# Patient Record
Sex: MALE | Race: WHITE | NOT HISPANIC OR LATINO | URBAN - METROPOLITAN AREA
[De-identification: names, ages, dates, MRNs, and addresses within clinical notes are randomized per-mention and may not be internally consistent; named-entity substitution may affect disease eponyms.]

---

## 2017-07-28 ENCOUNTER — INPATIENT (INPATIENT)
Facility: HOSPITAL | Age: 62
LOS: 1 days | Discharge: HOME | DRG: 884 | End: 2017-07-30
Attending: INTERNAL MEDICINE | Admitting: HOSPITALIST
Payer: COMMERCIAL

## 2017-07-28 VITALS
OXYGEN SATURATION: 95 % | TEMPERATURE: 98 F | HEIGHT: 65 IN | HEART RATE: 121 BPM | SYSTOLIC BLOOD PRESSURE: 154 MMHG | RESPIRATION RATE: 28 BRPM | DIASTOLIC BLOOD PRESSURE: 87 MMHG | WEIGHT: 139.99 LBS

## 2017-07-28 DIAGNOSIS — Z95.810 PRESENCE OF AUTOMATIC (IMPLANTABLE) CARDIAC DEFIBRILLATOR: Chronic | ICD-10-CM

## 2017-07-28 DIAGNOSIS — Z95.828 PRESENCE OF OTHER VASCULAR IMPLANTS AND GRAFTS: Chronic | ICD-10-CM

## 2017-07-28 LAB
BASE EXCESS BLDA CALC-SCNC: 1.5 MMOL/L — SIGNIFICANT CHANGE UP (ref -3–3)
BASOPHILS # BLD AUTO: 0 K/UL — SIGNIFICANT CHANGE UP (ref 0–0.2)
BASOPHILS NFR BLD AUTO: 0.8 % — SIGNIFICANT CHANGE UP (ref 0–2)
BLOOD GAS COMMENTS ARTERIAL: SIGNIFICANT CHANGE UP
EOSINOPHIL # BLD AUTO: 0.1 K/UL — SIGNIFICANT CHANGE UP (ref 0–0.5)
EOSINOPHIL NFR BLD AUTO: 1.2 % — SIGNIFICANT CHANGE UP (ref 0–5)
GAS PNL BLDA: SIGNIFICANT CHANGE UP
HCO3 BLDA-SCNC: 26 MMOL/L — SIGNIFICANT CHANGE UP (ref 20–26)
HCT VFR BLD CALC: 43.9 % — SIGNIFICANT CHANGE UP (ref 42–52)
HGB BLD-MCNC: 14 G/DL — SIGNIFICANT CHANGE UP (ref 14–18)
HOROWITZ INDEX BLDA+IHG-RTO: 21 — SIGNIFICANT CHANGE UP
LYMPHOCYTES # BLD AUTO: 1.5 K/UL — SIGNIFICANT CHANGE UP (ref 1–4.8)
LYMPHOCYTES # BLD AUTO: 22.7 % — SIGNIFICANT CHANGE UP (ref 20–55)
MAGNESIUM SERPL-MCNC: 2.1 MG/DL — SIGNIFICANT CHANGE UP (ref 1.6–2.6)
MCHC RBC-ENTMCNC: 24.3 PG — LOW (ref 27–31)
MCHC RBC-ENTMCNC: 31.9 G/DL — LOW (ref 32–36)
MCV RBC AUTO: 76.3 FL — LOW (ref 80–94)
MONOCYTES # BLD AUTO: 0.9 K/UL — HIGH (ref 0–0.8)
MONOCYTES NFR BLD AUTO: 13.1 % — HIGH (ref 3–10)
NEUTROPHILS # BLD AUTO: 4.1 K/UL — SIGNIFICANT CHANGE UP (ref 1.8–8)
NEUTROPHILS NFR BLD AUTO: 62 % — SIGNIFICANT CHANGE UP (ref 37–73)
NT-PROBNP SERPL-SCNC: HIGH PG/ML (ref 0–300)
PCO2 BLDA: 31 MMHG — LOW (ref 35–45)
PH BLDA: 7.5 — HIGH (ref 7.35–7.45)
PHOSPHATE SERPL-MCNC: 3.6 MG/DL — SIGNIFICANT CHANGE UP (ref 2.4–4.7)
PLATELET # BLD AUTO: 251 K/UL — SIGNIFICANT CHANGE UP (ref 150–400)
PO2 BLDA: 75 MMHG — LOW (ref 83–108)
RBC # BLD: 5.75 M/UL — SIGNIFICANT CHANGE UP (ref 4.6–6.2)
RBC # FLD: 21.6 % — HIGH (ref 11–15.6)
SAO2 % BLDA: 96 % — SIGNIFICANT CHANGE UP (ref 95–99)
WBC # BLD: 6.6 K/UL — SIGNIFICANT CHANGE UP (ref 4.8–10.8)
WBC # FLD AUTO: 6.6 K/UL — SIGNIFICANT CHANGE UP (ref 4.8–10.8)

## 2017-07-28 PROCEDURE — 71010: CPT | Mod: 26

## 2017-07-28 RX ORDER — SODIUM CHLORIDE 9 MG/ML
3 INJECTION INTRAMUSCULAR; INTRAVENOUS; SUBCUTANEOUS ONCE
Qty: 0 | Refills: 0 | Status: COMPLETED | OUTPATIENT
Start: 2017-07-28 | End: 2017-07-28

## 2017-07-28 RX ADMIN — SODIUM CHLORIDE 3 MILLILITER(S): 9 INJECTION INTRAMUSCULAR; INTRAVENOUS; SUBCUTANEOUS at 22:31

## 2017-07-28 NOTE — ED PROVIDER NOTE - PMH
AICD (automatic cardioverter/defibrillator) present    Atrial fibrillation    Cardiomyopathy    CVA (cerebral vascular accident)    Hyperlipidemia    Hypertension    RBBB    Thrombus of left atrial appendage

## 2017-07-28 NOTE — ED PROVIDER NOTE - CARE PLAN
Principal Discharge DX:	Atrial fibrillation with RVR  Secondary Diagnosis:	Dyspnea  Secondary Diagnosis:	Congestive heart failure (CHF)

## 2017-07-28 NOTE — ED PROVIDER NOTE - MEDICAL DECISION MAKING DETAILS
multiple bedside cardiac reevlaution do not suspect acute cva need for immediate rate control to prevent detioration loryitple bedside reassessments cardiopulmonary status

## 2017-07-28 NOTE — ED ADULT TRIAGE NOTE - CHIEF COMPLAINT QUOTE
patient complaining of respitory distress - upon arrival by ems at the home, patient oxygenation was 74 and placed on 100% nrb. - patient was also complaining of right arm and wrist discomfort

## 2017-07-28 NOTE — ED PROVIDER NOTE - OBJECTIVE STATEMENT
63 y/o M pt with hx of AICD, A-Fib, Cardiomyopathy, CVA (Residual right upper extremity weakness), HLD, HTN, RBBB,  presents to ED BIBA for difficulty breathing starting today. Per EMS, Wife states increased weakness to right upper extremity which isn't normal for pt. Unknown time of onset, Unknown DNR/DNI. Pt is not oxygen dependent at home. Pt walks independently. denies fever. denies HA or neck pain. no chest pain.  no abd pain. no n/v/d. no urinary f/u/d. no back pain. no motor or sensory deficits. denies drug use. no recent travel. no rash. no other acute issues symptoms or concerns.  PMD: Dennise   Nephrologist: Jayne  Cardiologist: Ke

## 2017-07-28 NOTE — ED PROVIDER NOTE - CARDIAC, MLM
Normal rate, regular rhythm.  Heart sounds S1, S2.  No murmurs,  +2 pitting edema b/l lower extremities

## 2017-07-28 NOTE — ED PROVIDER NOTE - CRITICAL CARE PROVIDED
direct patient care (not related to procedure)/interpretation of diagnostic studies/consultation with other physicians/documentation/additional history taking

## 2017-07-29 DIAGNOSIS — I50.23 ACUTE ON CHRONIC SYSTOLIC (CONGESTIVE) HEART FAILURE: ICD-10-CM

## 2017-07-29 DIAGNOSIS — R60.0 LOCALIZED EDEMA: ICD-10-CM

## 2017-07-29 DIAGNOSIS — Z71.89 OTHER SPECIFIED COUNSELING: ICD-10-CM

## 2017-07-29 DIAGNOSIS — I63.9 CEREBRAL INFARCTION, UNSPECIFIED: ICD-10-CM

## 2017-07-29 DIAGNOSIS — Z95.810 PRESENCE OF AUTOMATIC (IMPLANTABLE) CARDIAC DEFIBRILLATOR: ICD-10-CM

## 2017-07-29 DIAGNOSIS — E78.5 HYPERLIPIDEMIA, UNSPECIFIED: ICD-10-CM

## 2017-07-29 DIAGNOSIS — I48.91 UNSPECIFIED ATRIAL FIBRILLATION: ICD-10-CM

## 2017-07-29 DIAGNOSIS — N18.3 CHRONIC KIDNEY DISEASE, STAGE 3 (MODERATE): ICD-10-CM

## 2017-07-29 DIAGNOSIS — Z29.9 ENCOUNTER FOR PROPHYLACTIC MEASURES, UNSPECIFIED: ICD-10-CM

## 2017-07-29 DIAGNOSIS — F01.51 VASCULAR DEMENTIA, UNSPECIFIED SEVERITY, WITH BEHAVIORAL DISTURBANCE: ICD-10-CM

## 2017-07-29 DIAGNOSIS — R13.12 DYSPHAGIA, OROPHARYNGEAL PHASE: ICD-10-CM

## 2017-07-29 LAB
APTT BLD: 40.8 SEC — HIGH (ref 27.5–37.4)
INR BLD: 3.42 RATIO — HIGH (ref 0.88–1.16)
PROTHROM AB SERPL-ACNC: 38.6 SEC — HIGH (ref 9.8–12.7)

## 2017-07-29 PROCEDURE — 73090 X-RAY EXAM OF FOREARM: CPT | Mod: 26,LT

## 2017-07-29 PROCEDURE — 70450 CT HEAD/BRAIN W/O DYE: CPT | Mod: 26

## 2017-07-29 PROCEDURE — 73070 X-RAY EXAM OF ELBOW: CPT | Mod: 26,RT

## 2017-07-29 PROCEDURE — 73130 X-RAY EXAM OF HAND: CPT | Mod: 26,RT

## 2017-07-29 PROCEDURE — 99223 1ST HOSP IP/OBS HIGH 75: CPT | Mod: AI

## 2017-07-29 PROCEDURE — 99291 CRITICAL CARE FIRST HOUR: CPT

## 2017-07-29 PROCEDURE — 12345: CPT | Mod: NC

## 2017-07-29 RX ORDER — FUROSEMIDE 40 MG
40 TABLET ORAL
Qty: 0 | Refills: 0 | Status: DISCONTINUED | OUTPATIENT
Start: 2017-07-29 | End: 2017-07-29

## 2017-07-29 RX ORDER — NITROGLYCERIN 6.5 MG
0.5 CAPSULE, EXTENDED RELEASE ORAL EVERY 6 HOURS
Qty: 0 | Refills: 0 | Status: DISCONTINUED | OUTPATIENT
Start: 2017-07-29 | End: 2017-07-30

## 2017-07-29 RX ORDER — ALBUTEROL 90 UG/1
2 AEROSOL, METERED ORAL EVERY 6 HOURS
Qty: 0 | Refills: 0 | Status: DISCONTINUED | OUTPATIENT
Start: 2017-07-29 | End: 2017-07-29

## 2017-07-29 RX ORDER — DIGOXIN 250 MCG
0.5 TABLET ORAL ONCE
Qty: 0 | Refills: 0 | Status: COMPLETED | OUTPATIENT
Start: 2017-07-29 | End: 2017-07-29

## 2017-07-29 RX ORDER — CARVEDILOL PHOSPHATE 80 MG/1
3.12 CAPSULE, EXTENDED RELEASE ORAL EVERY 12 HOURS
Qty: 0 | Refills: 0 | Status: DISCONTINUED | OUTPATIENT
Start: 2017-07-29 | End: 2017-07-29

## 2017-07-29 RX ORDER — FUROSEMIDE 40 MG
40 TABLET ORAL ONCE
Qty: 0 | Refills: 0 | Status: COMPLETED | OUTPATIENT
Start: 2017-07-29 | End: 2017-07-29

## 2017-07-29 RX ORDER — HYDRALAZINE HCL 50 MG
10 TABLET ORAL
Qty: 0 | Refills: 0 | Status: DISCONTINUED | OUTPATIENT
Start: 2017-07-29 | End: 2017-07-29

## 2017-07-29 RX ORDER — CARVEDILOL PHOSPHATE 80 MG/1
3.12 CAPSULE, EXTENDED RELEASE ORAL ONCE
Qty: 0 | Refills: 0 | Status: COMPLETED | OUTPATIENT
Start: 2017-07-29 | End: 2017-07-29

## 2017-07-29 RX ORDER — DIGOXIN 250 MCG
0.12 TABLET ORAL DAILY
Qty: 0 | Refills: 0 | Status: DISCONTINUED | OUTPATIENT
Start: 2017-07-30 | End: 2017-07-30

## 2017-07-29 RX ORDER — PANTOPRAZOLE SODIUM 20 MG/1
40 TABLET, DELAYED RELEASE ORAL
Qty: 0 | Refills: 0 | Status: DISCONTINUED | OUTPATIENT
Start: 2017-07-29 | End: 2017-07-30

## 2017-07-29 RX ORDER — ALBUTEROL 90 UG/1
2.5 AEROSOL, METERED ORAL EVERY 6 HOURS
Qty: 0 | Refills: 0 | Status: DISCONTINUED | OUTPATIENT
Start: 2017-07-29 | End: 2017-07-30

## 2017-07-29 RX ORDER — ATORVASTATIN CALCIUM 80 MG/1
40 TABLET, FILM COATED ORAL AT BEDTIME
Qty: 0 | Refills: 0 | Status: DISCONTINUED | OUTPATIENT
Start: 2017-07-29 | End: 2017-07-29

## 2017-07-29 RX ORDER — ASPIRIN/CALCIUM CARB/MAGNESIUM 324 MG
325 TABLET ORAL ONCE
Qty: 0 | Refills: 0 | Status: COMPLETED | OUTPATIENT
Start: 2017-07-29 | End: 2017-07-29

## 2017-07-29 RX ORDER — FUROSEMIDE 40 MG
40 TABLET ORAL DAILY
Qty: 0 | Refills: 0 | Status: DISCONTINUED | OUTPATIENT
Start: 2017-07-29 | End: 2017-07-29

## 2017-07-29 RX ORDER — FUROSEMIDE 40 MG
40 TABLET ORAL DAILY
Qty: 0 | Refills: 0 | Status: DISCONTINUED | OUTPATIENT
Start: 2017-07-30 | End: 2017-07-30

## 2017-07-29 RX ORDER — CARVEDILOL PHOSPHATE 80 MG/1
6.25 CAPSULE, EXTENDED RELEASE ORAL EVERY 12 HOURS
Qty: 0 | Refills: 0 | Status: DISCONTINUED | OUTPATIENT
Start: 2017-07-29 | End: 2017-07-30

## 2017-07-29 RX ADMIN — CARVEDILOL PHOSPHATE 6.25 MILLIGRAM(S): 80 CAPSULE, EXTENDED RELEASE ORAL at 17:57

## 2017-07-29 RX ADMIN — Medication 40 MILLIGRAM(S): at 18:35

## 2017-07-29 RX ADMIN — ALBUTEROL 2.5 MILLIGRAM(S): 90 AEROSOL, METERED ORAL at 17:58

## 2017-07-29 RX ADMIN — Medication 40 MILLIGRAM(S): at 08:56

## 2017-07-29 RX ADMIN — Medication 40 MILLIGRAM(S): at 17:57

## 2017-07-29 RX ADMIN — Medication 0.5 INCH(S): at 18:35

## 2017-07-29 RX ADMIN — PANTOPRAZOLE SODIUM 40 MILLIGRAM(S): 20 TABLET, DELAYED RELEASE ORAL at 08:13

## 2017-07-29 RX ADMIN — Medication 0.5 MILLIGRAM(S): at 18:35

## 2017-07-29 RX ADMIN — CARVEDILOL PHOSPHATE 3.12 MILLIGRAM(S): 80 CAPSULE, EXTENDED RELEASE ORAL at 08:13

## 2017-07-29 NOTE — H&P ADULT - PROBLEM SELECTOR PROBLEM 5
AICD (automatic cardioverter/defibrillator) present Acute on chronic systolic heart failure CKD (chronic kidney disease) stage 3, GFR 30-59 ml/min

## 2017-07-29 NOTE — H&P ADULT - HISTORY OF PRESENT ILLNESS
61yo M w/ PMHx of chronic afib on warfarin, ischemic CVA (residual R sided weakness), hemorrhagic CVA, CAD, BiV AICD, systolic heart failure w/ EF ~30%, CKD stage 3, chronic dysphagia, jeancarlos filter placement presents for evaluation of R sided arm weakness and mild confusion. These symptoms began this morning but pt has h/o similar symptoms. He was recently admitted @ [facility] 6/2017 for same complaint. The symptoms at that time were self limited and the workup was negative as per wife. Today, the symptoms have persisted about 20 hours with no resolution. The wife states that the pt's speech has been delayed, not slurred. She reports purposeful movement with the R arm but his coordination is dramatically declined from baseline. No other acute complaints. Denies fever/chills. Denies cough. Denies palpitations. Denies CP.    In the ED, an IV was started and the pt's spouse refused cardizem and aspirin. No other interventions. Pt has had variable heart rate between 90 and 140 and the rhythm is V paced with spurts of atrial fibrillation.     PMHx: chronic afib on warfarin, ischemic CVA (residual R sided weakness), hemorrhagic CVA, CAD, BiV AICD, systolic heart failure w/ EF ~30%, CKD stage 3, chronic dysphagia, jeancarlos filter placement    PSHx: AICD placement, jeancarlos filter placed    Family Hx: denies family hx of DM2, premature CAD

## 2017-07-29 NOTE — ED ADULT NURSE REASSESSMENT NOTE - NS ED NURSE REASSESS COMMENT FT1
Dr. Travis at the bedside. Plan of care thoroughly explained to wife by MD Travis. Pt to received 0.5mg IV Digoxin, 40mg IV lasix and 1/2 inch nitro paste. Wife agreeable to plan of care with MD at bedside.

## 2017-07-29 NOTE — CONSULT NOTE ADULT - ASSESSMENT
CKD , Known CM  Needs to be kept in state of augmented pre-renal azotemia to avoid pulm edema   Renal fcn at baseline   Continue the current Lasix  Watch Labs   wife not at bedside , but will discuss when I see her

## 2017-07-29 NOTE — ED ADULT NURSE REASSESSMENT NOTE - NS ED NURSE REASSESS COMMENT FT1
Bedside report received from off-going RN at 0730. Pt received resting quietly on stretcher with wife at bedside. Pt is A&Ox1, oriented to self only. Pt denies any complaints at this time. Pt's wife states that she did not want pt to receive ASA or Cardizem without speaking to Dr. Dempsey (cardio). Pt's wife refusing multiple forms of treatment throughout hospital stay. Pt offered dysphagia screening and albuterol inhaler which pt's wife refused. She states she is agreeable to pt taking lasix, protonix and coreg as ordered. Dr. Honeycutt paged. Awaiting call back at this time. Pt denies any pain or discomfort at this time. NIH as charted on flowsheet, however pt has right side residual weakness from previous stroke per wife. Pt in no apparent distress. Respirations slightly labored with even chest rise, oxygen in place via nasal cannula at 2LPM. Wife reports pt with chronic dyspnea/tachypnea. Lj heard on auscultation bilaterally throughout. MD aware. Wife states that she would like to take patient home since the CT was negative. Charge RN made aware of wife's concerns.

## 2017-07-29 NOTE — ED ADULT NURSE REASSESSMENT NOTE - NS ED NURSE REASSESS COMMENT FT1
Spoke with Dr. Honeycutt, states he will be in the ED to speak with pt and his wife. Pt turned and repositioned for comfort, cocnepcion care provided. Pt given with meal tray and honey thick liquid, tolerating well. Will continue to monitor and reassess.

## 2017-07-29 NOTE — H&P ADULT - NSHPREVIEWOFSYSTEMS_GEN_ALL_CORE
CONSTITUTIONAL:  Denies weight loss, fever, chills, weakness or fatigue.  HEENT:  Denies vision loss, blurred vision. Denies congestion, runny nose or sore throat.  SKIN:  Denies rash or itching. Denies new skin lesions.  CARDIOVASCULAR:  Denies chest pain, chest pressure or chest discomfort. Denies palpitations.  RESPIRATORY:  Admits chronic dyspnea, denies cough.   GASTROINTESTINAL:  Denies anorexia, nausea, vomiting or diarrhea. Denies abdominal pain or blood.  GENITOURINARY:  Denies dysuria. Denies discharge.  NEUROLOGICAL:  Denies headache, seizure activity, syncope. Admits R sided arm weakness.   MUSCULOSKELETAL:  Denies muscle aches, back pain, joint pain or stiffness.  HEMATOLOGIC:  Denies anemia, bleeding or bruising.  LYMPHATICS:  Denies enlarged nodes. Admits lower extremity edema, chronic.   ENDOCRINOLOGIC:  Denies sweating, cold or heat intolerance. Denies polyuria or polydipsia.

## 2017-07-29 NOTE — CONSULT NOTE ADULT - ASSESSMENT
62 year old man with multiple medical problems including stroke in 2012 resulting in right sided weakness who presents with worsening of chronic symptoms.  Unclear if this is a new event or worsening of chronic symptoms in the setting of hypoxia.  Would also check urinalysis to r/o causes (UTI) of toxic encephalopathy  Continue warfarin. Patient's wife not agreeable to addition of aspirin.  Unable to have MRI due to pacemaker/defibrillator. Can repeat CT brain tomorrow.  X-rays of right hand/forearm to rule out fractures with acute pain. Can also consider ultrasound.  Echo and carotid ultrasound ordered.

## 2017-07-29 NOTE — H&P ADULT - PROBLEM SELECTOR PROBLEM 6
Dyslipidemia AICD (automatic cardioverter/defibrillator) present Acute on chronic systolic heart failure

## 2017-07-29 NOTE — H&P ADULT - PROBLEM SELECTOR PLAN 1
admit to stroke unit  neurology consult ([facility] neurology)  MRI cannot be completed as the pt's spouse states that his AICD is not compatible  telemetry monitoring  urgent CT brain without contrast has been ordered  pt's spouse refuses aspirin  continue statin  pt is on warfarin w/ supratherapeutic INR  carotid sono  stroke protocol

## 2017-07-29 NOTE — CONSULT NOTE ADULT - SUBJECTIVE AND OBJECTIVE BOX
CHIEF COMPLAINT:confusion and right sided weakness    HPI: 61 yo man with multiple medical problems including a history of stroke in 2012 resulting in right sided weakness who came to the hospital with confusion and worsening right sided weakness.  His wife reports having intermittent confusion since he was started on a diuretic. He was admitted to [facility] last month with similar symptoms. She reports that he is "delusional". Yesterday it seemed that his chronic right sided weakness was slightly worse. He was not using his hand and complaining of pain in his hand.  CT brain did not show any acute infarct.      REVIEW OF SYSTEMS: Pertinent symptoms negative other than listed in HPI and PMH.    PAST MEDICAL & SURGICAL HISTORY:  AICD (automatic cardioverter/defibrillator) present  Thrombus of left atrial appendage  Atrial fibrillation  RBBB  Cardiomyopathy  Hyperlipidemia  Hypertension  CVA (cerebral vascular accident)  AICD (automatic cardioverter/defibrillator) present  [place] filter in place: no h/o dvt and as per wife  it was placed as a precaution after cva.    MEDICATIONS  (STANDING):  pantoprazole    Tablet 40 milliGRAM(s) Oral before breakfast  carvedilol 6.25 milliGRAM(s) Oral every 12 hours  furosemide    Tablet 40 milliGRAM(s) Oral two times a day    MEDICATIONS  (PRN):  ALBUTerol    0.083% 2.5 milliGRAM(s) Nebulizer every 6 hours PRN Shortness of Breath and/or Wheezing    Allergies    No Known Allergies    Intolerances        FAMILY HISTORY:  Family history of cerebrovascular accident (CVA)          SOCIAL HISTORY:    Tobacco:  denies  Alcohol:  denies  Drugs:  denies    VITAL SIGNS:  Vital Signs Last 24 Hrs  T(C): 36.9 (29 Jul 2017 11:39), Max: 36.9 (29 Jul 2017 11:39)  T(F): 98.4 (29 Jul 2017 11:39), Max: 98.4 (29 Jul 2017 11:39)  HR: 96 (29 Jul 2017 11:39) (96 - 121)  BP: 104/68 (29 Jul 2017 11:39) (104/68 - 154/87)  BP(mean): --  RR: 28 (29 Jul 2017 11:39) (26 - 28)  SpO2: 96% (29 Jul 2017 11:39) (93% - 96%)    PHYSICAL EXAMINATION:  General: Well-developed, well nourished, in no acute distress.  Eyes: Conjunctiva and sclera clear.  Neurologic:  - Mental Status:  Alert, awake, oriented to person. States he is at [facility]   Speech is fluent with intact naming, repetition, and comprehension  Cranial Nerves II-XII:    II:  Visual fields are full to confronation;  Pupils are equal, round, and reactive to light.  III, IV, VI:  Extraocular movements are intact without nystagmus.  V:  Facial sensation is intact in the V1-V3 distribution bilaterally.  VII:  Right facial droop  VIII:  Hearing is intact to finger rub.  IX, X:  Uvula is midline and soft palate rises symmetrically  XI:  Head turning and shoulder shrug are intact.  XII:  Tongue protrudes in the midline.  - Motor:  right hemiparesis, arm worse than leg. Not allowing exam of right arm due to pain in hand and forearm.  - Reflexes:  1+ and symmetric at the biceps, triceps, brachioradialis (right deferred), knees, and ankles.  Plantar responses flexor.  - Sensory:  Intact to light touch, pin prick, vibration, and joint-position sense throughout.  - Gait:  deferred    LABS:                          14.0   6.6   )-----------( 251      ( 28 Jul 2017 22:39 )             43.9     28 Jul 2017 22:38    136    |  96     |  35.0   ----------------------------<  158    4.7     |  25.0   |  1.92     Ca    9.6        28 Jul 2017 22:38  Phos  3.6       28 Jul 2017 22:38  Mg     2.1       28 Jul 2017 22:38    TPro  8.0    /  Alb  3.7    /  TBili  4.5    /  DBili  x      /  AST  22     /  ALT  13     /  AlkPhos  98     28 Jul 2017 22:38    LIVER FUNCTIONS - ( 28 Jul 2017 22:38 )  Alb: 3.7 g/dL / Pro: 8.0 g/dL / ALK PHOS: 98 U/L / ALT: 13 U/L / AST: 22 U/L / GGT: x           PT/INR - ( 29 Jul 2017 01:16 )   PT: 38.6 sec;   INR: 3.42 ratio         PTT - ( 29 Jul 2017 01:16 )  PTT:40.8 sec      RADIOLOGY & ADDITIONAL STUDIES:  CT brain: no intracranial hemorrhage or acute infarct
Patient is a 62y old  Male who presents with a chief complaint of R arm weakness and confusion (29 Jul 2017 04:04)      HPI:  63yo M w/ PMHx of chronic afib on warfarin, ischemic CVA (residual R sided weakness), hemorrhagic CVA, CAD, BiV AICD, systolic heart failure w/ EF ~30%, CKD stage 3, chronic dysphagia, jeancarlos filter placement presents for evaluation of R sided arm weakness and mild confusion. These symptoms began this morning but pt has h/o similar symptoms. He was recently admitted @ [facility] 6/2017 for same complaint. The symptoms at that time were self limited and the workup was negative as per wife. Today, the symptoms have persisted about 20 hours with no resolution. The wife states that the pt's speech has been delayed, not slurred. She reports purposeful movement with the R arm but his coordination is dramatically declined from baseline. No other acute complaints. Denies fever/chills. Denies cough. Denies palpitations. Denies CP.    In the ED, an IV was started and the pt's spouse refused cardizem and aspirin. No other interventions. Pt has had variable heart rate between 90 and 140 and the rhythm is V paced with spurts of atrial fibrillation.     Above noted     PMHx: chronic afib on warfarin, ischemic CVA (residual R sided weakness), hemorrhagic CVA, CAD, BiV AICD, systolic heart failure w/ EF ~30%, CKD stage 3, chronic dysphagia, jeancarlos filter placement    PSHx: AICD placement, jeancarlos filter placed    Family Hx: denies family hx of DM2, premature CAD (29 Jul 2017 04:04)      PAST MEDICAL & SURGICAL HISTORY:  AICD (automatic cardioverter/defibrillator) present  Thrombus of left atrial appendage  Atrial fibrillation  RBBB  Cardiomyopathy  Hyperlipidemia  Hypertension  CVA (cerebral vascular accident)  AICD (automatic cardioverter/defibrillator) present  [place] filter in place: no h/o dvt and as per wife  it was placed as a precaution after cva.      FAMILY HISTORY:  Family history of cerebrovascular accident (CVA)      Social History:    MEDICATIONS  (STANDING):  pantoprazole    Tablet 40 milliGRAM(s) Oral before breakfast  carvedilol 6.25 milliGRAM(s) Oral every 12 hours  furosemide    Tablet 40 milliGRAM(s) Oral two times a day    MEDICATIONS  (PRN):  ALBUTerol    0.083% 2.5 milliGRAM(s) Nebulizer every 6 hours PRN Shortness of Breath and/or Wheezing      Allergies    No Known Allergies    Intolerances        REVIEW OF SYSTEMS:    CONSTITUTIONAL: No fever, weight loss, or fatigue  EYES: No eye pain, visual disturbances, or discharge  ENMT:  No difficulty hearing, tinnitus, vertigo; No sinus or throat pain  NECK: No pain or stiffness  BREASTS: No pain, masses, or nipple discharge  RESPIRATORY: No cough, wheezing, chills or hemoptysis; No shortness of breath  CARDIOVASCULAR: No chest pain, palpitations, dizziness, or leg swelling  GASTROINTESTINAL: No abdominal or epigastric pain. No nausea, vomiting, or hematemesis; No diarrhea or constipation. No melena or hematochezia.  GENITOURINARY: No dysuria, frequency, hematuria, or incontinence  NEUROLOGICAL: No headaches, memory loss, loss of strength, numbness, or tremors  SKIN: No itching, burning, rashes, or lesions   LYMPH NODES: No enlarged glands  ENDOCRINE: No heat or cold intolerance; No hair loss  MUSCULOSKELETAL: No joint pain or swelling; No muscle, back, or extremity pain  PSYCHIATRIC: No depression, anxiety, mood swings, or difficulty sleeping  HEME/LYMPH: No easy bruising, or bleeding gums  ALLERY AND IMMUNOLOGIC: No hives or eczema      Vital Signs Last 24 Hrs  T(C): 36.9 (29 Jul 2017 11:39), Max: 36.9 (29 Jul 2017 11:39)  T(F): 98.4 (29 Jul 2017 11:39), Max: 98.4 (29 Jul 2017 11:39)  HR: 96 (29 Jul 2017 11:39) (96 - 121)  BP: 104/68 (29 Jul 2017 11:39) (104/68 - 154/87)  BP(mean): --  RR: 28 (29 Jul 2017 11:39) (26 - 28)  SpO2: 96% (29 Jul 2017 11:39) (93% - 96%)  Daily Height in cm: 165.1 (28 Jul 2017 22:08)    Daily   I&O's Detail    I&O's Summary      PHYSICAL EXAM:    GENERAL: NAD  CHEST/LUNG: crackles b/l bases  HEART: irreg irreg rate and rhythm; S1 S2; no murmurs noted  ABDOMEN: Soft, Nontender, Nondistended; Bowel sounds present  EXTREMITIES: +1 edema.   NERVOUS SYSTEM:  Alert, awake. right arm hemiplegia. otherwise nonfocal     LABS:                        14.0   6.6   )-----------( 251      ( 28 Jul 2017 22:39 )             43.9     07-28    136  |  96<L>  |  35.0<H>  ----------------------------<  158<H>  4.7   |  25.0  |  1.92<H>    Ca    9.6      28 Jul 2017 22:38  Phos  3.6     07-28  Mg     2.1     07-28    TPro  8.0  /  Alb  3.7  /  TBili  4.5<H>  /  DBili  x   /  AST  22  /  ALT  13  /  AlkPhos  98  07-28    PT/INR - ( 29 Jul 2017 01:16 )   PT: 38.6 sec;   INR: 3.42 ratio         PTT - ( 29 Jul 2017 01:16 )  PTT:40.8 sec    Magnesium, Serum: 2.1 mg/dL (07-28 @ 22:38)  Phosphorus Level, Serum: 3.6 mg/dL (07-28 @ 22:38)    ABG - ( 28 Jul 2017 22:30 )  pH: 7.50  /  pCO2: 31    /  pO2: 75    / HCO3: 26    / Base Excess: 1.5   /  SaO2: 96                    RADIOLOGY & ADDITIONAL TESTS:
RUSH CARDENAS  [id number]          HPI:  63yo M w/ PMHx of chronic AF on warfarin, prior large hemorrhagic CVA with residual R sided weakness with additional ischemic CVAs, severe NICM with systolic heart failure w/ EF ~30% s/p bi-V AICD, CKD stage 3 presents for evaluation of R sided arm weakness and confusion. Patient was in his USOH per his wife this week until yesterday.  Yesterday he became much more confused and had difficulty with speech.  Additionally he was unable to move his right arm.  Has hemiparesis at baseline but per his wife was much worse.  Patient had brief episode of AMS for which he was seen at [facility] in 6/2017 with no clear diagnosis other than possible overdiuresis.  This episode appears longer lasting.  Patient still appears more confused and aphasic than baseline with worse RUE weakness and lethargy.  In the ER noted to in AF with some RVR but denies CP/SOB/palps.        ALLERGIES:  No Known Allergies      PAST MEDICAL & SURGICAL HISTORY:  Hyperlipidemia  Hypertension  Saul filter   Recurrent aspiration PNA  GERD  NBA  Otherwise, as noted above      MEDICATIONS (HOME):  coreg 3.125mg BID  bumex 1mg QD  coumadin 3mg QHS  omeprazole  albuterol      SOCIAL HISTORY:  Patient denies alcohol, tobacco, drug use    FAMILY HISTORY:  Family history of cerebrovascular accident (CVA)      ROS:  Patient denies cough, and other than noted above full ROS is unremarkable      PHYSICAL EXAM:  Vital Signs Last 24 Hrs  T(C): 36.4 (29 Jul 2017 07:39), Max: 36.6 (28 Jul 2017 22:08)  T(F): 97.5 (29 Jul 2017 07:39), Max: 97.8 (28 Jul 2017 22:08)  HR: 119 (29 Jul 2017 07:39) (119 - 121)  BP: 128/89 (29 Jul 2017 07:39) (128/89 - 154/87)  BP(mean): --  RR: 26 (29 Jul 2017 07:39) (26 - 28)  SpO2: 93% (29 Jul 2017 07:39) (93% - 95%)  General: Patient appears lethargic but in no distress  HEENT: NCAT, mmm, EOMI  Neck: no JVD, no carotid bruits  CVS: nl s1, split s2, no s3, +2/6 sys murmur, irreg, tachy  Chest: CTA b/l  Abdomen: soft, nt/nd  Extremities: tr-1+ b/l LE edema  Neuro: Confused, noted aphasia right arm strength 1-2/5. left 5/5  Psych: Normal affect      ECG:  AF with Bi-V pacing      LABS:                        14.0   6.6   )-----------( 251      ( 28 Jul 2017 22:39 )             43.9     07-28    136  |  96<L>  |  35.0<H>  ----------------------------<  158<H>  4.7   |  25.0  |  1.92<H>    Ca    9.6      28 Jul 2017 22:38  Phos  3.6     07-28  Mg     2.1     07-28    TPro  8.0  /  Alb  3.7  /  TBili  4.5<H>  /  DBili  x   /  AST  22  /  ALT  13  /  AlkPhos  98  07-28    CARDIAC MARKERS ( 28 Jul 2017 22:38 )  x     / 0.02 ng/mL / x     / x     / x          PT/INR - ( 29 Jul 2017 01:16 )   PT: 38.6 sec;   INR: 3.42 ratio         PTT - ( 29 Jul 2017 01:16 )  PTT:40.8 sec      RADIOLOGY:  CXR:  Pulmonary vascular congestion.  Cardiomegaly.    CTH:  No intracranial hemorrhage or mass effect. No significant   interval change in multiple old infarcts.          Assessment:  63yo M w/ PMHx of chronic AF on warfarin, prior large hemorrhagic CVA with residual R sided weakness with additional ischemic CVAs, severe NICM with systolic heart failure w/ EF ~30% s/p bi-V AICD, CKD stage 3 presents for evaluation of R sided arm weakness and confusion.  Noted to be in AF with mild RVR, ?2/2 underlying condition and anxiety/confusion.  No symptoms of RVR.  Appears to be in very mild acute on chronic sys CHF on exam and CXR.  Cause of presentation most likely neurologic, ?recurrent CVA not seen on CTH, cannot get MRI.  ?developing vascular dementia given noted recent decline and recurrent hospitalizations.    Plan:  1. Tele  2. Repeat TTE  3. Neuro eval  4. Coumadin to INR 2-3  5. No ASA given h/o cerebral hemorrhage and on Coumadin  6. Increase Coreg to 6.25mg BID,  Will avoid DHP CCB given LV dysfunction and CHF.  Consider Dig if needed.  7. No statin given h/o hepatic dysfunction with them  8. Renal f/u.  No evidence of significant metabolic derangement or ARF.  9. Will hold on addition of hydralazine/nitrates, has had issues with hypotension in the past and cannot be on ACEi/ARB 2/2 renal issues  10. Change Bumex back to Lasix.  Seemed to tolerate Lasix with less issues.    Will follow.  Thanks!

## 2017-07-29 NOTE — ED ADULT NURSE REASSESSMENT NOTE - NS ED NURSE REASSESS COMMENT FT1
Called and spoke w/ pt and states that's she has tried the benadryl and will try it again but does not want to make an appt w/ addiction specialist.     Pt resting comfortably on stretcher, wife remains at bedside. Pt given meal tray, tolerating well. Pt seen by neurology and cardiology. Awaiting admission bed assignment at this time. Pt respirations are even and unlabored. Pt turned and repositioned for comfort. Will continue to monitor and reassess.

## 2017-07-29 NOTE — H&P ADULT - PROBLEM SELECTOR PLAN 5
management as above pt's spouse is refusing changes in medication regimen so I will continue pt's home meds  would recommend optimizing systolic heart failure regimen if she agrees and BP tolerates nephro consulted by ED (Ke)  likely at baseline

## 2017-07-29 NOTE — H&P ADULT - ASSESSMENT
63yo M w/ PMHx of chronic afib on warfarin, ischemic CVA (residual R sided weakness), hemorrhagic CVA, CAD, BiV AICD, systolic heart failure w/ EF ~30%, CKD stage 3, chronic dysphagia, jeancarlos filter placement presents for evaluation of R sided arm weakness and mild confusion. CT brain pending.

## 2017-07-29 NOTE — H&P ADULT - NSHPPHYSICALEXAM_GEN_ALL_CORE
VITALS: 97.8 - [id number] - R26 - 154/87 - 95%2LNC  GENERAL: Patient lying supine. Appropriately answers yes/no questions. Pleasantly confused.  HEENT: Normocephalic, atraumatic.  Extraocular movements intact. Oropharynx clear. Mucous membranes are moist.  NECK: Supple, soft. No palpable nodules. No thyromegaly appreciated.   LUNGS: Reduced air entry bilateral chest, clear to auscultation bilateral lung fields. No wheezing or rhonchi appreciated  HEART: Soft S1, S2. 2/6 systolic murmur appreciated in R 2nd ICS. Irregular rhythm. Tachycardic.  ABDOMEN: Soft, nontender.  No organomegaly. No palpable masses.  EXTREMITIES: 2+ pitting edema b/l LE. Calves minimally tender to palpation. No obvious deformities noted in 4 extremities.  NEUROLOGIC: Awake and alert. Oriented to person only. Knows he's in a hospital but not which one. Muscle strength in b/l LE is 4/5 throughout. RUE muscle strength is approximately 2/5 in  strength and pt life his arm only a few inches off the bed without assistance. LUE muscle strength is 4/5 throughout. EOMI. PERRL. No obvious sensory deficits.  PSYCHOSOCIAL: Mood is "tired", affect is congruent. Only answering yes/no questions but he is appopriate.  INTEGUMENT: Dry mucous membranes. Skin is cool and clammy in b/l LE. No warmth appreciated.

## 2017-07-29 NOTE — ED ADULT NURSE REASSESSMENT NOTE - NS ED NURSE REASSESS COMMENT FT1
Patient's wife at bedside refusing to allow administration of Cardizem. Stated pt's cardiologist told her to not allow anyone to administer that medication. Patient educated on necessity of medication due to patient in uncontrolled a-fib. Continued to refuse. Dr. Suarez made aware. Dr. Suarez came to bedside to educate patient's wife on medication order. Patient's wife continued to refuse administration of medication unless cleared by cardiologist.

## 2017-07-29 NOTE — H&P ADULT - PROBLEM SELECTOR PLAN 6
continue statin management as above pt's spouse is refusing changes in medication regimen so I will continue pt's home meds  would recommend optimizing systolic heart failure regimen if she agrees and BP tolerates  cardio consulted by ED (Jayne)

## 2017-07-29 NOTE — H&P ADULT - PSH
AICD (automatic cardioverter/defibrillator) present    [place] filter in place  no h/o dvt and as per wife  it was placed as a precaution after cva.

## 2017-07-29 NOTE — H&P ADULT - NSHPSOCIALHISTORY_GEN_ALL_CORE
denies etoh  denies tobacco exposure  denies recreational drug use  ambulates with assistance at home  lives w/ spouse

## 2017-07-29 NOTE — ED ADULT NURSE REASSESSMENT NOTE - NS ED NURSE REASSESS COMMENT FT1
Patient received from EMS triage with IV site C/D/I, patent, negative s/s phlebitis or infiltration. Respirations even & unlabored, denies any numbness or tingling. O2 in place. Lung sounds clear bilat. Denies any chest pain, shortness of breath, nausea or dizziness. Wife at bedside, stated patient experienced episode of confusion & was unable to elevate right arm. Patient has PPM/AICD. Cardiac monitor in place, tach, paced. 3+ pitting edema to BLLE.   Wife stated pt is on thickened liquid diet.

## 2017-07-29 NOTE — H&P ADULT - PROBLEM SELECTOR PLAN 2
on warfarin 2.5mg q daily with supratherapeutic INR  check INR in the AM  pt's spouse is refusing cardizem as she states that her cardiologist informed her that this medicine is not appropriate due to the pt's medical comorbidities  pt's rate is variable but BP is well controlled, will admin extra dose of coreg 3.125mg po at this time as the pt's spouse is agreeable w/ this plan and it has been 10hrs since last dose  target HR <120 as long as pt is maintaining BP

## 2017-07-29 NOTE — PROGRESS NOTE ADULT - ATTENDING COMMENTS
d/w patient and wife at bedside.  explained medical problems and suspected vascular dementia.  advised patient is in acute heart failure and needs diuretics.  she requested renal evaluation (pending) and neuro evaluation (pending).     so far, agreeable to lasix, increased coreg and dysphagia diet

## 2017-07-29 NOTE — H&P ADULT - ATTENDING COMMENTS
The nursing supervisor was at the bedside when I approached to evaluate the patient. She was unhappy with tx regimen which was recommended in the ED. I spoke with her for 45 minutes regarding her 's care before she allowed me to complete a physical examination. She does not want the pt's medications to be changed without consulting her first. I agreed with the ED recommendation of administering a single dose of cardizem but she refused. She requests that cardiology evaluate the patient and I informed her that the consult had been placed and our team can follow up with their recommendations in the morning as the patient is hemodynamically stable at this time and an urgent consultation at 4am was not necessary. She agreed with my recommendations after counseling was provided. I directed her concern toward the pt's new neurologic findings and recommended that at least a CT scan of his brain be completed at this time as the pt is supratherapeutic on warfarin and is high risk. Her questions were answered and her concerns were addressed to the best of my ability. Total encounter including reviewing available records, discussing w/ nursing staff, counseling pt and his spouse, documenting H/P was 90 mins. The nursing supervisor was at the bedside when I approached to evaluate the patient. Pt's spouse was unhappy with tx regimen which was recommended in the ED. I spoke with her for 45 minutes regarding her 's care before she allowed me to complete a physical examination. She does not want the pt's medications to be changed without consulting her first. I agreed with the ED recommendation of administering a single dose of cardizem but she refused. She requests that cardiology evaluate the patient and I informed her that the consult had been placed and our team can follow up with their recommendations in the morning as the patient is hemodynamically stable at this time and an urgent consultation at 4am was not necessary. She agreed with my recommendations after counseling was provided. I directed her concern toward the pt's new neurologic findings and recommended that at least a CT scan of his brain be completed at this time as the pt is supratherapeutic on warfarin and is high risk. Her questions were answered and her concerns were addressed to the best of my ability. Total encounter including reviewing available records, discussing w/ nursing staff, counseling pt and his spouse, documenting H/P was 90 mins.

## 2017-07-29 NOTE — ED ADULT NURSE REASSESSMENT NOTE - NS ED NURSE REASSESS COMMENT FT1
Dr Terry called back, updated on status and VSS, at this time no new orders we continue of established plan of care, pt comfortable resting, wife went home.

## 2017-07-29 NOTE — H&P ADULT - PROBLEM SELECTOR PLAN 3
likely due to CHF but edema appears mildly asymmetric w/ R>L and pt has mild TTP in R calf  considered eval for DVT w/ venous doppler but pt is therapeutic w/ anticoagulation and he has h/o IVC filter so I cancelled imaging

## 2017-07-29 NOTE — PROGRESS NOTE ADULT - SUBJECTIVE AND OBJECTIVE BOX
Called to bedside by RN and Dr. Honeycutt due to patients wife unwilling to allow additional therapy.  Patient became more dyspneic today a/w more elevated rates in AF.  On exam has b/l crackles through the bases appears dyspneic and diaphoretic, remains lethargic    Plan:  1. Lasix IV  2. NTP  3. Dig IV x1 and po starting tomorrow  4. Coreg given at 6.25mg and to continue BID  5. If does not respond may need additional diuresis and/or IV Tridil  6. Increase O2 as needed, venti and eventual CPAP if not improved

## 2017-07-29 NOTE — PROGRESS NOTE ADULT - ASSESSMENT
61yo M w/ PMHx of chronic afib on warfarin, ischemic CVA (residual R sided weakness), hemorrhagic CVA, CAD, BiV AICD, systolic heart failure w/ EF ~30%, CKD stage 3, chronic dysphagia, jeancarlos filter placement presents for evaluation of R sided arm weakness and mild confusion. CT brain showing old strokes, microvascular changes.

## 2017-07-29 NOTE — H&P ADULT - PROBLEM SELECTOR PLAN 4
pt's spouse is refusing changes in medication regimen so I will continue pt's home meds  would recommend optimizing systolic heart failure regimen if she agrees and BP tolerates pt has long standing chronic dysphagia  I informed pt's spouse that she is describing an acute neurologic event which can change the pt's baseline status and acutely worsen his ability to swallow foods  I recommended bedside swallow evaluation prior to advancing diet but she refused this  she states that she wants her 's diet to be advanced prior to speech eval and dysphagia screen  I informed her of risk of aspiration pna, sepsis and even death as the pt has multiple medical comorbidities and she demonstrated adequate understanding of this information and still wants his diet advanced

## 2017-07-29 NOTE — PROGRESS NOTE ADULT - SUBJECTIVE AND OBJECTIVE BOX
seen for AMS, arm discomfort    Patient pulls back right arm during exam.  episodes of mild agitation  ROS negative     MEDICATIONS  (STANDING):  pantoprazole    Tablet 40 milliGRAM(s) Oral before breakfast  carvedilol 6.25 milliGRAM(s) Oral every 12 hours  furosemide    Tablet 40 milliGRAM(s) Oral two times a day    MEDICATIONS  (PRN):  ALBUTerol    0.083% 2.5 milliGRAM(s) Nebulizer every 6 hours PRN Shortness of Breath and/or Wheezing      Allergies    No Known Allergies      Vital Signs Last 24 Hrs  T(C): 36.4 (29 Jul 2017 07:39), Max: 36.6 (28 Jul 2017 22:08)  T(F): 97.5 (29 Jul 2017 07:39), Max: 97.8 (28 Jul 2017 22:08)  HR: 119 (29 Jul 2017 07:39) (119 - 121)  BP: 128/89 (29 Jul 2017 07:39) (128/89 - 154/87)  BP(mean): --  RR: 26 (29 Jul 2017 07:39) (26 - 28)  SpO2: 93% (29 Jul 2017 07:39) (93% - 95%)    PHYSICAL EXAM:    GENERAL: NAD  CHEST/LUNG: crackles b/l bases  HEART: irreg irreg rate and rhythm; S1 S2; no murmurs noted  ABDOMEN: Soft, Nontender, Nondistended; Bowel sounds present  EXTREMITIES: +1 edema.   NERVOUS SYSTEM:  Alert, awake. right arm hemiplegia. otherwise nonfocal     LABS:                        14.0   6.6   )-----------( 251      ( 28 Jul 2017 22:39 )             43.9     07-28    136  |  96<L>  |  35.0<H>  ----------------------------<  158<H>  4.7   |  25.0  |  1.92<H>    Ca    9.6      28 Jul 2017 22:38  Phos  3.6     07-28  Mg     2.1     07-28    TPro  8.0  /  Alb  3.7  /  TBili  4.5<H>  /  DBili  x   /  AST  22  /  ALT  13  /  AlkPhos  98  07-28    PT/INR - ( 29 Jul 2017 01:16 )   PT: 38.6 sec;   INR: 3.42 ratio         PTT - ( 29 Jul 2017 01:16 )  PTT:40.8 sec      CAPILLARY BLOOD GLUCOSE  150 (28 Jul 2017 22:14)            RADIOLOGY & ADDITIONAL TESTS:

## 2017-07-29 NOTE — ED ADULT NURSE REASSESSMENT NOTE - NS ED NURSE REASSESS COMMENT FT1
NIH score: 9. Patient has history of CVA with right sided residual & slightly slurred speech. Respirations even & unlabored, denies any numbness or tingling. O2 in place, Cardiac monitor in place, uncontrolled A-Fib. IV site C/D/I, patent, negative s/s phlebitis or infiltration. Patient refused all AM medications, stated wanted to wait for wife to return to facility. Will continue to monitor.

## 2017-07-29 NOTE — H&P ADULT - PROBLEM SELECTOR PROBLEM 7
Prophylactic measure Advanced care planning/counseling discussion Dyslipidemia AICD (automatic cardioverter/defibrillator) present

## 2017-07-30 ENCOUNTER — TRANSCRIPTION ENCOUNTER (OUTPATIENT)
Age: 62
End: 2017-07-30

## 2017-07-30 VITALS — WEIGHT: 139.99 LBS

## 2017-07-30 PROCEDURE — 83735 ASSAY OF MAGNESIUM: CPT

## 2017-07-30 PROCEDURE — 99285 EMERGENCY DEPT VISIT HI MDM: CPT | Mod: 25

## 2017-07-30 PROCEDURE — 70450 CT HEAD/BRAIN W/O DYE: CPT

## 2017-07-30 PROCEDURE — 94640 AIRWAY INHALATION TREATMENT: CPT

## 2017-07-30 PROCEDURE — 82803 BLOOD GASES ANY COMBINATION: CPT

## 2017-07-30 PROCEDURE — 73090 X-RAY EXAM OF FOREARM: CPT

## 2017-07-30 PROCEDURE — 73130 X-RAY EXAM OF HAND: CPT

## 2017-07-30 PROCEDURE — 71045 X-RAY EXAM CHEST 1 VIEW: CPT

## 2017-07-30 PROCEDURE — 85027 COMPLETE CBC AUTOMATED: CPT

## 2017-07-30 PROCEDURE — 99239 HOSP IP/OBS DSCHRG MGMT >30: CPT

## 2017-07-30 PROCEDURE — 80053 COMPREHEN METABOLIC PANEL: CPT

## 2017-07-30 PROCEDURE — 85730 THROMBOPLASTIN TIME PARTIAL: CPT

## 2017-07-30 PROCEDURE — 84100 ASSAY OF PHOSPHORUS: CPT

## 2017-07-30 PROCEDURE — 73070 X-RAY EXAM OF ELBOW: CPT

## 2017-07-30 PROCEDURE — 36415 COLL VENOUS BLD VENIPUNCTURE: CPT

## 2017-07-30 PROCEDURE — 83880 ASSAY OF NATRIURETIC PEPTIDE: CPT

## 2017-07-30 PROCEDURE — 97163 PT EVAL HIGH COMPLEX 45 MIN: CPT

## 2017-07-30 PROCEDURE — 93005 ELECTROCARDIOGRAM TRACING: CPT

## 2017-07-30 PROCEDURE — 84484 ASSAY OF TROPONIN QUANT: CPT

## 2017-07-30 PROCEDURE — 36600 WITHDRAWAL OF ARTERIAL BLOOD: CPT

## 2017-07-30 PROCEDURE — 85610 PROTHROMBIN TIME: CPT

## 2017-07-30 RX ORDER — ALBUTEROL 90 UG/1
1 AEROSOL, METERED ORAL
Qty: 0 | Refills: 0 | DISCHARGE
Start: 2017-07-30

## 2017-07-30 RX ORDER — FUROSEMIDE 40 MG
1 TABLET ORAL
Qty: 0 | Refills: 0 | DISCHARGE
Start: 2017-07-30 | End: 2017-08-29

## 2017-07-30 RX ORDER — CARVEDILOL PHOSPHATE 80 MG/1
1 CAPSULE, EXTENDED RELEASE ORAL
Qty: 60 | Refills: 0
Start: 2017-07-30 | End: 2017-08-29

## 2017-07-30 RX ORDER — DIGOXIN 250 MCG
1 TABLET ORAL
Qty: 0 | Refills: 0 | DISCHARGE
Start: 2017-07-30 | End: 2017-08-29

## 2017-07-30 RX ORDER — DIGOXIN 250 MCG
1 TABLET ORAL
Qty: 30 | Refills: 0
Start: 2017-07-30 | End: 2017-08-29

## 2017-07-30 RX ORDER — CARVEDILOL PHOSPHATE 80 MG/1
1 CAPSULE, EXTENDED RELEASE ORAL
Qty: 0 | Refills: 0 | DISCHARGE
Start: 2017-07-30 | End: 2017-08-29

## 2017-07-30 RX ORDER — FUROSEMIDE 40 MG
1 TABLET ORAL
Qty: 30 | Refills: 0
Start: 2017-07-30 | End: 2017-08-29

## 2017-07-30 RX ADMIN — Medication 0.12 MILLIGRAM(S): at 07:05

## 2017-07-30 RX ADMIN — CARVEDILOL PHOSPHATE 6.25 MILLIGRAM(S): 80 CAPSULE, EXTENDED RELEASE ORAL at 07:05

## 2017-07-30 NOTE — PHYSICAL THERAPY INITIAL EVALUATION ADULT - ADDITIONAL COMMENTS
pt requires assist for all functional mobility at home, wife able to assist prn, ramp to enter home, owns a W/C, uses a RW, was participating in outpatient PT

## 2017-07-30 NOTE — DISCHARGE NOTE ADULT - PATIENT PORTAL LINK FT
“You can access the [facility] Patient Portal, offered by [facility], by registering with the following website: [email or web address]”

## 2017-07-30 NOTE — DISCHARGE NOTE ADULT - MEDICATION SUMMARY - MEDICATIONS TO STOP TAKING
I will STOP taking the medications listed below when I get home from the hospital:    hydrALAZINE 10 mg oral tablet  -- 1 tab(s) by mouth 2 times a day    Levaquin 250 mg oral tablet  -- 1 tab(s) by mouth every 24 hours  -- Avoid prolonged or excessive exposure to direct and/or artificial sunlight while taking this medication.  Do not take dairy products, antacids, or iron preparations within one hour of this medication.  Finish all this medication unless otherwise directed by prescriber.  May cause drowsiness or dizziness.  Medication should be taken with plenty of water.    pantoprazole 40 mg oral delayed release tablet  -- 1 tab(s) by mouth once a day

## 2017-07-30 NOTE — PROGRESS NOTE ADULT - ASSESSMENT
· Assessment		  CKD , Known CM  Needs to be kept in state of augmented pre-renal azotemia to avoid pulm edema   Renal fcn at baseline   Continue the current Lasix

## 2017-07-30 NOTE — DISCHARGE NOTE ADULT - MEDICATION SUMMARY - MEDICATIONS TO CHANGE
I will SWITCH the dose or number of times a day I take the medications listed below when I get home from the hospital:    carvedilol 12.5 mg oral tablet  -- 1 tab(s) by mouth 2 times a day  -- It is very important that you take or use this exactly as directed.  Do not skip doses or discontinue unless directed by your doctor.  May cause drowsiness.  Alcohol may intensify this effect.  Use care when operating dangerous machinery.  Some non-prescription drugs may aggravate your condition.  Read all labels carefully.  If a warning appears, check with your doctor before taking.  Take with food or milk.

## 2017-07-30 NOTE — ED ADULT NURSE REASSESSMENT NOTE - NS ED NURSE REASSESS COMMENT FT1
family upset patient still in ED, complaining attempted to de escalate. Spouse wants to speak to Supervisor. Isabelle Maza made aware of family concerns will come down to speak with them.

## 2017-07-30 NOTE — PHYSICAL THERAPY INITIAL EVALUATION ADULT - IMPAIRMENTS FOUND, PT EVAL
gait, locomotion, and balance/muscle strength/aerobic capacity/endurance/arousal, attention, and cognition

## 2017-07-30 NOTE — DISCHARGE NOTE ADULT - HOSPITAL COURSE
62 year old man with multiple medical problems including stroke in 2012 resulting in right sided weakness who presents with worsening of chronic symptoms. Patient grasping at right arm and acting confused at times per wife.  patient found to have hypoxia due to acute chf exacerbation, afib with RVR.   CT head negative for acute processes, unable to perform MRI given PPM/aicd.  After multiple conversations between wife and cardiologist, patient given IV lasix and IV dig with improvement and stable for d/c.  seen by neurology, recommended repeat CT head but wife refused.  xray of right arm performed negative for pathology.  d/c home   follow up as outpatient advised  d/c planning 35 min.     VSS patient at baseline-alert/awake/responsive. irreg irreg s1s2 b/l crackles at bases, soft abdomen, trace LE edema.

## 2017-07-30 NOTE — DISCHARGE NOTE ADULT - CARE PLAN
Principal Discharge DX:	Transient alteration of awareness  Goal:	stable.  Instructions for follow-up, activity and diet:	suspected vascular dementia  please follow up with neurology  Secondary Diagnosis:	Vascular dementia with behavior disturbance  Instructions for follow-up, activity and diet:	see above  Secondary Diagnosis:	Atrial fibrillation with RVR  Instructions for follow-up, activity and diet:	stable on new regimen per cardiology--- coreg 6.25mg twice daily, digoxin 0.125mg daily and lasix 40mg daily  follow up with cardiology  Secondary Diagnosis:	Acute on chronic systolic heart failure  Instructions for follow-up, activity and diet:	improved with lasix.  Secondary Diagnosis:	Cerebrovascular accident (CVA), unspecified mechanism  Instructions for follow-up, activity and diet:	at baseline  Secondary Diagnosis:	CKD (chronic kidney disease) stage 3, GFR 30-59 ml/min  Instructions for follow-up, activity and diet:	stable

## 2017-07-30 NOTE — DISCHARGE NOTE ADULT - MEDICATION SUMMARY - MEDICATIONS TO TAKE
I will START or STAY ON the medications listed below when I get home from the hospital:    digoxin 125 mcg (0.125 mg) oral tablet  -- 1 tab(s) by mouth once a day  -- Indication: For Atrial fibrillation    Coumadin 3 mg oral tablet  -- 1 tab(s) by mouth once a day  -- Do not take this drug if you are pregnant.  It is very important that you take or use this exactly as directed.  Do not skip doses or discontinue unless directed by your doctor.  Obtain medical advice before taking any non-prescription drugs as some may affect the action of this medication.    -- Indication: For Atrial fibrillation    Lipitor 40 mg oral tablet  -- 1 tab(s) by mouth once a day  -- Indication: For Cerebrovascular accident (CVA), unspecified mechanism    Coreg 6.25 mg oral tablet  -- 1 tab(s) by mouth every 12 hours  -- Indication: For Atrial fibrillation    albuterol 2.5 mg/3 mL (0.083%) inhalation solution  -- 1 dose(s) inhaled 4 times a day, As Needed  -- Indication: For shortness of breath    furosemide 40 mg oral tablet  -- 1 tab(s) by mouth once a day  -- Avoid prolonged or excessive exposure to direct and/or artificial sunlight while taking this medication.  It is very important that you take or use this exactly as directed.  Do not skip doses or discontinue unless directed by your doctor.  It may be advisable to drink a full glass orange juice or eat a banana daily while taking this medication.    -- Indication: For Acute on chronic systolic heart failure

## 2017-07-30 NOTE — DISCHARGE NOTE ADULT - PLAN OF CARE
stable. suspected vascular dementia  please follow up with neurology see above stable on new regimen per cardiology--- coreg 6.25mg twice daily, digoxin 0.125mg daily and lasix 40mg daily  follow up with cardiology improved with lasix. at baseline stable

## 2017-07-30 NOTE — PHYSICAL THERAPY INITIAL EVALUATION ADULT - RANGE OF MOTION EXAMINATION, REHAB EVAL
right UE grossly observed due to pt refusing to perform ROM assessment due to pain: 50% AROM shoulder, elbow and wrist

## 2017-07-30 NOTE — PHYSICAL THERAPY INITIAL EVALUATION ADULT - CRITERIA FOR SKILLED THERAPEUTIC INTERVENTIONS
impairments found/predicted duration of therapy intervention/anticipated discharge recommendation/rehab potential/therapy frequency/functional limitations in following categories

## 2017-07-30 NOTE — PROGRESS NOTE ADULT - SUBJECTIVE AND OBJECTIVE BOX
NEPHROLOGY INTERVAL HPI/OVERNIGHT EVENTS:    Feels better   Wife at bedside     MEDICATIONS  (STANDING):  pantoprazole    Tablet 40 milliGRAM(s) Oral before breakfast  carvedilol 6.25 milliGRAM(s) Oral every 12 hours  nitroglycerin    2% Ointment 0.5 Inch(s) Transdermal every 6 hours  digoxin     Tablet 0.125 milliGRAM(s) Oral daily  furosemide   Injectable 40 milliGRAM(s) IV Push daily    MEDICATIONS  (PRN):  ALBUTerol    0.083% 2.5 milliGRAM(s) Nebulizer every 6 hours PRN Shortness of Breath and/or Wheezing      Allergies    No Known Allergies    Intolerances      Vital Signs Last 24 Hrs  T(C): 36.9 (2017 07:48), Max: 37.2 (2017 20:03)  T(F): 98.4 (2017 07:48), Max: 98.9 (2017 20:03)  HR: 68 (2017 07:48) (68 - 128)  BP: 132/89 (2017 07:48) (112/64 - 132/89)  BP(mean): --  RR: 20 (2017 07:48) (20 - 28)  SpO2: 100% (2017 07:48) (94% - 100%)  Daily     Daily Weight in k.5 (2017 09:52)  I&O's Detail    I&O's Summary      PHYSICAL EXAM:  GENERAL: NAD  CHEST/LUNG: crackles b/l bases  HEART: irreg irreg rate and rhythm; S1 S2; no murmurs noted  ABDOMEN: Soft, Nontender, Nondistended; Bowel sounds present  EXTREMITIES: +1 edema.   NERVOUS SYSTEM:  Alert, awake. right arm hemiplegia. otherwise nonfocal       LABS:                        14.0   6.6   )-----------( 251      ( 2017 22:39 )             43.9     -    136  |  96<L>  |  35.0<H>  ----------------------------<  158<H>  4.7   |  25.0  |  1.92<H>    Ca    9.6      2017 22:38  Phos  3.6       Mg     2.1         TPro  8.0  /  Alb  3.7  /  TBili  4.5<H>  /  DBili  x   /  AST  22  /  ALT  13  /  AlkPhos  98  07-    PT/INR - ( 2017 01:16 )   PT: 38.6 sec;   INR: 3.42 ratio         PTT - ( 2017 01:16 )  PTT:40.8 sec      ABG - ( 2017 22:30 )  pH: 7.50  /  pCO2: 31    /  pO2: 75    / HCO3: 26    / Base Excess: 1.5   /  SaO2: 96                    RADIOLOGY & ADDITIONAL TESTS:

## 2017-07-30 NOTE — PROGRESS NOTE ADULT - SUBJECTIVE AND OBJECTIVE BOX
RUSH CARDENAS  [id number]      Chief Complaint:  CVA/CHF    Interval History:  Patient feeling much better this am, back to baseline.  Able to use right hand with PT.  Denies further SOB.  No CP/palps.    Tele:  AF, rates better      pantoprazole    Tablet 40 milliGRAM(s) Oral before breakfast  carvedilol 6.25 milliGRAM(s) Oral every 12 hours  ALBUTerol    0.083% 2.5 milliGRAM(s) Nebulizer every 6 hours PRN  nitroglycerin    2% Ointment 0.5 Inch(s) Transdermal every 6 hours  digoxin     Tablet 0.125 milliGRAM(s) Oral daily  furosemide   Injectable 40 milliGRAM(s) IV Push daily          Physical Exam:  T(C): 36.9 (07-30-17 @ 07:48), Max: 37.2 (07-29-17 @ 20:03)  HR: 68 (07-30-17 @ 07:48) (68 - 128)  BP: 132/89 (07-30-17 @ 07:48) (104/68 - 132/89)  RR: 20 (07-30-17 @ 07:48) (20 - 28)  SpO2: 100% (07-30-17 @ 07:48) (94% - 100%)  Wt(kg): --  General: Comfortable in NAD  Neck: No JVD  CVS: nl s1s2, no s3, irreg  Pulm: Crackles at lower bases b/l  Abd: soft, non-tender  Ext: tr b/l pretibial edema  Neuro A&O x3  Psych: Normal affect      Labs:   28 Jul 2017 22:38    136    |  96     |  35.0   ----------------------------<  158    4.7     |  25.0   |  1.92     Ca    9.6        28 Jul 2017 22:38  Phos  3.6       28 Jul 2017 22:38  Mg     2.1       28 Jul 2017 22:38    TPro  8.0    /  Alb  3.7    /  TBili  4.5    /  DBili  x      /  AST  22     /  ALT  13     /  AlkPhos  98     28 Jul 2017 22:38                          14.0   6.6   )-----------( 251      ( 28 Jul 2017 22:39 )             43.9     PT/INR - ( 29 Jul 2017 01:16 )   PT: 38.6 sec;   INR: 3.42 ratio         PTT - ( 29 Jul 2017 01:16 )  PTT:40.8 sec  CARDIAC MARKERS ( 28 Jul 2017 22:38 )  x     / 0.02 ng/mL / x     / x     / x            Assessment:  61yo M w/ PMHx of chronic AF on warfarin, prior large hemorrhagic CVA with residual R sided weakness with additional ischemic CVAs, severe NICM with systolic heart failure w/ EF ~30% s/p bi-V AICD, CKD stage 3 presents for evaluation of R sided arm weakness and confusion.  Noted to be in AF with mild RVR, ?2/2 underlying condition and anxiety/confusion.  No symptoms of RVR.  Appears to be in very mild acute on chronic sys CHF on exam and CXR.  Cause of presentation most likely neurologic, ?recurrent CVA not seen on CTH, cannot get MRI.  ?developing vascular dementia given noted recent decline and recurrent hospitalizations.  -Developed acute on chronic systolic CHF 2/2 elevated HR.  Improved this am with Lasix/NTG/Dig.  -Patients wife feels he is at baseline and wishes to take him home    Plan:  1. CV stable for d/c  2. Repeat TTE in office  3. D/c back on Lasix 40mg in lieu of Bumex as seemed to work better for him  4. D/c on Dig 0.125mg QD and will check level OP  5. D/c on higher dose Coreg, 6.25mg BID  6. Coumadin to INR 2-3  7. No statin given h/o hepatic dysfunction with them  8. Renal f/u OP  9. Will hold on addition of hydralazine/nitrates, has had issues with hypotension in the past and cannot be on ACEi/ARB 2/2 renal issues  10. F/u with EP regarding BiV and in office in 1 week    D/w patient, his wife and Dr. Gonzalez

## 2017-07-30 NOTE — DISCHARGE NOTE ADULT - SECONDARY DIAGNOSIS.
Vascular dementia with behavior disturbance Atrial fibrillation with RVR Acute on chronic systolic heart failure Cerebrovascular accident (CVA), unspecified mechanism CKD (chronic kidney disease) stage 3, GFR 30-59 ml/min

## 2017-12-26 ENCOUNTER — RECORD ABSTRACTING (OUTPATIENT)
Age: 62
End: 2017-12-26

## 2017-12-26 DIAGNOSIS — N28.9 DISORDER OF KIDNEY AND URETER, UNSPECIFIED: ICD-10-CM

## 2018-01-22 ENCOUNTER — RECORD ABSTRACTING (OUTPATIENT)
Age: 63
End: 2018-01-22

## 2018-01-22 DIAGNOSIS — I49.3 VENTRICULAR PREMATURE DEPOLARIZATION: ICD-10-CM

## 2018-01-22 RX ORDER — FUROSEMIDE 40 MG/1
40 TABLET ORAL
Refills: 0 | Status: ACTIVE | COMMUNITY

## 2018-01-22 NOTE — ED ADULT TRIAGE NOTE - MEANS OF ARRIVAL
ABLE TO STAND AND WALK. SLEEPY PATIENT STATED NEEDS MORE TIME TO WAKE UP.  ,MONITORED     OXYGEN LEVEL
stretcher

## 2018-01-23 ENCOUNTER — APPOINTMENT (OUTPATIENT)
Dept: CARDIOLOGY | Facility: CLINIC | Age: 63
End: 2018-01-23

## 2018-01-26 ENCOUNTER — APPOINTMENT (OUTPATIENT)
Dept: CARDIOLOGY | Facility: CLINIC | Age: 63
End: 2018-01-26
Payer: COMMERCIAL

## 2018-01-26 VITALS
DIASTOLIC BLOOD PRESSURE: 79 MMHG | SYSTOLIC BLOOD PRESSURE: 108 MMHG | RESPIRATION RATE: 14 BRPM | HEART RATE: 80 BPM | WEIGHT: 133 LBS | HEIGHT: 64 IN | BODY MASS INDEX: 22.71 KG/M2

## 2018-01-26 PROCEDURE — 99214 OFFICE O/P EST MOD 30 MIN: CPT

## 2018-01-26 PROCEDURE — 93000 ELECTROCARDIOGRAM COMPLETE: CPT | Mod: 59

## 2018-01-26 PROCEDURE — 93284 PRGRMG EVAL IMPLANTABLE DFB: CPT

## 2018-03-28 ENCOUNTER — RX RENEWAL (OUTPATIENT)
Age: 63
End: 2018-03-28

## 2018-04-10 ENCOUNTER — APPOINTMENT (OUTPATIENT)
Dept: CARDIOLOGY | Facility: CLINIC | Age: 63
End: 2018-04-10

## 2018-04-12 ENCOUNTER — APPOINTMENT (OUTPATIENT)
Dept: CARDIOLOGY | Facility: CLINIC | Age: 63
End: 2018-04-12
Payer: COMMERCIAL

## 2018-04-12 VITALS
HEIGHT: 64 IN | WEIGHT: 137 LBS | DIASTOLIC BLOOD PRESSURE: 86 MMHG | SYSTOLIC BLOOD PRESSURE: 127 MMHG | HEART RATE: 81 BPM | RESPIRATION RATE: 14 BRPM | BODY MASS INDEX: 23.39 KG/M2

## 2018-04-12 PROCEDURE — 93284 PRGRMG EVAL IMPLANTABLE DFB: CPT

## 2018-04-12 PROCEDURE — 99215 OFFICE O/P EST HI 40 MIN: CPT

## 2018-04-12 RX ORDER — WARFARIN 2.5 MG/1
2.5 TABLET ORAL
Refills: 0 | Status: DISCONTINUED | COMMUNITY
End: 2018-04-12

## 2018-04-12 RX ORDER — FUROSEMIDE 20 MG/1
20 TABLET ORAL
Qty: 90 | Refills: 3 | Status: DISCONTINUED | COMMUNITY
Start: 2018-03-28 | End: 2018-04-12

## 2018-05-01 ENCOUNTER — RX RENEWAL (OUTPATIENT)
Age: 63
End: 2018-05-01

## 2018-07-05 ENCOUNTER — APPOINTMENT (OUTPATIENT)
Dept: CARDIOLOGY | Facility: CLINIC | Age: 63
End: 2018-07-05
Payer: COMMERCIAL

## 2018-07-05 VITALS
BODY MASS INDEX: 23.56 KG/M2 | WEIGHT: 138 LBS | DIASTOLIC BLOOD PRESSURE: 74 MMHG | HEIGHT: 64 IN | SYSTOLIC BLOOD PRESSURE: 112 MMHG | HEART RATE: 80 BPM

## 2018-07-05 PROCEDURE — 99214 OFFICE O/P EST MOD 30 MIN: CPT

## 2018-07-05 PROCEDURE — 93284 PRGRMG EVAL IMPLANTABLE DFB: CPT

## 2018-07-05 PROCEDURE — 93000 ELECTROCARDIOGRAM COMPLETE: CPT | Mod: 59

## 2018-07-05 RX ORDER — DIGOXIN 125 MCG
0.12 TABLET ORAL
Refills: 0 | Status: ACTIVE | COMMUNITY

## 2018-07-05 RX ORDER — DIGOXIN 125 MCG
0.12 TABLET ORAL
Refills: 0 | Status: COMPLETED | COMMUNITY
End: 2018-07-05

## 2018-07-30 ENCOUNTER — APPOINTMENT (OUTPATIENT)
Dept: ELECTROPHYSIOLOGY | Facility: CLINIC | Age: 63
End: 2018-07-30
Payer: COMMERCIAL

## 2018-07-30 VITALS
HEART RATE: 82 BPM | BODY MASS INDEX: 23.56 KG/M2 | HEIGHT: 64 IN | SYSTOLIC BLOOD PRESSURE: 123 MMHG | DIASTOLIC BLOOD PRESSURE: 85 MMHG | WEIGHT: 138 LBS | RESPIRATION RATE: 15 BRPM

## 2018-07-30 PROCEDURE — 99203 OFFICE O/P NEW LOW 30 MIN: CPT

## 2018-08-23 ENCOUNTER — RX RENEWAL (OUTPATIENT)
Age: 63
End: 2018-08-23

## 2018-10-16 ENCOUNTER — APPOINTMENT (OUTPATIENT)
Dept: CARDIOLOGY | Facility: CLINIC | Age: 63
End: 2018-10-16
Payer: COMMERCIAL

## 2018-10-16 VITALS
SYSTOLIC BLOOD PRESSURE: 135 MMHG | RESPIRATION RATE: 15 BRPM | BODY MASS INDEX: 23.22 KG/M2 | OXYGEN SATURATION: 94 % | WEIGHT: 136 LBS | HEIGHT: 64 IN | DIASTOLIC BLOOD PRESSURE: 94 MMHG | HEART RATE: 87 BPM

## 2018-10-16 VITALS — SYSTOLIC BLOOD PRESSURE: 110 MMHG | DIASTOLIC BLOOD PRESSURE: 78 MMHG

## 2018-10-16 PROCEDURE — 99214 OFFICE O/P EST MOD 30 MIN: CPT

## 2018-10-16 PROCEDURE — 93000 ELECTROCARDIOGRAM COMPLETE: CPT | Mod: 59

## 2018-10-16 PROCEDURE — 93284 PRGRMG EVAL IMPLANTABLE DFB: CPT

## 2018-11-07 ENCOUNTER — MOBILE ON CALL (OUTPATIENT)
Age: 63
End: 2018-11-07

## 2018-11-27 ENCOUNTER — APPOINTMENT (OUTPATIENT)
Dept: CARDIOLOGY | Facility: CLINIC | Age: 63
End: 2018-11-27

## 2019-01-15 ENCOUNTER — APPOINTMENT (OUTPATIENT)
Dept: CARDIOLOGY | Facility: CLINIC | Age: 64
End: 2019-01-15
Payer: COMMERCIAL

## 2019-01-15 PROCEDURE — 93306 TTE W/DOPPLER COMPLETE: CPT

## 2019-01-22 ENCOUNTER — APPOINTMENT (OUTPATIENT)
Dept: CARDIOLOGY | Facility: CLINIC | Age: 64
End: 2019-01-22
Payer: COMMERCIAL

## 2019-01-22 VITALS
OXYGEN SATURATION: 96 % | HEIGHT: 64 IN | BODY MASS INDEX: 23.9 KG/M2 | WEIGHT: 140 LBS | HEART RATE: 90 BPM | SYSTOLIC BLOOD PRESSURE: 115 MMHG | DIASTOLIC BLOOD PRESSURE: 81 MMHG | RESPIRATION RATE: 14 BRPM

## 2019-01-22 DIAGNOSIS — I10 ESSENTIAL (PRIMARY) HYPERTENSION: ICD-10-CM

## 2019-01-22 PROCEDURE — 99214 OFFICE O/P EST MOD 30 MIN: CPT

## 2019-01-22 PROCEDURE — 93284 PRGRMG EVAL IMPLANTABLE DFB: CPT

## 2019-01-22 RX ORDER — WARFARIN 3 MG/1
3 TABLET ORAL
Qty: 45 | Refills: 1 | Status: DISCONTINUED | COMMUNITY
Start: 2018-08-23 | End: 2019-01-22

## 2019-01-22 NOTE — DISCUSSION/SUMMARY
[FreeTextEntry1] : 1. Continue current cardiac meds in doses as noted above for treatment of his CAD, cardiomyopathy and CHF.\par 2. He and his wife continue to be hesitant to add any medication such as ACE inhibitor and Aldactone or statin. We'll continue to hold off for now as the patient is doing very well and had issues with such medications in the past.\par 3. Continue Coumadin at current dose with INRs checked by you. The goal is 2-3.\par 4. He will have an evaluation by Dr. Varner from  about the pending need for a battery change.\par 5. Recommend repeat bloodwork to check renal function.\par 6.  Will plan followup in 3 months or as needed.

## 2019-01-22 NOTE — ASSESSMENT
[FreeTextEntry1] : ICD interrogation demonstrates no events and normal functioning device at 13 months of battery remaining. \par \par Echocardiogram January 15, 2019 demonstrated left ventricle normal in size with moderately reduced function and an ejection fraction of 35-40%. Right ventricle is mildly enlarged with mildly reduced function. Left atrium moderately dilated, mildly dilated right atrium. Mild mitral, mild to moderate aortic and mild tricuspid regurgitation noted. No significant pulmonary hypertension. Overall this represented some improvement in EF.\par \par 63-year-old man with a past medical history CAD, ischemic cardiomyopathy, chronic systolic heart failure, chronic atrial fibrillation who presents for followup. Patient continues to be without evidence of acute heart failure and feeling well. He is still over a year from needing a battery change on his defibrillator. There have been no events. He is tolerating his medications without issue. There may be a component of depression at this point as he has been less active. I have again encouraged him to try and do more. Echo shows a modest improvement in his LV function and is otherwise unchanged.

## 2019-01-22 NOTE — HISTORY OF PRESENT ILLNESS
[FreeTextEntry1] : Patient is back to the office physically feeling generally well. He has had no issues with shortness of breath or edema. His weight has been stable. His wife notes that he has been less active at times and there may be a component of depression. He has been doing little bit exercising with some cycling on the floor. At this point with regards to his teeth they're waiting for his Medicare to kick in in the middle of this year. Patient denies chest pain, palpitations, orthopnea, presyncope, syncope.

## 2019-01-22 NOTE — REVIEW OF SYSTEMS
[FreeTextEntry1] : Patient denies chest pain. Other than noted above full review of systems is unremarkable.

## 2019-01-22 NOTE — PHYSICAL EXAM
[General Appearance - In No Acute Distress] : no acute distress [Normal Conjunctiva] : the conjunctiva exhibited no abnormalities [Normal Oral Mucosa] : normal oral mucosa [Auscultation Breath Sounds / Voice Sounds] : lungs were clear to auscultation bilaterally [Normal Rate] : normal [Rhythm Regular] : regular [Normal S1] : normal S1 [Normal S2] : normal S2 [II] : a grade 2 [Abdomen Soft] : soft [Abdomen Tenderness] : non-tender [Nail Clubbing] : no clubbing of the fingernails [Cyanosis, Localized] : no localized cyanosis [Skin Color & Pigmentation] : normal skin color and pigmentation [Oriented To Time, Place, And Person] : oriented to person, place, and time [Affect] : the affect was normal [FreeTextEntry1] : No JVD, no carotid bruit. [S3] : no S3

## 2019-04-24 ENCOUNTER — APPOINTMENT (OUTPATIENT)
Dept: CARDIOLOGY | Facility: CLINIC | Age: 64
End: 2019-04-24

## 2019-04-25 ENCOUNTER — APPOINTMENT (OUTPATIENT)
Dept: ELECTROPHYSIOLOGY | Facility: CLINIC | Age: 64
End: 2019-04-25
Payer: COMMERCIAL

## 2019-04-25 VITALS
HEIGHT: 64 IN | BODY MASS INDEX: 23.9 KG/M2 | DIASTOLIC BLOOD PRESSURE: 76 MMHG | SYSTOLIC BLOOD PRESSURE: 115 MMHG | RESPIRATION RATE: 16 BRPM | HEART RATE: 82 BPM | WEIGHT: 140 LBS

## 2019-04-25 PROCEDURE — 99205 OFFICE O/P NEW HI 60 MIN: CPT

## 2019-04-25 PROCEDURE — 93284 PRGRMG EVAL IMPLANTABLE DFB: CPT

## 2019-04-25 NOTE — HISTORY OF PRESENT ILLNESS
[FreeTextEntry1] : Mr. Smith is a very pleasant 64 yo male with a past medical history of HTN, permanent AF with slow ventricular response and prior h/o LA thrombus on SHANELLE, RBBB, ischemic cardiomyopathy (EF 35-40%), CVA in 2012 on coumadin (CHADS-VASC = 6) who presented with monomorphic VT on 5/2014 @ 200 bpm. The pt underwent implantation of a dual chamber ICD on 5/16/2014 for secondary prevention and then subsequently upgraded to CRT-D on 1/7/16 for persistently reduced LVEF (20-25%) at the time a/w NYHA Class III symptoms and a wide RBBB (164 ms). \par \par He presents today to establish care with a new electrophysiology provider (undersigned) and for evaluation of his CRT-D system. He's doing well, no complaints. Wife mainly speaks on his behalf. He denies any CP, DUFFY, orthopnea/PND/LE edema, LH/dizziness/syncope, palpitations, or n/v/diaphoresis.

## 2019-04-25 NOTE — PHYSICAL EXAM
[General Appearance - In No Acute Distress] : no acute distress [Normal Conjunctiva] : the conjunctiva exhibited no abnormalities [Normal Oral Mucosa] : normal oral mucosa [No Oral Cyanosis] : no oral cyanosis [Heart Rate And Rhythm] : heart rate and rhythm were normal [Murmurs] : no murmurs present [Heart Sounds] : normal S1 and S2 [Edema] : no peripheral edema present [Respiration, Rhythm And Depth] : normal respiratory rhythm and effort [Exaggerated Use Of Accessory Muscles For Inspiration] : no accessory muscle use [Auscultation Breath Sounds / Voice Sounds] : lungs were clear to auscultation bilaterally [Abdomen Tenderness] : non-tender [Abdomen Soft] : soft [Cyanosis, Localized] : no localized cyanosis [Nail Clubbing] : no clubbing of the fingernails [Skin Color & Pigmentation] : normal skin color and pigmentation [Skin Turgor] : normal skin turgor [] : no rash [Impaired Insight] : insight and judgment were intact [Oriented To Time, Place, And Person] : oriented to person, place, and time [Affect] : the affect was normal [Mood] : the mood was normal [No Anxiety] : not feeling anxious [FreeTextEntry1] : walks with a walker

## 2019-04-25 NOTE — ASSESSMENT
[FreeTextEntry1] : Device interrogation 04/25/2019 \par MDT Viva Quad XT CRT-D with A 5076 / RV 6947 / LV 4298 leads\par A imp 513 ohms / RV im 456 ohms LV imp 513 ohms\par A is in Fib. Fib waves 0.9mV \par RV 1.25V @ 0.4 / LV 1.25V @ 0.4 \par R waves 6.9\par Shock impedance RV/SVC 57/75 ohms \par Has underlying rhythm 50s-90s\par Programmed VVIR  \par No VT / Chronic AF\par  87.4%\par Battery expected longevity is 11 months\par \par Impression: normal device function, however, <96% BiV pacing 2/2 AF w/ventricular fusion

## 2019-04-25 NOTE — DISCUSSION/SUMMARY
[FreeTextEntry1] : 64 yo male with a past medical history of HTN, permanent AF with slow ventricular response and prior h/o LA thrombus on SHANELLE, RBBB, ischemic cardiomyopathy (EF 35-40%), CVA in 2012 on coumadin (CHADS-VASC = 6), MMVT s/p dual chamber ICD and subsequent upgrade to CRT-D who presents with normal device function and no VT recurrence. \par \par Unable to pace more effectively 2/2 underlying AF. Only way to correct this would be to either be more aggressive with rate control (but he is exquisitely sensitive to medication regimen changes) or perform AVJ ablation. However, he is currently clinically stable and doing well, so will defer any treatment changes for now. \par \par Recommendations:\par 1. F/u in device clinic in 4 months (alongside apt w/Dr. Travis)\par 2. Cont coumadin for stroke ppx\par 3. RTC 6 months for clinical f/u and to likely schedule generator replacement

## 2019-04-25 NOTE — REASON FOR VISIT
[Consultation] : a consultation regarding [AICD Check] : implantable cardioverter-defibrillator [Spouse] : spouse [FreeTextEntry1] : Referring: Dr. Travis

## 2019-07-30 ENCOUNTER — APPOINTMENT (OUTPATIENT)
Dept: CARDIOLOGY | Facility: CLINIC | Age: 64
End: 2019-07-30

## 2019-08-28 ENCOUNTER — APPOINTMENT (OUTPATIENT)
Dept: CARDIOLOGY | Facility: CLINIC | Age: 64
End: 2019-08-28
Payer: MEDICARE

## 2019-08-28 PROCEDURE — 93295 DEV INTERROG REMOTE 1/2/MLT: CPT

## 2019-08-28 PROCEDURE — 93296 REM INTERROG EVL PM/IDS: CPT

## 2019-09-19 ENCOUNTER — NON-APPOINTMENT (OUTPATIENT)
Age: 64
End: 2019-09-19

## 2019-09-19 ENCOUNTER — APPOINTMENT (OUTPATIENT)
Dept: CARDIOLOGY | Facility: CLINIC | Age: 64
End: 2019-09-19
Payer: MEDICARE

## 2019-09-19 VITALS
RESPIRATION RATE: 15 BRPM | HEART RATE: 94 BPM | HEIGHT: 64 IN | BODY MASS INDEX: 23.39 KG/M2 | WEIGHT: 137 LBS | DIASTOLIC BLOOD PRESSURE: 76 MMHG | SYSTOLIC BLOOD PRESSURE: 106 MMHG | OXYGEN SATURATION: 98 %

## 2019-09-19 DIAGNOSIS — J00 ACUTE NASOPHARYNGITIS [COMMON COLD]: ICD-10-CM

## 2019-09-19 PROCEDURE — 93289 INTERROG DEVICE EVAL HEART: CPT

## 2019-09-19 PROCEDURE — 93000 ELECTROCARDIOGRAM COMPLETE: CPT | Mod: 59

## 2019-09-19 PROCEDURE — 99214 OFFICE O/P EST MOD 30 MIN: CPT

## 2019-09-19 NOTE — ASSESSMENT
[FreeTextEntry1] : ICD interrogation demonstrates no events and normal functioning device at 6 months of battery remaining. \par \par Echocardiogram January 15, 2019 demonstrated left ventricle normal in size with moderately reduced function and an ejection fraction of 35-40%. Right ventricle is mildly enlarged with mildly reduced function. Left atrium moderately dilated, mildly dilated right atrium. Mild mitral, mild to moderate aortic and mild tricuspid regurgitation noted. No significant pulmonary hypertension. Overall this represented some improvement in EF.\par \par 64-year-old man with a past medical history CAD, ischemic cardiomyopathy, chronic systolic heart failure, chronic atrial fibrillation who presents for followup. Patient does not appear to be in acute CHF at this time and apparently had a recent chest x-ray which was clear. Interrogation of his device does show increased impedances in the chest wall but there is no other evidence for CHF. I would not recommend additional diuresis given that he appears to be dry and his general sensitivities. With regards to his beta blocker I would like him to be on carvedilol twice a day if possible and apparently his heart rates have been running fast in the evenings. If he cannot tolerate higher dose carvedilol or carvedilol b.i.d. might alternatively consider the addition of Ivabridine. He will continue on digoxin.

## 2019-09-19 NOTE — PHYSICAL EXAM
[General Appearance - In No Acute Distress] : no acute distress [Normal Oral Mucosa] : normal oral mucosa [Normal Conjunctiva] : the conjunctiva exhibited no abnormalities [Auscultation Breath Sounds / Voice Sounds] : lungs were clear to auscultation bilaterally [Normal Rate] : normal [Rhythm Regular] : regular [Normal S1] : normal S1 [Normal S2] : normal S2 [II] : a grade 2 [Abdomen Soft] : soft [Nail Clubbing] : no clubbing of the fingernails [Abdomen Tenderness] : non-tender [Cyanosis, Localized] : no localized cyanosis [Skin Color & Pigmentation] : normal skin color and pigmentation [Oriented To Time, Place, And Person] : oriented to person, place, and time [Affect] : the affect was normal [S3] : no S3 [FreeTextEntry1] : No JVD, no carotid bruit.

## 2019-09-19 NOTE — DISCUSSION/SUMMARY
[FreeTextEntry1] : 1. Start Coreg 3.125mg in the evening along with 6.25mg in the am.\par 2. Continue other current cardiac meds in doses as noted above for treatment of his CAD, cardiomyopathy and CHF.\par 3. If HR remains elevated or BP drops will consider Ivabridine.\par 4. He and his wife continue to be hesitant to add any medication such as ACE inhibitor and Aldactone or statin. We'll continue to hold off for now as the patient is doing very well and had issues with such medications in the past.\par 5. Continue Coumadin at current dose with INRs checked by you. The goal is 2-3.\par 6. F/u with EP regarding AICD nearing WILD.\par 7.  Will plan followup in 2 months or as needed.

## 2019-09-19 NOTE — HISTORY OF PRESENT ILLNESS
[FreeTextEntry1] : Patient to the office, having been seen here since January because he was having a lot of issues with weakness and was unable to get out of the house. He is feeling a little stronger these days. He was followed by his primary doctor at home and his carvedilol was decreased to once a day. After doing that he started having more energy and feeling better. He reports no real shortness of breath or orthopnea. He has had no edema. Patient denies chest pain, palpitations, orthopnea, presyncope, syncope.

## 2019-11-26 ENCOUNTER — APPOINTMENT (OUTPATIENT)
Dept: CARDIOLOGY | Facility: CLINIC | Age: 64
End: 2019-11-26

## 2019-12-09 ENCOUNTER — APPOINTMENT (OUTPATIENT)
Dept: CARDIOLOGY | Facility: CLINIC | Age: 64
End: 2019-12-09
Payer: MEDICARE

## 2019-12-09 PROCEDURE — 93295 DEV INTERROG REMOTE 1/2/MLT: CPT

## 2019-12-09 PROCEDURE — 93296 REM INTERROG EVL PM/IDS: CPT

## 2020-02-28 ENCOUNTER — APPOINTMENT (OUTPATIENT)
Dept: CARDIOLOGY | Facility: CLINIC | Age: 65
End: 2020-02-28
Payer: MEDICARE

## 2020-02-28 ENCOUNTER — NON-APPOINTMENT (OUTPATIENT)
Age: 65
End: 2020-02-28

## 2020-02-28 VITALS
HEIGHT: 64 IN | DIASTOLIC BLOOD PRESSURE: 75 MMHG | WEIGHT: 130 LBS | SYSTOLIC BLOOD PRESSURE: 114 MMHG | OXYGEN SATURATION: 95 % | HEART RATE: 85 BPM | BODY MASS INDEX: 22.2 KG/M2 | RESPIRATION RATE: 16 BRPM

## 2020-02-28 DIAGNOSIS — I50.22 CHRONIC SYSTOLIC (CONGESTIVE) HEART FAILURE: ICD-10-CM

## 2020-02-28 PROCEDURE — 99214 OFFICE O/P EST MOD 30 MIN: CPT

## 2020-02-28 PROCEDURE — 93289 INTERROG DEVICE EVAL HEART: CPT

## 2020-02-28 NOTE — HISTORY OF PRESENT ILLNESS
[FreeTextEntry1] : Patient returns to the office today seemingly doing fairly well and stable. He has continued to take carvedilol only in the morning. Unfortunately he still has a lot of issues with walking and is mostly bedbound at this point. He is in need of battery replacement for his AICD. He continues to be monitored at home by his primary care physician with regular blood work which apparently has been very good. He does not appear to be short of breath at all and has had no specific physical symptoms. Patient denies chest pain, palpitations, orthopnea, presyncope, syncope.

## 2020-02-28 NOTE — DISCUSSION/SUMMARY
[FreeTextEntry1] : 1. Will plan to set up for AICD battery generator placement.\par 2. Continue current cardiac meds in doses as noted above for treatment of his CAD, cardiomyopathy and CHF.\par 3. He and his wife continue to be hesitant to add any medication such as ACE inhibitor and Aldactone or statin. We'll continue to hold off for now as the patient is doing very well and had issues with such medications in the past.\par 4. Continue Coumadin at current dose with INRs checked by PCP.\par 5. Will plan followup in 3 months.

## 2020-02-28 NOTE — PHYSICAL EXAM
[General Appearance - In No Acute Distress] : no acute distress [Normal Oral Mucosa] : normal oral mucosa [Normal Conjunctiva] : the conjunctiva exhibited no abnormalities [Auscultation Breath Sounds / Voice Sounds] : lungs were clear to auscultation bilaterally [Abdomen Soft] : soft [Abdomen Tenderness] : non-tender [Nail Clubbing] : no clubbing of the fingernails [Cyanosis, Localized] : no localized cyanosis [Skin Color & Pigmentation] : normal skin color and pigmentation [Oriented To Time, Place, And Person] : oriented to person, place, and time [Affect] : the affect was normal [Rhythm Regular] : regular [Normal Rate] : normal [Normal S2] : normal S2 [Normal S1] : normal S1 [II] : a grade 2 [S3] : no S3 [FreeTextEntry1] : No JVD, no carotid bruit.

## 2020-02-28 NOTE — ASSESSMENT
[FreeTextEntry1] : ICD interrogation demonstrates no events and normal functioning device at 2 months of battery remaining. \par \par Echocardiogram January 15, 2019 demonstrated left ventricle normal in size with moderately reduced function and an ejection fraction of 35-40%. Right ventricle is mildly enlarged with mildly reduced function. Left atrium moderately dilated, mildly dilated right atrium. Mild mitral, mild to moderate aortic and mild tricuspid regurgitation noted. No significant pulmonary hypertension. Overall this represented some improvement in EF.\par \par 64-year-old man with a past medical history CAD, ischemic cardiomyopathy, chronic systolic heart failure, chronic atrial fibrillation who presents for followup. Patient does not appear to be in acute CHF at this time.  He seems to be actually fairly stable from a cardiac standpoint. He does need replacement of his AICD battery. Given the difficulties with getting him out of the house we will forego him following up with EP and to set him up at the hospital for the procedure. He will have blood work at home a week prior to the procedure. He remains on carvedilol only once a day, and although I still recommend he take an evening dose I am not pushing that at this time as he seems to be fairly stable. He is also tolerating his current diuretic regimen reasonably well and seems to be dry if anything. No overt evidence of ischemia at this time.

## 2020-03-12 ENCOUNTER — LABORATORY RESULT (OUTPATIENT)
Age: 65
End: 2020-03-12

## 2020-03-20 ENCOUNTER — APPOINTMENT (OUTPATIENT)
Dept: CARDIOLOGY | Facility: CLINIC | Age: 65
End: 2020-03-20
Payer: MEDICARE

## 2020-03-20 PROCEDURE — 93296 REM INTERROG EVL PM/IDS: CPT

## 2020-03-20 PROCEDURE — 93295 DEV INTERROG REMOTE 1/2/MLT: CPT

## 2020-04-02 PROBLEM — J00 CORYZA: Status: ACTIVE | Noted: 2019-09-19

## 2020-04-10 ENCOUNTER — APPOINTMENT (OUTPATIENT)
Dept: CARDIOLOGY | Facility: CLINIC | Age: 65
End: 2020-04-10
Payer: MEDICARE

## 2020-04-10 PROCEDURE — 93295 DEV INTERROG REMOTE 1/2/MLT: CPT

## 2020-04-10 PROCEDURE — 93296 REM INTERROG EVL PM/IDS: CPT

## 2020-04-14 ENCOUNTER — APPOINTMENT (OUTPATIENT)
Dept: ELECTROPHYSIOLOGY | Facility: CLINIC | Age: 65
End: 2020-04-14
Payer: MEDICARE

## 2020-04-14 VITALS
BODY MASS INDEX: 21.34 KG/M2 | HEART RATE: 70 BPM | SYSTOLIC BLOOD PRESSURE: 112 MMHG | HEIGHT: 64 IN | WEIGHT: 125 LBS | DIASTOLIC BLOOD PRESSURE: 73 MMHG

## 2020-04-14 PROCEDURE — 99214 OFFICE O/P EST MOD 30 MIN: CPT | Mod: 95

## 2020-04-14 RX ORDER — MUPIROCIN 20 MG/G
2 OINTMENT TOPICAL
Qty: 22 | Refills: 0 | Status: DISCONTINUED | COMMUNITY
Start: 2018-04-26 | End: 2020-04-14

## 2020-04-14 NOTE — REASON FOR VISIT
[Follow-Up - Clinic] : a clinic follow-up of [FreeTextEntry1] : Referring: Dr. Travis [Spouse] : spouse

## 2020-04-14 NOTE — DISCUSSION/SUMMARY
[FreeTextEntry1] : RUSH CARDENAS is a 64 year old male with hypertension, permanent AF with slow ventricular response (and prior h/o LA thrombus on SHANELLE) on warfarin, RBBB, CVA (2012), monomorphic VT (5/2014) s/p MDT dual chamber ICD with upgrade to CRT-D (1/2016) and ischemic cardiomyopathy with NYHA II-IIIC HFrEF (LVEF: 35-40%) who presents for follow up as his CRT-D recently reached WILD.\par \par We will schedule patient for generator replacement. His BiV pacing percentage remains low and this appears to be secondary to rapid AF. I discussed his wife the possibility of pursuing AVN ablation for better rate control since he does not tolerate additional AVN blockers (appears to be due to hypotension). She was reluctant to pursue this since he is doing well. However, she will discuss with Dr. Travis.\par \par Recommendations:\par - Generator Replacement\par - Consider AVN ablation at same time if family agreeable\par \par This visit was completed via [facility] due to the restrictions of the COVID-19 pandemic. All issues as below were discussed and addressed but no physical exam was performed unless allowed by visual confirmation on [facility]. If it was felt that the patient should be evaluated in clinic then they were directed there. Patient verbally consented to visit.\par \par 25 minutes were spent with the patient face to face and more than 50% of the time was spent in counseling and coordination of care.

## 2020-04-14 NOTE — HISTORY OF PRESENT ILLNESS
[Home] : at home, [unfilled] , at the time of the visit. [Medical Office: ([facility])___] : at ~his/her~ medical office located in [place] [Spouse] : spouse [Patient] : the patient [Self] : self [FreeTextEntry1] : Time Initiated: 10:30 am\par \par RUSH CARDENAS is a 64 year old male with hypertension, permanent AF with slow ventricular response (and prior h/o LA thrombus on SHANELLE) on warfarin, RBBB, CVA (2012), monomorphic VT (5/2014) s/p MDT dual chamber ICD with upgrade to CRT-D (1/2016) and ischemic cardiomyopathy with NYHA II-IIIC HFrEF (LVEF: 35-40%) who presents for follow up as his CRT-D recently reached WILD.\par \par Today, the patient states he feels well. Currently he states he is able to walk about 2 rooms. He denies any chest pain, palpitations, dyspnea on exertion, orthopnea, paroxysmal nocturnal dyspnea, peripheral edema, lightheadedness, dizziness, syncope, nausea, vomiting, diaphoresis, melena, hematochezia, hematemesis, fever, chills or sweats. [FreeTextEntry2] : Je Smith [FreeTextEntry4] : AMAURY Torres

## 2020-05-19 ENCOUNTER — APPOINTMENT (OUTPATIENT)
Dept: CARDIOLOGY | Facility: CLINIC | Age: 65
End: 2020-05-19

## 2020-05-21 ENCOUNTER — TRANSCRIPTION ENCOUNTER (OUTPATIENT)
Age: 65
End: 2020-05-21

## 2020-05-21 ENCOUNTER — OUTPATIENT (OUTPATIENT)
Dept: OUTPATIENT SERVICES | Facility: HOSPITAL | Age: 65
LOS: 1 days | Discharge: HOME | End: 2020-05-21
Payer: MEDICARE

## 2020-05-21 VITALS
RESPIRATION RATE: 16 BRPM | OXYGEN SATURATION: 97 % | DIASTOLIC BLOOD PRESSURE: 87 MMHG | HEART RATE: 46 BPM | TEMPERATURE: 98 F | SYSTOLIC BLOOD PRESSURE: 142 MMHG

## 2020-05-21 VITALS
OXYGEN SATURATION: 97 % | HEART RATE: 69 BPM | SYSTOLIC BLOOD PRESSURE: 111 MMHG | DIASTOLIC BLOOD PRESSURE: 78 MMHG | RESPIRATION RATE: 18 BRPM

## 2020-05-21 DIAGNOSIS — Z95.828 PRESENCE OF OTHER VASCULAR IMPLANTS AND GRAFTS: Chronic | ICD-10-CM

## 2020-05-21 DIAGNOSIS — Z95.810 PRESENCE OF AUTOMATIC (IMPLANTABLE) CARDIAC DEFIBRILLATOR: Chronic | ICD-10-CM

## 2020-05-21 DIAGNOSIS — I25.10 ATHEROSCLEROTIC HEART DISEASE OF NATIVE CORONARY ARTERY WITHOUT ANGINA PECTORIS: ICD-10-CM

## 2020-05-21 LAB
ANION GAP SERPL CALC-SCNC: 12 MMOL/L — SIGNIFICANT CHANGE UP (ref 5–17)
APTT BLD: 39.6 SEC — HIGH (ref 27.5–36.3)
BUN SERPL-MCNC: 18 MG/DL — SIGNIFICANT CHANGE UP (ref 8–20)
CALCIUM SERPL-MCNC: 9.1 MG/DL — SIGNIFICANT CHANGE UP (ref 8.6–10.2)
CHLORIDE SERPL-SCNC: 99 MMOL/L — SIGNIFICANT CHANGE UP (ref 98–107)
CO2 SERPL-SCNC: 29 MMOL/L — SIGNIFICANT CHANGE UP (ref 22–29)
CREAT SERPL-MCNC: 1.29 MG/DL — SIGNIFICANT CHANGE UP (ref 0.5–1.3)
GLUCOSE SERPL-MCNC: 124 MG/DL — HIGH (ref 70–99)
HCT VFR BLD CALC: 48.4 % — SIGNIFICANT CHANGE UP (ref 39–50)
HGB BLD-MCNC: 15 G/DL — SIGNIFICANT CHANGE UP (ref 13–17)
INR BLD: 1.73 RATIO — HIGH (ref 0.88–1.16)
MAGNESIUM SERPL-MCNC: 2 MG/DL — SIGNIFICANT CHANGE UP (ref 1.6–2.6)
MCHC RBC-ENTMCNC: 24 PG — LOW (ref 27–34)
MCHC RBC-ENTMCNC: 31 GM/DL — LOW (ref 32–36)
MCV RBC AUTO: 77.4 FL — LOW (ref 80–100)
PLATELET # BLD AUTO: 282 K/UL — SIGNIFICANT CHANGE UP (ref 150–400)
POTASSIUM SERPL-MCNC: 3.7 MMOL/L — SIGNIFICANT CHANGE UP (ref 3.5–5.3)
POTASSIUM SERPL-SCNC: 3.7 MMOL/L — SIGNIFICANT CHANGE UP (ref 3.5–5.3)
PROTHROM AB SERPL-ACNC: 19.9 SEC — HIGH (ref 10–12.9)
RBC # BLD: 6.25 M/UL — HIGH (ref 4.2–5.8)
RBC # FLD: 15.7 % — HIGH (ref 10.3–14.5)
SARS-COV-2 RNA SPEC QL NAA+PROBE: SIGNIFICANT CHANGE UP
SODIUM SERPL-SCNC: 140 MMOL/L — SIGNIFICANT CHANGE UP (ref 135–145)
WBC # BLD: 6.89 K/UL — SIGNIFICANT CHANGE UP (ref 3.8–10.5)
WBC # FLD AUTO: 6.89 K/UL — SIGNIFICANT CHANGE UP (ref 3.8–10.5)

## 2020-05-21 PROCEDURE — 85610 PROTHROMBIN TIME: CPT

## 2020-05-21 PROCEDURE — C1889: CPT

## 2020-05-21 PROCEDURE — 87635 SARS-COV-2 COVID-19 AMP PRB: CPT

## 2020-05-21 PROCEDURE — 36415 COLL VENOUS BLD VENIPUNCTURE: CPT

## 2020-05-21 PROCEDURE — 93010 ELECTROCARDIOGRAM REPORT: CPT

## 2020-05-21 PROCEDURE — C1882: CPT

## 2020-05-21 PROCEDURE — 80048 BASIC METABOLIC PNL TOTAL CA: CPT

## 2020-05-21 PROCEDURE — 33264 RMVL & RPLCMT DFB GEN MLT LD: CPT

## 2020-05-21 PROCEDURE — 85027 COMPLETE CBC AUTOMATED: CPT

## 2020-05-21 PROCEDURE — 85730 THROMBOPLASTIN TIME PARTIAL: CPT

## 2020-05-21 PROCEDURE — 93005 ELECTROCARDIOGRAM TRACING: CPT

## 2020-05-21 PROCEDURE — 83735 ASSAY OF MAGNESIUM: CPT

## 2020-05-21 RX ORDER — CEPHALEXIN 500 MG
1 CAPSULE ORAL
Qty: 4 | Refills: 0
Start: 2020-05-21 | End: 2020-05-22

## 2020-05-21 RX ORDER — LISINOPRIL 2.5 MG/1
1 TABLET ORAL
Qty: 30 | Refills: 0
Start: 2020-05-21 | End: 2020-06-19

## 2020-05-21 NOTE — DISCHARGE NOTE PROVIDER - NSDCCPTREATMENT_GEN_ALL_CORE_FT
PRINCIPAL PROCEDURE  Procedure: Replacement, biventricular ICD pulse generator  Findings and Treatment: Cardiac Device Implant Post Operative Instructions  - Do not touch the incision until it is completely healed.   - There are Steristrips (white strips of tape) on your incision, which will start to peel off on their own over the next 2-3 weeks. Do not pick at or peel off the Steristrips.   - Bruising around the implant site or over the chest, side or arm near the incision is normal, and will take a few weeks to resolve.    - Do not apply soaps, creams, lotions, ointments or powders to the incision until it is completely healed.  - You may take a shower in 24 hours, and allow the water to run over the incision. However, do not submerge the incision in water: do not swim or soak in bath tubs, hot tubs, swimming pools, etc.   You should call the doctor if:   - You notice redness, drainage, swelling, increased tenderness, hot sensation around the incision, bleeding or incision edges pulling apart.  - Your temperature is greater than 100 degrees F for more than 24 hours.  - You notice swelling or bulging at the incision or around the device that was not there when you left the hospital or is increasing in size.  - You experience increased difficulty breathing.  - You notice new/worsening swelling in your legs and ankles.  - You faint or have dizzy spells.  - You have any questions or concerns regarding your device or the procedure. PRINCIPAL PROCEDURE  Procedure: Replacement, biventricular ICD pulse generator  Findings and Treatment: Cardiac Device Implant Post Operative Instructions  - Do not touch the incision until it is completely healed.   - There is DERMABOND on your incision, which will start to peel off on their own over the next 2-3 weeks. Do not pick at or peel off the glue.  - Bruising around the implant site or over the chest, side or arm near the incision is normal, and will take a few weeks to resolve.    - Do not apply soaps, creams, lotions, ointments or powders to the incision until it is completely healed.  - You may take a shower in 24 hours, and allow the water to run over the incision. However, do not submerge the incision in water: do not swim or soak in bath tubs, hot tubs, swimming pools, etc.   You should call the doctor if:   - You notice redness, drainage, swelling, increased tenderness, hot sensation around the incision, bleeding or incision edges pulling apart.  - Your temperature is greater than 100 degrees F for more than 24 hours.  - You notice swelling or bulging at the incision or around the device that was not there when you left the hospital or is increasing in size.  - You experience increased difficulty breathing.  - You notice new/worsening swelling in your legs and ankles.  - You faint or have dizzy spells.  - You have any questions or concerns regarding your device or the procedure.

## 2020-05-21 NOTE — DISCHARGE NOTE PROVIDER - HOSPITAL COURSE
64 year old male with hypertension, permanent AF with slow ventricular response (and prior h/o LA thrombus on SHANELLE) on warfarin, RBBB, CVA (2012), monomorphic VT (5/2014) s/p MDT dual chamber ICD with upgrade to CRT-D (1/2016) and ischemic cardiomyopathy with NYHA II-IIIC HFrEF (LVEF: 35-40%) who is now s/p successful generator replacement.

## 2020-05-21 NOTE — H&P PST ADULT - NSICDXPASTSURGICALHX_GEN_ALL_CORE_FT
PAST SURGICAL HISTORY:  AICD (automatic cardioverter/defibrillator) present     Saul filter in place no h/o dvt and as per wife  it was placed as a precaution after cva.

## 2020-05-21 NOTE — DISCHARGE NOTE PROVIDER - NSDCCPCAREPLAN_GEN_ALL_CORE_FT
PRINCIPAL DISCHARGE DIAGNOSIS  Diagnosis: ICD (implantable cardioverter-defibrillator) battery depletion  Assessment and Plan of Treatment:

## 2020-05-21 NOTE — H&P PST ADULT - HISTORY OF PRESENT ILLNESS
64 year old male with hypertension, permanent AF with slow ventricular response (and prior h/o LA thrombus on SHANELLE) on warfarin, RBBB, CVA (2012), monomorphic VT (5/2014) s/p MDT dual chamber ICD with upgrade to CRT-D (1/2016) and ischemic cardiomyopathy with NYHA II-IIIC HFrEF (LVEF: 35-40%) who presents for follow up as his CRT-D recently reached [facility]. Today, the patient states he feels well. Currently he states he is able to walk about 2 rooms. He denies any chest pain, palpitations, dyspnea on exertion, orthopnea, paroxysmal nocturnal dyspnea, peripheral edema, lightheadedness, dizziness, syncope, nausea, vomiting, diaphoresis, melena, hematochezia, hematemesis, fever, chills or sweats.      Cardiology Summary  Echo:  1/15/2019 TTE: LAD: 3.9cm. LVEF 35-40%. Basal-mid inferolateral wall severely hypokinetic. Apical septal segment and apex moderately hypokinetic. Moderate LA dilation. Mild RA dilation. Mild MR, TR. Mild AoV sclerosis. Mild-mod AI.  ICD/Defibrillator:    MDT CRT-D (S/N: [id number]; DOI: 1/7/2016)  RA Lead: 5076 (S/N: [id number]; DOI: 5/16/2014)  RV Lead: 6947M (S/N: [id number]; DOI: 5/16/2014)  LV Lead: 4298 (S/N: [id number]; DOI: 1/7/2016)

## 2020-05-21 NOTE — PROGRESS NOTE ADULT - SUBJECTIVE AND OBJECTIVE BOX
ELECTROPHYSIOLOGY BRIEF POST-OP NOTE    I have personally seen and examined the patient today in holding area spot 10 s/p generator replacement    PRE-OP DIAGNOSIS: CRT-D generator at WILD    POST-OP DIAGNOSIS: New CRT-D generator    PROCEDURE: Generator replacement    COMPLICATIONS: None    CARDIOMYOPATHY: YES    IMPLANT EF%: 35%    NYHA CLASS: III    BETA BLOCKER: Coreg    ACE/ARB: No - Renal function improved - starting low dose ACEI    Physician: Dave Marte MD  Assistant: None    ESTIMATED BLOOD LOSS: <10 mL    ANESTHESIA TYPE:  [   ]General Anesthesia  [ x ]Sedation  [   ]Local/Regional    CONDITION:  [  ]Critical  [  ]Serious  [ x ]Stable  [  ]Good    SPECIMENS REMOVED (if applicable): old Medtronic generator    IMPLANT (if applicable): New Medtronic CRT-D generator    EKG: VVI paced at 70ppm    Vital Signs Last 24 Hrs  T(C): 36.7 (21 May 2020 08:15), Max: 36.7 (21 May 2020 08:15)  T(F): 98 (21 May 2020 08:15), Max: 98 (21 May 2020 08:15)  HR: 69 (21 May 2020 12:25) (46 - 69)  BP: 133/83 (21 May 2020 12:25) (115/77 - 142/87)  RR: 18 (21 May 2020 12:25) (16 - 18)  SpO2: 97% (21 May 2020 12:25) (97% - 98%)    Physical Exam:  Constitutional: NAD,  Cardiovascular: +S1S2 RRR  Pulmonary: CTA b/l, unlabored  Pressure dressing on LACW CDI. No hematoma.   GI: soft NTND +BS  Extremities: no pedal edema,   Neuro: non focal, HARRIS x4    A/P  64 year old male with hypertension, permanent AF with slow ventricular response (and prior h/o LA thrombus on SHANELLE) on warfarin, RBBB, CVA (2012), monomorphic VT (5/2014) s/p MDT dual chamber ICD with upgrade to CRT-D (1/2016) and ischemic cardiomyopathy with NYHA II-IIIC HFrEF (LVEF: 35-40%) who presents for generator replacement.     -Keflex 500mg PO BID x 2 days  -Continue Coumadin. Check INR in 2-3 days  -Resume ACEI and follow up with Dr. Travis  -Remove pressure dressing in AM  -Discharge home today.

## 2020-05-21 NOTE — H&P PST ADULT - ASSESSMENT
64 year old male with hypertension, permanent AF with slow ventricular response (and prior h/o LA thrombus on SHANELLE) on warfarin, RBBB, CVA (2012), monomorphic VT (5/2014) s/p MDT dual chamber ICD with upgrade to CRT-D (1/2016) and ischemic cardiomyopathy with NYHA II-IIIC HFrEF (LVEF: 35-40%) who presents for generator replacement.     - Arrived in NPO status  - COVID-19 swab not obtained as outpatient. Will swab stat now.   - No AV node ablation this admission. Patient preference  - Patient not on ACEI/ARB due to history of PITA.

## 2020-05-21 NOTE — DISCHARGE NOTE PROVIDER - NSDCMRMEDTOKEN_GEN_ALL_CORE_FT
Coreg 6.25 mg oral tablet: 1 tab(s) orally once a day  Coumadin 3 mg oral tablet: 1 tab(s) orally once a day  digoxin 125 mcg (0.125 mg) oral tablet: 1 tab(s) orally every other day (at bedtime)  furosemide 40 mg oral tablet: 1 tab(s) orally every other day (at bedtime)  Lasix 20 mg oral tablet: 1 tab(s) orally every other day (at bedtime)  pantoprazole 40 mg oral delayed release tablet: 1 tab(s) orally once a day Coreg 6.25 mg oral tablet: 1 tab(s) orally once a day  Coumadin 3 mg oral tablet: 1 tab(s) orally once a day  digoxin 125 mcg (0.125 mg) oral tablet: 1 tab(s) orally every other day (at bedtime)  furosemide 40 mg oral tablet: 1 tab(s) orally every other day (at bedtime)  Keflex 500 mg oral capsule: 1 cap(s) orally 2 times a day x 2 days   Lasix 20 mg oral tablet: 1 tab(s) orally every other day (at bedtime)  lisinopril 2.5 mg oral tablet: 1 tab(s) orally once a day   pantoprazole 40 mg oral delayed release tablet: 1 tab(s) orally once a day

## 2020-05-21 NOTE — DISCHARGE NOTE NURSING/CASE MANAGEMENT/SOCIAL WORK - PATIENT PORTAL LINK FT
You can access the FollowMyHealth Patient Portal offered by [facility] by registering at the following website: [email or web address]. By joining [email or web address]’s FollowMyHealth portal, you will also be able to view your health information using other applications (apps) compatible with our system.

## 2020-05-21 NOTE — DISCHARGE NOTE PROVIDER - PROVIDER TOKENS
PROVIDER:[TOKEN:[[id number]],FOLLOWUP:[2 weeks],ESTABLISHEDPATIENT:[T]] PROVIDER:[TOKEN:[[id number]],FOLLOWUP:[2 weeks],ESTABLISHEDPATIENT:[T]],PROVIDER:[TOKEN:[[id number]],ESTABLISHEDPATIENT:[T]]

## 2020-05-21 NOTE — DISCHARGE NOTE PROVIDER - CARE PROVIDER_API CALL
Dave Marte)  Cardiology; Internal Medicine  [address] [place] [address], [place] [address]  Phone: [phone]  Fax: [phone]  Established Patient  Follow Up Time: 2 weeks Dave Marte)  Cardiology; Internal Medicine  [address] [facility], [address]  [place] [address]  Phone: [phone]  Fax: [phone]  Established Patient  Follow Up Time: 2 weeks    [facility] [place] [address]  Phone: [phone]  Fax: [phone]  Established Patient  Follow Up Time:

## 2020-05-21 NOTE — DISCHARGE NOTE PROVIDER - NSDCFUSCHEDAPPT_GEN_ALL_CORE_FT
RUSH CARDENAS ; 06/09/2020 ; NPP Cardiology [address] [facility] RUSH CARDENAS ; 06/09/2020 ; NPP Cardiology [address] [facility]

## 2020-05-21 NOTE — H&P PST ADULT - NSICDXPASTMEDICALHX_GEN_ALL_CORE_FT
PAST MEDICAL HISTORY:  AICD (automatic cardioverter/defibrillator) present     Atrial fibrillation     Cardiomyopathy     CVA (cerebral vascular accident)     Hyperlipidemia     Hypertension     RBBB     Thrombus of left atrial appendage

## 2020-05-21 NOTE — DISCHARGE NOTE PROVIDER - CARE PROVIDERS DIRECT ADDRESSES
,DirectAddress_Unknown ,DirectAddress_Unknown,[email or web address]

## 2020-05-21 NOTE — DISCHARGE NOTE PROVIDER - NSDCFUADDINST_GEN_ALL_CORE_FT
Follow up with Dr. Marte in 2 weeks time. The office will call you in 3-5 days to arrange this follow up. Please check your INR in 2-3 days time. Follow up with Dr. Marte in 2 weeks time. The office will call you in 3-5 days to arrange this follow up. Please check your INR in 2-3 days time.  Remove Pressure dressing from your chest tomorrow.

## 2020-05-21 NOTE — H&P PST ADULT - ATTENDING COMMENTS
I have personally seen, examined, and participated in the care of this patient. I have reviewed all pertinent clinical information, including history, physical exam, plan, and the PA/NP's note and agree except as noted below.    Briefly, 64 year old male with hypertension, permanent AF with slow ventricular response (and prior h/o LA thrombus on SHANELLE) on warfarin, RBBB, CVA (2012), monomorphic VT (5/2014) s/p MDT dual chamber ICD with upgrade to CRT-D (1/2016) and ischemic cardiomyopathy with NYHA II-IIIC HFrEF (LVEF: 35-40%) who presents for generator replacement as it has reached WILD. AV Node ablation was discussed but patient and family refused. COVID-19 swab negative today. Plan for generator replacement today.    Dave Marte MD  Clinical Cardiac Electrophysiology

## 2020-05-28 ENCOUNTER — INPATIENT (INPATIENT)
Facility: HOSPITAL | Age: 65
LOS: 5 days | Discharge: HOME | DRG: 392 | End: 2020-06-03
Attending: INTERNAL MEDICINE | Admitting: INTERNAL MEDICINE
Payer: MEDICARE

## 2020-05-28 ENCOUNTER — NON-APPOINTMENT (OUTPATIENT)
Age: 65
End: 2020-05-28

## 2020-05-28 VITALS
HEART RATE: 94 BPM | RESPIRATION RATE: 22 BRPM | TEMPERATURE: 100 F | WEIGHT: 149.91 LBS | HEIGHT: 67 IN | OXYGEN SATURATION: 95 % | DIASTOLIC BLOOD PRESSURE: 67 MMHG | SYSTOLIC BLOOD PRESSURE: 104 MMHG

## 2020-05-28 DIAGNOSIS — Z95.810 PRESENCE OF AUTOMATIC (IMPLANTABLE) CARDIAC DEFIBRILLATOR: Chronic | ICD-10-CM

## 2020-05-28 DIAGNOSIS — Z95.828 PRESENCE OF OTHER VASCULAR IMPLANTS AND GRAFTS: Chronic | ICD-10-CM

## 2020-05-28 PROCEDURE — 71045 X-RAY EXAM CHEST 1 VIEW: CPT | Mod: 26

## 2020-05-28 PROCEDURE — 99285 EMERGENCY DEPT VISIT HI MDM: CPT | Mod: CS,GC

## 2020-05-28 RX ORDER — ACETAMINOPHEN 500 MG
650 TABLET ORAL ONCE
Refills: 0 | Status: COMPLETED | OUTPATIENT
Start: 2020-05-28 | End: 2020-05-28

## 2020-05-28 RX ORDER — SODIUM CHLORIDE 9 MG/ML
1000 INJECTION INTRAMUSCULAR; INTRAVENOUS; SUBCUTANEOUS ONCE
Refills: 0 | Status: COMPLETED | OUTPATIENT
Start: 2020-05-28 | End: 2020-05-28

## 2020-05-28 RX ORDER — SODIUM CHLORIDE 9 MG/ML
2100 INJECTION INTRAMUSCULAR; INTRAVENOUS; SUBCUTANEOUS ONCE
Refills: 0 | Status: DISCONTINUED | OUTPATIENT
Start: 2020-05-28 | End: 2020-05-28

## 2020-05-28 RX ORDER — VANCOMYCIN HCL 1 G
1000 VIAL (EA) INTRAVENOUS ONCE
Refills: 0 | Status: DISCONTINUED | OUTPATIENT
Start: 2020-05-28 | End: 2020-05-29

## 2020-05-28 RX ORDER — PIPERACILLIN AND TAZOBACTAM 4; .5 G/20ML; G/20ML
3.38 INJECTION, POWDER, LYOPHILIZED, FOR SOLUTION INTRAVENOUS ONCE
Refills: 0 | Status: COMPLETED | OUTPATIENT
Start: 2020-05-28 | End: 2020-05-28

## 2020-05-28 RX ORDER — AZITHROMYCIN 500 MG/1
500 TABLET, FILM COATED ORAL ONCE
Refills: 0 | Status: COMPLETED | OUTPATIENT
Start: 2020-05-28 | End: 2020-05-28

## 2020-05-28 NOTE — ED PROVIDER NOTE - PSH
AICD (automatic cardioverter/defibrillator) present    [place] filter in place  no h/o dvt and as per wife  it was placed as a precaution after cva.

## 2020-05-28 NOTE — ED PROVIDER NOTE - PHYSICAL EXAMINATION
Constitutional: Awake, alert, in no acute distress  Eyes: no scleral icterus  HENT: normocephalic, atraumatic, dry oral mucosa, neck supple with full ROM  CV: +irregularly irregular rhythm, no murmur  Pulm: non-labored respirations, +mild bibasilar crackles  Abdomen: soft, non-tender, non-distended  Extremities: no edema, no deformity  Skin: no rash, no jaundice  Neuro: AAOx2, +chronic R facial droop and R hemiparesis, moving all extremities Constitutional: Awake, alert, in no acute distress  Eyes: no scleral icterus  HENT: normocephalic, atraumatic, dry oral mucosa, neck supple with full ROM  CV: +irregularly irregular rhythm, no murmur  Pulm: non-labored respirations, +mild bibasilar crackles  Abdomen: soft, non-tender, non-distended  Extremities: no edema, no deformity  Skin: no rash, no jaundice, no skin breakdown  Neuro: AAOx2, +chronic R facial droop and R hemiparesis, moving all extremities

## 2020-05-28 NOTE — ED PROVIDER NOTE - OBJECTIVE STATEMENT
64y M w/ hx HTN, Afib, CHF, AICD, CVA 2012 with residual R sided weakness, dysphagia; presenting from home for weakness.  Pt offers no complaints at this time, unable to provide further hx.  Additional hx obtained from pt's wife Nicky.  She states that pt just had ICD generator changed 1 week ago, and since then he has not been acting at his baseline.  She says that he has had increased R leg weakness and difficulty walking over the past week.  He was discharged from the hospital on Keflex, but stopped taking it 5 days ago because he began having loose stools.  No fever, cough, chest pain, SOB, vomiting, abdominal pain. 64y M w/ hx HTN, Afib, CHF, AICD, CVA 2012 with residual R sided weakness, dysphagia; presenting from home for weakness.  Pt offers no complaints at this time, unable to provide further hx.  Additional hx obtained from pt's wife Nicky.  She states that pt just had ICD generator changed 1 week ago, and since then he has not been acting at his baseline.  She says that he has had increased R leg weakness and difficulty walking over the past week.  He was discharged from the hospital on Keflex, but stopped taking it 5 days ago because he began having loose stools.  No fever, cough, chest pain, SOB, vomiting, abdominal pain.  Pt is normally AAOx2, but has been more confused compared to his baseline.

## 2020-05-28 NOTE — ED PROVIDER NOTE - CLINICAL SUMMARY MEDICAL DECISION MAKING FREE TEXT BOX
64y M w/ hx HTN, Afib, CHF, CVA, presenting for 1 week of worsening weakness and altered mental status, associated with loose stools.  Recent discharged from this hospital after having AICD generator replaced.  Will initiate sepsis workup with labs, cultures, UA, CXR, EKG.  Given reported loose stools and rales noted on exam, will obtain CT chest/abd/pelvis, in addition to CT head given AMS/increased R leg weakness.  Will treat with 1 L NS (will not do 30 cc/kg bolus due to hx CHF), tylenol, empiric vancomycin, zosyn, azithromycin. 64y M w/ hx HTN, Afib, CHF, CVA, presenting for 1 week of worsening weakness and altered mental status, associated with loose stools.  Recently discharged from this hospital after having AICD generator replaced.  Pt febrile to 101.2 rectal in the ED. Will initiate sepsis workup with labs, cultures, UA, CXR, EKG.  Given reported loose stools and rales noted on exam, will obtain CT chest/abd/pelvis, in addition to CT head given AMS/increased R leg weakness.  Will treat with 1 L NS (will not do 30 cc/kg bolus due to hx CHF), tylenol, empiric vancomycin, zosyn, azithromycin.

## 2020-05-28 NOTE — ED ADULT NURSE NOTE - CHIEF COMPLAINT QUOTE
as per EMS patient had "procedure on his defibrillator" at [facility] last week and ever since family has been stating he is "not right" and has had low grade fevers all week  Hx of prior stroke, baseline mental as per EMS but more lethargic than normal  patient able to state name and location, unsure as to why he is in the ED

## 2020-05-28 NOTE — ED PROVIDER NOTE - ATTENDING CONTRIBUTION TO CARE
63yo male with pmh of HTN, Afib, CHF, AICD, CVA 2012 with residual R sided weakness, dysphagia; presents for weakness.  Pt states his wife called EMS but he's not sure why. Wife states that pt just had ICD generator changed 1 week ago, and since then he has not been acting at his baseline.  She says that he has had increased R leg weakness and difficulty walking over the past week.  He was discharged from the hospital on Keflex, but stopped taking it 5 days ago because he began having loose stools.  No fever, cough, chest pain, SOB, vomiting, abdominal pain.  Pt is normally AAOx2  Const: Awake, alert and orientedx2. In no acute distress. Well appearing.  HEENT: NC/AT. Moist mucous membranes.  Eyes: No scleral icterus. EOMI.  Neck:. Soft and supple. Full ROM without pain.  Cardiac: Irregular rate and irregular rhythm. +S1/S2. Peripheral pulses 2+ and symmetric. No LE edema.  Resp: Speaking in full sentences. No evidence of respiratory distress. crackles at bases  Abd: Soft, non-tender, non-distended. Normal bowel sounds in all 4 quadrants. No guarding or rebound.  Back: Spine midline and non-tender. No CVAT.  Skin: No rashes, abrasions or lacerations.  Lymph: No cervical lymphadenopathy.  Neuro: Awake, alert & oriented x2  sepsis protocol initiated, labs, ct, abx, ivf - judicious administration of IVF due to ho of CHF

## 2020-05-28 NOTE — ED PROVIDER NOTE - CRITICAL CARE PROVIDED
consult w/ pt's family directly relating to pts condition/direct patient care (not related to procedure)/documentation/interpretation of diagnostic studies/additional history taking

## 2020-05-28 NOTE — ED ADULT NURSE NOTE - OBJECTIVE STATEMENT
Patient to B10L via EMS, placed in gown and on monitor. EMS states family called because the patient has been acting "not like himself" since being discharged from here last week after a procedure was done on his defibrillator. Upon arrival, patient A&Ox3, respirations even and unlabored, skin warm pink dry and intact. Rectal temp 101.2.

## 2020-05-28 NOTE — ED CLERICAL - CLERICAL COMMENTS
PTs wife called saying PT has a swallowing issue, she claims that he can eat soft foods but it has to be followed with a thickened honey like consitancy liquid

## 2020-05-28 NOTE — ED ADULT TRIAGE NOTE - CHIEF COMPLAINT QUOTE
as per EMS patient had "procedure on his defibrillator" at [facility] last week and ever since family has been stating he is "not right" and has had low grade fevers all week  Hx of prior stroke, baseline mental as per EMS but more lethargic than normal  patient able to state name and location, unsure as to why he is in the ED

## 2020-05-28 NOTE — ED PROVIDER NOTE - NS ED ROS FT
Constitutional: no fever, no chills  Eyes: no vision changes  ENT: no nasal congestion, no sore throat  CV: no chest pain  Resp: no cough, no shortness of breath  GI: no abdominal pain, no vomiting, +diarrhea  : no dysuria  MSK: no joint pain  Skin: no rash  Neuro: no headache, +weakness, no paresthesias

## 2020-05-29 DIAGNOSIS — K52.9 NONINFECTIVE GASTROENTERITIS AND COLITIS, UNSPECIFIED: ICD-10-CM

## 2020-05-29 LAB
ALBUMIN SERPL ELPH-MCNC: 3.4 G/DL — SIGNIFICANT CHANGE UP (ref 3.3–5.2)
ALP SERPL-CCNC: 92 U/L — SIGNIFICANT CHANGE UP (ref 40–120)
ALT FLD-CCNC: 19 U/L — SIGNIFICANT CHANGE UP
ANION GAP SERPL CALC-SCNC: 12 MMOL/L — SIGNIFICANT CHANGE UP (ref 5–17)
ANION GAP SERPL CALC-SCNC: 15 MMOL/L — SIGNIFICANT CHANGE UP (ref 5–17)
ANISOCYTOSIS BLD QL: SLIGHT — SIGNIFICANT CHANGE UP
APTT BLD: 26.3 SEC — LOW (ref 27.5–36.3)
AST SERPL-CCNC: 34 U/L — SIGNIFICANT CHANGE UP
BASOPHILS # BLD AUTO: 0.06 K/UL — SIGNIFICANT CHANGE UP (ref 0–0.2)
BASOPHILS # BLD AUTO: 0.08 K/UL — SIGNIFICANT CHANGE UP (ref 0–0.2)
BASOPHILS NFR BLD AUTO: 0.5 % — SIGNIFICANT CHANGE UP (ref 0–2)
BASOPHILS NFR BLD AUTO: 0.8 % — SIGNIFICANT CHANGE UP (ref 0–2)
BILIRUB SERPL-MCNC: 1 MG/DL — SIGNIFICANT CHANGE UP (ref 0.4–2)
BUN SERPL-MCNC: 27 MG/DL — HIGH (ref 8–20)
BUN SERPL-MCNC: 30 MG/DL — HIGH (ref 8–20)
CALCIUM SERPL-MCNC: 8.7 MG/DL — SIGNIFICANT CHANGE UP (ref 8.6–10.2)
CALCIUM SERPL-MCNC: 9 MG/DL — SIGNIFICANT CHANGE UP (ref 8.6–10.2)
CHLORIDE SERPL-SCNC: 93 MMOL/L — LOW (ref 98–107)
CHLORIDE SERPL-SCNC: 97 MMOL/L — LOW (ref 98–107)
CO2 SERPL-SCNC: 27 MMOL/L — SIGNIFICANT CHANGE UP (ref 22–29)
CO2 SERPL-SCNC: 28 MMOL/L — SIGNIFICANT CHANGE UP (ref 22–29)
CREAT SERPL-MCNC: 1.47 MG/DL — HIGH (ref 0.5–1.3)
CREAT SERPL-MCNC: 1.48 MG/DL — HIGH (ref 0.5–1.3)
CRP SERPL-MCNC: 20.15 MG/DL — HIGH (ref 0–0.4)
EOSINOPHIL # BLD AUTO: 0.06 K/UL — SIGNIFICANT CHANGE UP (ref 0–0.5)
EOSINOPHIL # BLD AUTO: 0.15 K/UL — SIGNIFICANT CHANGE UP (ref 0–0.5)
EOSINOPHIL NFR BLD AUTO: 0.5 % — SIGNIFICANT CHANGE UP (ref 0–6)
EOSINOPHIL NFR BLD AUTO: 1.5 % — SIGNIFICANT CHANGE UP (ref 0–6)
FERRITIN SERPL-MCNC: 625 NG/ML — HIGH (ref 30–400)
GLUCOSE SERPL-MCNC: 101 MG/DL — HIGH (ref 70–99)
GLUCOSE SERPL-MCNC: 118 MG/DL — HIGH (ref 70–99)
HCT VFR BLD CALC: 42.4 % — SIGNIFICANT CHANGE UP (ref 39–50)
HCT VFR BLD CALC: 44.8 % — SIGNIFICANT CHANGE UP (ref 39–50)
HGB BLD-MCNC: 13.2 G/DL — SIGNIFICANT CHANGE UP (ref 13–17)
HGB BLD-MCNC: 14.1 G/DL — SIGNIFICANT CHANGE UP (ref 13–17)
IMM GRANULOCYTES NFR BLD AUTO: 0.4 % — SIGNIFICANT CHANGE UP (ref 0–1.5)
IMM GRANULOCYTES NFR BLD AUTO: 0.5 % — SIGNIFICANT CHANGE UP (ref 0–1.5)
INR BLD: 1.49 RATIO — HIGH (ref 0.88–1.16)
LACTATE BLDV-MCNC: 1.3 MMOL/L — SIGNIFICANT CHANGE UP (ref 0.5–2)
LYMPHOCYTES # BLD AUTO: 1.57 K/UL — SIGNIFICANT CHANGE UP (ref 1–3.3)
LYMPHOCYTES # BLD AUTO: 1.79 K/UL — SIGNIFICANT CHANGE UP (ref 1–3.3)
LYMPHOCYTES # BLD AUTO: 14.8 % — SIGNIFICANT CHANGE UP (ref 13–44)
LYMPHOCYTES # BLD AUTO: 15.4 % — SIGNIFICANT CHANGE UP (ref 13–44)
MAGNESIUM SERPL-MCNC: 2.1 MG/DL — SIGNIFICANT CHANGE UP (ref 1.6–2.6)
MANUAL SMEAR VERIFICATION: SIGNIFICANT CHANGE UP
MCHC RBC-ENTMCNC: 24 PG — LOW (ref 27–34)
MCHC RBC-ENTMCNC: 24.1 PG — LOW (ref 27–34)
MCHC RBC-ENTMCNC: 31.1 GM/DL — LOW (ref 32–36)
MCHC RBC-ENTMCNC: 31.5 GM/DL — LOW (ref 32–36)
MCV RBC AUTO: 76.6 FL — LOW (ref 80–100)
MCV RBC AUTO: 77.1 FL — LOW (ref 80–100)
MICROCYTES BLD QL: SLIGHT — SIGNIFICANT CHANGE UP
MONOCYTES # BLD AUTO: 1.4 K/UL — HIGH (ref 0–0.9)
MONOCYTES # BLD AUTO: 1.58 K/UL — HIGH (ref 0–0.9)
MONOCYTES NFR BLD AUTO: 13.1 % — SIGNIFICANT CHANGE UP (ref 2–14)
MONOCYTES NFR BLD AUTO: 13.7 % — SIGNIFICANT CHANGE UP (ref 2–14)
NEUTROPHILS # BLD AUTO: 6.96 K/UL — SIGNIFICANT CHANGE UP (ref 1.8–7.4)
NEUTROPHILS # BLD AUTO: 8.56 K/UL — HIGH (ref 1.8–7.4)
NEUTROPHILS NFR BLD AUTO: 68.1 % — SIGNIFICANT CHANGE UP (ref 43–77)
NEUTROPHILS NFR BLD AUTO: 70.7 % — SIGNIFICANT CHANGE UP (ref 43–77)
OVALOCYTES BLD QL SMEAR: SLIGHT — SIGNIFICANT CHANGE UP
PHOSPHATE SERPL-MCNC: 2.7 MG/DL — SIGNIFICANT CHANGE UP (ref 2.4–4.7)
PLAT MORPH BLD: NORMAL — SIGNIFICANT CHANGE UP
PLATELET # BLD AUTO: 304 K/UL — SIGNIFICANT CHANGE UP (ref 150–400)
PLATELET # BLD AUTO: 319 K/UL — SIGNIFICANT CHANGE UP (ref 150–400)
POIKILOCYTOSIS BLD QL AUTO: SLIGHT — SIGNIFICANT CHANGE UP
POLYCHROMASIA BLD QL SMEAR: SIGNIFICANT CHANGE UP
POTASSIUM SERPL-MCNC: 3.2 MMOL/L — LOW (ref 3.5–5.3)
POTASSIUM SERPL-MCNC: 3.7 MMOL/L — SIGNIFICANT CHANGE UP (ref 3.5–5.3)
POTASSIUM SERPL-SCNC: 3.2 MMOL/L — LOW (ref 3.5–5.3)
POTASSIUM SERPL-SCNC: 3.7 MMOL/L — SIGNIFICANT CHANGE UP (ref 3.5–5.3)
PROCALCITONIN SERPL-MCNC: 0.34 NG/ML — HIGH (ref 0.02–0.1)
PROT SERPL-MCNC: 7.4 G/DL — SIGNIFICANT CHANGE UP (ref 6.6–8.7)
PROTHROM AB SERPL-ACNC: 17 SEC — HIGH (ref 10–12.9)
RBC # BLD: 5.5 M/UL — SIGNIFICANT CHANGE UP (ref 4.2–5.8)
RBC # BLD: 5.85 M/UL — HIGH (ref 4.2–5.8)
RBC # FLD: 14.7 % — HIGH (ref 10.3–14.5)
RBC # FLD: 14.8 % — HIGH (ref 10.3–14.5)
RBC BLD AUTO: ABNORMAL
SARS-COV-2 RNA SPEC QL NAA+PROBE: SIGNIFICANT CHANGE UP
SODIUM SERPL-SCNC: 136 MMOL/L — SIGNIFICANT CHANGE UP (ref 135–145)
SODIUM SERPL-SCNC: 136 MMOL/L — SIGNIFICANT CHANGE UP (ref 135–145)
WBC # BLD: 10.21 K/UL — SIGNIFICANT CHANGE UP (ref 3.8–10.5)
WBC # BLD: 12.1 K/UL — HIGH (ref 3.8–10.5)
WBC # FLD AUTO: 10.21 K/UL — SIGNIFICANT CHANGE UP (ref 3.8–10.5)
WBC # FLD AUTO: 12.1 K/UL — HIGH (ref 3.8–10.5)

## 2020-05-29 PROCEDURE — 70450 CT HEAD/BRAIN W/O DYE: CPT | Mod: 26

## 2020-05-29 PROCEDURE — 93010 ELECTROCARDIOGRAM REPORT: CPT

## 2020-05-29 PROCEDURE — 74176 CT ABD & PELVIS W/O CONTRAST: CPT | Mod: 26

## 2020-05-29 PROCEDURE — 71045 X-RAY EXAM CHEST 1 VIEW: CPT | Mod: 26

## 2020-05-29 PROCEDURE — 99223 1ST HOSP IP/OBS HIGH 75: CPT

## 2020-05-29 PROCEDURE — 99222 1ST HOSP IP/OBS MODERATE 55: CPT

## 2020-05-29 PROCEDURE — 71250 CT THORAX DX C-: CPT | Mod: 26

## 2020-05-29 RX ORDER — PIPERACILLIN AND TAZOBACTAM 4; .5 G/20ML; G/20ML
3.38 INJECTION, POWDER, LYOPHILIZED, FOR SOLUTION INTRAVENOUS EVERY 8 HOURS
Refills: 0 | Status: DISCONTINUED | OUTPATIENT
Start: 2020-05-29 | End: 2020-05-31

## 2020-05-29 RX ORDER — ACETAMINOPHEN 500 MG
650 TABLET ORAL EVERY 6 HOURS
Refills: 0 | Status: DISCONTINUED | OUTPATIENT
Start: 2020-05-29 | End: 2020-06-03

## 2020-05-29 RX ORDER — POTASSIUM CHLORIDE 20 MEQ
40 PACKET (EA) ORAL ONCE
Refills: 0 | Status: COMPLETED | OUTPATIENT
Start: 2020-05-29 | End: 2020-05-29

## 2020-05-29 RX ORDER — WARFARIN SODIUM 2.5 MG/1
2.5 TABLET ORAL ONCE
Refills: 0 | Status: COMPLETED | OUTPATIENT
Start: 2020-05-29 | End: 2020-05-29

## 2020-05-29 RX ORDER — SODIUM CHLORIDE 9 MG/ML
1000 INJECTION INTRAMUSCULAR; INTRAVENOUS; SUBCUTANEOUS
Refills: 0 | Status: DISCONTINUED | OUTPATIENT
Start: 2020-05-29 | End: 2020-05-29

## 2020-05-29 RX ORDER — ALBUTEROL 90 UG/1
2 AEROSOL, METERED ORAL EVERY 6 HOURS
Refills: 0 | Status: DISCONTINUED | OUTPATIENT
Start: 2020-05-29 | End: 2020-06-03

## 2020-05-29 RX ORDER — PANTOPRAZOLE SODIUM 20 MG/1
1 TABLET, DELAYED RELEASE ORAL
Qty: 0 | Refills: 0 | DISCHARGE

## 2020-05-29 RX ORDER — SODIUM CHLORIDE 9 MG/ML
1000 INJECTION, SOLUTION INTRAVENOUS
Refills: 0 | Status: DISCONTINUED | OUTPATIENT
Start: 2020-05-29 | End: 2020-05-30

## 2020-05-29 RX ORDER — CARVEDILOL PHOSPHATE 80 MG/1
6.25 CAPSULE, EXTENDED RELEASE ORAL DAILY
Refills: 0 | Status: DISCONTINUED | OUTPATIENT
Start: 2020-05-29 | End: 2020-06-03

## 2020-05-29 RX ORDER — CARVEDILOL PHOSPHATE 80 MG/1
6.25 CAPSULE, EXTENDED RELEASE ORAL EVERY 12 HOURS
Refills: 0 | Status: DISCONTINUED | OUTPATIENT
Start: 2020-05-29 | End: 2020-05-29

## 2020-05-29 RX ORDER — DIGOXIN 250 MCG
0.12 TABLET ORAL DAILY
Refills: 0 | Status: DISCONTINUED | OUTPATIENT
Start: 2020-05-29 | End: 2020-06-03

## 2020-05-29 RX ORDER — ENOXAPARIN SODIUM 100 MG/ML
68 INJECTION SUBCUTANEOUS EVERY 12 HOURS
Refills: 0 | Status: DISCONTINUED | OUTPATIENT
Start: 2020-05-29 | End: 2020-05-31

## 2020-05-29 RX ORDER — DIGOXIN 250 MCG
0.12 TABLET ORAL DAILY
Refills: 0 | Status: DISCONTINUED | OUTPATIENT
Start: 2020-05-29 | End: 2020-05-29

## 2020-05-29 RX ADMIN — PIPERACILLIN AND TAZOBACTAM 25 GRAM(S): 4; .5 INJECTION, POWDER, LYOPHILIZED, FOR SOLUTION INTRAVENOUS at 12:28

## 2020-05-29 RX ADMIN — Medication 40 MILLIEQUIVALENT(S): at 12:28

## 2020-05-29 RX ADMIN — Medication 650 MILLIGRAM(S): at 01:22

## 2020-05-29 RX ADMIN — SODIUM CHLORIDE 75 MILLILITER(S): 9 INJECTION, SOLUTION INTRAVENOUS at 21:22

## 2020-05-29 RX ADMIN — Medication 0.12 MILLIGRAM(S): at 12:28

## 2020-05-29 RX ADMIN — PIPERACILLIN AND TAZOBACTAM 200 GRAM(S): 4; .5 INJECTION, POWDER, LYOPHILIZED, FOR SOLUTION INTRAVENOUS at 01:23

## 2020-05-29 RX ADMIN — SODIUM CHLORIDE 75 MILLILITER(S): 9 INJECTION, SOLUTION INTRAVENOUS at 10:00

## 2020-05-29 RX ADMIN — SODIUM CHLORIDE 500 MILLILITER(S): 9 INJECTION INTRAMUSCULAR; INTRAVENOUS; SUBCUTANEOUS at 00:07

## 2020-05-29 RX ADMIN — AZITHROMYCIN 255 MILLIGRAM(S): 500 TABLET, FILM COATED ORAL at 02:04

## 2020-05-29 RX ADMIN — PIPERACILLIN AND TAZOBACTAM 25 GRAM(S): 4; .5 INJECTION, POWDER, LYOPHILIZED, FOR SOLUTION INTRAVENOUS at 21:19

## 2020-05-29 RX ADMIN — CARVEDILOL PHOSPHATE 6.25 MILLIGRAM(S): 80 CAPSULE, EXTENDED RELEASE ORAL at 12:28

## 2020-05-29 RX ADMIN — Medication 10 MILLIGRAM(S): at 06:08

## 2020-05-29 RX ADMIN — WARFARIN SODIUM 2.5 MILLIGRAM(S): 2.5 TABLET ORAL at 21:18

## 2020-05-29 RX ADMIN — ENOXAPARIN SODIUM 68 MILLIGRAM(S): 100 INJECTION SUBCUTANEOUS at 18:01

## 2020-05-29 RX ADMIN — ENOXAPARIN SODIUM 68 MILLIGRAM(S): 100 INJECTION SUBCUTANEOUS at 06:08

## 2020-05-29 NOTE — H&P ADULT - NSHPSOCIALHISTORY_GEN_ALL_CORE
Denies smoking, etoh, illicit drugs.   lives in private home with wife.   ambulates with walker at baseline. per wife, no recent falls.

## 2020-05-29 NOTE — ED ADULT NURSE REASSESSMENT NOTE - NS ED NURSE REASSESS COMMENT FT1
Dr Velasquez to D/C isolation order, condition no longer warrants, report given to CARLOS ALBERTO RN ESSU [id number]

## 2020-05-29 NOTE — H&P ADULT - ASSESSMENT
65 yo male with pmh of HTN, permanent Afib, HFrEF (EF 35-40%), s/p AICD (recently replaced 5/21), CVA (2012) with residual R sided weakness and dysphagia, admitted for stercoral colitis complicated by sepsis.     Stercoral colitis, complicated by sepsis on arrival  -CT abd revealed large fecal load in the rectum with mild thickening of the rectal walls and slight infiltration of the perirectal fat concerning for a stercoral colitis.   -unclear etiology. likely severe constipation secondary to diet and reduced mobility  -given zosyn and azithro in the ED. Vanco was initially ordered, will cancel as no need for mrsa coverage. will treat empirically with zosyn for now for possible bacterial seeding from impacted fecal material as possible source  -blood cxs drawn  -UA, urine cx pending  -will give SMOG enema x 1   -dulcolax suppository QD. can consider adding oral laxatives pending speech eval  -s/p 1L NS in the ED. will give gentle hydration x 12 hrs given npo status and expected GI losses from aggressive bowel regimen 65 yo male with pmh of HTN, permanent Afib, HFrEF (EF 35-40%), s/p AICD (recently replaced 5/21), CVA (2012) with residual R sided weakness and dysphagia, admitted for stercoral colitis complicated by sepsis.     Stercoral colitis, complicated by sepsis on arrival  -CT abd revealed large fecal load in the rectum with mild thickening of the rectal walls and slight infiltration of the perirectal fat concerning for a stercoral colitis.   -unclear etiology. likely severe constipation secondary to diet and reduced mobility  -given zosyn and azithro in the ED. Vanco was initially ordered, will cancel as no need for mrsa coverage. will treat empirically with zosyn for now for possible bacterial seeding from impacted fecal material as possible source  -blood cxs drawn  -UA, urine cx pending  -will give SMOG enema x 1   -dulcolax suppository QD. can consider adding oral laxatives pending speech eval  -s/p 1L NS in the ED. will give gentle hydration x 12 hrs given npo status and expected GI losses from aggressive bowel regimen    PITA on CKD3a  -Cr 1.48, worse from prior Cr 1.2 on 5/21/20  -likely from poor oral intake and recent GI losses from overflow diarrhea  -avoid nephrotoxic meds. holding furosemide  -gentle hydration as above  -follow up repeat bmp  -consider sono to eval for obstruction if persistent renal impairment    Permanent Afib  -CHADS-VASc score 5  -takes coumadin 2.5mg at home. will switch to full dose lovenox given subtherapeutic inr. can transition back to coumadin or noac on discharge  -will add oral digoxin and coreg pending speech eval. currently rate is well controlled. goal HR <110    HFrEF (EF 35-40%) s/p AICD  -stable, with mild bibasilar crackles on exam however likely present at baseline  -holding furosemide for now due to pita and expected further gi losses from aggressive bowel regimen, also soft BP on arrival. consider restarting when appropriate  -giving gentle hydration for brief duration as above, monitor carefully for fluid overload and discontinue if worsening volume status    Hypertension, well controlled  -can restart home meds pending speech eval    Dysphagia  -residual deficit after CVA in 2012  -speech eval pending, npo until then    DVT ppx: full dose lovenox for afib    Dispo: expected LOS 1-2 days pending BM. will need PT eval prior to discharge

## 2020-05-29 NOTE — CHART NOTE - NSCHARTNOTEFT_GEN_A_CORE
Brief EP Note:    Patient returned to hospital as he had weakness at home. Has been diagnosed with stercoral colitis. ICD site is healing well without any evidence of erythema, tenderness or warmth. No evidence of pocket hematoma. OK for patient to continue Lovenox bridging if needed. No additional EP intervention required. Pt to follow up in office following discharge.    Dave Marte MD  Clinical Cardiac Electrophysiology

## 2020-05-29 NOTE — H&P ADULT - NSHPPHYSICALEXAM_GEN_ALL_CORE
Vital Signs Last 24 Hrs  T(C): 38.4 (29 May 2020 01:00), Max: 38.4 (29 May 2020 01:00)  T(F): 101.2 (29 May 2020 01:00), Max: 101.2 (29 May 2020 01:00)  HR: 94 (28 May 2020 22:57) (94 - 94)  BP: 104/67 (28 May 2020 22:57) (104/67 - 104/67)  RR: 22 (28 May 2020 22:57) (22 - 22)  SpO2: 95% (28 May 2020 22:57) (95% - 95%)    General: NAD, resting comfortably in bed  Head: normocephalic, atraumatic  Eyes: PERRLA, EOMI  Neck: supple, no lymphadenopathy, no JVD  Pulm: unlabored breathing, good air entry, mild bibasilar crackles, no wheezes  Cardio: S1S2+, irregularly irregular  GI: soft, moderately distended, non-tender, no guarding or rigidity  Vascular: no peripheral edema, DP pulses 2+ b/l  Neuro: AAOx2 (self and location) but confused, able to answer questions and follow instructions, R facial droop (at baseline), RLE 3/5 strength, LLE 4/5 strength. RUE 2/5 strength, LUE 4/5 strength. poor  strength on Right.  strength intact on Left.   Skin: warm and dry, no rashes on exposed surfaces

## 2020-05-29 NOTE — ED ADULT NURSE REASSESSMENT NOTE - NS ED NURSE REASSESS COMMENT FT1
Large BM cleaned up from patient, brief changed. No blood noted in stool. Patient awaiting inpatient bed, will continue to monitor.

## 2020-05-29 NOTE — SWALLOW BEDSIDE ASSESSMENT ADULT - ASR SWALLOW ASPIRATION MONITOR
oral hygiene/position upright (90Y)/throat clearing/pneumonia/upper respiratory infection/change of breathing pattern/cough/gurgly voice/fever

## 2020-05-29 NOTE — H&P ADULT - HISTORY OF PRESENT ILLNESS
63 yo male with pmh of HTN, permanent Afib, HFrEF (EF 35-40%), s/p AICD (recently replaced 5/21), CVA (2012) with residual R sided weakness and dysphagia, presents with weakness. Pt noted to be unreliable historian. He is AAOx2 (self, location) and confused. As per wife, his mentation is at baseline. She states that he has been progressively getting weaker since the AICD replacement procedure last week. Also reports reduced po intake. His PMD did a home visit last weekend and reported no acute findings. Wife also reports he had 6-7 episodes of loose stools over the past 2 days, but normally constipated at baseline. Denies fevers, chills, n/v, abd pain, chest pain, sob, cough, nasal congestion.     ED course: pt met sepsis criteria with rectal temp 101.2 as per ED documentation, also with mild tachycardia (HR 94), mild tachypnea (RR 22), and leukocytosis with wbc 12.1. Hemodynamically stable and lactate wnl. Given 1L NS. Blood cxs drawn. Empiric abx ordered by ED, zosyn and azithmasood given, vanco pending. CT head done due to worsening weakness and noted to be negative. CT chest for rales on exam and negative for pulm edema, only noted mild bibasilar dependent atelectasis. CT abd/pelv done due to loose stools, and revealed large fecal load in the rectum with mild thickening of the rectal walls and slight infiltration of the perirectal fat concerning for a stercoral colitis.

## 2020-05-29 NOTE — CONSULT NOTE ADULT - SUBJECTIVE AND OBJECTIVE BOX
RUSH CARDENAS  [id number]      HPI:  Mr. Cardenas is a 64 year old man with past medical history of Permanent atrial fibrillation (prior history of LUC thrombus on SHANELLE) on warfarin, CVA (2012),  Hypertension, Monomorphic ventricular tachycardia s/p CRT-D and Ischemic Cardiomyopathy (LVEF 35-40% in 1/2019) who was sent in to the hospital due to symptoms of weakness and altered mental status per wife. The patient is not completely oriented on exam. His wife reported that he recently had his generator replacement for his ICD last week. She has noticed over the past few days that he has been more lethargic. The patient denies dyspnea or angina. Denies leg edema.      ALLERGIES:  latex (Rash)  No Known Drug Allergies      PAST MEDICAL HISTORY:  Permanent atrial fibrillation (prior history of LUC thrombus on SHANELLE) on warfarin  CVA (2012)  Hypertension  Monomorphic ventricular tachycardia s/p CRT-D  Ischemic Cardiomyopathy      Current meds:   acetaminophen  Suppository .. 650 milliGRAM(s) Rectal every 6 hours PRN  bisacodyl Suppository 10 milliGRAM(s) Rectal daily  carvedilol Oral Tab/Cap - Peds 6.25 milliGRAM(s) Oral daily  dextrose 5% + sodium chloride 0.9%. 1000 milliLiter(s) IV Continuous <Continuous>  digoxin     Tablet 0.125 milliGRAM(s) Oral daily  enoxaparin Injectable 68 milliGRAM(s) SubCutaneous every 12 hours  piperacillin/tazobactam IVPB.. 3.375 Gram(s) IV Intermittent every 8 hours  warfarin 2.5 milliGRAM(s) Oral once      SOCIAL HISTORY:  Patient denies alcohol, tobacco, drug use    FAMILY HISTORY:  Family history of cerebrovascular accident (CVA)      ROS:  All 10 systems reviewed and positives noted in HPI    Objective:     Vital Signs Last 24 Hrs  T(C): 37.1 (29 May 2020 11:43), Max: 38.4 (29 May 2020 01:00)  T(F): 98.7 (29 May 2020 11:43), Max: 101.2 (29 May 2020 01:00)  HR: 81 (29 May 2020 11:43) (69 - 94)  BP: 153/105 (29 May 2020 11:43) (104/67 - 153/105)  BP(mean): --  RR: 20 (29 May 2020 11:43) (18 - 22)  SpO2: 95% (29 May 2020 11:43) (95% - 97%)    Physical Exam:   General: elderly frail man, no acute distress  Neck: supple, no carotid bruits b/l  CVS: JVP ~ 9 cm H20, irregularly irregular  Chest: unlabored respirations, clear anteriorly   Extremities: trace lower extremity edema b/l  Neuro: A&O x2  Psych: Normal affect        LABS:                        13.2   10.21 )-----------( 304      ( 29 May 2020 08:45 )             42.4     05-29    136  |  97<L>  |  27.0<H>  ----------------------------<  101<H>  3.2<L>   |  27.0  |  1.47<H>    Ca    8.7      29 May 2020 08:45  Phos  2.7     05-29  Mg     2.1     05-29    TPro  7.4  /  Alb  3.4  /  TBili  1.0  /  DBili  x   /  AST  34  /  ALT  19  /  AlkPhos  92  05-28        PT/INR - ( 29 May 2020 00:28 )   PT: 17.0 sec;   INR: 1.49 ratio         PTT - ( 29 May 2020 00:28 )  PTT:26.3 sec      ECG (5/28/2020): atrial fibrillation, ventricular paced, nonspecific ST/T wave abnormalities    CT Abdomen (5/29/2020):  No acute parenchymal or pleural lung disease.  Large fecal load in the rectum with mild thickening of the rectal walls and slight infiltration of the perirectal fat concerning for a stercoral colitis.  Contracted gallbladder with cholelithiasis.

## 2020-05-29 NOTE — CHART NOTE - NSCHARTNOTEFT_GEN_A_CORE
Pt seen and exam. Chart reviewed. Pt is awake,alert x2. No acute issues.  Stool cdif neg. DC Isolation. Continue Zosyn. coumadin dosing bridge with lovenox,goal inr 2-3. F/u cbc,bmp  swallow eval rec Pureed with honey thick diet.  Spoke with RN.  See detailed H & P Pt seen and exam. Chart reviewed. Pt is awake,alert x2. No acute issues.  Stool cdif neg. DC Isolation. Continue Zosyn. coumadin dosing bridge with lovenox,goal inr 2-3. resume home meds.Hold lasix for now.F/u cbc,bmp  swallow eval rec Pureed with honey thick diet.  Spoke with RN.  See detailed H & P

## 2020-05-29 NOTE — SWALLOW BEDSIDE ASSESSMENT ADULT - SWALLOW EVAL: RECOMMENDED FEEDING/EATING TECHNIQUES
position upright (90 degrees)/maintain upright posture during/after eating for 30 mins/no straws/oral hygiene/small sips/bites/crush medication (when feasible)

## 2020-05-29 NOTE — CONSULT NOTE ADULT - ASSESSMENT
Assessment:  Mr. 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 Patient recently had generator replacement for his CRT-D last week. At this time the patient does not appear to be in decompensated heart failure    Recommendations:  [] Sepsis: Follow up primary team/infectious diseases regarding treatment of sterocoral colitis. Follow up blood cultures to see if TTE needed to further evaluate valves.  No obvious erythema or tenderness at CRT-D site, can discuss with Cardiac EP.  [] Cardiomyopathy: Given sepsis would not actively diurese patient at this time. Continue carvedilol.  [] Permanent atrial fibrillation: Rates controlled. Goal INR 2-3, patient receiving lovenox bridging to coumadin. Continue digoxin every other day  [] Acute kidney injury: Likely pre-renal, hold diuresis, monitor Cr. Replete potassium    Thank you for the consult. We will continue to follow along.    Kale Duvall MD  Cardiology

## 2020-05-29 NOTE — SWALLOW BEDSIDE ASSESSMENT ADULT - SWALLOW EVAL: DIAGNOSIS
Pt presents w/mild oral dysphagia for all consistencies presented nectar, honey & puree marked by +lingual pumping w/decreased A-P propulsion of boli resulting in increased oral transit time; Suspected pharyngeal dysphagia for nectar thick liquids characterized by immediate strong cough following cup sip x 1/1. Pt presents w/mild oral dysphagia for all consistencies presented nectar, honey & puree marked by +lingual pumping w/decreased A-P propulsion of boli resulting in increased oral transit time; Suspected pharyngeal dysphagia for nectar thick liquids characterized by immediate strong cough following cup sip x 1/1, pharyngeal phase functional for further honey and puree.

## 2020-05-29 NOTE — SWALLOW BEDSIDE ASSESSMENT ADULT - SLP GENERAL OBSERVATIONS
Pt recd awake and upright in stretcher in ED, A&A Ox2 (chart deems baseline), +moderate dysarthric speech w/mild R facial droop, 0/10 pain pre and post, tolerating RA without overt distress w/SpO2 98% throughout

## 2020-05-30 DIAGNOSIS — Z45.02 ENCOUNTER FOR ADJUSTMENT AND MANAGEMENT OF AUTOMATIC IMPLANTABLE CARDIAC DEFIBRILLATOR: ICD-10-CM

## 2020-05-30 LAB
ANION GAP SERPL CALC-SCNC: 13 MMOL/L — SIGNIFICANT CHANGE UP (ref 5–17)
BUN SERPL-MCNC: 25 MG/DL — HIGH (ref 8–20)
CALCIUM SERPL-MCNC: 8.5 MG/DL — LOW (ref 8.6–10.2)
CHLORIDE SERPL-SCNC: 99 MMOL/L — SIGNIFICANT CHANGE UP (ref 98–107)
CO2 SERPL-SCNC: 24 MMOL/L — SIGNIFICANT CHANGE UP (ref 22–29)
CREAT SERPL-MCNC: 1.36 MG/DL — HIGH (ref 0.5–1.3)
GLUCOSE SERPL-MCNC: 194 MG/DL — HIGH (ref 70–99)
HCT VFR BLD CALC: 40.1 % — SIGNIFICANT CHANGE UP (ref 39–50)
HCV AB S/CO SERPL IA: 0.32 S/CO — SIGNIFICANT CHANGE UP (ref 0–0.99)
HCV AB SERPL-IMP: SIGNIFICANT CHANGE UP
HGB BLD-MCNC: 12.6 G/DL — LOW (ref 13–17)
INR BLD: 2.05 RATIO — HIGH (ref 0.88–1.16)
MCHC RBC-ENTMCNC: 24.2 PG — LOW (ref 27–34)
MCHC RBC-ENTMCNC: 31.4 GM/DL — LOW (ref 32–36)
MCV RBC AUTO: 77.1 FL — LOW (ref 80–100)
PLATELET # BLD AUTO: 310 K/UL — SIGNIFICANT CHANGE UP (ref 150–400)
POTASSIUM SERPL-MCNC: 3.2 MMOL/L — LOW (ref 3.5–5.3)
POTASSIUM SERPL-SCNC: 3.2 MMOL/L — LOW (ref 3.5–5.3)
PROTHROM AB SERPL-ACNC: 23.7 SEC — HIGH (ref 10–12.9)
RBC # BLD: 5.2 M/UL — SIGNIFICANT CHANGE UP (ref 4.2–5.8)
RBC # FLD: 14.8 % — HIGH (ref 10.3–14.5)
SODIUM SERPL-SCNC: 136 MMOL/L — SIGNIFICANT CHANGE UP (ref 135–145)
WBC # BLD: 8.5 K/UL — SIGNIFICANT CHANGE UP (ref 3.8–10.5)
WBC # FLD AUTO: 8.5 K/UL — SIGNIFICANT CHANGE UP (ref 3.8–10.5)

## 2020-05-30 PROCEDURE — 99232 SBSQ HOSP IP/OBS MODERATE 35: CPT

## 2020-05-30 PROCEDURE — 74018 RADEX ABDOMEN 1 VIEW: CPT | Mod: 26

## 2020-05-30 RX ORDER — LACTULOSE 10 G/15ML
20 SOLUTION ORAL
Refills: 0 | Status: DISCONTINUED | OUTPATIENT
Start: 2020-05-30 | End: 2020-05-31

## 2020-05-30 RX ORDER — WARFARIN SODIUM 2.5 MG/1
2.5 TABLET ORAL ONCE
Refills: 0 | Status: COMPLETED | OUTPATIENT
Start: 2020-05-30 | End: 2020-05-30

## 2020-05-30 RX ORDER — POTASSIUM CHLORIDE 20 MEQ
40 PACKET (EA) ORAL ONCE
Refills: 0 | Status: COMPLETED | OUTPATIENT
Start: 2020-05-30 | End: 2020-05-30

## 2020-05-30 RX ORDER — MINERAL OIL
133 OIL (ML) MISCELLANEOUS DAILY
Refills: 0 | Status: DISCONTINUED | OUTPATIENT
Start: 2020-05-30 | End: 2020-05-31

## 2020-05-30 RX ORDER — SODIUM CHLORIDE 9 MG/ML
1000 INJECTION, SOLUTION INTRAVENOUS
Refills: 0 | Status: DISCONTINUED | OUTPATIENT
Start: 2020-05-30 | End: 2020-06-03

## 2020-05-30 RX ORDER — POLYETHYLENE GLYCOL 3350 17 G/17G
17 POWDER, FOR SOLUTION ORAL DAILY
Refills: 0 | Status: DISCONTINUED | OUTPATIENT
Start: 2020-05-30 | End: 2020-06-03

## 2020-05-30 RX ADMIN — SODIUM CHLORIDE 75 MILLILITER(S): 9 INJECTION, SOLUTION INTRAVENOUS at 08:43

## 2020-05-30 RX ADMIN — Medication 0.12 MILLIGRAM(S): at 06:56

## 2020-05-30 RX ADMIN — PIPERACILLIN AND TAZOBACTAM 25 GRAM(S): 4; .5 INJECTION, POWDER, LYOPHILIZED, FOR SOLUTION INTRAVENOUS at 06:51

## 2020-05-30 RX ADMIN — WARFARIN SODIUM 2.5 MILLIGRAM(S): 2.5 TABLET ORAL at 21:41

## 2020-05-30 RX ADMIN — ENOXAPARIN SODIUM 68 MILLIGRAM(S): 100 INJECTION SUBCUTANEOUS at 17:18

## 2020-05-30 RX ADMIN — Medication 40 MILLIEQUIVALENT(S): at 11:40

## 2020-05-30 RX ADMIN — Medication 133 MILLILITER(S): at 11:42

## 2020-05-30 RX ADMIN — SODIUM CHLORIDE 75 MILLILITER(S): 9 INJECTION, SOLUTION INTRAVENOUS at 11:39

## 2020-05-30 RX ADMIN — PIPERACILLIN AND TAZOBACTAM 25 GRAM(S): 4; .5 INJECTION, POWDER, LYOPHILIZED, FOR SOLUTION INTRAVENOUS at 13:58

## 2020-05-30 RX ADMIN — ENOXAPARIN SODIUM 68 MILLIGRAM(S): 100 INJECTION SUBCUTANEOUS at 06:51

## 2020-05-30 RX ADMIN — POLYETHYLENE GLYCOL 3350 17 GRAM(S): 17 POWDER, FOR SOLUTION ORAL at 11:40

## 2020-05-30 RX ADMIN — CARVEDILOL PHOSPHATE 6.25 MILLIGRAM(S): 80 CAPSULE, EXTENDED RELEASE ORAL at 06:56

## 2020-05-30 RX ADMIN — Medication 650 MILLIGRAM(S): at 06:48

## 2020-05-30 RX ADMIN — PIPERACILLIN AND TAZOBACTAM 25 GRAM(S): 4; .5 INJECTION, POWDER, LYOPHILIZED, FOR SOLUTION INTRAVENOUS at 21:40

## 2020-05-30 RX ADMIN — Medication 10 MILLIGRAM(S): at 21:40

## 2020-05-30 RX ADMIN — LACTULOSE 20 GRAM(S): 10 SOLUTION ORAL at 17:18

## 2020-05-30 NOTE — PROGRESS NOTE ADULT - ASSESSMENT
65 yo male with pmh of HTN, permanent Afib, HFrEF (EF 35-40%), s/p AICD (recently replaced 5/21), CVA (2012) with residual R sided weakness and dysphagia, admitted for stercoral colitis complicated by sepsis.     #Stercoral colitis, complicated by sepsis on arrival  - CT abd revealed large fecal load in the rectum with mild thickening of the rectal walls and slight infiltration of the perirectal fat concerning for a stercoral colitis.   - unclear etiology. likely severe constipation secondary to diet and reduced mobility  - given zosyn and azithro in the ED  - blood cxs drawn - results tomorrow  - UA, urine cx pending  - s/p enema/lactulose/miralax/  - dulcolax suppository QD.   - s/p 1L NS in the ED. will give gentle hydration x 12 hrs given npo status and expected GI losses from aggressive bowel regimen    #PITA on CKD3a  - Cr improved  - continue gentle IV fluids    #Permanent Afib  - CHADS-VASc score 5  - takes coumadin 2.5mg at home  - continue warfarin  - discontinue lovenox tomorrow    #HFrEF (EF 35-40%) s/p AICD  - euvolemic to hypovolemic today  - holding lasix  - hydration as above  - cardiology recs appreciated     #Hypertension, well controlled  - can restart home meds pending speech eval    #Dysphagia  - residual deficit after CVA in 2012  - speech eval pending, npo until then    DVT ppx: full dose lovenox for afib    Dispo: PT eval; likely discharge tomorrow or Monday

## 2020-05-30 NOTE — PROGRESS NOTE ADULT - SUBJECTIVE AND OBJECTIVE BOX
CC: stercoral colitis complicated by sepsis (29 May 2020 12:31)    INTERVAL HPI/OVERNIGHT EVENTS:  no acute events overnight  patient without complaints today  patient had 2 BM so far during admission  no further fevers overnight  discussed with wife susan and answered all questions  patient may be medically ready tomorrow for discharge  nursing care was being set up for Tuesday in the house for PT - likely can resume on discharge  PT consult placed.    Vital Signs Last 24 Hrs  T(C): 37.5 (30 May 2020 09:11), Max: 38.1 (30 May 2020 06:37)  T(F): 99.5 (30 May 2020 09:11), Max: 100.6 (30 May 2020 06:37)  HR: 89 (30 May 2020 09:11) (79 - 91)  BP: 98/66 (30 May 2020 09:11) (98/66 - 154/107)  BP(mean): --  RR: 17 (30 May 2020 09:11) (16 - 20)  SpO2: 97% (30 May 2020 09:11) (93% - 98%)    PHYSICAL EXAM:  General: in no acute distress; frail appearing; alert  ENMT: No nasal discharge; airway clear  Respiratory: No wheezes, rales or rhonchi  Cardiovascular: Regular rate and rhythm. S1 and S2 Normal; No murmurs, gallops or rubs  Gastrointestinal: Soft non-tender non-distended; Normal bowel sounds  Extremities: Normal range of motion, No clubbing, cyanosis or edema  Vascular: Peripheral pulses palpable 2+ bilaterally  Neurological: Alert and oriented to person and place; 4/5 strength on the right side but 5/5 on the left; facial droop noted on the right; slurred speech  Psychiatric: Cooperative and appropriate    I&O's Detail                        12.6   8.50  )-----------( 310      ( 30 May 2020 08:20 )             40.1     30 May 2020 08:20    136    |  99     |  25.0   ----------------------------<  194    3.2     |  24.0   |  1.36     Ca    8.5        30 May 2020 08:20  Phos  2.7       29 May 2020 08:45  Mg     2.1       29 May 2020 08:45    TPro  7.4    /  Alb  3.4    /  TBili  1.0    /  DBili  x      /  AST  34     /  ALT  19     /  AlkPhos  92     28 May 2020 23:40    PT/INR - ( 30 May 2020 08:20 )   PT: 23.7 sec;   INR: 2.05 ratio      PTT - ( 29 May 2020 00:28 )  PTT:26.3 sec  CAPILLARY BLOOD GLUCOSE    LIVER FUNCTIONS - ( 28 May 2020 23:40 )  Alb: 3.4 g/dL / Pro: 7.4 g/dL / ALK PHOS: 92 U/L / ALT: 19 U/L / AST: 34 U/L / GGT: x           MEDICATIONS  (STANDING):  bisacodyl Suppository 10 milliGRAM(s) Rectal daily  carvedilol Oral Tab/Cap - Peds 6.25 milliGRAM(s) Oral daily  dextrose 5% + sodium chloride 0.9%. 1000 milliLiter(s) (75 mL/Hr) IV Continuous <Continuous>  digoxin     Tablet 0.125 milliGRAM(s) Oral daily  enoxaparin Injectable 68 milliGRAM(s) SubCutaneous every 12 hours  lactulose Syrup 20 Gram(s) Oral two times a day  mineral oil enema 133 milliLiter(s) Rectal daily  piperacillin/tazobactam IVPB.. 3.375 Gram(s) IV Intermittent every 8 hours  polyethylene glycol 3350 17 Gram(s) Oral daily  warfarin 2.5 milliGRAM(s) Oral once    MEDICATIONS  (PRN):  acetaminophen  Suppository .. 650 milliGRAM(s) Rectal every 6 hours PRN Temp greater or equal to 38C (100.4F), Mild Pain (1 - 3)  ALBUTerol    90 MICROgram(s) HFA Inhaler 2 Puff(s) Inhalation every 6 hours PRN Shortness of Breath and/or Wheezing      RADIOLOGY & ADDITIONAL TESTS:

## 2020-05-31 ENCOUNTER — TRANSCRIPTION ENCOUNTER (OUTPATIENT)
Age: 65
End: 2020-05-31

## 2020-05-31 LAB
ANION GAP SERPL CALC-SCNC: 12 MMOL/L — SIGNIFICANT CHANGE UP (ref 5–17)
APPEARANCE UR: CLEAR — SIGNIFICANT CHANGE UP
APTT BLD: 45.8 SEC — HIGH (ref 27.5–36.3)
BILIRUB UR-MCNC: NEGATIVE — SIGNIFICANT CHANGE UP
BUN SERPL-MCNC: 19 MG/DL — SIGNIFICANT CHANGE UP (ref 8–20)
CALCIUM SERPL-MCNC: 8.5 MG/DL — LOW (ref 8.6–10.2)
CHLORIDE SERPL-SCNC: 105 MMOL/L — SIGNIFICANT CHANGE UP (ref 98–107)
CO2 SERPL-SCNC: 23 MMOL/L — SIGNIFICANT CHANGE UP (ref 22–29)
COLOR SPEC: YELLOW — SIGNIFICANT CHANGE UP
CREAT SERPL-MCNC: 1.22 MG/DL — SIGNIFICANT CHANGE UP (ref 0.5–1.3)
DIFF PNL FLD: NEGATIVE — SIGNIFICANT CHANGE UP
EPI CELLS # UR: SIGNIFICANT CHANGE UP
GLUCOSE SERPL-MCNC: 148 MG/DL — HIGH (ref 70–99)
GLUCOSE UR QL: NEGATIVE MG/DL — SIGNIFICANT CHANGE UP
HCT VFR BLD CALC: 41.1 % — SIGNIFICANT CHANGE UP (ref 39–50)
HGB BLD-MCNC: 12.6 G/DL — LOW (ref 13–17)
INR BLD: 2.31 RATIO — HIGH (ref 0.88–1.16)
KETONES UR-MCNC: NEGATIVE — SIGNIFICANT CHANGE UP
LEUKOCYTE ESTERASE UR-ACNC: NEGATIVE — SIGNIFICANT CHANGE UP
MCHC RBC-ENTMCNC: 24.3 PG — LOW (ref 27–34)
MCHC RBC-ENTMCNC: 30.7 GM/DL — LOW (ref 32–36)
MCV RBC AUTO: 79.2 FL — LOW (ref 80–100)
NITRITE UR-MCNC: NEGATIVE — SIGNIFICANT CHANGE UP
PH UR: 5 — SIGNIFICANT CHANGE UP (ref 5–8)
PLATELET # BLD AUTO: 252 K/UL — SIGNIFICANT CHANGE UP (ref 150–400)
POTASSIUM SERPL-MCNC: 3.9 MMOL/L — SIGNIFICANT CHANGE UP (ref 3.5–5.3)
POTASSIUM SERPL-SCNC: 3.9 MMOL/L — SIGNIFICANT CHANGE UP (ref 3.5–5.3)
PROT UR-MCNC: 30 MG/DL
PROTHROM AB SERPL-ACNC: 26.8 SEC — HIGH (ref 10–12.9)
RBC # BLD: 5.19 M/UL — SIGNIFICANT CHANGE UP (ref 4.2–5.8)
RBC # FLD: 15 % — HIGH (ref 10.3–14.5)
SODIUM SERPL-SCNC: 140 MMOL/L — SIGNIFICANT CHANGE UP (ref 135–145)
SP GR SPEC: 1.01 — SIGNIFICANT CHANGE UP (ref 1.01–1.02)
UROBILINOGEN FLD QL: 4 MG/DL
WBC # BLD: 7.63 K/UL — SIGNIFICANT CHANGE UP (ref 3.8–10.5)
WBC # FLD AUTO: 7.63 K/UL — SIGNIFICANT CHANGE UP (ref 3.8–10.5)

## 2020-05-31 PROCEDURE — 99232 SBSQ HOSP IP/OBS MODERATE 35: CPT

## 2020-05-31 RX ORDER — WARFARIN SODIUM 2.5 MG/1
2.5 TABLET ORAL ONCE
Refills: 0 | Status: COMPLETED | OUTPATIENT
Start: 2020-05-31 | End: 2020-05-31

## 2020-05-31 RX ORDER — POLYETHYLENE GLYCOL 3350 17 G/17G
17 POWDER, FOR SOLUTION ORAL
Qty: 30 | Refills: 0
Start: 2020-05-31 | End: 2020-06-29

## 2020-05-31 RX ADMIN — SODIUM CHLORIDE 75 MILLILITER(S): 9 INJECTION, SOLUTION INTRAVENOUS at 12:50

## 2020-05-31 RX ADMIN — WARFARIN SODIUM 2.5 MILLIGRAM(S): 2.5 TABLET ORAL at 21:21

## 2020-05-31 RX ADMIN — Medication 10 MILLIGRAM(S): at 21:21

## 2020-05-31 RX ADMIN — PIPERACILLIN AND TAZOBACTAM 25 GRAM(S): 4; .5 INJECTION, POWDER, LYOPHILIZED, FOR SOLUTION INTRAVENOUS at 05:36

## 2020-05-31 RX ADMIN — SODIUM CHLORIDE 75 MILLILITER(S): 9 INJECTION, SOLUTION INTRAVENOUS at 17:18

## 2020-05-31 RX ADMIN — POLYETHYLENE GLYCOL 3350 17 GRAM(S): 17 POWDER, FOR SOLUTION ORAL at 12:50

## 2020-05-31 RX ADMIN — LACTULOSE 20 GRAM(S): 10 SOLUTION ORAL at 05:36

## 2020-05-31 RX ADMIN — Medication 650 MILLIGRAM(S): at 17:16

## 2020-05-31 RX ADMIN — Medication 0.12 MILLIGRAM(S): at 05:36

## 2020-05-31 RX ADMIN — ENOXAPARIN SODIUM 68 MILLIGRAM(S): 100 INJECTION SUBCUTANEOUS at 05:35

## 2020-05-31 NOTE — PHYSICAL THERAPY INITIAL EVALUATION ADULT - MANUAL MUSCLE TESTING RESULTS, REHAB EVAL
left extremities at least 3+/5, right UE grosslu 3-/5 .c right shoulder flexion 1/5,. Left LE: hip flexion 1/5, knee ext 2/5, ankle DF and PF 0/5

## 2020-05-31 NOTE — DISCHARGE NOTE PROVIDER - NSDCFUSCHEDAPPT_GEN_ALL_CORE_FT
RUSH CARDENAS ; 06/09/2020 ; [facility] Card[facility] [address] [facility] RUSH CARDENAS ; 06/09/2020 ; [facility] Cardiology [address] [facility] RUSH CARDENAS ; 06/09/2020 ; [facility] Cardiology [address] [facility]

## 2020-05-31 NOTE — DISCHARGE NOTE PROVIDER - NSDCCPCAREPLAN_GEN_ALL_CORE_FT
PRINCIPAL DISCHARGE DIAGNOSIS  Diagnosis: Constipation  Assessment and Plan of Treatment: Continue miralax if patient not having bowel movement or is experiencing distension. If no bowel movement with miralax then try suppository. If abdominal pain then return to the hospital. PRINCIPAL DISCHARGE DIAGNOSIS  Diagnosis: Constipation  Assessment and Plan of Treatment: Continue miralax if patient not having bowel movement or is experiencing distension. If no bowel movement with miralax then try suppository. If abdominal pain then return to the hospital.      SECONDARY DISCHARGE DIAGNOSES  Diagnosis: Colitis  Assessment and Plan of Treatment: stercocolitis with fever. blood cultures negative x4. Patient to continue augmentin for 2 more days. Follow up with PCP Dr. Moreno

## 2020-05-31 NOTE — PHYSICAL THERAPY INITIAL EVALUATION ADULT - LEVEL OF INDEPENDENCE: SIT/STAND, REHAB EVAL
pt. refusing sit to stand transfer or any other further functional assessment. Pt. reporting pain right LE and requesting to return to bed. Pt. with right LE flexion contracture - prior level of function unknown./unable to perform

## 2020-05-31 NOTE — PHYSICAL THERAPY INITIAL EVALUATION ADULT - PASSIVE RANGE OF MOTION EXAMINATION, REHAB EVAL
with exception to b/l shoulder 0-35 degrees, right LE lacking at least 60 degrees of extension; exam limited due to pt. report of right LE pain/no Passive ROM deficits were identified

## 2020-05-31 NOTE — PROGRESS NOTE ADULT - SUBJECTIVE AND OBJECTIVE BOX
CC: stercoral colitis complicated by sepsis (29 May 2020 12:31)    INTERVAL HPI/OVERNIGHT EVENTS:  no acute events overnight  patient without complaints today  patient continues to have BM  patient wife feels that patient had a big decline since his last CRT-D placement on 5/21  she feels weakness has progressed  we are awaiting PT at this time    Vital Signs Last 24 Hrs  T(C): 37 (31 May 2020 09:15), Max: 37.4 (31 May 2020 05:30)  T(F): 98.6 (31 May 2020 09:15), Max: 99.4 (31 May 2020 05:30)  HR: 87 (31 May 2020 09:15) (71 - 87)  BP: 110/72 (31 May 2020 09:15) (101/62 - 122/72)  BP(mean): --  RR: 18 (31 May 2020 09:15) (17 - 18)  SpO2: 99% (30 May 2020 21:36) (99% - 99%)    PHYSICAL EXAM:  General: in no acute distress; frail appearing; alert  ENMT: No nasal discharge; airway clear  Respiratory: No wheezes, rales or rhonchi  Cardiovascular: Regular rate and rhythm. S1 and S2 Normal; No murmurs, gallops or rubs  Gastrointestinal: Soft non-tender non-distended; Normal bowel sounds  Extremities: Normal range of motion, No clubbing, cyanosis or edema  Vascular: Peripheral pulses palpable 2+ bilaterally  Neurological: Alert and oriented to person and place; 4/5 strength on the right side but 5/5 on the left; facial droop noted on the right; slurred speech  Psychiatric: Cooperative and appropriate    I&O's Detail                                   12.6   7.63  )-----------( 252      ( 31 May 2020 07:50 )             41.1     05-31    140  |  105  |  19.0  ----------------------------<  148<H>  3.9   |  23.0  |  1.22    Ca    8.5<L>      31 May 2020 07:49    MEDICATIONS  (STANDING):  bisacodyl Suppository 10 milliGRAM(s) Rectal daily  carvedilol Oral Tab/Cap - Peds 6.25 milliGRAM(s) Oral daily  dextrose 5% + sodium chloride 0.9%. 1000 milliLiter(s) (75 mL/Hr) IV Continuous <Continuous>  digoxin     Tablet 0.125 milliGRAM(s) Oral daily  enoxaparin Injectable 68 milliGRAM(s) SubCutaneous every 12 hours  lactulose Syrup 20 Gram(s) Oral two times a day  mineral oil enema 133 milliLiter(s) Rectal daily  piperacillin/tazobactam IVPB.. 3.375 Gram(s) IV Intermittent every 8 hours  polyethylene glycol 3350 17 Gram(s) Oral daily  warfarin 2.5 milliGRAM(s) Oral once    MEDICATIONS  (PRN):  acetaminophen  Suppository .. 650 milliGRAM(s) Rectal every 6 hours PRN Temp greater or equal to 38C (100.4F), Mild Pain (1 - 3)  ALBUTerol    90 MICROgram(s) HFA Inhaler 2 Puff(s) Inhalation every 6 hours PRN Shortness of Breath and/or Wheezing      RADIOLOGY & ADDITIONAL TESTS:

## 2020-05-31 NOTE — PHYSICAL THERAPY INITIAL EVALUATION ADULT - LEVEL OF INDEPENDENCE: BED TO CHAIR, REHAB EVAL
unable to perform/pt. refusing sit to stand transfer or any other further functional assessment. Pt. reporting pain right LE and requesting to return to bed. Pt. with right LE flexion contracture - prior level of function unknown.

## 2020-05-31 NOTE — CHART NOTE - NSCHARTNOTEFT_GEN_A_CORE
Discussed with patient's wife regarding discharge. Patient had a temperature of 100.7. I advised we monitor another 24 hours off of antibiotics. I advised that if no fever in the next 24 hours he should be stable to come home. I explained to her that there was fever also present on admission and though he was ruled out for infection intially we should carry heightened precaution given recent generator replacement of CRT-D. Patient's wife (Nicky) stated 'you killing my ' before abruptly hanging up. Apparently had a similar outburst with  today when arranging for discharge preparation. Patient informed of plan along with nurse. Will reach out to patient's wife Nicky tomorrow if patient is ready for discharge or to further discuss plan if fever continues.

## 2020-05-31 NOTE — DISCHARGE NOTE PROVIDER - CARE PROVIDER_API CALL
Dave Marte)  Cardiology; Internal Medicine  [address] [place] [address], [place] [address]  Phone: [phone]  Fax: [phone]  Follow Up Time: 2 weeks Dave Marte)  Cardiology; Internal Medicine  [address] [facility], [address]  [place] [address]  Phone: [phone]  Fax: [phone]  Follow Up Time: 2 weeks    [place] [address]  Phone: [phone]  Fax: [phone]  Follow Up Time: 1 week

## 2020-05-31 NOTE — DISCHARGE NOTE PROVIDER - NSDCMRMEDTOKEN_GEN_ALL_CORE_FT
bisacodyl 10 mg rectal suppository: 1 suppository(ies) rectal once a day; PRN if no relief of constipation from miralax  Coreg 6.25 mg oral tablet: 1 tab(s) orally once a day  Coumadin 3 mg oral tablet: 1 tab(s) orally once a day  digoxin 125 mcg (0.125 mg) oral tablet: 1 tab(s) orally every other day (at bedtime)  furosemide 40 mg oral tablet: 1 tab(s) orally every other day (at bedtime)  Lasix 20 mg oral tablet: 1 tab(s) orally every other day (at bedtime)  polyethylene glycol 3350 oral powder for reconstitution: 17 gram(s) orally once a day PRN for constipation; stop if patient is having regular bowel movements Augmentin 875 mg-125 mg oral tablet: 1 tab(s) orally once a day   bisacodyl 10 mg rectal suppository: 1 suppository(ies) rectal once a day; PRN if no relief of constipation from miralax  Coreg 6.25 mg oral tablet: 1 tab(s) orally once a day  Coumadin 3 mg oral tablet: 1 tab(s) orally once a day  digoxin 125 mcg (0.125 mg) oral tablet: 1 tab(s) orally every other day (at bedtime)  furosemide 40 mg oral tablet: 1 tab(s) orally every other day (at bedtime)  Lasix 20 mg oral tablet: 1 tab(s) orally every other day (at bedtime)  polyethylene glycol 3350 oral powder for reconstitution: 17 gram(s) orally once a day PRN for constipation; stop if patient is having regular bowel movements  rolling walker: rolling walker

## 2020-05-31 NOTE — PHYSICAL THERAPY INITIAL EVALUATION ADULT - PERTINENT HX OF CURRENT PROBLEM, REHAB EVAL
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

## 2020-05-31 NOTE — DISCHARGE NOTE PROVIDER - HOSPITAL COURSE
63 yo male with pmh of HTN, permanent Afib, HFrEF (EF 35-40%), s/p AICD (recently replaced 5/21), CVA (2012) with residual R sided weakness and dysphagia, admitted for stercoral colitis complicated by SIRS. Patient the week prior had a CRT-D device placed with Dr. Marte. Patient was admitted to medicine to rule out sepsis and blood cultures were obtained. In the ED he received azithromycin and zosyn and was continued on zosyn during hospital stay until cultures returned negative on 5/31.        In regards to his constipation/obstipation patient continued on miralax and dulcolax with good effect. KUB on 5/30 demonstrated no obstruction.        Patient seen in consultation with cardiology. at this time no recommendation for further work up. clinically patient not in heart failure and CRT-D appears to be functioning well.        Discussed course with patient wife who was concerned about his physical decline since 1 week ago. MRI of the brain was offered to rule out stroke (though no new neurological symptoms) however at this time she declined. He was seen in consultation with physical therapy who noted patient extremely weak and would be a good candidate for rehab. Patient wife would like to take patient home today. On Tuesday she is scheduled with [facility] services for nursing evaluation who will evaluate PT needs at that time.        Dr. Marte saw patient during admission and reached out to patient's wife regarding her concerns around the CRT-D placement a week prior. It's likely that the fentanyl patient had received could have led to both weakness and constipation resulting in his presentation on admission.        At this time patient is medically ready for discharge. Will discharge patient home today.        discharge time 40 minutes 63 yo male with pmh of HTN, permanent Afib, HFrEF (EF 35-40%), s/p AICD (recently replaced 5/21), CVA (2012) with residual R sided weakness and dysphagia, admitted for stercoral colitis complicated by SIRS. Patient the week prior had a CRT-D device placed with Dr. Marte. Patient was admitted to medicine to rule out sepsis and blood cultures were obtained. In the ED he received azithromycin and zosyn and was continued on zosyn during hospital stay. He was seen in consultation with ID and was continued on zosyn and blood cultures repeated on 5/31 due to repeat fever. At this time they remain negative. Patient will continue on augmentin  875mg BID for 2 more days to complete 7 day course.        In regards to his constipation/obstipation patient continued on miralax and dulcolax with good effect. KUB on repeat showed no obstruction.        Patient seen in consultation with cardiology. at this time no recommendation for further work up. clinically patient not in heart failure and CRT-D appears to be functioning well.        Discussed course with patient wife who was concerned about his physical decline since 1 week ago. MRI of the brain was offered to rule out stroke (though no new neurological symptoms) however at this time she declined. He was seen in consultation with physical therapy who noted patient extremely weak and would be a good candidate for rehab. Patient wife would like to take patient home today. On Thursday she is scheduled with Health system services for nursing evaluation who will evaluate PT needs at that time.        Dr. Marte saw patient during admission and reached out to patient's wife regarding her concerns around the CRT-D placement a week prior. It's likely that the fentanyl patient had received could have led to both weakness and constipation resulting in his presentation on admission.        Discussed with patient's PCP as well Dr. Bean during hospital course.        At this time patient is medically ready for discharge. Will discharge patient home today.        Vital Signs Last 24 Hrs    T(C): 36.8 (03 Jun 2020 08:07), Max: 38 (02 Jun 2020 16:34)    T(F): 98.3 (03 Jun 2020 08:07), Max: 100.4 (02 Jun 2020 16:34)    HR: 63 (03 Jun 2020 08:07) (63 - 91)    BP: 112/72 (03 Jun 2020 08:07) (112/72 - 119/79)    BP(mean): --    RR: 19 (03 Jun 2020 08:07) (19 - 20)    SpO2: 94% (03 Jun 2020 04:52) (93% - 94%)        PHYSICAL EXAM:    General: in no acute distress; frail appearing; alert    ENMT: No nasal discharge; airway clear    Respiratory: No wheezes, rales or rhonchi    Cardiovascular: Regular rate and rhythm. S1 and S2 Normal; No murmurs, gallops or rubs    Gastrointestinal: Soft non-tender non-distended; Normal bowel sounds    Extremities: Normal range of motion, No clubbing, cyanosis or edema; no warm/erythematous joints    Vascular: Peripheral pulses palpable 2+ bilaterally    Neurological: Alert and oriented to person and place; 4/5 strength on the right side but 5/5 on the left; facial droop noted on the right; slurred speech    Psychiatric: Cooperative and appropriate        discharge time 40 minutes

## 2020-05-31 NOTE — PHYSICAL THERAPY INITIAL EVALUATION ADULT - ADDITIONAL COMMENTS
As per pt. and CCC: Pt. lives at home with his wife who is available to assist at all times. Pt. reports 5 ISAAC with one rail and 7 inside with one rail. Pt. reports requiring assistance from wife "sometimes" with and without RW for bed mobility, transfers, ambulation household distances only, and stairs. Pt. reports he owns a W/C and SAC. pt. may be poor historian. . Discussed with CCC Danielle who is going to get more information from wife.    As per PT jen 7/29/17: pt requires assist for all functional mobility at home, wife able to assist prn, ramp to enter home, owns a W/C, uses a RW, was participating in outpatient PT

## 2020-05-31 NOTE — PHYSICAL THERAPY INITIAL EVALUATION ADULT - ACTIVE RANGE OF MOTION EXAMINATION, REHAB EVAL
no Active ROM deficits were identified/with exception to b/l shoulder 0-25 degrees, right LE lacking 90 degrees of extension

## 2020-05-31 NOTE — PROGRESS NOTE ADULT - ASSESSMENT
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     #Stercoral colitis, complicated by SIRS on arrival - sepsis ruled out since cultures are negative  - CT abd revealed large fecal load in the rectum with mild thickening of the rectal walls and slight infiltration of the perirectal fat concerning for a stercoral colitis.   - unclear etiology. likely severe constipation secondary to diet and reduced mobility  - given zosyn and azithro in the ED - continued on zosyn until today  - blood cxs drawn - results show no growth   - no further fever  - discontinue zosyn today  - s/p enema/lactulose/miralax  - dulcolax suppository QD.   - for now will continue only miralax and suppository dulocolax    #functional decline - deconditioning  - difficult to assess if any new findings given patient previous stroke  - at this time patient has no complaints  - will evaluate MRI brain since patient had a subtherapeutic INR when had CRT-D placed  - he has compatible leads/device  - if stroke will likely need MIGUEL  - if ruled out may be able to go home with family   - PT to evaluate today    #SIRS - ruled out for infection with negative blood cultures  - patient with recent AICD placement but at this time cultures negative  - no further fevers  - will monitor off zosyn    #PITA on CKD3a - improved  - Cr improved    #Permanent Afib  - CHADS-VASc score 5  - takes coumadin 2.5mg at home  - continue warfarin  - discontinued lovenox    #HFrEF (EF 35-40%) s/p AICD  - euvolemic to hypovolemic today  - holding lasix  - hydration as above  - cardiology recs appreciated     #Hypertension, well controlled  - can restart home meds pending speech eval    #Dysphagia  - residual deficit after CVA in 2012  - continue pureed thickened liquids which patient does at home  - wife states at home patient will tolerate some soft foods as well.    DVT ppx: therapeutic INR    Dispo: PT eval; likely discharge Monday if no stroke and PT recommends home with assist

## 2020-06-01 LAB
ANION GAP SERPL CALC-SCNC: 12 MMOL/L — SIGNIFICANT CHANGE UP (ref 5–17)
BUN SERPL-MCNC: 16 MG/DL — SIGNIFICANT CHANGE UP (ref 8–20)
CALCIUM SERPL-MCNC: 8.3 MG/DL — LOW (ref 8.6–10.2)
CHLORIDE SERPL-SCNC: 107 MMOL/L — SIGNIFICANT CHANGE UP (ref 98–107)
CO2 SERPL-SCNC: 20 MMOL/L — LOW (ref 22–29)
CREAT SERPL-MCNC: 1.11 MG/DL — SIGNIFICANT CHANGE UP (ref 0.5–1.3)
GLUCOSE BLDC GLUCOMTR-MCNC: 124 MG/DL — HIGH (ref 70–99)
GLUCOSE BLDC GLUCOMTR-MCNC: 147 MG/DL — HIGH (ref 70–99)
GLUCOSE SERPL-MCNC: 133 MG/DL — HIGH (ref 70–99)
HCT VFR BLD CALC: 39.1 % — SIGNIFICANT CHANGE UP (ref 39–50)
HGB BLD-MCNC: 12.1 G/DL — LOW (ref 13–17)
MCHC RBC-ENTMCNC: 24.1 PG — LOW (ref 27–34)
MCHC RBC-ENTMCNC: 30.9 GM/DL — LOW (ref 32–36)
MCV RBC AUTO: 77.9 FL — LOW (ref 80–100)
PLATELET # BLD AUTO: 351 K/UL — SIGNIFICANT CHANGE UP (ref 150–400)
POTASSIUM SERPL-MCNC: 4.1 MMOL/L — SIGNIFICANT CHANGE UP (ref 3.5–5.3)
POTASSIUM SERPL-SCNC: 4.1 MMOL/L — SIGNIFICANT CHANGE UP (ref 3.5–5.3)
RBC # BLD: 5.02 M/UL — SIGNIFICANT CHANGE UP (ref 4.2–5.8)
RBC # FLD: 15.3 % — HIGH (ref 10.3–14.5)
SODIUM SERPL-SCNC: 139 MMOL/L — SIGNIFICANT CHANGE UP (ref 135–145)
WBC # BLD: 9.2 K/UL — SIGNIFICANT CHANGE UP (ref 3.8–10.5)
WBC # FLD AUTO: 9.2 K/UL — SIGNIFICANT CHANGE UP (ref 3.8–10.5)

## 2020-06-01 PROCEDURE — 99233 SBSQ HOSP IP/OBS HIGH 50: CPT

## 2020-06-01 PROCEDURE — 99222 1ST HOSP IP/OBS MODERATE 55: CPT

## 2020-06-01 PROCEDURE — 74018 RADEX ABDOMEN 1 VIEW: CPT | Mod: 26

## 2020-06-01 RX ORDER — PIPERACILLIN AND TAZOBACTAM 4; .5 G/20ML; G/20ML
3.38 INJECTION, POWDER, LYOPHILIZED, FOR SOLUTION INTRAVENOUS ONCE
Refills: 0 | Status: COMPLETED | OUTPATIENT
Start: 2020-06-01 | End: 2020-06-01

## 2020-06-01 RX ORDER — PIPERACILLIN AND TAZOBACTAM 4; .5 G/20ML; G/20ML
3.38 INJECTION, POWDER, LYOPHILIZED, FOR SOLUTION INTRAVENOUS EVERY 8 HOURS
Refills: 0 | Status: DISCONTINUED | OUTPATIENT
Start: 2020-06-01 | End: 2020-06-03

## 2020-06-01 RX ORDER — ACETAMINOPHEN 500 MG
650 TABLET ORAL ONCE
Refills: 0 | Status: COMPLETED | OUTPATIENT
Start: 2020-06-01 | End: 2020-06-01

## 2020-06-01 RX ADMIN — Medication 650 MILLIGRAM(S): at 18:27

## 2020-06-01 RX ADMIN — POLYETHYLENE GLYCOL 3350 17 GRAM(S): 17 POWDER, FOR SOLUTION ORAL at 11:00

## 2020-06-01 RX ADMIN — CARVEDILOL PHOSPHATE 6.25 MILLIGRAM(S): 80 CAPSULE, EXTENDED RELEASE ORAL at 05:28

## 2020-06-01 RX ADMIN — SODIUM CHLORIDE 75 MILLILITER(S): 9 INJECTION, SOLUTION INTRAVENOUS at 15:03

## 2020-06-01 RX ADMIN — PIPERACILLIN AND TAZOBACTAM 200 GRAM(S): 4; .5 INJECTION, POWDER, LYOPHILIZED, FOR SOLUTION INTRAVENOUS at 10:55

## 2020-06-01 RX ADMIN — Medication 0.12 MILLIGRAM(S): at 05:28

## 2020-06-01 RX ADMIN — PIPERACILLIN AND TAZOBACTAM 25 GRAM(S): 4; .5 INJECTION, POWDER, LYOPHILIZED, FOR SOLUTION INTRAVENOUS at 21:22

## 2020-06-01 RX ADMIN — PIPERACILLIN AND TAZOBACTAM 25 GRAM(S): 4; .5 INJECTION, POWDER, LYOPHILIZED, FOR SOLUTION INTRAVENOUS at 15:03

## 2020-06-01 RX ADMIN — Medication 650 MILLIGRAM(S): at 21:24

## 2020-06-01 NOTE — CONSULT NOTE ADULT - ASSESSMENT
65 yo male with pmh of HTN, permanent Afib, HFrEF (EF 35-40%), s/p AICD (recently replaced 5/21), CVA (2012) with residual R sided weakness and dysphagia, presents with weakness. Pt noted to be unreliable historian. He is AAOx2 (self, location) and confused. As per wife, his mentation is at baseline. She states that he has been progressively getting weaker since the AICD replacement procedure last week. Also reports reduced po intake. His PMD did a home visit last weekend and reported no acute findings. Wife also reports he had 6-7 episodes of loose stools over the past 2 days, but normally constipated at baseline. Denies fevers, chills, n/v, abd pain, chest pain, sob, cough, nasal congestion.     ED course: pt met sepsis criteria with rectal temp 101.2 as per ED documentation, also with mild tachycardia (HR 94), mild tachypnea (RR 22), and leukocytosis with wbc 12.1. Hemodynamically stable and lactate wnl. Given 1L NS. Blood cxs drawn. Empiric abx ordered by ED, zosyn and azithro given, vanco pending. CT head done due to worsening weakness and noted to be negative. CT chest for rales on exam and negative for pulm edema, only noted mild bibasilar dependent atelectasis. CT abd/pelv done due to loose stools, and revealed large fecal load in the rectum with mild thickening of the rectal walls and slight infiltration of the perirectal fat concerning for a stercoral colitis. (29 May 2020 04:44)    patient was given Azithromycin x 1 dose on 5/29, then Zosyn was added from 5/29 - 5/31.   He had a bowel movement yesterday.     Overnight he had a fever, and blood cultures were obtained.     he was now restarted on Zosyn.         Impression:  Fevers  Stercoral colitis  WBC elevation      Plans:    - restarted on Zosyn  - Check Abd flat plate to r/o persistence of retained fecal matter   - may need Tap water enema if still retaining feces    - follow up all outstanding cultures  - trend temperature and WBC curve  - repeat cultures from blood and all sources if febrile.

## 2020-06-01 NOTE — CONSULT NOTE ADULT - SUBJECTIVE AND OBJECTIVE BOX
[facility]  INFECTIOUS DISEASES AND INTERNAL MEDICINE at [place]  =======================================================  Nabil Can MD  Diplomates American Board of Internal Medicine and Infectious Diseases  Telephone [phone]  Fax            [phone]  =======================================================    [id number]  [facility] MARTINYVROSECOLEEN   HPI:  63 yo male with pmh of HTN, permanent Afib, HFrEF (EF 35-40%), s/p AICD (recently replaced 5/21), CVA (2012) with residual R sided weakness and dysphagia, presents with weakness. Pt noted to be unreliable historian. He is AAOx2 (self, location) and confused. As per wife, his mentation is at baseline. She states that he has been progressively getting weaker since the AICD replacement procedure last week. Also reports reduced po intake. His PMD did a home visit last weekend and reported no acute findings. Wife also reports he had 6-7 episodes of loose stools over the past 2 days, but normally constipated at baseline. Denies fevers, chills, n/v, abd pain, chest pain, sob, cough, nasal congestion.     ED course: pt met sepsis criteria with rectal temp 101.2 as per ED documentation, also with mild tachycardia (HR 94), mild tachypnea (RR 22), and leukocytosis with wbc 12.1. Hemodynamically stable and lactate wnl. Given 1L NS. Blood cxs drawn. Empiric abx ordered by ED, zosyn and azithro given, vanco pending. CT head done due to worsening weakness and noted to be negative. CT chest for rales on exam and negative for pulm edema, only noted mild bibasilar dependent atelectasis. CT abd/pelv done due to loose stools, and revealed large fecal load in the rectum with mild thickening of the rectal walls and slight infiltration of the perirectal fat concerning for a stercoral colitis. (29 May 2020 04:44)    patient was given Azithromycin x 1 dose on 5/29, then Zosyn was added from 5/29 - 5/31.   He had a bowel movement yesterday.     Overnight he had a fever, and blood cultures were obtained.     he was now restarted on Zosyn.     patient reports no significant abd pain.    I have personally reviewed the labs and data; pertinent labs and data are listed in this note; please see below.   =======================================================  Past Medical & Surgical Hx:  =====================  PAST MEDICAL & SURGICAL HISTORY:  AICD (automatic cardioverter/defibrillator) present  Thrombus of left atrial appendage  Atrial fibrillation  RBBB  Cardiomyopathy  Hyperlipidemia  Hypertension  CVA (cerebral vascular accident)  AICD (automatic cardioverter/defibrillator) present  Saul filter in place: no h/o dvt and as per wife  it was placed as a precaution after cva.    Problem List:  ==========  HEALTH ISSUES - PROBLEM Dx:        Social Hx:  =======  no toxic habits currently    FAMILY HISTORY:  Family history of cerebrovascular accident (CVA)  no significant family history of immunosuppressive disorders in mother or father   =======================================================  REVIEW OF SYSTEMS:  CONSTITUTIONAL:   POSITIVE fevers  HEENT:  No diplopia or blurred vision.  No earache, sore throat or runny nose.  CARDIOVASCULAR:  No pressure, squeezing, strangling, tightness, heaviness or aching about the chest, neck, axilla or epigastrium.  RESPIRATORY:  No cough, shortness of breath  GASTROINTESTINAL:  No nausea, vomiting or diarrhea.  GENITOURINARY:  No dysuria, frequency or urgency. No Blood in urine  MUSCULOSKELETAL:  no joint aches, no muscle pain  SKIN:  No change in skin, hair or nails.  NEUROLOGIC:  No Headaches, seizures or weakness.  PSYCHIATRIC:  No disorder of thought or mood.  ENDOCRINE:  No heat or cold intolerance  HEMATOLOGICAL:  No easy bruising or bleeding.   =======================================================  Allergies    latex (Rash)  No Known Drug Allergies    Intolerances    Antibiotics:  piperacillin/tazobactam IVPB.. 3.375 Gram(s) IV Intermittent every 8 hours    Other medications:  bisacodyl Suppository 10 milliGRAM(s) Rectal daily  carvedilol Oral Tab/Cap - Peds 6.25 milliGRAM(s) Oral daily  dextrose 5% + sodium chloride 0.9%. 1000 milliLiter(s) IV Continuous <Continuous>  digoxin     Tablet 0.125 milliGRAM(s) Oral daily  polyethylene glycol 3350 17 Gram(s) Oral daily    ======================================================  Physical Exam:  ============  T(F): 99 (01 Jun 2020 08:23), Max: 102.1 (31 May 2020 16:27)  HR: 91 (01 Jun 2020 08:23)  BP: 125/83 (01 Jun 2020 08:23)  RR: 18 (01 Jun 2020 08:23)  SpO2: 92% (01 Jun 2020 00:00) (92% - 95%)  temp max in last 48H T(F): , Max: 102.1 (05-31-20 @ 16:27)    General:  No acute distress.  Eye: Pupils are equal, round and reactive to light, Extraocular movements are intact, Normal conjunctiva.  HENT: Normocephalic, Oral mucosa is moist, No pharyngeal erythema, No sinus tenderness.  Neck: Supple, No lymphadenopathy.  Respiratory: Lungs are clear to auscultation, Respirations are non-labored.  Cardiovascular: Normal rate, Regular rhythm,   Gastrointestinal: Soft, Non-tender, Non-distended, Normal bowel sounds.  Genitourinary: No costovertebral angle tenderness.  Lymphatics: No lymphadenopathy neck,   Musculoskeletal: Normal range of motion, Normal strength.  Integumentary: No rash.  Neurologic: Alert, Oriented, No focal deficits, Cranial Nerves II-XII are grossly intact.  Psychiatric: Appropriate mood & affect.    =======================================================  Labs:                        12.1   9.20  )-----------( 351      ( 01 Jun 2020 07:24 )             39.1      06-01    139  |  107  |  16.0  ----------------------------<  133<H>  4.1   |  20.0<L>  |  1.11    Ca    8.3<L>      01 Jun 2020 07:24      Culture - Blood (collected 05-28-20 @ 23:42)  Source: .Blood Blood-Peripheral    Culture - Blood (collected 05-28-20 @ 23:41)  Source: .Blood Blood-Peripheral      Creatinine, Serum: 1.11 mg/dL (06-01-20 @ 07:24)  Creatinine, Serum: 1.22 mg/dL (05-31-20 @ 07:49)  Creatinine, Serum: 1.36 mg/dL (05-30-20 @ 08:20)  Creatinine, Serum: 1.47 mg/dL (05-29-20 @ 08:45)  Creatinine, Serum: 1.48 mg/dL (05-28-20 @ 23:40)    C-Reactive Protein, Serum: 20.15 mg/dL (05-28-20 @ 23:40)    Procalcitonin, Serum: 0.34 ng/mL (05-28-20 @ 23:40)    COVID-19 PCR: NotDetec (05-28-20 @ 23:53)  COVID-19 PCR: NotDetec (05-21-20 @ 08:50)    WBC Count: 9.20 K/uL (06-01-20 @ 07:24)  WBC Count: 7.63 K/uL (05-31-20 @ 07:50)  WBC Count: 8.50 K/uL (05-30-20 @ 08:20)  WBC Count: 10.21 K/uL (05-29-20 @ 08:45)  WBC Count: 12.10 K/uL (05-29-20 @ 00:28)      < from: CT Abdomen and Pelvis No Cont (05.29.20 @ 01:03) >   EXAM:  CT ABDOMEN AND PELVIS                         EXAM:  CT CHEST                          PROCEDURE DATE:  05/29/2020          INTERPRETATION:  CLINICAL INFORMATION: Fever and change in mental status.    COMPARISON: CT chest from 10/17/2016.    PROCEDURE:   CT of the Chest, Abdomen and Pelvis was performed without intravenous contrast.   Intravenous contrast: None.  Oral contrast: None.  Sagittal and coronal reformats were performed.    FINDINGS:  CHEST:   LUNGS AND LARGE AIRWAYS: Patent central airways. No lung consolidation or abnormal groundglass opacity. No suspicious nodule or mass. Mild bibasilar dependent atelectasis and a few scattered bands of platelike atelectasis.  PLEURA: No pleural effusion.  VESSELS: Normal caliber thoracic aorta. Main pulmonary artery is not dilated. Atherosclerotic calcifications of the thoracic aorta, proximal great vessels, and coronary arteries.  HEART: Heart is enlarged. No pericardial effusion. Pacemaker/AICD leads in the right atrium, right ventricle, and coronary sinus.  MEDIASTINUM AND PRINCE: No lymphadenopathy.  CHEST WALL AND LOWER NECK: Left chest wall cardiac device.    ABDOMEN AND PELVIS:  LIVER: Within normal limits.  BILE DUCTS: Normal caliber.  GALLBLADDER: Contracted gallbladder with cholelithiasis.  SPLEEN: Within normal limits.  PANCREAS: Within normal limits.  ADRENALS: Within normal limits.  KIDNEYS/URETERS: Kidneys are symmetric in size. No right or left hydronephrosis or nephrolithiasis. Lower pole right renal cortical thinning/scarring. Right renal cyst. Subcentimeter low-attenuation lesion arising from the left kidney which is too small to characterize.    BLADDER: Within normal limits.  REPRODUCTIVE ORGANS: Prostate gland is mildly enlarged.    BOWEL: Small hiatus hernia. Large fecal load in the rectum with mild thickening of the rectal walls and slight infiltration of the perirectal fat. Appendix is not discretely identified, however, no secondary CT findings to suggest appendicitis.  PERITONEUM: No ascites or pneumoperitoneum. No mesenteric lymphadenopathy.  VESSELS: Atherosclerotic calcifications of the aortoiliac tree and abdominal aortic branches. Normal caliber abdominal aorta. IVC filter.  RETROPERITONEUM/LYMPH NODES: No lymphadenopathy.    ABDOMINAL WALL: Within normal limits.  BONES: Mild degenerative changes of the spine.    IMPRESSION:   No acute parenchymal or pleural lung disease.    Large fecal load in the rectum with mild thickening of the rectal walls and slight infiltration of the perirectal fat concerning for a stercoral colitis.    Contracted gallbladder with cholelithiasis.       HIEN BRADY D.O. ATTENDING RADIOLOGIST  This document has been electronically signed. May 29 2020  1:20AM         < end of copied text >

## 2020-06-01 NOTE — PROGRESS NOTE ADULT - SUBJECTIVE AND OBJECTIVE BOX
CC: stercoral colitis complicated by sepsis (29 May 2020 12:31)    INTERVAL HPI/OVERNIGHT EVENTS:  continues to have fever from yesterday afternoon into evening  patient without complaints today  patient states has not had a BM in last 24 hours    Vital Signs Last 24 Hrs  T(C): 37.2 (01 Jun 2020 08:23), Max: 38.9 (31 May 2020 16:27)  T(F): 99 (01 Jun 2020 08:23), Max: 102.1 (31 May 2020 16:27)  HR: 91 (01 Jun 2020 08:23) (80 - 110)  BP: 125/83 (01 Jun 2020 08:23) (125/83 - 143/90)  BP(mean): --  RR: 18 (01 Jun 2020 08:23) (18 - 18)  SpO2: 92% (01 Jun 2020 00:00) (92% - 95%)    PHYSICAL EXAM:  General: in no acute distress; frail appearing; alert  ENMT: No nasal discharge; airway clear  Respiratory: No wheezes, rales or rhonchi  Cardiovascular: Regular rate and rhythm. S1 and S2 Normal; No murmurs, gallops or rubs  Gastrointestinal: Soft non-tender non-distended; Normal bowel sounds  Extremities: Normal range of motion, No clubbing, cyanosis or edema; no warm/erythematous joints  Vascular: Peripheral pulses palpable 2+ bilaterally  Neurological: Alert and oriented to person and place; 4/5 strength on the right side but 5/5 on the left; facial droop noted on the right; slurred speech  Psychiatric: Cooperative and appropriate    I&O's Detail                                   12.1   9.20  )-----------( 351      ( 01 Jun 2020 07:24 )             39.1     06-01    139  |  107  |  16.0  ----------------------------<  133<H>  4.1   |  20.0<L>  |  1.11    Ca    8.3<L>      01 Jun 2020 07:24    MEDICATIONS  (STANDING):  bisacodyl Suppository 10 milliGRAM(s) Rectal daily  carvedilol Oral Tab/Cap - Peds 6.25 milliGRAM(s) Oral daily  dextrose 5% + sodium chloride 0.9%. 1000 milliLiter(s) (75 mL/Hr) IV Continuous <Continuous>  digoxin     Tablet 0.125 milliGRAM(s) Oral daily  enoxaparin Injectable 68 milliGRAM(s) SubCutaneous every 12 hours  lactulose Syrup 20 Gram(s) Oral two times a day  mineral oil enema 133 milliLiter(s) Rectal daily  piperacillin/tazobactam IVPB.. 3.375 Gram(s) IV Intermittent every 8 hours  polyethylene glycol 3350 17 Gram(s) Oral daily  warfarin 2.5 milliGRAM(s) Oral once    MEDICATIONS  (PRN):  acetaminophen  Suppository .. 650 milliGRAM(s) Rectal every 6 hours PRN Temp greater or equal to 38C (100.4F), Mild Pain (1 - 3)  ALBUTerol    90 MICROgram(s) HFA Inhaler 2 Puff(s) Inhalation every 6 hours PRN Shortness of Breath and/or Wheezing      RADIOLOGY & ADDITIONAL TESTS:

## 2020-06-01 NOTE — PROGRESS NOTE ADULT - ASSESSMENT
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     #Stercoral colitis, complicated by SIRS on arrival - intially sepsis ruled out since cultures are negative; patient continues to have fever and now we are repeating blood and urine culures - UA however appears mostly unremarkable for signs of infection. Discussed with ID who suspect GI source.  - on admission CT abd revealed large fecal load in the rectum with mild thickening of the rectal walls and slight infiltration of the perirectal fat concerning for a stercoral colitis.   - given zosyn and azithro in the ED - continued on zosyn until 5/31 when it was stopped  - blood cxs from admission show no growth - repeat from 5/31 are pending  - monitor fevers  - dulcolax suppository QD and miralax daily  - monitor for stool output    #functional decline - deconditioning  - difficult to assess if any new findings given patient previous stroke  - at this time patient has no complaints  - we offered MRI however family declined  - he has compatible leads/device if family agrees  - PT to evaluated and patient with extremely poor mobility  - we offered MIGUEL to the wife who declined and would like to bring the patient home instead    #SIRS - etiology unclear as above - at this time GI source is suspected  - patient with recent AICD placement but at this time cultures negative  - if positive will discuss SHANELLE with cardiology  - ID recs appreciated    #PITA on CKD3a - improved  - Cr improved    #Permanent Afib  - CHADS-VASc score 5  - takes coumadin 2.5mg at home  - continue warfarin  - discontinued lovenox    #HFrEF (EF 35-40%) s/p AICD  - euvolemic to hypovolemic today  - holding lasix  - hydration as above  - cardiology recs appreciated     #Hypertension, well controlled  - can restart home meds pending speech eval    #Dysphagia  - residual deficit after CVA in 2012  - continue pureed thickened liquids which patient does at home  - wife states at home patient will tolerate some soft foods as well.    DVT ppx: therapeutic INR    DISPO: pending results of cultures and KUB recommended by ID. will continue to monitor fever trend/WBC; ultimately home with home care

## 2020-06-02 LAB
INR BLD: 2.77 RATIO — HIGH (ref 0.88–1.16)
PROTHROM AB SERPL-ACNC: 32.3 SEC — HIGH (ref 10–12.9)

## 2020-06-02 PROCEDURE — 99232 SBSQ HOSP IP/OBS MODERATE 35: CPT

## 2020-06-02 RX ORDER — LACTULOSE 10 G/15ML
200 SOLUTION ORAL ONCE
Refills: 0 | Status: COMPLETED | OUTPATIENT
Start: 2020-06-02 | End: 2020-06-02

## 2020-06-02 RX ORDER — LACTULOSE 10 G/15ML
20 SOLUTION ORAL
Refills: 0 | Status: DISCONTINUED | OUTPATIENT
Start: 2020-06-02 | End: 2020-06-02

## 2020-06-02 RX ORDER — NYSTATIN 500MM UNIT
500000 POWDER (EA) MISCELLANEOUS
Refills: 0 | Status: DISCONTINUED | OUTPATIENT
Start: 2020-06-02 | End: 2020-06-03

## 2020-06-02 RX ORDER — LACTULOSE 10 G/15ML
20 SOLUTION ORAL ONCE
Refills: 0 | Status: COMPLETED | OUTPATIENT
Start: 2020-06-02 | End: 2020-06-02

## 2020-06-02 RX ORDER — WARFARIN SODIUM 2.5 MG/1
2.5 TABLET ORAL ONCE
Refills: 0 | Status: COMPLETED | OUTPATIENT
Start: 2020-06-02 | End: 2020-06-02

## 2020-06-02 RX ORDER — MINERAL OIL
133 OIL (ML) MISCELLANEOUS ONCE
Refills: 0 | Status: COMPLETED | OUTPATIENT
Start: 2020-06-02 | End: 2020-06-02

## 2020-06-02 RX ORDER — MULTIVIT WITH MIN/MFOLATE/K2 340-15/3 G
1 POWDER (GRAM) ORAL ONCE
Refills: 0 | Status: DISCONTINUED | OUTPATIENT
Start: 2020-06-02 | End: 2020-06-02

## 2020-06-02 RX ADMIN — WARFARIN SODIUM 2.5 MILLIGRAM(S): 2.5 TABLET ORAL at 21:11

## 2020-06-02 RX ADMIN — PIPERACILLIN AND TAZOBACTAM 25 GRAM(S): 4; .5 INJECTION, POWDER, LYOPHILIZED, FOR SOLUTION INTRAVENOUS at 16:36

## 2020-06-02 RX ADMIN — Medication 650 MILLIGRAM(S): at 16:35

## 2020-06-02 RX ADMIN — LACTULOSE 20 GRAM(S): 10 SOLUTION ORAL at 10:45

## 2020-06-02 RX ADMIN — Medication 10 MILLIGRAM(S): at 10:46

## 2020-06-02 RX ADMIN — Medication 500000 UNIT(S): at 14:10

## 2020-06-02 RX ADMIN — PIPERACILLIN AND TAZOBACTAM 25 GRAM(S): 4; .5 INJECTION, POWDER, LYOPHILIZED, FOR SOLUTION INTRAVENOUS at 21:11

## 2020-06-02 RX ADMIN — POLYETHYLENE GLYCOL 3350 17 GRAM(S): 17 POWDER, FOR SOLUTION ORAL at 10:46

## 2020-06-02 RX ADMIN — CARVEDILOL PHOSPHATE 6.25 MILLIGRAM(S): 80 CAPSULE, EXTENDED RELEASE ORAL at 05:49

## 2020-06-02 RX ADMIN — PIPERACILLIN AND TAZOBACTAM 25 GRAM(S): 4; .5 INJECTION, POWDER, LYOPHILIZED, FOR SOLUTION INTRAVENOUS at 10:35

## 2020-06-02 RX ADMIN — Medication 133 MILLILITER(S): at 10:46

## 2020-06-02 RX ADMIN — LACTULOSE 200 GRAM(S): 10 SOLUTION ORAL at 14:09

## 2020-06-02 RX ADMIN — Medication 0.12 MILLIGRAM(S): at 05:49

## 2020-06-02 NOTE — PROGRESS NOTE ADULT - SUBJECTIVE AND OBJECTIVE BOX
[facility]  INFECTIOUS DISEASES AND INTERNAL MEDICINE at [place]  =======================================================  Nabil Can MD  Diplomates American Board of Internal Medicine and Infectious Diseases  Telephone [phone]  Fax            [phone]  =======================================================    [id number]  [facility] CARDENAS   follow up for fevers       repeat KUB done, still with large fecal content in rectosigmoid  no BM yet     =======================================================  REVIEW OF SYSTEMS:  CONSTITUTIONAL:   POSITIVE fevers  HEENT:  No diplopia or blurred vision.  No earache, sore throat or runny nose.  CARDIOVASCULAR:  No pressure, squeezing, strangling, tightness, heaviness or aching about the chest, neck, axilla or epigastrium.  RESPIRATORY:  No cough, shortness of breath  GASTROINTESTINAL:  No nausea, vomiting or diarrhea.  GENITOURINARY:  No dysuria, frequency or urgency. No Blood in urine  MUSCULOSKELETAL:  no joint aches, no muscle pain  SKIN:  No change in skin, hair or nails.  NEUROLOGIC:  No Headaches, seizures or weakness.  PSYCHIATRIC:  No disorder of thought or mood.  ENDOCRINE:  No heat or cold intolerance  HEMATOLOGICAL:  No easy bruising or bleeding.   =======================================================  Allergies    latex (Rash)  No Known Drug Allergies     ======================================================  Physical Exam:  ============  (see vitals section below)    General:  No acute distress.  Eye: Pupils are equal, round and reactive to light, Extraocular movements are intact, Normal conjunctiva.  HENT: Normocephalic, Oral mucosa is moist, No pharyngeal erythema, No sinus tenderness.  Neck: Supple, No lymphadenopathy.  Respiratory: Lungs are clear to auscultation, Respirations are non-labored.  Cardiovascular: Normal rate, Regular rhythm,   Gastrointestinal: Soft, Non-tender, Non-distended, Normal bowel sounds.  Genitourinary: No costovertebral angle tenderness.  Lymphatics: No lymphadenopathy neck,   Musculoskeletal: Normal range of motion, Normal strength.  Integumentary: No rash.  Neurologic: Alert, Oriented, No focal deficits, Cranial Nerves II-XII are grossly intact.  Psychiatric: Appropriate mood & affect.    =======================================================  Vitals:  ============  T(F): 98.4 (02 Jun 2020 09:30), Max: 101.1 (01 Jun 2020 21:09)  HR: 89 (02 Jun 2020 09:30)  BP: 124/73 (02 Jun 2020 09:30)  RR: 18 (02 Jun 2020 09:30)  SpO2: 93% (02 Jun 2020 09:30) (91% - 93%)  temp max in last 48H T(F): , Max: 102.1 (05-31-20 @ 16:27)    =======================================================  Current Antibiotics:  nystatin    Suspension 914019 Unit(s) Oral four times a day  piperacillin/tazobactam IVPB.. 3.375 Gram(s) IV Intermittent every 8 hours    Other medications:  bisacodyl Suppository 10 milliGRAM(s) Rectal daily  carvedilol Oral Tab/Cap - Peds 6.25 milliGRAM(s) Oral daily  dextrose 5% + sodium chloride 0.9%. 1000 milliLiter(s) IV Continuous <Continuous>  digoxin     Tablet 0.125 milliGRAM(s) Oral daily  lactulose Retention Enema 200 Gram(s) Rectal once  polyethylene glycol 3350 17 Gram(s) Oral daily      =======================================================  Labs:                        12.1   9.20  )-----------( 351      ( 01 Jun 2020 07:24 )             39.1      06-01    139  |  107  |  16.0  ----------------------------<  133<H>  4.1   |  20.0<L>  |  1.11    Ca    8.3<L>      01 Jun 2020 07:24        Culture - Blood (collected 05-28-20 @ 23:42)  Source: .Blood Blood-Peripheral    Culture - Blood (collected 05-28-20 @ 23:41)  Source: .Blood Blood-Peripheral      Creatinine, Serum: 1.11 mg/dL (06-01-20 @ 07:24)  Creatinine, Serum: 1.22 mg/dL (05-31-20 @ 07:49)  Creatinine, Serum: 1.36 mg/dL (05-30-20 @ 08:20)  Creatinine, Serum: 1.47 mg/dL (05-29-20 @ 08:45)  Creatinine, Serum: 1.48 mg/dL (05-28-20 @ 23:40)    Procalcitonin, Serum: 0.34 ng/mL (05-28-20 @ 23:40)      Ferritin, Serum: 625 ng/mL (05-28-20 @ 23:40)    C-Reactive Protein, Serum: 20.15 mg/dL (05-28-20 @ 23:40)    WBC Count: 9.20 K/uL (06-01-20 @ 07:24)  WBC Count: 7.63 K/uL (05-31-20 @ 07:50)  WBC Count: 8.50 K/uL (05-30-20 @ 08:20)  WBC Count: 10.21 K/uL (05-29-20 @ 08:45)  WBC Count: 12.10 K/uL (05-29-20 @ 00:28)      COVID-19 PCR: NotDetec (05-28-20 @ 23:53)  COVID-19 PCR: NotDetec (05-21-20 @ 08:50)

## 2020-06-02 NOTE — PROGRESS NOTE ADULT - ASSESSMENT
65 yo male with pmh of HTN, permanent Afib, HFrEF (EF 35-40%), s/p AICD (recently replaced 5/21), CVA (2012) with residual R sided weakness and dysphagia, presents with weakness. Pt noted to be unreliable historian. He is AAOx2 (self, location) and confused. As per wife, his mentation is at baseline. She states that he has been progressively getting weaker since the AICD replacement procedure last week. Also reports reduced po intake. His PMD did a home visit last weekend and reported no acute findings. Wife also reports he had 6-7 episodes of loose stools over the past 2 days, but normally constipated at baseline. Denies fevers, chills, n/v, abd pain, chest pain, sob, cough, nasal congestion.     ED course: pt met sepsis criteria with rectal temp 101.2 as per ED documentation, also with mild tachycardia (HR 94), mild tachypnea (RR 22), and leukocytosis with wbc 12.1. Hemodynamically stable and lactate wnl. Given 1L NS. Blood cxs drawn. Empiric abx ordered by ED, zosyn and azithro given, vanco pending. CT head done due to worsening weakness and noted to be negative. CT chest for rales on exam and negative for pulm edema, only noted mild bibasilar dependent atelectasis. CT abd/pelv done due to loose stools, and revealed large fecal load in the rectum with mild thickening of the rectal walls and slight infiltration of the perirectal fat concerning for a stercoral colitis. (29 May 2020 04:44)    patient was given Azithromycin x 1 dose on 5/29, then Zosyn was added from 5/29 - 5/31.   He had a bowel movement yesterday.     Overnight he had a fever, and blood cultures were obtained.     he was now restarted on Zosyn.         Impression:  Fevers  Stercoral colitis  WBC elevation      Plans:    - continue zosyn  - continue bowel regimen     - follow up all outstanding cultures  - trend temperature and WBC curve  - repeat cultures from blood and all sources if febrile.

## 2020-06-02 NOTE — PROGRESS NOTE ADULT - SUBJECTIVE AND OBJECTIVE BOX
CC: stercoral colitis complicated by sepsis (29 May 2020 12:31)    INTERVAL HPI/OVERNIGHT EVENTS:  fever at 9pm of 101  patient still without BM  continue with aggressive bowel regimen    ICU Vital Signs Last 24 Hrs  T(C): 36.9 (02 Jun 2020 09:30), Max: 38.4 (01 Jun 2020 21:09)  T(F): 98.4 (02 Jun 2020 09:30), Max: 101.1 (01 Jun 2020 21:09)  HR: 89 (02 Jun 2020 09:30) (84 - 93)  BP: 124/73 (02 Jun 2020 09:30) (124/73 - 150/93)  BP(mean): --  ABP: --  ABP(mean): --  RR: 18 (02 Jun 2020 09:30) (18 - 18)  SpO2: 93% (02 Jun 2020 09:30) (91% - 93%)    PHYSICAL EXAM:  General: in no acute distress; frail appearing; alert  ENMT: No nasal discharge; airway clear  Respiratory: No wheezes, rales or rhonchi  Cardiovascular: Regular rate and rhythm. S1 and S2 Normal; No murmurs, gallops or rubs  Gastrointestinal: Soft non-tender non-distended; Normal bowel sounds  Extremities: Normal range of motion, No clubbing, cyanosis or edema; no warm/erythematous joints  Vascular: Peripheral pulses palpable 2+ bilaterally  Neurological: Alert and oriented to person and place; 4/5 strength on the right side but 5/5 on the left; facial droop noted on the right; slurred speech  Psychiatric: Cooperative and appropriate    I&O's Detail                                   12.1   9.20  )-----------( 351      ( 01 Jun 2020 07:24 )             39.1     06-01    139  |  107  |  16.0  ----------------------------<  133<H>  4.1   |  20.0<L>  |  1.11    Ca    8.3<L>      01 Jun 2020 07:24    MEDICATIONS  (STANDING):  bisacodyl Suppository 10 milliGRAM(s) Rectal daily  carvedilol Oral Tab/Cap - Peds 6.25 milliGRAM(s) Oral daily  dextrose 5% + sodium chloride 0.9%. 1000 milliLiter(s) (75 mL/Hr) IV Continuous <Continuous>  digoxin     Tablet 0.125 milliGRAM(s) Oral daily  enoxaparin Injectable 68 milliGRAM(s) SubCutaneous every 12 hours  lactulose Syrup 20 Gram(s) Oral two times a day  mineral oil enema 133 milliLiter(s) Rectal daily  piperacillin/tazobactam IVPB.. 3.375 Gram(s) IV Intermittent every 8 hours  polyethylene glycol 3350 17 Gram(s) Oral daily  warfarin 2.5 milliGRAM(s) Oral once    MEDICATIONS  (PRN):  acetaminophen  Suppository .. 650 milliGRAM(s) Rectal every 6 hours PRN Temp greater or equal to 38C (100.4F), Mild Pain (1 - 3)  ALBUTerol    90 MICROgram(s) HFA Inhaler 2 Puff(s) Inhalation every 6 hours PRN Shortness of Breath and/or Wheezing      RADIOLOGY & ADDITIONAL TESTS:

## 2020-06-02 NOTE — PROGRESS NOTE ADULT - ASSESSMENT
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     #Stercoral colitis, complicated by SIRS on arrival - intially sepsis ruled out since cultures are negative; patient continues to have fever and now we are repeating blood and urine culures - UA however appears mostly unremarkable for signs of infection. Discussed with ID who suspect GI source.  - on admission CT abd revealed large fecal load in the rectum with mild thickening of the rectal walls and slight infiltration of the perirectal fat concerning for a stercoral colitis.  - given zosyn and azithro in the ED - continued on zosyn until 5/31 when it was stopped - currently restarted  - blood cxs from admission show no growth - repeat from 5/31 are pending - likely to return tonite or tomorrow  - monitor fevers  - dulcolax suppository QD and miralax daily  - ordered mineral oil enema, lactulose 20gm PO x1 and lactulose enema  - monitor for stool output    #functional decline - deconditioning  - difficult to assess if any new findings given patient previous stroke  - at this time patient has no complaints  - we offered MRI however family declined  - he has compatible leads/device if family agrees  - PT to evaluated and patient with extremely poor mobility  - we offered MIGUEL to the wife who declined and would like to bring the patient home instead    #SIRS - etiology unclear as above - at this time GI source is suspected  - patient with recent AICD placement but at this time cultures negative  - if positive will discuss SHANELLE with cardiology  - ID recs appreciated    #PITA on CKD3a - improved  - Cr improved    #Permanent Afib  - CHADS-VASc score 5  - takes coumadin 2.5mg at home  - continue warfarin  - discontinued lovenox    #HFrEF (EF 35-40%) s/p AICD  - euvolemic to hypovolemic today  - holding lasix  - hydration as above  - cardiology recs appreciated     #Hypertension, well controlled  - can restart home meds pending speech eval    #Dysphagia  - residual deficit after CVA in 2012  - continue pureed thickened liquids which patient does at home  - wife states at home patient will tolerate some soft foods as well.    DVT ppx: therapeutic INR    DISPO: pending results of cultures and stool output. ID recs appreciated. will continue to monitor fever trend/WBC; ultimately home with home care

## 2020-06-03 VITALS
DIASTOLIC BLOOD PRESSURE: 72 MMHG | RESPIRATION RATE: 19 BRPM | TEMPERATURE: 98 F | HEART RATE: 63 BPM | SYSTOLIC BLOOD PRESSURE: 112 MMHG

## 2020-06-03 LAB
CULTURE RESULTS: SIGNIFICANT CHANGE UP
CULTURE RESULTS: SIGNIFICANT CHANGE UP
SPECIMEN SOURCE: SIGNIFICANT CHANGE UP
SPECIMEN SOURCE: SIGNIFICANT CHANGE UP

## 2020-06-03 PROCEDURE — 74018 RADEX ABDOMEN 1 VIEW: CPT

## 2020-06-03 PROCEDURE — 74176 CT ABD & PELVIS W/O CONTRAST: CPT

## 2020-06-03 PROCEDURE — 80162 ASSAY OF DIGOXIN TOTAL: CPT

## 2020-06-03 PROCEDURE — 99232 SBSQ HOSP IP/OBS MODERATE 35: CPT

## 2020-06-03 PROCEDURE — 70450 CT HEAD/BRAIN W/O DYE: CPT

## 2020-06-03 PROCEDURE — 99285 EMERGENCY DEPT VISIT HI MDM: CPT | Mod: 25

## 2020-06-03 PROCEDURE — 85730 THROMBOPLASTIN TIME PARTIAL: CPT

## 2020-06-03 PROCEDURE — 92526 ORAL FUNCTION THERAPY: CPT

## 2020-06-03 PROCEDURE — 92610 EVALUATE SWALLOWING FUNCTION: CPT

## 2020-06-03 PROCEDURE — 83605 ASSAY OF LACTIC ACID: CPT

## 2020-06-03 PROCEDURE — 84145 PROCALCITONIN (PCT): CPT

## 2020-06-03 PROCEDURE — 80053 COMPREHEN METABOLIC PANEL: CPT

## 2020-06-03 PROCEDURE — 85027 COMPLETE CBC AUTOMATED: CPT

## 2020-06-03 PROCEDURE — 86140 C-REACTIVE PROTEIN: CPT

## 2020-06-03 PROCEDURE — 71250 CT THORAX DX C-: CPT

## 2020-06-03 PROCEDURE — 96374 THER/PROPH/DIAG INJ IV PUSH: CPT

## 2020-06-03 PROCEDURE — 85610 PROTHROMBIN TIME: CPT

## 2020-06-03 PROCEDURE — 86803 HEPATITIS C AB TEST: CPT

## 2020-06-03 PROCEDURE — 99239 HOSP IP/OBS DSCHRG MGMT >30: CPT

## 2020-06-03 PROCEDURE — 82962 GLUCOSE BLOOD TEST: CPT

## 2020-06-03 PROCEDURE — 87040 BLOOD CULTURE FOR BACTERIA: CPT

## 2020-06-03 PROCEDURE — 96375 TX/PRO/DX INJ NEW DRUG ADDON: CPT

## 2020-06-03 PROCEDURE — U0003: CPT

## 2020-06-03 PROCEDURE — 81001 URINALYSIS AUTO W/SCOPE: CPT

## 2020-06-03 PROCEDURE — 87493 C DIFF AMPLIFIED PROBE: CPT

## 2020-06-03 PROCEDURE — 80048 BASIC METABOLIC PNL TOTAL CA: CPT

## 2020-06-03 PROCEDURE — 71045 X-RAY EXAM CHEST 1 VIEW: CPT

## 2020-06-03 PROCEDURE — 82728 ASSAY OF FERRITIN: CPT

## 2020-06-03 PROCEDURE — 93005 ELECTROCARDIOGRAM TRACING: CPT

## 2020-06-03 PROCEDURE — 84100 ASSAY OF PHOSPHORUS: CPT

## 2020-06-03 PROCEDURE — 83735 ASSAY OF MAGNESIUM: CPT

## 2020-06-03 PROCEDURE — 36415 COLL VENOUS BLD VENIPUNCTURE: CPT

## 2020-06-03 RX ADMIN — CARVEDILOL PHOSPHATE 6.25 MILLIGRAM(S): 80 CAPSULE, EXTENDED RELEASE ORAL at 05:22

## 2020-06-03 RX ADMIN — Medication 0.12 MILLIGRAM(S): at 05:22

## 2020-06-03 RX ADMIN — Medication 500000 UNIT(S): at 05:22

## 2020-06-03 RX ADMIN — Medication 500000 UNIT(S): at 00:14

## 2020-06-03 RX ADMIN — Medication 500000 UNIT(S): at 11:07

## 2020-06-03 RX ADMIN — PIPERACILLIN AND TAZOBACTAM 25 GRAM(S): 4; .5 INJECTION, POWDER, LYOPHILIZED, FOR SOLUTION INTRAVENOUS at 05:22

## 2020-06-03 NOTE — PROGRESS NOTE ADULT - SUBJECTIVE AND OBJECTIVE BOX
[facility]  INFECTIOUS DISEASES AND INTERNAL MEDICINE at [place]  =======================================================  Nabil Can MD  Diplomates American Board of Internal Medicine and Infectious Diseases  Telephone [phone]  Fax            [phone]  =======================================================    [id number]  RUSH CARDENAS   follow up for fevers; constipation, stercoral colitis    given bowel regimen yesterday  moved bowels.   feeling better     =======================================================  REVIEW OF SYSTEMS:  CONSTITUTIONAL:   POSITIVE fevers  HEENT:  No diplopia or blurred vision.  No earache, sore throat or runny nose.  CARDIOVASCULAR:  No pressure, squeezing, strangling, tightness, heaviness or aching about the chest, neck, axilla or epigastrium.  RESPIRATORY:  No cough, shortness of breath  GASTROINTESTINAL:  No nausea, vomiting or diarrhea.  GENITOURINARY:  No dysuria, frequency or urgency. No Blood in urine  MUSCULOSKELETAL:  no joint aches, no muscle pain  SKIN:  No change in skin, hair or nails.  NEUROLOGIC:  No Headaches, seizures or weakness.  PSYCHIATRIC:  No disorder of thought or mood.  ENDOCRINE:  No heat or cold intolerance  HEMATOLOGICAL:  No easy bruising or bleeding.   =======================================================  Allergies    latex (Rash)  No Known Drug Allergies     ======================================================  Physical Exam:  ============  (see vitals section below)    General:  No acute distress.  Eye: Pupils are equal, round and reactive to light, Extraocular movements are intact, Normal conjunctiva.  HENT: Normocephalic, Oral mucosa is moist, No pharyngeal erythema, No sinus tenderness.  Neck: Supple, No lymphadenopathy.  Respiratory: Lungs are clear to auscultation, Respirations are non-labored.  Cardiovascular: Normal rate, Regular rhythm,   Gastrointestinal: Soft, Non-tender, Non-distended, Normal bowel sounds.  Genitourinary: No costovertebral angle tenderness.  Lymphatics: No lymphadenopathy neck,   Musculoskeletal: Normal range of motion, Normal strength.  Integumentary: No rash.  Neurologic: Alert, Oriented, No focal deficits, Cranial Nerves II-XII are grossly intact.  Psychiatric: Appropriate mood & affect.    =======================================================  Vitals:  ============  T(F): 98.3 (03 Jun 2020 08:07), Max: 100.4 (02 Jun 2020 16:34)  HR: 63 (03 Jun 2020 08:07)  BP: 112/72 (03 Jun 2020 08:07)  RR: 19 (03 Jun 2020 08:07)  SpO2: 94% (03 Jun 2020 04:52) (93% - 94%)  temp max in last 48H T(F): , Max: 101.1 (06-01-20 @ 21:09)    =======================================================  Current Antibiotics:  nystatin    Suspension 743042 Unit(s) Oral four times a day  piperacillin/tazobactam IVPB.. 3.375 Gram(s) IV Intermittent every 8 hours    Other medications:  bisacodyl Suppository 10 milliGRAM(s) Rectal daily  carvedilol Oral Tab/Cap - Peds 6.25 milliGRAM(s) Oral daily  dextrose 5% + sodium chloride 0.9%. 1000 milliLiter(s) IV Continuous <Continuous>  digoxin     Tablet 0.125 milliGRAM(s) Oral daily  polyethylene glycol 3350 17 Gram(s) Oral daily      =======================================================  Labs:             Culture - Blood (collected 05-31-20 @ 20:56)  Source: .Blood Blood-Peripheral    Culture - Blood (collected 05-31-20 @ 20:56)  Source: .Blood Blood-Peripheral    Culture - Blood (collected 05-28-20 @ 23:42)  Source: .Blood Blood-Peripheral  Final Report (06-03-20 @ 01:00):    No growth at 5 days.    Culture - Blood (collected 05-28-20 @ 23:41)  Source: .Blood Blood-Peripheral  Final Report (06-03-20 @ 01:00):    No growth at 5 days.      Creatinine, Serum: 1.11 mg/dL (06-01-20 @ 07:24)  Creatinine, Serum: 1.22 mg/dL (05-31-20 @ 07:49)  Creatinine, Serum: 1.36 mg/dL (05-30-20 @ 08:20)    Procalcitonin, Serum: 0.34 ng/mL (05-28-20 @ 23:40)      Ferritin, Serum: 625 ng/mL (05-28-20 @ 23:40)    C-Reactive Protein, Serum: 20.15 mg/dL (05-28-20 @ 23:40)    WBC Count: 9.20 K/uL (06-01-20 @ 07:24)  WBC Count: 7.63 K/uL (05-31-20 @ 07:50)  WBC Count: 8.50 K/uL (05-30-20 @ 08:20)    COVID-19 PCR: NotDetec (05-28-20 @ 23:53)  COVID-19 PCR: NotDetec (05-21-20 @ 08:50)

## 2020-06-03 NOTE — PROGRESS NOTE ADULT - ASSESSMENT
65 yo male with pmh of HTN, permanent Afib, HFrEF (EF 35-40%), s/p AICD (recently replaced 5/21), CVA (2012) with residual R sided weakness and dysphagia, presents with weakness. Pt noted to be unreliable historian. He is AAOx2 (self, location) and confused. As per wife, his mentation is at baseline. She states that he has been progressively getting weaker since the AICD replacement procedure last week. Also reports reduced po intake. His PMD did a home visit last weekend and reported no acute findings. Wife also reports he had 6-7 episodes of loose stools over the past 2 days, but normally constipated at baseline. Denies fevers, chills, n/v, abd pain, chest pain, sob, cough, nasal congestion.     ED course: pt met sepsis criteria with rectal temp 101.2 as per ED documentation, also with mild tachycardia (HR 94), mild tachypnea (RR 22), and leukocytosis with wbc 12.1. Hemodynamically stable and lactate wnl. Given 1L NS. Blood cxs drawn. Empiric abx ordered by ED, zosyn and azithro given, vanco pending. CT head done due to worsening weakness and noted to be negative. CT chest for rales on exam and negative for pulm edema, only noted mild bibasilar dependent atelectasis. CT abd/pelv done due to loose stools, and revealed large fecal load in the rectum with mild thickening of the rectal walls and slight infiltration of the perirectal fat concerning for a stercoral colitis. (29 May 2020 04:44)    patient was given Azithromycin x 1 dose on 5/29, then Zosyn was added from 5/29 - 5/31.   He had a bowel movement yesterday.     Overnight he had a fever, and blood cultures were obtained.   he was now restarted on Zosyn.       Impression:  Fevers  Stercoral colitis  WBC elevation      Plans:  - clinically improving  - moved bowels  - can change Zosyn --> Augmentin x 2 more days    no contraindications to discharge to community from ID standpoint

## 2020-06-09 ENCOUNTER — APPOINTMENT (OUTPATIENT)
Dept: CARDIOLOGY | Facility: CLINIC | Age: 65
End: 2020-06-09

## 2020-06-26 ENCOUNTER — APPOINTMENT (OUTPATIENT)
Dept: CARDIOLOGY | Facility: CLINIC | Age: 65
End: 2020-06-26
Payer: MEDICARE

## 2020-06-26 PROCEDURE — 93296 REM INTERROG EVL PM/IDS: CPT

## 2020-06-26 PROCEDURE — 93295 DEV INTERROG REMOTE 1/2/MLT: CPT

## 2020-08-03 NOTE — PHYSICAL THERAPY INITIAL EVALUATION ADULT - DIAGNOSIS, PT EVAL
decreased functional mobility Complex Repair And Split-Thickness Skin Graft Text: The defect edges were debeveled with a #15 scalpel blade.  The primary defect was closed partially with a complex linear closure.  Given the location of the defect, shape of the defect and the proximity to free margins a split thickness skin graft was deemed most appropriate to repair the remaining defect.  The graft was trimmed to fit the size of the remaining defect.  The graft was then placed in the primary defect, oriented appropriately, and sutured into place.

## 2020-09-29 ENCOUNTER — EMERGENCY (EMERGENCY)
Facility: HOSPITAL | Age: 65
LOS: 1 days | Discharge: HOME | End: 2020-09-29
Attending: EMERGENCY MEDICINE | Admitting: EMERGENCY MEDICINE
Payer: MEDICARE

## 2020-09-29 VITALS
OXYGEN SATURATION: 98 % | HEIGHT: 67 IN | DIASTOLIC BLOOD PRESSURE: 78 MMHG | SYSTOLIC BLOOD PRESSURE: 124 MMHG | TEMPERATURE: 101 F | RESPIRATION RATE: 22 BRPM | WEIGHT: 167.99 LBS | HEART RATE: 120 BPM

## 2020-09-29 VITALS
HEART RATE: 71 BPM | DIASTOLIC BLOOD PRESSURE: 65 MMHG | TEMPERATURE: 98 F | RESPIRATION RATE: 18 BRPM | SYSTOLIC BLOOD PRESSURE: 124 MMHG | OXYGEN SATURATION: 95 %

## 2020-09-29 DIAGNOSIS — Z95.828 PRESENCE OF OTHER VASCULAR IMPLANTS AND GRAFTS: Chronic | ICD-10-CM

## 2020-09-29 DIAGNOSIS — Z95.810 PRESENCE OF AUTOMATIC (IMPLANTABLE) CARDIAC DEFIBRILLATOR: Chronic | ICD-10-CM

## 2020-09-29 LAB
ALBUMIN SERPL ELPH-MCNC: 3.3 G/DL — SIGNIFICANT CHANGE UP (ref 3.3–5.2)
ALP SERPL-CCNC: 75 U/L — SIGNIFICANT CHANGE UP (ref 40–120)
ALT FLD-CCNC: 8 U/L — SIGNIFICANT CHANGE UP
ANION GAP SERPL CALC-SCNC: 15 MMOL/L — SIGNIFICANT CHANGE UP (ref 5–17)
ANISOCYTOSIS BLD QL: SLIGHT — SIGNIFICANT CHANGE UP
APPEARANCE UR: CLEAR — SIGNIFICANT CHANGE UP
APTT BLD: 29 SEC — SIGNIFICANT CHANGE UP (ref 27.5–35.5)
AST SERPL-CCNC: 22 U/L — SIGNIFICANT CHANGE UP
BACTERIA # UR AUTO: ABNORMAL
BASOPHILS # BLD AUTO: 0.19 K/UL — SIGNIFICANT CHANGE UP (ref 0–0.2)
BASOPHILS NFR BLD AUTO: 1.7 % — SIGNIFICANT CHANGE UP (ref 0–2)
BILIRUB SERPL-MCNC: 1 MG/DL — SIGNIFICANT CHANGE UP (ref 0.4–2)
BILIRUB UR-MCNC: NEGATIVE — SIGNIFICANT CHANGE UP
BUN SERPL-MCNC: 21 MG/DL — HIGH (ref 8–20)
CALCIUM SERPL-MCNC: 8.8 MG/DL — SIGNIFICANT CHANGE UP (ref 8.6–10.2)
CHLORIDE SERPL-SCNC: 95 MMOL/L — LOW (ref 98–107)
CO2 SERPL-SCNC: 28 MMOL/L — SIGNIFICANT CHANGE UP (ref 22–29)
COLOR SPEC: YELLOW — SIGNIFICANT CHANGE UP
CREAT SERPL-MCNC: 1.57 MG/DL — HIGH (ref 0.5–1.3)
DIFF PNL FLD: NEGATIVE — SIGNIFICANT CHANGE UP
ELLIPTOCYTES BLD QL SMEAR: SLIGHT — SIGNIFICANT CHANGE UP
EOSINOPHIL # BLD AUTO: 0.1 K/UL — SIGNIFICANT CHANGE UP (ref 0–0.5)
EOSINOPHIL NFR BLD AUTO: 0.9 % — SIGNIFICANT CHANGE UP (ref 0–6)
EPI CELLS # UR: SIGNIFICANT CHANGE UP
GIANT PLATELETS BLD QL SMEAR: PRESENT — SIGNIFICANT CHANGE UP
GLUCOSE SERPL-MCNC: 116 MG/DL — HIGH (ref 70–99)
GLUCOSE UR QL: NEGATIVE MG/DL — SIGNIFICANT CHANGE UP
HCT VFR BLD CALC: 47.1 % — SIGNIFICANT CHANGE UP (ref 39–50)
HGB BLD-MCNC: 14.2 G/DL — SIGNIFICANT CHANGE UP (ref 13–17)
INR BLD: 1.68 RATIO — HIGH (ref 0.88–1.16)
KETONES UR-MCNC: NEGATIVE — SIGNIFICANT CHANGE UP
LACTATE BLDV-MCNC: 1.6 MMOL/L — SIGNIFICANT CHANGE UP (ref 0.5–2)
LEUKOCYTE ESTERASE UR-ACNC: NEGATIVE — SIGNIFICANT CHANGE UP
LYMPHOCYTES # BLD AUTO: 2.69 K/UL — SIGNIFICANT CHANGE UP (ref 1–3.3)
LYMPHOCYTES # BLD AUTO: 24.3 % — SIGNIFICANT CHANGE UP (ref 13–44)
MANUAL SMEAR VERIFICATION: SIGNIFICANT CHANGE UP
MCHC RBC-ENTMCNC: 23.9 PG — LOW (ref 27–34)
MCHC RBC-ENTMCNC: 30.1 GM/DL — LOW (ref 32–36)
MCV RBC AUTO: 79.4 FL — LOW (ref 80–100)
MICROCYTES BLD QL: SLIGHT — SIGNIFICANT CHANGE UP
MONOCYTES # BLD AUTO: 1.06 K/UL — HIGH (ref 0–0.9)
MONOCYTES NFR BLD AUTO: 9.6 % — SIGNIFICANT CHANGE UP (ref 2–14)
NEUTROPHILS # BLD AUTO: 6.93 K/UL — SIGNIFICANT CHANGE UP (ref 1.8–7.4)
NEUTROPHILS NFR BLD AUTO: 62.6 % — SIGNIFICANT CHANGE UP (ref 43–77)
NITRITE UR-MCNC: NEGATIVE — SIGNIFICANT CHANGE UP
OVALOCYTES BLD QL SMEAR: SLIGHT — SIGNIFICANT CHANGE UP
PH UR: 6 — SIGNIFICANT CHANGE UP (ref 5–8)
PLAT MORPH BLD: NORMAL — SIGNIFICANT CHANGE UP
PLATELET # BLD AUTO: 294 K/UL — SIGNIFICANT CHANGE UP (ref 150–400)
POIKILOCYTOSIS BLD QL AUTO: SLIGHT — SIGNIFICANT CHANGE UP
POLYCHROMASIA BLD QL SMEAR: SLIGHT — SIGNIFICANT CHANGE UP
POTASSIUM SERPL-MCNC: 4.4 MMOL/L — SIGNIFICANT CHANGE UP (ref 3.5–5.3)
POTASSIUM SERPL-SCNC: 4.4 MMOL/L — SIGNIFICANT CHANGE UP (ref 3.5–5.3)
PROT SERPL-MCNC: 7.3 G/DL — SIGNIFICANT CHANGE UP (ref 6.6–8.7)
PROT UR-MCNC: 15 MG/DL
PROTHROM AB SERPL-ACNC: 19 SEC — HIGH (ref 10.6–13.6)
RBC # BLD: 5.93 M/UL — HIGH (ref 4.2–5.8)
RBC # FLD: 15.6 % — HIGH (ref 10.3–14.5)
RBC BLD AUTO: ABNORMAL
RBC CASTS # UR COMP ASSIST: SIGNIFICANT CHANGE UP /HPF (ref 0–4)
SARS-COV-2 RNA SPEC QL NAA+PROBE: SIGNIFICANT CHANGE UP
SODIUM SERPL-SCNC: 138 MMOL/L — SIGNIFICANT CHANGE UP (ref 135–145)
SP GR SPEC: 1.01 — SIGNIFICANT CHANGE UP (ref 1.01–1.02)
UROBILINOGEN FLD QL: 1 MG/DL
VARIANT LYMPHS # BLD: 0.9 % — SIGNIFICANT CHANGE UP (ref 0–6)
WBC # BLD: 11.07 K/UL — HIGH (ref 3.8–10.5)
WBC # FLD AUTO: 11.07 K/UL — HIGH (ref 3.8–10.5)
WBC UR QL: SIGNIFICANT CHANGE UP

## 2020-09-29 PROCEDURE — 85025 COMPLETE CBC W/AUTO DIFF WBC: CPT

## 2020-09-29 PROCEDURE — 36415 COLL VENOUS BLD VENIPUNCTURE: CPT

## 2020-09-29 PROCEDURE — 87086 URINE CULTURE/COLONY COUNT: CPT

## 2020-09-29 PROCEDURE — 80053 COMPREHEN METABOLIC PANEL: CPT

## 2020-09-29 PROCEDURE — 96375 TX/PRO/DX INJ NEW DRUG ADDON: CPT

## 2020-09-29 PROCEDURE — 99285 EMERGENCY DEPT VISIT HI MDM: CPT | Mod: 25

## 2020-09-29 PROCEDURE — 87150 DNA/RNA AMPLIFIED PROBE: CPT

## 2020-09-29 PROCEDURE — 87186 SC STD MICRODIL/AGAR DIL: CPT

## 2020-09-29 PROCEDURE — 99291 CRITICAL CARE FIRST HOUR: CPT | Mod: CS,GC

## 2020-09-29 PROCEDURE — 83605 ASSAY OF LACTIC ACID: CPT

## 2020-09-29 PROCEDURE — 93010 ELECTROCARDIOGRAM REPORT: CPT

## 2020-09-29 PROCEDURE — 93005 ELECTROCARDIOGRAM TRACING: CPT

## 2020-09-29 PROCEDURE — 71045 X-RAY EXAM CHEST 1 VIEW: CPT | Mod: 26

## 2020-09-29 PROCEDURE — U0003: CPT

## 2020-09-29 PROCEDURE — 85610 PROTHROMBIN TIME: CPT

## 2020-09-29 PROCEDURE — 81001 URINALYSIS AUTO W/SCOPE: CPT

## 2020-09-29 PROCEDURE — 99292 CRITICAL CARE ADDL 30 MIN: CPT | Mod: CS

## 2020-09-29 PROCEDURE — 87040 BLOOD CULTURE FOR BACTERIA: CPT

## 2020-09-29 PROCEDURE — 71045 X-RAY EXAM CHEST 1 VIEW: CPT

## 2020-09-29 PROCEDURE — 96374 THER/PROPH/DIAG INJ IV PUSH: CPT

## 2020-09-29 PROCEDURE — 85730 THROMBOPLASTIN TIME PARTIAL: CPT

## 2020-09-29 RX ORDER — SODIUM CHLORIDE 9 MG/ML
2400 INJECTION INTRAMUSCULAR; INTRAVENOUS; SUBCUTANEOUS ONCE
Refills: 0 | Status: COMPLETED | OUTPATIENT
Start: 2020-09-29 | End: 2020-09-29

## 2020-09-29 RX ORDER — CEFTRIAXONE 500 MG/1
1000 INJECTION, POWDER, FOR SOLUTION INTRAMUSCULAR; INTRAVENOUS ONCE
Refills: 0 | Status: DISCONTINUED | OUTPATIENT
Start: 2020-09-29 | End: 2020-09-29

## 2020-09-29 RX ORDER — FUROSEMIDE 40 MG
1 TABLET ORAL
Qty: 0 | Refills: 0 | DISCHARGE

## 2020-09-29 RX ORDER — PIPERACILLIN AND TAZOBACTAM 4; .5 G/20ML; G/20ML
3.38 INJECTION, POWDER, LYOPHILIZED, FOR SOLUTION INTRAVENOUS ONCE
Refills: 0 | Status: COMPLETED | OUTPATIENT
Start: 2020-09-29 | End: 2020-09-29

## 2020-09-29 RX ORDER — ACETAMINOPHEN 500 MG
975 TABLET ORAL ONCE
Refills: 0 | Status: COMPLETED | OUTPATIENT
Start: 2020-09-29 | End: 2020-09-29

## 2020-09-29 RX ORDER — ACETAMINOPHEN 500 MG
650 TABLET ORAL ONCE
Refills: 0 | Status: DISCONTINUED | OUTPATIENT
Start: 2020-09-29 | End: 2020-09-29

## 2020-09-29 RX ORDER — VANCOMYCIN HCL 1 G
1000 VIAL (EA) INTRAVENOUS ONCE
Refills: 0 | Status: COMPLETED | OUTPATIENT
Start: 2020-09-29 | End: 2020-09-29

## 2020-09-29 RX ADMIN — Medication 250 MILLIGRAM(S): at 18:57

## 2020-09-29 RX ADMIN — Medication 975 MILLIGRAM(S): at 17:01

## 2020-09-29 RX ADMIN — SODIUM CHLORIDE 2400 MILLILITER(S): 9 INJECTION INTRAMUSCULAR; INTRAVENOUS; SUBCUTANEOUS at 17:00

## 2020-09-29 RX ADMIN — PIPERACILLIN AND TAZOBACTAM 200 GRAM(S): 4; .5 INJECTION, POWDER, LYOPHILIZED, FOR SOLUTION INTRAVENOUS at 17:05

## 2020-09-29 RX ADMIN — Medication 975 MILLIGRAM(S): at 18:48

## 2020-09-29 NOTE — ED ADULT NURSE NOTE - OBJECTIVE STATEMENT
pt received in critical care BIBA from home c/o generalized weakness, febrile on arrival, code sepsis initiated in triage. per wife bedside pt had labs drawn 2x hrs PTA for increasing weakness. foul smelling urine noted in patients saturated brief on arrival. Pacemaker placed 2x months ago. hx cva with ride sided weakness@ baseline and aphasia. rectal temp 102.4F.wife bedside with MD, updated on POC, pt safety maintained.

## 2020-09-29 NOTE — ED PROVIDER NOTE - PROGRESS NOTE DETAILS
Pt asking to go home, understands return precautions with wife at bedside.  All questions answered, has follow up with PMD tomorrow.

## 2020-09-29 NOTE — ED PROVIDER NOTE - OBJECTIVE STATEMENT
Pt is a 64 y/o M w/PMHx HTN, permanent Afib, HFrEF (EF 35-40%), s/p AICD (recently replaced 5/21/20), CVA (2012) with residual R sided weakness and dysphagia presents c/o malaise.  Per EMS pt was brought in due to feeling weak over the last day.  Labs were drawn at home today, and his wife was concerned that he might be dehydrated and called EMS to go to hospital.  Pt was found to be febrile, sepsis protocol initiated.  Pt denies headache, chest pain, n/v.

## 2020-09-29 NOTE — ED PROVIDER NOTE - ATTENDING CONTRIBUTION TO CARE
The patient seen and examined.    Sepsis    I, Daniel Allen, performed the initial face to face bedside interview with this patient regarding history of present illness, review of symptoms and relevant past medical, social and family history.  I completed an independent physical examination.  I was the initial provider who evaluated this patient. I have signed out the follow up of any pending tests (i.e. labs, radiological studies) to the resident.  I have communicated the patient’s plan of care and disposition with the resident. The patient seen and examined.    Sepsis    I, Daniel Allen, performed the initial face to face bedside interview with this patient regarding history of present illness, review of symptoms and relevant past medical, social and family history.  I completed an independent physical examination.  I was the initial provider who evaluated this patient. I have signed out the follow up of any pending tests (i.e. labs, radiological studies) to the resident.  I have communicated the patient’s plan of care and disposition with the resident..

## 2020-09-29 NOTE — ED PROVIDER NOTE - PHYSICAL EXAMINATION
General: febrile, interactive, well nourished, NAD  HEENT: pupils equal and reactive, normal external ears bilaterally   Cardiac: RRR, no MRG appreciated  Resp: lungs clear to auscultation bilaterally, symmetric chest wall rise  Abd: soft, nontender, nondistended,   : no CVA tenderness  Neuro: Moving all extremities  Skin:  normal color for race

## 2020-09-29 NOTE — ED PROVIDER NOTE - NS ED ROS FT
CONSTITUTIONAL: +fevers, no chills  Eyes: No vision changes  Cardiovascular: No Chest pain  Respiratory: No SOB  Gastrointestinal: No n/v/c/d, no abd pain  Genitourinary: no dysuria, no hematuria  SKIN: no rashes.  MSK: no weakness, no myalgias, no arthralgias  NEURO: no headache, no weakness, no numbness  PSYCHIATRIC: no SI/HI

## 2020-09-29 NOTE — ED ADULT NURSE NOTE - PSH
AICD (automatic cardioverter/defibrillator) present    [place] filter in place  no h/o dvt and as per wife  it was placed as a precaution after cva.

## 2020-09-29 NOTE — ED ADULT NURSE NOTE - NSIMPLEMENTINTERV_GEN_ALL_ED
Implemented All Fall with Harm Risk Interventions:  [place] to call system. Call bell, personal items and telephone within reach. Instruct patient to call for assistance. Room bathroom lighting operational. Non-slip footwear when patient is off stretcher. Physically safe environment: no spills, clutter or unnecessary equipment. Stretcher in lowest position, wheels locked, appropriate side rails in place. Provide visual cue, wrist band, yellow gown, etc. Monitor gait and stability. Monitor for mental status changes and reorient to person, place, and time. Review medications for side effects contributing to fall risk. Reinforce activity limits and safety measures with patient and family. Provide visual clues: red socks.

## 2020-09-29 NOTE — ED PROVIDER NOTE - PATIENT PORTAL LINK FT
You can access the FollowMyHealth Patient Portal offered by [facility] by registering at the following website: [email or web address]. By joining [employer]’s FollowMyHealth portal, you will also be able to view your health information using other applications (apps) compatible with our system.

## 2020-09-29 NOTE — ED PROVIDER NOTE - NSFOLLOWUPINSTRUCTIONS_ED_ALL_ED_FT
-- Please follow up with your doctor(s) within the next 3 days, but seek medical attention sooner if your symptoms persist or worsen.  Please call tomorrow for an appointment.  If you cannot follow-up with your primary doctor please return to the ED for any urgent issues.    -- You were given a copy of your labs and imaging.  Please go over these with your doctor(s).     -- If you have any worsening of symptoms or any other concerns please see your doctor or return to the nearest emergency room immediately.    -- Please continue taking your home medications as directed.  Do not use alcohol when taking any medication (especially antibiotics, tylenol or other pain medication) unless you check with the doctor or pharmacist.    **************************************************************************************************************  Fever    A fever is an increase in the body's temperature above 100.4°F (38°C) or higher. A brief mild or moderate fever generally has no long-term effect, and it usually does not require treatment. Many times, fevers are the result of viral infections, which are self-resolving.  However, certain symptoms or diagnostic tests may suggest a bacterial infection that may respond to antibiotics. Take medications as directed by your health care provider.    SEEK IMMEDIATE MEDICAL CARE IF YOU OR YOUR CHILD HAVE ANY OF THE FOLLOWING SYMPTOMS : shortness of breath, seizure, rash/stiff neck/headache, severe abdominal pain, persistent vomiting, any signs of dehydration.

## 2020-09-29 NOTE — ED PROVIDER NOTE - CRITICAL CARE PROVIDED
consult w/ pt's family directly relating to pts condition/additional history taking/direct patient care (not related to procedure)/documentation/conducted a detailed discussion of DNR status

## 2020-09-29 NOTE — ED ADULT NURSE NOTE - NS ED NURSE AMBULANCES2
Pt called. She just found out that Losartan Potassium-HCTZ 100-25 MG Oral Tab (a med that's prescribed) is on the recall list.  She wants to know what the next step is. Please call her back on her cell phone.  If unable to reach her please leave a message Ambulnz

## 2020-09-30 LAB
-  COAGULASE NEGATIVE STAPHYLOCOCCUS: SIGNIFICANT CHANGE UP
CULTURE RESULTS: SIGNIFICANT CHANGE UP
METHOD TYPE: SIGNIFICANT CHANGE UP
SPECIMEN SOURCE: SIGNIFICANT CHANGE UP

## 2020-09-30 NOTE — ED POST DISCHARGE NOTE - DETAILS
unable to contact Pt non emergent PD number ([phone]) called due to possible severity of febrile pt with pos blood cultures.

## 2020-10-02 LAB
-  AMPICILLIN/SULBACTAM: SIGNIFICANT CHANGE UP
-  CEFAZOLIN: SIGNIFICANT CHANGE UP
-  CLINDAMYCIN: SIGNIFICANT CHANGE UP
-  ERYTHROMYCIN: SIGNIFICANT CHANGE UP
-  GENTAMICIN: SIGNIFICANT CHANGE UP
-  OXACILLIN: SIGNIFICANT CHANGE UP
-  PENICILLIN: SIGNIFICANT CHANGE UP
-  RIFAMPIN: SIGNIFICANT CHANGE UP
-  TETRACYCLINE: SIGNIFICANT CHANGE UP
-  TRIMETHOPRIM/SULFAMETHOXAZOLE: SIGNIFICANT CHANGE UP
-  VANCOMYCIN: SIGNIFICANT CHANGE UP
METHOD TYPE: SIGNIFICANT CHANGE UP

## 2020-10-04 LAB
CULTURE RESULTS: SIGNIFICANT CHANGE UP
SPECIMEN SOURCE: SIGNIFICANT CHANGE UP

## 2020-10-05 ENCOUNTER — APPOINTMENT (OUTPATIENT)
Dept: CARDIOLOGY | Facility: CLINIC | Age: 65
End: 2020-10-05
Payer: MEDICARE

## 2020-10-05 LAB
CULTURE RESULTS: SIGNIFICANT CHANGE UP
ORGANISM # SPEC MICROSCOPIC CNT: SIGNIFICANT CHANGE UP
SPECIMEN SOURCE: SIGNIFICANT CHANGE UP

## 2020-10-05 PROCEDURE — 93295 DEV INTERROG REMOTE 1/2/MLT: CPT

## 2020-10-05 PROCEDURE — 93296 REM INTERROG EVL PM/IDS: CPT

## 2020-12-09 ENCOUNTER — NON-APPOINTMENT (OUTPATIENT)
Age: 65
End: 2020-12-09

## 2021-02-19 ENCOUNTER — APPOINTMENT (OUTPATIENT)
Dept: CARDIOLOGY | Facility: CLINIC | Age: 66
End: 2021-02-19
Payer: MEDICARE

## 2021-02-19 PROCEDURE — 93295 DEV INTERROG REMOTE 1/2/MLT: CPT

## 2021-02-19 PROCEDURE — 93296 REM INTERROG EVL PM/IDS: CPT

## 2021-06-01 ENCOUNTER — APPOINTMENT (OUTPATIENT)
Dept: CARDIOLOGY | Facility: CLINIC | Age: 66
End: 2021-06-01
Payer: MEDICARE

## 2021-06-01 PROCEDURE — 93295 DEV INTERROG REMOTE 1/2/MLT: CPT

## 2021-06-01 PROCEDURE — 93296 REM INTERROG EVL PM/IDS: CPT

## 2021-07-27 ENCOUNTER — NON-APPOINTMENT (OUTPATIENT)
Age: 66
End: 2021-07-27

## 2021-07-30 ENCOUNTER — NON-APPOINTMENT (OUTPATIENT)
Age: 66
End: 2021-07-30

## 2021-07-30 NOTE — ED ADULT NURSE REASSESSMENT NOTE - NS ED NURSE REASSESS COMMENT FT1
Atiya is a 9-year-old here for evaluation of scoliosis.  She does not have any back pain.  She has a paternal sister in law who has a history of scoliosis.  On physical exam her neurocirculatory status was intact reflexes were symmetric her abdominal reflexes were symmetric a PA and lateral scoliosis x-ray demonstrates very minimal 13 degree lumbar curve and a 13 degree thoracic curve.  Assessment plan that he has very mild scoliosis the very soonest I recommended scoliosis x-ray would be in 9 months if her curve progresses to more than 25° she may be a candidate for treatment.   Pt found to be tachypneic, dyspneic and tachycardic. HOB elevated, diffuse rales heard on auscultation. RT paged for breathing tx. Wife at bedside only agreeable to take coreg if ordered by Dr. Travis. Confirmed with pt's wife at the bedside that coreg was ordered by Angy and wife now agreeable for pt to take medication. Pt medicated per MD orders. Pt remains tachy . Dr. Travis paged to come evaluate patient. Pt found to be tachypneic, dyspneic and tachycardic. HOB elevated, diffuse rales heard on auscultation. RT paged for breathing tx. Wife at bedside only agreeable to take coreg if ordered by Dr. Travis. Confirmed with pt's wife at the bedside that coreg was ordered by Angy and wife now agreeable for pt to take medication. Pt medicated per MD orders. Pt remains tachy , MD Travis made aware. MD to come evaluate patient. Pt found to be tachypneic, dyspneic and tachycardic in rapid AFIB. HOB elevated, diffuse rales heard on auscultation. RT paged for breathing tx. Wife at bedside only agreeable to take coreg if ordered by Dr. Travis. Confirmed with pt's wife at the bedside that coreg was ordered by Angy and wife now agreeable for pt to take medication. Pt medicated per MD orders. Pt remains tachy , MD Travis made aware. MD to come evaluate patient.

## 2021-09-05 ENCOUNTER — EMERGENCY (EMERGENCY)
Facility: HOSPITAL | Age: 66
LOS: 1 days | Discharge: HOME | End: 2021-09-05
Attending: EMERGENCY MEDICINE
Payer: MEDICARE

## 2021-09-05 VITALS
RESPIRATION RATE: 16 BRPM | TEMPERATURE: 98 F | SYSTOLIC BLOOD PRESSURE: 139 MMHG | HEIGHT: 67 IN | OXYGEN SATURATION: 99 % | HEART RATE: 106 BPM | DIASTOLIC BLOOD PRESSURE: 88 MMHG

## 2021-09-05 VITALS
TEMPERATURE: 98 F | OXYGEN SATURATION: 97 % | HEART RATE: 99 BPM | DIASTOLIC BLOOD PRESSURE: 91 MMHG | SYSTOLIC BLOOD PRESSURE: 132 MMHG | RESPIRATION RATE: 20 BRPM

## 2021-09-05 DIAGNOSIS — Z95.810 PRESENCE OF AUTOMATIC (IMPLANTABLE) CARDIAC DEFIBRILLATOR: Chronic | ICD-10-CM

## 2021-09-05 DIAGNOSIS — Z95.828 PRESENCE OF OTHER VASCULAR IMPLANTS AND GRAFTS: Chronic | ICD-10-CM

## 2021-09-05 LAB
ALBUMIN SERPL ELPH-MCNC: 2.8 G/DL — LOW (ref 3.3–5.2)
ALP SERPL-CCNC: 114 U/L — SIGNIFICANT CHANGE UP (ref 40–120)
ALT FLD-CCNC: 34 U/L — SIGNIFICANT CHANGE UP
ANION GAP SERPL CALC-SCNC: 11 MMOL/L — SIGNIFICANT CHANGE UP (ref 5–17)
AST SERPL-CCNC: 25 U/L — SIGNIFICANT CHANGE UP
BASE EXCESS BLDV CALC-SCNC: 3.2 MMOL/L — HIGH (ref -2–3)
BASOPHILS # BLD AUTO: 0.08 K/UL — SIGNIFICANT CHANGE UP (ref 0–0.2)
BASOPHILS NFR BLD AUTO: 0.7 % — SIGNIFICANT CHANGE UP (ref 0–2)
BILIRUB SERPL-MCNC: 0.5 MG/DL — SIGNIFICANT CHANGE UP (ref 0.4–2)
BUN SERPL-MCNC: 27.3 MG/DL — HIGH (ref 8–20)
CA-I SERPL-SCNC: 1.09 MMOL/L — LOW (ref 1.15–1.33)
CALCIUM SERPL-MCNC: 9 MG/DL — SIGNIFICANT CHANGE UP (ref 8.6–10.2)
CHLORIDE BLDV-SCNC: 102 MMOL/L — SIGNIFICANT CHANGE UP (ref 98–107)
CHLORIDE SERPL-SCNC: 100 MMOL/L — SIGNIFICANT CHANGE UP (ref 98–107)
CO2 SERPL-SCNC: 24 MMOL/L — SIGNIFICANT CHANGE UP (ref 22–29)
CREAT SERPL-MCNC: 1.05 MG/DL — SIGNIFICANT CHANGE UP (ref 0.5–1.3)
EOSINOPHIL # BLD AUTO: 0.09 K/UL — SIGNIFICANT CHANGE UP (ref 0–0.5)
EOSINOPHIL NFR BLD AUTO: 0.7 % — SIGNIFICANT CHANGE UP (ref 0–6)
GAS PNL BLDV: 132 MMOL/L — LOW (ref 136–145)
GAS PNL BLDV: SIGNIFICANT CHANGE UP
GAS PNL BLDV: SIGNIFICANT CHANGE UP
GLUCOSE BLDV-MCNC: 122 MG/DL — HIGH (ref 70–99)
GLUCOSE SERPL-MCNC: 112 MG/DL — HIGH (ref 70–99)
HCO3 BLDV-SCNC: 27 MMOL/L — SIGNIFICANT CHANGE UP (ref 22–29)
HCT VFR BLD CALC: 43 % — SIGNIFICANT CHANGE UP (ref 39–50)
HCT VFR BLDA CALC: 41 % — SIGNIFICANT CHANGE UP (ref 39–51)
HGB BLD CALC-MCNC: 13.8 G/DL — SIGNIFICANT CHANGE UP (ref 12.6–17.4)
HGB BLD-MCNC: 13.1 G/DL — SIGNIFICANT CHANGE UP (ref 13–17)
IMM GRANULOCYTES NFR BLD AUTO: 0.9 % — SIGNIFICANT CHANGE UP (ref 0–1.5)
LACTATE BLDV-MCNC: 2.1 MMOL/L — HIGH (ref 0.5–2)
LYMPHOCYTES # BLD AUTO: 1.51 K/UL — SIGNIFICANT CHANGE UP (ref 1–3.3)
LYMPHOCYTES # BLD AUTO: 12.5 % — LOW (ref 13–44)
MCHC RBC-ENTMCNC: 23.7 PG — LOW (ref 27–34)
MCHC RBC-ENTMCNC: 30.5 GM/DL — LOW (ref 32–36)
MCV RBC AUTO: 77.9 FL — LOW (ref 80–100)
MONOCYTES # BLD AUTO: 0.96 K/UL — HIGH (ref 0–0.9)
MONOCYTES NFR BLD AUTO: 7.9 % — SIGNIFICANT CHANGE UP (ref 2–14)
NEUTROPHILS # BLD AUTO: 9.36 K/UL — HIGH (ref 1.8–7.4)
NEUTROPHILS NFR BLD AUTO: 77.3 % — HIGH (ref 43–77)
NT-PROBNP SERPL-SCNC: HIGH PG/ML (ref 0–300)
PCO2 BLDV: 38 MMHG — LOW (ref 42–55)
PH BLDV: 7.46 — HIGH (ref 7.32–7.43)
PLATELET # BLD AUTO: 527 K/UL — HIGH (ref 150–400)
PO2 BLDV: 191 MMHG — HIGH (ref 25–45)
POTASSIUM BLDV-SCNC: 5.9 MMOL/L — HIGH (ref 3.5–5.1)
POTASSIUM SERPL-MCNC: 5.2 MMOL/L — SIGNIFICANT CHANGE UP (ref 3.5–5.3)
POTASSIUM SERPL-SCNC: 5.2 MMOL/L — SIGNIFICANT CHANGE UP (ref 3.5–5.3)
PROT SERPL-MCNC: 7.4 G/DL — SIGNIFICANT CHANGE UP (ref 6.6–8.7)
RBC # BLD: 5.52 M/UL — SIGNIFICANT CHANGE UP (ref 4.2–5.8)
RBC # FLD: 15 % — HIGH (ref 10.3–14.5)
SAO2 % BLDV: 100 % — SIGNIFICANT CHANGE UP
SODIUM SERPL-SCNC: 135 MMOL/L — SIGNIFICANT CHANGE UP (ref 135–145)
TROPONIN T SERPL-MCNC: 0.03 NG/ML — SIGNIFICANT CHANGE UP (ref 0–0.06)
WBC # BLD: 12.11 K/UL — HIGH (ref 3.8–10.5)
WBC # FLD AUTO: 12.11 K/UL — HIGH (ref 3.8–10.5)

## 2021-09-05 PROCEDURE — 84295 ASSAY OF SERUM SODIUM: CPT

## 2021-09-05 PROCEDURE — 82330 ASSAY OF CALCIUM: CPT

## 2021-09-05 PROCEDURE — 96374 THER/PROPH/DIAG INJ IV PUSH: CPT

## 2021-09-05 PROCEDURE — 99284 EMERGENCY DEPT VISIT MOD MDM: CPT | Mod: 25

## 2021-09-05 PROCEDURE — 83735 ASSAY OF MAGNESIUM: CPT

## 2021-09-05 PROCEDURE — 80299 QUANTITATIVE ASSAY DRUG: CPT

## 2021-09-05 PROCEDURE — 82947 ASSAY GLUCOSE BLOOD QUANT: CPT

## 2021-09-05 PROCEDURE — 36415 COLL VENOUS BLD VENIPUNCTURE: CPT

## 2021-09-05 PROCEDURE — 82803 BLOOD GASES ANY COMBINATION: CPT

## 2021-09-05 PROCEDURE — 93010 ELECTROCARDIOGRAM REPORT: CPT

## 2021-09-05 PROCEDURE — 84132 ASSAY OF SERUM POTASSIUM: CPT

## 2021-09-05 PROCEDURE — 71046 X-RAY EXAM CHEST 2 VIEWS: CPT

## 2021-09-05 PROCEDURE — 82435 ASSAY OF BLOOD CHLORIDE: CPT

## 2021-09-05 PROCEDURE — 71046 X-RAY EXAM CHEST 2 VIEWS: CPT | Mod: 26

## 2021-09-05 PROCEDURE — 84484 ASSAY OF TROPONIN QUANT: CPT

## 2021-09-05 PROCEDURE — 99285 EMERGENCY DEPT VISIT HI MDM: CPT

## 2021-09-05 PROCEDURE — 83605 ASSAY OF LACTIC ACID: CPT

## 2021-09-05 PROCEDURE — 80053 COMPREHEN METABOLIC PANEL: CPT

## 2021-09-05 PROCEDURE — 83880 ASSAY OF NATRIURETIC PEPTIDE: CPT

## 2021-09-05 PROCEDURE — 85018 HEMOGLOBIN: CPT

## 2021-09-05 PROCEDURE — 85014 HEMATOCRIT: CPT

## 2021-09-05 PROCEDURE — 85025 COMPLETE CBC W/AUTO DIFF WBC: CPT

## 2021-09-05 PROCEDURE — 93005 ELECTROCARDIOGRAM TRACING: CPT

## 2021-09-05 RX ORDER — FUROSEMIDE 40 MG
40 TABLET ORAL ONCE
Refills: 0 | Status: COMPLETED | OUTPATIENT
Start: 2021-09-05 | End: 2021-09-05

## 2021-09-05 RX ADMIN — Medication 40 MILLIGRAM(S): at 22:40

## 2021-09-05 NOTE — ED PROVIDER NOTE - ATTENDING CONTRIBUTION TO CARE
HPI: 67yo male pMH CVA with right-sided weakness, HTN, atrial fibrillation and congestive heart failure on digoxin, deep venous thrombosis, chronic kidney disease presenting with b/l pedal edema. Patient sent in by PMD for fear of fluid overload. Patient has no complaints at this time, denies fevers, chills, chest pain, shortness of breath. Recent admission to OSH for cellulitis of LUE. States that due to Acute Kidney Injury, patient advised to hold lasix which he has not taken for 12 days.     PE:  Gen: NAD  Head: NCAT  HEENT: Oral mucosa moist, normal conjunctiva  CV: RRR no murmurs, normal perfusion  Resp: CTA b/l, no wheezing, rales, rhonchi, no respiratory distress  GI: Abd Soft NTND  Neuro: +R-sided weakness, RUE limited range of motion and contracted  MSK: FROM all 4 extremities, no deformity  Skin: L hand and L forearm violaceous however no warmth/tenderness, no crepitus  Psych: Normal affect    MDM: Pt with likely fluid overload due to holding lasix- check labs, CXR, ekg and reassess. Roma Ch DO         I performed a history and physical exam of the patient and discussed their management with the resident. I reviewed the resident's note and agree with the documented findings and plan of care. My medical decision making and observations are found above.

## 2021-09-05 NOTE — ED PROVIDER NOTE - CLINICAL SUMMARY MEDICAL DECISION MAKING FREE TEXT BOX
65yo male with history of CVA with right-side residual weakness, HTN, afib, CHF on digoxin, CKD, DVT s/p IVC filter, gout presenting by request of PCP for concern of dehydration/fluid overload. Patient is asymptomatic. Reports that he was recently admitted on 8/24/21 to [facility] for sepsis secondary to cellulitis. Patient was treated with zosyn, vancomycin, linezolid and discontinued from Lasix per wife. Also states that he has been experiencing increased lower extremity swelling. Denies chest pain, shortness of breath, abdominal pain, new focal neurologic symptoms. On Digoxin and warfarin.

## 2021-09-05 NOTE — ED PROVIDER NOTE - PHYSICAL EXAMINATION
General: Well appearing in no acute distress. Alert and cooperative.   Head: Normocephalic, atraumatic.  Eyes: PERRLA. No conjunctival injection. No scleral icterus. EOMI  ENMT: Atraumatic external nose and ears. Moist mucous membranes. Oropharynx clear.  Neck: Soft and supple. Full ROM without pain. No midline tenderness. No Thyromegaly. No lymphadenopathy.  Cardiac: Regular rate and regular rhythm. No murmurs, rubs, gallops. Peripheral pulses 2+ and symmetric in all extremities. No LE edema.  Resp: Unlabored respiratory effort. Lungs CTAB. Speaking in full sentences. No wheezes, rales or rhonchi.  Abd: Soft, non-tender, non-distended. No guarding or rebound. No scars, masses, or lesions.  MSK: Spine midline and non-tender   Skin: Warm and dry. No rashes, abrasions, or lacerations.  Neuro: RUE contracted with limited range of motion, otherwise neuro intact

## 2021-09-05 NOTE — ED ADULT NURSE NOTE - CAS EDN DISCHARGE ASSESSMENT
[facility]    [address]    [facility] [address]    Phone:  [phone]    Fax:  [phone]       Thank You for choosing us for your health care visit. We are glad to serve you and happy to provide you with this summary of your visit. Please help us to ensure we have accurate records. If you find anything that needs to be changed, please let our staff know as soon as possible.          Your Demographic Information     Patient Name Sex Isabel Smith Female 1974       Ethnic Group Patient Race    Not of  or  Origin White      Your Visit Details     Date & Time Provider Department    2017 5:25 PM Manuela Mckinney NP [facility]      Your Vitals Were     BP Pulse Temp Resp Height Weight    110/78 (BP [place] [facility] [place]: Sitting, Cuff Size: Regular) 66 98.2 °F (36.8 °C) (Oral) 18 5' 6\" (1.676 m) 175 lb (79.4 kg)    SpO2 BMI Smoking Status             97% 28.25 kg/m2 Former Smoker         Medications Prescribed or Re-Ordered Today     None      Your Current Medications Are        Disp Refills Start End    topiramate (TOPAMAX) 25 MG tablet 180 tablet 1 2017     Sig - Route: Take 1 tablet by mouth 2 times daily. - Oral    Class: Eprescribe    Notes to Pharmacy: Needs to be seen for further refills    rabeprazole (ACIPHEX) 20 MG tablet 90 tablet 1 2017     Sig - Route: Take 1 tablet by mouth daily. - Oral    Class: Eprescribe    ranitidine (ZANTAC) 150 MG tablet 90 tablet 1 2017    Sig - Route: Take 1 tablet by mouth nightly. - Oral    Class: Eprescribe    butalbital-acetaminophen-caffeine-codeine -15-30 MG capsule 30 capsule 0 2017     Sig - Route: Take 1 capsule by mouth 4 times daily as needed (headache). Patient needs to be seen for more refills - Oral    venlafaxine XR (EFFEXOR XR) 37.5 MG 24 hr capsule 30 capsule 5 2017     Sig -  Route: Take 1 capsule by mouth daily. - Oral    Class: Eprescribe    fluticasone (FLONASE) 50 MCG/ACT nasal spray 1 Bottle 8 6/10/2015     Si spray up each nostril up to twice per day.    Class: Eprescribe      Allergies     No Known Allergies      Immunizations History as of 2017     Name Date    Influenza 11/3/2016      Problem List as of 2017     Precordial pain    Pulmonary hypertension    Esophageal reflux    Migraine            Patient Instructions     None       Alert and oriented to person, place and time

## 2021-09-05 NOTE — ED ADULT TRIAGE NOTE - CHIEF COMPLAINT QUOTE
call from MD for patient to come to hospital for IV hydration based on lab work. Patient at baseline as per EMS, deficits from previous stroke. awake and alert this time with no specific complaints.

## 2021-09-05 NOTE — CHART NOTE - NSCHARTNOTEFT_GEN_A_CORE
SW advised by medical team that pt is medically stable for discharge home, pt spouse aware and agreeable to discharge plan. MD advised SW that pt needs an ambulance due to being bed bound at baseline. PEARL placed call to spouse Nicky ([phone]) who reports being agreeable to d/c tonight, reports pt has 24/7 family care as well as home care for PT/OT services, has all equipment. PEARL called and arranged ambulance transport ([phone]) to home address which this SW confirmed with wife. NEAF completed with transport billing acknowledgment form. [facility] name requested and provided, SW sent email for follow up to obtain auth. NEAF placed at RN station. No barriers to safe D/C.

## 2021-09-05 NOTE — ED PROVIDER NOTE - PROGRESS NOTE DETAILS
Patricia HERNANDEZ: patient with no complaints. kidney function WNL. Ordered dose of lasix. Discussed case with wife, will start on lasix, will follow with PMD. Wife and patient comfortable with discharge. Strict return precautions given. PEARL called for ambulance for dc.

## 2021-09-05 NOTE — ED PROVIDER NOTE - NSFOLLOWUPINSTRUCTIONS_ED_ALL_ED_FT
- Follow up with your doctor within 2-3 days.   - Bring results with you to the appointment.   - Return to the ED for any new or worsening symptoms.     Congestive Heart Failure (CHF)    Congestive heart failure is a chronic condition in which the heart has trouble pumping blood. In some cases of heart failure, fluid may back up into your lungs or you may have swelling (edema) in your lower legs. There are many causes of heart failure including high blood pressure, coronary artery disease, abnormal heart valves, heart muscle disease, lung disease, diabetes, etc. Symptoms include shortness of breath with activity or when lying flat, cough, swelling of the legs, fatigue, or increased urination during the night.     Treatment is aimed at managing the symptoms of heart failure and may include lifestyle changes, medications, or surgical procedures. Take medicines only as directed by your health care provider and do not stop unless instructed to do so. Eat heart-healthy foods with low or no trans/saturated fats, cholesterol and salt. Weigh yourself every day for early recognition of fluid accumulation.    SEEK IMMEDIATE MEDICAL CARE IF YOU HAVE ANY OF THE FOLLOWING SYMPTOMS: shortness of breath, change in mental status, chest pain, lightheadedness/dizziness/fainting, or worsening of symptoms including not being able to conduct normal physical activity.

## 2021-09-05 NOTE — ED PROVIDER NOTE - OBJECTIVE STATEMENT
67yo male with history of CVA with right-side residual weakness, HTN, afib, CHF on digoxin, CKD, DVT s/p IVC filter, gout presenting by request of PCP for concern of dehydration/fluid overload. Patient is asymptomatic. Reports that he was recently admitted on 8/24/21 to [facility] for sepsis secondary to cellulitis. Patient was treated with zosyn, vancomycin, linezolid and discontinued from Lasix per wife. Also states that he has been experiencing increased lower extremity swelling. Denies chest pain, shortness of breath, abdominal pain, new focal neurologic symptoms. 65yo male with history of CVA with right-side residual weakness, HTN, afib, CHF on digoxin, CKD, DVT s/p IVC filter, gout presenting by request of PCP for concern of dehydration/fluid overload. Patient is asymptomatic. Reports that he was recently admitted on 8/24/21 to [facility] for sepsis secondary to cellulitis. Patient was treated with zosyn, vancomycin, linezolid and discontinued from Lasix per wife. Also states that he has been experiencing increased lower extremity swelling. Denies chest pain, shortness of breath, abdominal pain, new focal neurologic symptoms. On Digoxin and warfarin.

## 2021-09-05 NOTE — ED PROVIDER NOTE - PATIENT PORTAL LINK FT
You can access the FollowMyHealth Patient Portal offered by [facility] by registering at the following website: [email or web address]. By joining [facility]’s FollowMyHealth portal, you will also be able to view your health information using other applications (apps) compatible with our system.

## 2021-09-05 NOTE — ED ADULT NURSE NOTE - OBJECTIVE STATEMENT
Patient A&Ox4 with speech mostly difficult to understand. Wife at bedside stated patient has Hx of CVA with right-sided residual. Stated MD told her to bring patient to ED for concerns of fluid overload & dehydration. Stated patient has not been on his Lasix x12 days. Patient's respirations even/unlabored, presents with swelling to bilat upper extremities, various bruising to extremities due to recent hospitalizations. Patient denies any pain or discomfort.

## 2021-09-08 LAB — DIGITOXIN SERPL-MCNC: <5 NG/ML — LOW (ref 10–25)

## 2021-09-17 ENCOUNTER — APPOINTMENT (OUTPATIENT)
Dept: CARDIOLOGY | Facility: CLINIC | Age: 66
End: 2021-09-17
Payer: MEDICARE

## 2021-09-17 DIAGNOSIS — I48.20 CHRONIC ATRIAL FIBRILLATION, UNSP: ICD-10-CM

## 2021-09-17 DIAGNOSIS — Z95.810 PRESENCE OF AUTOMATIC (IMPLANTABLE) CARDIAC DEFIBRILLATOR: ICD-10-CM

## 2021-09-17 DIAGNOSIS — I25.10 ATHEROSCLEROTIC HEART DISEASE OF NATIVE CORONARY ARTERY W/OUT ANGINA PECTORIS: ICD-10-CM

## 2021-09-17 DIAGNOSIS — I25.5 ISCHEMIC CARDIOMYOPATHY: ICD-10-CM

## 2021-09-17 PROCEDURE — 99443: CPT | Mod: 95

## 2021-09-17 RX ORDER — WARFARIN 2 MG/1
2 TABLET ORAL
Refills: 0 | Status: ACTIVE | COMMUNITY
Start: 2021-09-17

## 2021-09-17 NOTE — HISTORY OF PRESENT ILLNESS
[FreeTextEntry1] : Patient presents for phone call today but the phone call primarily takes place with the patient's wife as the patient has difficulty in speaking at this time.  His wife relates to me that he was in the hospital in July with an infection of some kind.  He was there again in August 25 with a similar type of infection.  At that time his diuretic was stopped because of some renal failure.  He went back to the hospital on September 5 doing worse with some evidence of heart failure.  He was sent home and seen by his physician who doubled his diuretic and since then he seems to be doing better in that regard.  However sometimes through this entire experience she notes that he has had worsening mental status and left hemiparesis to go with the right hemiparesis he has had previously.  She is concerned he may have had a stroke.  This was never evaluated.  He has not had any scans of his head done.  At this time she notes that he is not seem to be particularly short of breath and has not been hypoxic.  He is just very lethargic and unable to do very much.  She simply does not know what to do anymore.  He does not appear to offer any other specific complaints.

## 2021-09-17 NOTE — ASSESSMENT
[FreeTextEntry1] : ICD interrogation demonstrates no events and normal functioning device at 2 months of battery remaining. \par \par Echocardiogram January 15, 2019 demonstrated left ventricle normal in size with moderately reduced function and an ejection fraction of 35-40%. Right ventricle is mildly enlarged with mildly reduced function. Left atrium moderately dilated, mildly dilated right atrium. Mild mitral, mild to moderate aortic and mild tricuspid regurgitation noted. No significant pulmonary hypertension. Overall this represented some improvement in EF.\par \par Echocardiogram 9/10/2021 demonstrated left ventricle normal size and function with ejection fraction of 66%.  Mild aortic stenosis noted.\par \par 64-year-old man with a past medical history CAD, ischemic cardiomyopathy, chronic systolic heart failure, chronic atrial fibrillation who presents for followup.  Patient's follow-up was done over the phone and primarily in discussion with his wife.  The patient is unable to speak very much and has been very tired and not able to move around much.  Based on discussion with the patient's wife it certainly possible that he had another stroke although this has not been able to be evaluated.  We discussed that in reality as it has been a month or more since this happened further work-up for this is unlikely to be helpful.  He remains on Coumadin with INRs checked by his physician as an outpatient.  He also has regular blood work done.  His renal function is apparently improved and he is continuing now on Lasix.  Heart rates have also apparently been controlled.  At this time it does not appear that there is any further work-up or treatment needed from a cardiac perspective and he seems to be relatively stable from a purely cardiovascular perspective.

## 2021-09-17 NOTE — REASON FOR VISIT
[Home] : at home, [unfilled] , at the time of the visit. [Medical Office: ([facility])___] : at the medical office located in  [Spouse] : spouse [Verbal consent obtained from patient] : the patient, [unfilled] [FreeTextEntry1] : Patient here for followup of his CVA, cardiomyopathy and CHF

## 2021-09-25 ENCOUNTER — INPATIENT (INPATIENT)
Facility: HOSPITAL | Age: 66
LOS: 9 days | Discharge: HOME | DRG: 64 | End: 2021-10-05
Attending: HOSPITALIST | Admitting: INTERNAL MEDICINE
Payer: MEDICARE

## 2021-09-25 VITALS
DIASTOLIC BLOOD PRESSURE: 65 MMHG | WEIGHT: 134.92 LBS | SYSTOLIC BLOOD PRESSURE: 103 MMHG | OXYGEN SATURATION: 97 % | RESPIRATION RATE: 18 BRPM | HEIGHT: 67 IN | HEART RATE: 109 BPM | TEMPERATURE: 98 F

## 2021-09-25 DIAGNOSIS — Z95.810 PRESENCE OF AUTOMATIC (IMPLANTABLE) CARDIAC DEFIBRILLATOR: Chronic | ICD-10-CM

## 2021-09-25 DIAGNOSIS — J69.0 PNEUMONITIS DUE TO INHALATION OF FOOD AND VOMIT: ICD-10-CM

## 2021-09-25 DIAGNOSIS — Z95.828 PRESENCE OF OTHER VASCULAR IMPLANTS AND GRAFTS: Chronic | ICD-10-CM

## 2021-09-25 LAB
ALBUMIN SERPL ELPH-MCNC: 3.1 G/DL — LOW (ref 3.3–5.2)
ALP SERPL-CCNC: 72 U/L — SIGNIFICANT CHANGE UP (ref 40–120)
ALT FLD-CCNC: 10 U/L — SIGNIFICANT CHANGE UP
ANION GAP SERPL CALC-SCNC: 16 MMOL/L — SIGNIFICANT CHANGE UP (ref 5–17)
APTT BLD: 34.5 SEC — SIGNIFICANT CHANGE UP (ref 27.5–35.5)
AST SERPL-CCNC: 17 U/L — SIGNIFICANT CHANGE UP
BASOPHILS # BLD AUTO: 0.06 K/UL — SIGNIFICANT CHANGE UP (ref 0–0.2)
BASOPHILS NFR BLD AUTO: 0.6 % — SIGNIFICANT CHANGE UP (ref 0–2)
BILIRUB SERPL-MCNC: 0.9 MG/DL — SIGNIFICANT CHANGE UP (ref 0.4–2)
BUN SERPL-MCNC: 36.4 MG/DL — HIGH (ref 8–20)
CALCIUM SERPL-MCNC: 9.7 MG/DL — SIGNIFICANT CHANGE UP (ref 8.6–10.2)
CHLORIDE SERPL-SCNC: 97 MMOL/L — LOW (ref 98–107)
CO2 SERPL-SCNC: 24 MMOL/L — SIGNIFICANT CHANGE UP (ref 22–29)
CREAT SERPL-MCNC: 1.42 MG/DL — HIGH (ref 0.5–1.3)
DIGOXIN SERPL-MCNC: 0.6 NG/ML — LOW (ref 0.8–2)
EOSINOPHIL # BLD AUTO: 0.06 K/UL — SIGNIFICANT CHANGE UP (ref 0–0.5)
EOSINOPHIL NFR BLD AUTO: 0.6 % — SIGNIFICANT CHANGE UP (ref 0–6)
GLUCOSE SERPL-MCNC: 159 MG/DL — HIGH (ref 70–99)
HCT VFR BLD CALC: 42.1 % — SIGNIFICANT CHANGE UP (ref 39–50)
HGB BLD-MCNC: 12.6 G/DL — LOW (ref 13–17)
IMM GRANULOCYTES NFR BLD AUTO: 0.6 % — SIGNIFICANT CHANGE UP (ref 0–1.5)
INR BLD: 1.76 RATIO — HIGH (ref 0.88–1.16)
LYMPHOCYTES # BLD AUTO: 1.36 K/UL — SIGNIFICANT CHANGE UP (ref 1–3.3)
LYMPHOCYTES # BLD AUTO: 13.9 % — SIGNIFICANT CHANGE UP (ref 13–44)
MCHC RBC-ENTMCNC: 23 PG — LOW (ref 27–34)
MCHC RBC-ENTMCNC: 29.9 GM/DL — LOW (ref 32–36)
MCV RBC AUTO: 76.7 FL — LOW (ref 80–100)
MONOCYTES # BLD AUTO: 0.89 K/UL — SIGNIFICANT CHANGE UP (ref 0–0.9)
MONOCYTES NFR BLD AUTO: 9.1 % — SIGNIFICANT CHANGE UP (ref 2–14)
NEUTROPHILS # BLD AUTO: 7.36 K/UL — SIGNIFICANT CHANGE UP (ref 1.8–7.4)
NEUTROPHILS NFR BLD AUTO: 75.2 % — SIGNIFICANT CHANGE UP (ref 43–77)
NT-PROBNP SERPL-SCNC: 4651 PG/ML — HIGH (ref 0–300)
PLATELET # BLD AUTO: 496 K/UL — HIGH (ref 150–400)
POTASSIUM SERPL-MCNC: 3.8 MMOL/L — SIGNIFICANT CHANGE UP (ref 3.5–5.3)
POTASSIUM SERPL-SCNC: 3.8 MMOL/L — SIGNIFICANT CHANGE UP (ref 3.5–5.3)
PROT SERPL-MCNC: 8.3 G/DL — SIGNIFICANT CHANGE UP (ref 6.6–8.7)
PROTHROM AB SERPL-ACNC: 19.9 SEC — HIGH (ref 10.6–13.6)
RBC # BLD: 5.49 M/UL — SIGNIFICANT CHANGE UP (ref 4.2–5.8)
RBC # FLD: 15.5 % — HIGH (ref 10.3–14.5)
SARS-COV-2 RNA SPEC QL NAA+PROBE: SIGNIFICANT CHANGE UP
SODIUM SERPL-SCNC: 137 MMOL/L — SIGNIFICANT CHANGE UP (ref 135–145)
TROPONIN T SERPL-MCNC: 0.08 NG/ML — HIGH (ref 0–0.06)
WBC # BLD: 9.79 K/UL — SIGNIFICANT CHANGE UP (ref 3.8–10.5)
WBC # FLD AUTO: 9.79 K/UL — SIGNIFICANT CHANGE UP (ref 3.8–10.5)

## 2021-09-25 PROCEDURE — 99497 ADVNCD CARE PLAN 30 MIN: CPT | Mod: 25

## 2021-09-25 PROCEDURE — 99285 EMERGENCY DEPT VISIT HI MDM: CPT

## 2021-09-25 PROCEDURE — 70450 CT HEAD/BRAIN W/O DYE: CPT | Mod: 26,MA

## 2021-09-25 PROCEDURE — 99223 1ST HOSP IP/OBS HIGH 75: CPT

## 2021-09-25 PROCEDURE — 99222 1ST HOSP IP/OBS MODERATE 55: CPT

## 2021-09-25 PROCEDURE — 93010 ELECTROCARDIOGRAM REPORT: CPT

## 2021-09-25 PROCEDURE — 71045 X-RAY EXAM CHEST 1 VIEW: CPT | Mod: 26

## 2021-09-25 RX ORDER — FUROSEMIDE 40 MG
20 TABLET ORAL DAILY
Refills: 0 | Status: DISCONTINUED | OUTPATIENT
Start: 2021-09-25 | End: 2021-09-25

## 2021-09-25 RX ORDER — FUROSEMIDE 40 MG
40 TABLET ORAL DAILY
Refills: 0 | Status: DISCONTINUED | OUTPATIENT
Start: 2021-09-25 | End: 2021-09-26

## 2021-09-25 RX ORDER — ASPIRIN/CALCIUM CARB/MAGNESIUM 324 MG
300 TABLET ORAL DAILY
Refills: 0 | Status: DISCONTINUED | OUTPATIENT
Start: 2021-09-25 | End: 2021-09-25

## 2021-09-25 RX ORDER — DIGOXIN 250 MCG
125 TABLET ORAL EVERY OTHER DAY
Refills: 0 | Status: DISCONTINUED | OUTPATIENT
Start: 2021-09-25 | End: 2021-09-26

## 2021-09-25 RX ORDER — PIPERACILLIN AND TAZOBACTAM 4; .5 G/20ML; G/20ML
3.38 INJECTION, POWDER, LYOPHILIZED, FOR SOLUTION INTRAVENOUS EVERY 8 HOURS
Refills: 0 | Status: COMPLETED | OUTPATIENT
Start: 2021-09-25 | End: 2021-10-02

## 2021-09-25 RX ORDER — CEFTRIAXONE 500 MG/1
1000 INJECTION, POWDER, FOR SOLUTION INTRAMUSCULAR; INTRAVENOUS ONCE
Refills: 0 | Status: COMPLETED | OUTPATIENT
Start: 2021-09-25 | End: 2021-09-25

## 2021-09-25 RX ORDER — PIPERACILLIN AND TAZOBACTAM 4; .5 G/20ML; G/20ML
3.38 INJECTION, POWDER, LYOPHILIZED, FOR SOLUTION INTRAVENOUS ONCE
Refills: 0 | Status: COMPLETED | OUTPATIENT
Start: 2021-09-25 | End: 2021-09-25

## 2021-09-25 RX ORDER — SODIUM CHLORIDE 9 MG/ML
1000 INJECTION, SOLUTION INTRAVENOUS
Refills: 0 | Status: DISCONTINUED | OUTPATIENT
Start: 2021-09-25 | End: 2021-09-26

## 2021-09-25 RX ORDER — ONDANSETRON 8 MG/1
4 TABLET, FILM COATED ORAL ONCE
Refills: 0 | Status: COMPLETED | OUTPATIENT
Start: 2021-09-25 | End: 2021-09-25

## 2021-09-25 RX ORDER — FUROSEMIDE 40 MG
1 TABLET ORAL
Qty: 0 | Refills: 0 | DISCHARGE

## 2021-09-25 RX ORDER — ENOXAPARIN SODIUM 100 MG/ML
60 INJECTION SUBCUTANEOUS ONCE
Refills: 0 | Status: COMPLETED | OUTPATIENT
Start: 2021-09-25 | End: 2021-09-25

## 2021-09-25 RX ORDER — METOPROLOL TARTRATE 50 MG
2.5 TABLET ORAL EVERY 6 HOURS
Refills: 0 | Status: DISCONTINUED | OUTPATIENT
Start: 2021-09-25 | End: 2021-09-26

## 2021-09-25 RX ORDER — VANCOMYCIN HCL 1 G
750 VIAL (EA) INTRAVENOUS EVERY 24 HOURS
Refills: 0 | Status: DISCONTINUED | OUTPATIENT
Start: 2021-09-25 | End: 2021-09-25

## 2021-09-25 RX ORDER — PANTOPRAZOLE SODIUM 20 MG/1
40 TABLET, DELAYED RELEASE ORAL AT BEDTIME
Refills: 0 | Status: DISCONTINUED | OUTPATIENT
Start: 2021-09-25 | End: 2021-10-01

## 2021-09-25 RX ORDER — WARFARIN SODIUM 2.5 MG/1
1 TABLET ORAL
Qty: 0 | Refills: 0 | DISCHARGE

## 2021-09-25 RX ORDER — ONDANSETRON 8 MG/1
4 TABLET, FILM COATED ORAL EVERY 8 HOURS
Refills: 0 | Status: DISCONTINUED | OUTPATIENT
Start: 2021-09-25 | End: 2021-10-05

## 2021-09-25 RX ORDER — VANCOMYCIN HCL 1 G
750 VIAL (EA) INTRAVENOUS EVERY 12 HOURS
Refills: 0 | Status: DISCONTINUED | OUTPATIENT
Start: 2021-09-25 | End: 2021-09-26

## 2021-09-25 RX ADMIN — ONDANSETRON 4 MILLIGRAM(S): 8 TABLET, FILM COATED ORAL at 18:49

## 2021-09-25 RX ADMIN — PANTOPRAZOLE SODIUM 40 MILLIGRAM(S): 20 TABLET, DELAYED RELEASE ORAL at 21:43

## 2021-09-25 RX ADMIN — ENOXAPARIN SODIUM 60 MILLIGRAM(S): 100 INJECTION SUBCUTANEOUS at 21:43

## 2021-09-25 RX ADMIN — CEFTRIAXONE 100 MILLIGRAM(S): 500 INJECTION, POWDER, FOR SOLUTION INTRAMUSCULAR; INTRAVENOUS at 16:19

## 2021-09-25 RX ADMIN — PIPERACILLIN AND TAZOBACTAM 200 GRAM(S): 4; .5 INJECTION, POWDER, LYOPHILIZED, FOR SOLUTION INTRAVENOUS at 20:24

## 2021-09-25 NOTE — H&P ADULT - NSHPPHYSICALEXAM_GEN_ALL_CORE
Vital Signs Last 24 Hrs  T(C): 37 (25 Sep 2021 18:38), Max: 37 (25 Sep 2021 18:38)  T(F): 98.6 (25 Sep 2021 18:38), Max: 98.6 (25 Sep 2021 18:38)  HR: 110 (25 Sep 2021 18:38) (101 - 110)  BP: 122/83 (25 Sep 2021 18:38) (103/65 - 122/83)  BP(mean): --  RR: 16 (25 Sep 2021 18:38) (16 - 18)  SpO2: 97% (25 Sep 2021 18:38) (97% - 97%)      Telemedicine precludes detailed physical examination  Pt is alert w O2 sat on forehead  nasal cannula in position  in no resp distress while on camera  does not interact w me  wife gives hx at bedside

## 2021-09-25 NOTE — H&P ADULT - HISTORY OF PRESENT ILLNESS
PAST MEDICAL HX  AICD (automatic cardioverter/defibrillator) present 2012,  Atrial fibrillation:  CAD (coronary artery disease)  Cardiomyopathy : ischemia  Chronic systolic CHF (congestive heart failure)  CVA (cerebral vascular accident) 2012  HLD hyperlipidemia   HTN hypertension   RBBB   Thrombus of left atrial appendage.     PAST SURGICAL HX  AICD (automatic cardioverter/defibrillator) present   [place] filter in place no h/o dvt and as per wife  it was placed as a precaution after cva.     FAMILY HX  Family history of cerebrovascular accident (CVA): mother 67 year old male w hx CVA w R sided weakness and dysphagia 2012, permanent AF with slow ventricular  response (prior hx LA thrombus on SHANELLE) on warfarin, RBBB, monomorphic VT (5/2014) s/p MDT dual chamber ICD with upgrade to CRT-D (1/2016)and generator change , CKD III, HTN came to ED by EMS after projectile emesis    Since stroke pt has used pureed w thickened agent vs soft diet  today while at lunch w wife, had projectile vomiting of food  has been moving his bowels daily; she did report that he had emesis once before when he was severely constipated  did not complaint to her of pain    Wife reports that since  admission at [facility] he has been unable to use his L side.  She reports that she was told this was due to sepsis because of L arm cellulitis. ( treated with zosyn, vancomycin, linezolid and discontinued from Lasix per wife to ED)  He is seen weekly by his PCP in home  OT/PT in home but without improvement  Wife reports he is usually verbal but has not been himself  Can no longer stand so she lifts him into wheelchair    Was seen in [facility] on 09-05 for IV hydration and was discharged home.  She then reports that he was in heart failure and should have been treated in hospital.  PCP adjusted diuretics    Ambulance call report not available for review      In ED /65      RR 18   T 97.5   97% sat 3 L nc  EMS states sat 92% so was placed on O2  ceftriaxone for presumed asp PNA  head CT no acute change but chronic bilat basal ganglia infarcts and R parietal  CXR +AICD no infiltrate seen, no PVC// + loops of dilated bowel  cardiology consulted for elevated trop  ECHO from 09- reviewed    while in ED had emesis in ED dropping sat to 85% so he was placed on 4 L      PAST MEDICAL HX  AICD (automatic cardioverter/defibrillator) present 2012,  Atrial fibrillation: chronic  CAD (coronary artery disease)  Cardiomyopathy : ischemia  Chronic systolic CHF (congestive heart failure)(LVEF 35-40% in 2019 with recovery in LVEF to 66% 09-)   CVA (cerebral vascular accident) 2012 w R sided weakness and dysphagia  HLD hyperlipidemia   HTN hypertension  Monomorphic ventricular tachycardia s/p CRT-D   RBBB   Thrombus of left atrial appendage.       PAST SURGICAL HX  AICD w generator change  Dr. Estuardo MENDOZA (automatic cardioverter/defibrillator) present   Saul filter in place no h/o dvt and as per wife  it was placed as a precaution after cva.     FAMILY HX  Family history of cerebrovascular accident (CVA): mother      SOCIAL HX    wife takes care of him 24/7  never smoker

## 2021-09-25 NOTE — ED ADULT TRIAGE NOTE - CHIEF COMPLAINT QUOTE
Per EMS Sent from home by daughter after patient vomited while eating.  Daughter was concerned he may have aspirated. O2 sat 92% on room air at home, 97% on 3lpm via NC upon arrival to ED.  Hx of CVA and is at baseline mental status per EMS.  Alert to person and place, not time.  Respirations even and unlabored.  Denies pain.

## 2021-09-25 NOTE — H&P ADULT - NSHPLABSRESULTS_GEN_ALL_CORE
<copied text>  ECHO 09-   LV cavity nl size  Abn septal wall motion due to RV pacemaker  Lateral wall segments not visualized well  calculated LVEF 66%  Structurally nl trileaflet AV w mild (Grade 1) regurg  Mild AV stenosis    read by Dr. Gama   <end of copied text>

## 2021-09-25 NOTE — CONSULT NOTE ADULT - SUBJECTIVE AND OBJECTIVE BOX
JE CARDENAS  [id number]      HPI:    Je Cardenas is a 66 year old man with past medical history of Permanent atrial fibrillation (prior history of LUC thrombus on SHANELLE) on warfarin, CVA with residual right-sided weakness (2012), Hypertension, Monomorphic ventricular tachycardia s/p CRT-D and Ischemic cardiomyopathy (LVEF 35-40% in 2019 with recovery in LVEF to 55-60%) who was brought in to hospital due to episode of projectile vomiting today. The patient is not able to provide history due to weakness and appears mostly non-verbal so wife at bedside to provide history. She states that since August the patient has had increasing weakness of his left side and not able to perform some activities like feeding himself that he previously did. The patient is able to deny chest pain and angina. The wife states that he is not mobile.         ALLERGIES:  IV Contrast (Other)  latex (Rash)      PAST MEDICAL & SURGICAL HISTORY:  Permanent atrial fibrillation (prior history of LUC thrombus on SHANELLE) on warfarin  CVA with residual right-sided weakness (2012)  Hypertension  Monomorphic ventricular tachycardia s/p CRT-D  Ischemic cardiomyopathy (LVEF 35-40% in 2019 with recovery in LVEF to 55-60%)   Hyperlipidemia  RBBB  Saul filter in place    FAMILY HISTORY:  Family history of cerebrovascular accident (CVA)      ROS:  All 10 systems reviewed and positives noted in HPI    OBJECTIVE:    VITAL SIGNS:  Vital Signs Last 24 Hrs  T(C): 37 (25 Sep 2021 18:38), Max: 37 (25 Sep 2021 18:38)  T(F): 98.6 (25 Sep 2021 18:38), Max: 98.6 (25 Sep 2021 18:38)  HR: 110 (25 Sep 2021 18:38) (101 - 110)  BP: 122/83 (25 Sep 2021 18:38) (103/65 - 122/83)  BP(mean): --  RR: 16 (25 Sep 2021 18:38) (16 - 18)  SpO2: 97% (25 Sep 2021 18:38) (97% - 97%)    PHYSICAL EXAM:  General: elderly man, minimally verbal, chronically ill appearing  HEENT: sclera anicteric  Neck: supple, no carotid bruits b/l  CVS: JVP ~ 9 cm H20, RRR, s1, s2, no murmurs  Chest: unlabored respirations, decreased breath sounds anteriorly   Extremities: no lower extremity edema b/l  Neuro: awake, alert  Psych: normal affect      LABS:                        12.6   9.79  )-----------( 496      ( 25 Sep 2021 15:10 )             42.1     09-25    137  |  97<L>  |  36.4<H>  ----------------------------<  159<H>  3.8   |  24.0  |  1.42<H>    Ca    9.7      25 Sep 2021 15:10    TPro  8.3  /  Alb  3.1<L>  /  TBili  0.9  /  DBili  x   /  AST  17  /  ALT  10  /  AlkPhos  72  09-25    CARDIAC MARKERS ( 25 Sep 2021 15:10 )  x     / 0.08 ng/mL / x     / x     / x          PT/INR - ( 25 Sep 2021 15:10 )   PT: 19.9 sec;   INR: 1.76 ratio         PTT - ( 25 Sep 2021 15:10 )  PTT:34.5 sec      ECG (9/25/21):     TTE:     JE CARDENAS  [id number]      HPI:    Je Cardenas is a 66 year old man with past medical history of Permanent atrial fibrillation (prior history of LUC thrombus on SHANELLE) on warfarin, CVA with residual right-sided weakness (2012), Hypertension, Monomorphic ventricular tachycardia s/p CRT-D and Ischemic cardiomyopathy (LVEF 35-40% in 2019 with recovery in LVEF to 66%) who was brought in to hospital due to episode of projectile vomiting today. The patient is not able to provide history due to weakness and appears mostly non-verbal so wife at bedside to provide history. She states that since August the patient has had increasing weakness of his left side and not able to perform some activities like feeding himself that he previously did. The patient is able to deny chest pain and angina. The wife states that he is not mobile.         ALLERGIES:  IV Contrast (Other)  latex (Rash)      PAST MEDICAL & SURGICAL HISTORY:  Permanent atrial fibrillation (prior history of LUC thrombus on SHANELLE) on warfarin  CVA with residual right-sided weakness (2012)  Hypertension  Monomorphic ventricular tachycardia s/p CRT-D  Ischemic cardiomyopathy (LVEF 35-40% in 2019 with recovery in LVEF to 66%)   Hyperlipidemia  RBBB  Saul filter in place    FAMILY HISTORY:  Family history of cerebrovascular accident (CVA)      ROS:  All 10 systems reviewed and positives noted in HPI    OBJECTIVE:    VITAL SIGNS:  Vital Signs Last 24 Hrs  T(C): 37 (25 Sep 2021 18:38), Max: 37 (25 Sep 2021 18:38)  T(F): 98.6 (25 Sep 2021 18:38), Max: 98.6 (25 Sep 2021 18:38)  HR: 110 (25 Sep 2021 18:38) (101 - 110)  BP: 122/83 (25 Sep 2021 18:38) (103/65 - 122/83)  BP(mean): --  RR: 16 (25 Sep 2021 18:38) (16 - 18)  SpO2: 97% (25 Sep 2021 18:38) (97% - 97%)    PHYSICAL EXAM:  General: elderly man, minimally verbal, chronically ill appearing  HEENT: sclera anicteric  Neck: supple, no carotid bruits b/l  CVS: JVP ~ 9 cm H20, RRR, s1, s2, no murmurs  Chest: unlabored respirations, decreased breath sounds anteriorly   Extremities: no lower extremity edema b/l  Neuro: awake, alert  Psych: normal affect      LABS:                        12.6   9.79  )-----------( 496      ( 25 Sep 2021 15:10 )             42.1     09-25    137  |  97<L>  |  36.4<H>  ----------------------------<  159<H>  3.8   |  24.0  |  1.42<H>    Ca    9.7      25 Sep 2021 15:10    TPro  8.3  /  Alb  3.1<L>  /  TBili  0.9  /  DBili  x   /  AST  17  /  ALT  10  /  AlkPhos  72  09-25    CARDIAC MARKERS ( 25 Sep 2021 15:10 )  x     / 0.08 ng/mL / x     / x     / x          PT/INR - ( 25 Sep 2021 15:10 )   PT: 19.9 sec;   INR: 1.76 ratio         PTT - ( 25 Sep 2021 15:10 )  PTT:34.5 sec        Outpatient TTE (9/10/21):  LVEF 66%, abnormal septal motion due to RV pacemaker  Mild aortic valve stenosis     ECG (9/25/21): artifact limits evaluation    CT Head (9/25/21):  HEAD CT: Mild volume loss, microvascular disease, no acute hemorrhage or midline shift.  Chronic bilateral basal ganglia and right parietal infarcts.    Home Cardiac Meds:  Warfarin  Carvedilol 6.25 mg daily  Digoxin 0.125 mg QOD  Lasix 40 mg/20 mg alternating daily

## 2021-09-25 NOTE — ED PROVIDER NOTE - PHYSICAL EXAMINATION
Gen: no acute distress, shirt is covered in nb/nb emesis  Head: normocephalic, atraumatic  Lung: no respiratory distress, coarse breath sounds with diffuse rhonchi bilaterally  CV: normal s1/s2, tachycardic, irregularly irregular rhythm  Abd: soft, no tenderness, non-distended  MSK: No edema, no visible deformities, full range of motion in all 4 extremities  Neuro: No focal neurologic deficits  Skin: No rash   Psych: normal affect

## 2021-09-25 NOTE — ED PROVIDER NOTE - ATTENDING CONTRIBUTION TO CARE
I, Drew Winters, personally saw the patient with the resident, and completed the key components of the history and physical exam. I then discussed the management plan with the resident.    67 yo M hx of cVA, HTN, Afib on digoxin, CKD, DVT, IVC filter p/w hypoxia placed on 4L O2. Patient had recent admission for weakness and sepsis. Patient had elevated trop and PITA. cardiology consulted. patient admitted for futher cardiac and pulmonary treatment.

## 2021-09-25 NOTE — H&P ADULT - ASSESSMENT
66 year old male w hx CVA w R sided weakness and dysphagia 2012, permanent AF with slow ventricular  response (prior hx LA thrombus on SHANELLE) on warfarin, RBBB, monomorphic VT (5/2014) s/p MDT dual chamber ICD with upgrade to CRT-D (1/2016)and generator change , CKD III, HTN came to ED by EMS after projectile emesis      #R parietal CVA w L sided weakness unable to date  #hx CVA w R sided weakness and dysphagia since 2012  1. admit to telemetry  2. admitting team to score NIHSS  3. neuro check q 4 hrs  4. NPO  5. speech and swallow  6. OT/PT  7. neuro consult        #Emesis  dilated loops of bowel on CXR  despite moving bowels daily  prior hx stercoral colitis  1. NPO  2. CT abd/pelvis noncontrast urgent  3. D5 0.45 NS 65 cc/hr  4. zofran prn      #possible aspiration PNA  not seen on current CXR  1. ceftriaxone in ED  2. zosyn q 6 hrs  3. vanco 750 mg q 12   4. supplemental O2        #elevated trop  no dynamic EKG changes  1. trend  2. already had recent ECHO  3. cardiology consult read and appreciated    #AFIB w   hx RBBB  AICD  1. subtherapeutic INR  2. lovenox 60 mg once  3. decide if pt can take po tomorrow after sppech and swallow evaluation  4. hold coreg  5. lopressor 2.5 mg IV q 6 hrs prn HR > 100      #hx CHF now LVEF 66%  #AICD  1. daily weight  2. lasix 40 IV qd w parameters  3. dig 125 mcg IV q 48 hrs  4. TSH      #CKD III  trop 1.42 vs 1.05 on 09-05 but on 08- Cr 1.41  1. trend  2. I/O      #GI  1. pantoprazole 40 mg po changed to IV          #VTE  IMPROVE VTE Individual Risk Assessment    RISK                                                                Points    [  ] Previous VTE                                                  3    [ X] Thrombophilia                                               2    [  ] Lower limb paralysis                                      2        (unable to hold up >15 seconds)      [  ] Current Cancer                                              2         (within 6 months)    [  ] Immobilization > 24 hrs                                1    [  ] ICU/CCU stay > 24 hours                              1    [  X Age > 60                                                      1    IMPROVE VTE Score ___3_____    IMPROVE Score 0-1: Low Risk, No VTE prophylaxis required for most patients, encourage ambulation.   IMPROVE Score 2-3: At risk, pharmacologic VTE prophylaxis is indicated for most patients (in the absence of a contraindication)  IMPROVE Score > or = 4: High Risk, pharmacologic VTE prophylaxis is indicated for most patients (in the absence of a contraindication)        VTE x 1 dose  med rec done w wife  ECHJO resulted above  Speech and swallow  OT/PT  Neuro [phone]        FOLLOW UP  CT abd/pelvis  needs NIHSS scoring  Is ID needed for vanco?             66 year old male w hx CVA w R sided weakness and dysphagia 2012, permanent AF with slow ventricular  response (prior hx LA thrombus on SHANELLE) on warfarin, RBBB, monomorphic VT (5/2014) s/p MDT dual chamber ICD with upgrade to CRT-D (1/2016)and generator change , CKD III, HTN came to ED by EMS after projectile emesis      #R parietal CVA w L sided weakness unable to date  #hx CVA w R sided weakness and dysphagia since 2012  1. admit to telemetry  2. admitting team to score NIHSS  3. neuro check q 4 hrs  4. NPO  5. speech and swallow  6. OT/PT  7. neuro consult        #Emesis  dilated loops of bowel on CXR  despite moving bowels daily  prior hx stercoral colitis  1. NPO  2. CT abd/pelvis noncontrast urgent  3. D5 0.45 NS 65 cc/hr  4. zofran prn      #possible aspiration PNA  not seen on current CXR  1. ceftriaxone in ED  2. zosyn q 6 hrs  3. vanco 750 mg q 12   4. supplemental O2        #elevated trop  no dynamic EKG changes  1. trend  2. already had recent ECHO  3. cardiology consult read and appreciated    #AFIB w   hx RBBB  AICD  1. subtherapeutic INR  2. lovenox 60 mg once  3. decide if pt can take po tomorrow after sppech and swallow evaluation  4. hold coreg  5. lopressor 2.5 mg IV q 6 hrs prn HR > 100      #hx CHF now LVEF 66%  #AICD  1. daily weight  2. lasix 40 IV qd w parameters  3. dig 125 mcg IV q 48 hrs  4. TSH      #CKD III  trop 1.42 vs 1.05 on 09-05 but on 08- Cr 1.41  1. trend  2. I/O      #GI  1. pantoprazole 40 mg po changed to IV          #VTE  IMPROVE VTE Individual Risk Assessment    RISK                                                                Points    [  ] Previous VTE                                                  3    [ X] Thrombophilia                                               2    [  ] Lower limb paralysis                                      2        (unable to hold up >15 seconds)      [  ] Current Cancer                                              2         (within 6 months)    [  ] Immobilization > 24 hrs                                1    [  ] ICU/CCU stay > 24 hours                              1    [  X Age > 60                                                      1    IMPROVE VTE Score ___3_____    IMPROVE Score 0-1: Low Risk, No VTE prophylaxis required for most patients, encourage ambulation.   IMPROVE Score 2-3: At risk, pharmacologic VTE prophylaxis is indicated for most patients (in the absence of a contraindication)  IMPROVE Score > or = 4: High Risk, pharmacologic VTE prophylaxis is indicated for most patients (in the absence of a contraindication)        VTE x 1 dose  med rec done w wife  ECHJO resulted above  Speech and swallow  OT/PT  Neuro [phone]        FOLLOW UP  CT abd/pelvis  needs NIHSS scoring  Is ID needed for vanco?          Wife wants consultants to call her if she is not [facility] bedside   66 year old male w hx CVA w R sided weakness and dysphagia 2012, permanent AF with slow ventricular  response (prior hx LA thrombus on SHANELLE) on warfarin, RBBB, monomorphic VT (5/2014) s/p MDT dual chamber ICD with upgrade to CRT-D (1/2016)and generator change , CKD III, HTN came to ED by EMS after projectile emesis      #R parietal CVA w L sided weakness unable to date  #hx CVA w R sided weakness and dysphagia since 2012  1. admit to telemetry  2. admitting team to score NIHSS  3. neuro check q 4 hrs  4. NPO  5. speech and swallow  6. OT/PT  7. neuro consult        #Emesis  dilated loops of bowel on CXR  despite moving bowels daily  prior hx stercoral colitis  1. NPO  2. CT abd/pelvis noncontrast urgent  3. D5 0.45 NS 65 cc/hr  4. zofran prn      #possible aspiration PNA  not seen on current CXR  1. ceftriaxone in ED  2. zosyn q 6 hrs  3. vanco 750 mg q 12   4. supplemental O2        #elevated trop  no dynamic EKG changes  1. trend  2. already had recent ECHO  3. cardiology consult read and appreciated    #AFIB w   hx RBBB  AICD  1. subtherapeutic INR  2. lovenox 60 mg once  3. decide if pt can take po tomorrow after sppech and swallow evaluation  4. hold coreg  5. lopressor 2.5 mg IV q 6 hrs prn HR > 100      #hx CHF now LVEF 66%  #AICD  1. daily weight  2. lasix 40 IV qd w parameters  3. dig 125 mcg IV q 48 hrs  4. TSH      #CKD III  trop 1.42 vs 1.05 on 09-05 but on 08- Cr 1.41  1. trend  2. I/O      #GI  1. pantoprazole 40 mg po changed to IV          #VTE  IMPROVE VTE Individual Risk Assessment    RISK                                                                Points    [  ] Previous VTE                                                  3    [ X] Thrombophilia                                               2    [  ] Lower limb paralysis                                      2        (unable to hold up >15 seconds)      [  ] Current Cancer                                              2         (within 6 months)    [  ] Immobilization > 24 hrs                                1    [  ] ICU/CCU stay > 24 hours                              1    [  X Age > 60                                                      1    IMPROVE VTE Score ___3_____    IMPROVE Score 0-1: Low Risk, No VTE prophylaxis required for most patients, encourage ambulation.   IMPROVE Score 2-3: At risk, pharmacologic VTE prophylaxis is indicated for most patients (in the absence of a contraindication)  IMPROVE Score > or = 4: High Risk, pharmacologic VTE prophylaxis is indicated for most patients (in the absence of a contraindication)        VTE x 1 dose  med rec done w wife  ECHJO resulted above  Speech and swallow  OT/PT  Neuro [phone]        FOLLOW UP  CT abd/pelvis  needs NIHSS scoring  Is ID needed for vanco?          Wife wants consultants to call her if she is not at bedside

## 2021-09-25 NOTE — ED PROVIDER NOTE - OBJECTIVE STATEMENT
65 y/o M pt with pmhx of CVA with right-side residual weakness, HTN, afib, CHF on digoxin, CKD, DVT s/p IVC filter, gout presenting today via EMS with concern for aspiration. Per EMS, the pt vomited while eating lunch. Had cough and was tachypenic on ems arrival satting 92%. Pt presenting satting 98% on 4L nc. Per EMS, pt is a mental baseline, no family at bedside to verify. Pt denies any complaint at this time, but hx limited 2/2 pt's baseline mental status.

## 2021-09-25 NOTE — ED ADULT NURSE REASSESSMENT NOTE - NS ED NURSE REASSESS COMMENT FT1
pt wife upset MD quentin lancaster aware, elsy mcdonough aware, feels pt was d/c to early last time from hospital.

## 2021-09-25 NOTE — ED PROVIDER NOTE - PROGRESS NOTE DETAILS
Family at bedside. States that pt's baseline spo2 is 92%, pt was taken off nc and maintained sat at that level. Reports that for the past several weeks pt has been in and out of [facility] for multiple admissions related to weakness, sepsis, lethargy. States that pt's mental status has progressively been deteriorating but denies any acute change today. - Dennys Garcia, PGY-3 Pt with additional episode of vomiting. Desaturated to 84% on ra, restarted on 4L nc with improvement. Discussed with Dr. Duvall (cards) feels that pt's elevated trop is 2/2 renuka, but would like to trend troponins on admission. Reviewed all results with pt's family as well as plans for admission. Family is comfortable with plan for admission. Questions answered. - Dennys Garcia, PGY-3 Wife at bedside refusing straight cath for urine. Emphasized need for urine sample given new PITA, continues to refuse. WIll admit pt pending urine. Condom cath placed. - Dennys Garcia, PGY-3

## 2021-09-25 NOTE — H&P ADULT - TIME BILLING
67 y/o M pt with pmhx of CVA with right-side residual weakness, HTN, afib, CHF on digoxin, CKD, DVT s/p IVC filter, gout presenting today via EMS with concern for aspiration 65 y/o male with PMH of CVA with right-side residual weakness, HTN, afib on Coumadin, CHF on digoxin, CKD, DVT s/p IVC filter, gout was brought to the ED for vomiting. Patient noted to have non-bloody, non-billous emesis while eating lunch today. Noted to be hypoxic to 84% on RA in the ED, NC placed; admitted for possible aspiration pneumonia. CT head done: chronic basal ganglia and right parietal infarction; troponin 0.08, BMP: 4651, PITA. Cardiology consulted; rec noted.   Patient was seen and examined at bed side, not in acute distress but noted to be hypoxic 80% on NC, patient also was mouth breathing, was placed on NRB; sat improved to 85%; he was suctioned too. Wife came and asked for NRB to be removed; I explained to her the reason for NRB. She requested and insisted I talk and run every care given to the patient by his PCP. While I was talking to her she called PCP and PCP called ED. I spoke to him (Dr. Bean @ [phone]) at length including goal of care; as per PCP, patient is DNR but not DNI. I confirmed with wife at bed side, she said MOLST was completed at our facility in 2014, has a copy at home and should be on hospital record. I checked but no record found. Offer to complete another form but she said no. While we were talking, patient said she was not feeling good; sat her down, gave her water, juice, she said she has not eaten all day; she continue to report not feeling good; then became unresponsive, pulseless, code blue called but she regained consciousness 10 sec later; code cancelled; transferred to ED.   Patient remained hypoxic, ABG done, ICU consulted rec noted. Patient placed on HF and upgraded to stepdown.

## 2021-09-25 NOTE — ED PROVIDER NOTE - CLINICAL SUMMARY MEDICAL DECISION MAKING FREE TEXT BOX
Pt with complex medical hx, baseline mental status per EMS, presenting after episode of emesis now requiring supplemental o2 with diffuse rhonchi on lung exam. Likely aspiration. Will order labs, ekg, trop, bnp, cxr, abx, attempt to contact family, and will reeval.

## 2021-09-25 NOTE — H&P ADULT - REASON FOR ADMISSION
CVA timing unclear  emesis poss constipation/fecal impaction CVA timing unclear of newer R parietal CVA  emesis poss constipation/fecal impaction

## 2021-09-26 LAB
A1C WITH ESTIMATED AVERAGE GLUCOSE RESULT: 7 % — HIGH (ref 4–5.6)
ANION GAP SERPL CALC-SCNC: 20 MMOL/L — HIGH (ref 5–17)
BASE EXCESS BLDA CALC-SCNC: 3.3 MMOL/L — HIGH (ref -2–3)
BLOOD GAS COMMENTS ARTERIAL: SIGNIFICANT CHANGE UP
BUN SERPL-MCNC: 45.6 MG/DL — HIGH (ref 8–20)
CALCIUM SERPL-MCNC: 9.4 MG/DL — SIGNIFICANT CHANGE UP (ref 8.6–10.2)
CHLORIDE SERPL-SCNC: 96 MMOL/L — LOW (ref 98–107)
CHOLEST SERPL-MCNC: 186 MG/DL — SIGNIFICANT CHANGE UP
CO2 SERPL-SCNC: 21 MMOL/L — LOW (ref 22–29)
COVID-19 SPIKE DOMAIN AB INTERP: NEGATIVE — SIGNIFICANT CHANGE UP
COVID-19 SPIKE DOMAIN ANTIBODY RESULT: 0.4 U/ML — SIGNIFICANT CHANGE UP
CREAT SERPL-MCNC: 1.95 MG/DL — HIGH (ref 0.5–1.3)
ESTIMATED AVERAGE GLUCOSE: 154 MG/DL — HIGH (ref 68–114)
GAS PNL BLDA: SIGNIFICANT CHANGE UP
GLUCOSE BLDC GLUCOMTR-MCNC: 159 MG/DL — HIGH (ref 70–99)
GLUCOSE BLDC GLUCOMTR-MCNC: 232 MG/DL — HIGH (ref 70–99)
GLUCOSE SERPL-MCNC: 158 MG/DL — HIGH (ref 70–99)
HCO3 BLDA-SCNC: 26 MMOL/L — SIGNIFICANT CHANGE UP (ref 21–28)
HDLC SERPL-MCNC: 40 MG/DL — LOW
HOROWITZ INDEX BLDA+IHG-RTO: 1 — SIGNIFICANT CHANGE UP
LIPID PNL WITH DIRECT LDL SERPL: 130 MG/DL — HIGH
NON HDL CHOLESTEROL: 146 MG/DL — HIGH
PCO2 BLDA: 34 MMHG — LOW (ref 35–48)
PH BLDA: 7.5 — HIGH (ref 7.35–7.45)
PHOSPHATE SERPL-MCNC: 4.3 MG/DL — SIGNIFICANT CHANGE UP (ref 2.4–4.7)
PO2 BLDA: 52 MMHG — LOW (ref 83–108)
POTASSIUM SERPL-MCNC: 3.3 MMOL/L — LOW (ref 3.5–5.3)
POTASSIUM SERPL-SCNC: 3.3 MMOL/L — LOW (ref 3.5–5.3)
SAO2 % BLDA: 87.5 % — LOW (ref 94–98)
SARS-COV-2 IGG+IGM SERPL QL IA: 0.4 U/ML — SIGNIFICANT CHANGE UP
SARS-COV-2 IGG+IGM SERPL QL IA: NEGATIVE — SIGNIFICANT CHANGE UP
SODIUM SERPL-SCNC: 137 MMOL/L — SIGNIFICANT CHANGE UP (ref 135–145)
TRIGL SERPL-MCNC: 82 MG/DL — SIGNIFICANT CHANGE UP
TSH SERPL-MCNC: 3.47 UIU/ML — SIGNIFICANT CHANGE UP (ref 0.27–4.2)

## 2021-09-26 PROCEDURE — 99222 1ST HOSP IP/OBS MODERATE 55: CPT

## 2021-09-26 PROCEDURE — 99497 ADVNCD CARE PLAN 30 MIN: CPT

## 2021-09-26 PROCEDURE — 99233 SBSQ HOSP IP/OBS HIGH 50: CPT

## 2021-09-26 PROCEDURE — 99232 SBSQ HOSP IP/OBS MODERATE 35: CPT

## 2021-09-26 PROCEDURE — 74176 CT ABD & PELVIS W/O CONTRAST: CPT | Mod: 26

## 2021-09-26 RX ORDER — SODIUM CHLORIDE 9 MG/ML
1000 INJECTION, SOLUTION INTRAVENOUS
Refills: 0 | Status: DISCONTINUED | OUTPATIENT
Start: 2021-09-26 | End: 2021-09-27

## 2021-09-26 RX ORDER — DIGOXIN 250 MCG
125 TABLET ORAL EVERY OTHER DAY
Refills: 0 | Status: DISCONTINUED | OUTPATIENT
Start: 2021-09-26 | End: 2021-09-27

## 2021-09-26 RX ORDER — INFLUENZA VIRUS VACCINE 15; 15; 15; 15 UG/.5ML; UG/.5ML; UG/.5ML; UG/.5ML
0.5 SUSPENSION INTRAMUSCULAR ONCE
Refills: 0 | Status: DISCONTINUED | OUTPATIENT
Start: 2021-09-26 | End: 2021-10-05

## 2021-09-26 RX ORDER — MINERAL OIL
133 OIL (ML) MISCELLANEOUS ONCE
Refills: 0 | Status: COMPLETED | OUTPATIENT
Start: 2021-09-26 | End: 2021-09-26

## 2021-09-26 RX ORDER — POTASSIUM CHLORIDE 20 MEQ
10 PACKET (EA) ORAL
Refills: 0 | Status: COMPLETED | OUTPATIENT
Start: 2021-09-26 | End: 2021-09-26

## 2021-09-26 RX ORDER — ASPIRIN/CALCIUM CARB/MAGNESIUM 324 MG
300 TABLET ORAL DAILY
Refills: 0 | Status: DISCONTINUED | OUTPATIENT
Start: 2021-09-26 | End: 2021-09-26

## 2021-09-26 RX ORDER — IPRATROPIUM/ALBUTEROL SULFATE 18-103MCG
3 AEROSOL WITH ADAPTER (GRAM) INHALATION EVERY 6 HOURS
Refills: 0 | Status: DISCONTINUED | OUTPATIENT
Start: 2021-09-26 | End: 2021-09-27

## 2021-09-26 RX ORDER — ENOXAPARIN SODIUM 100 MG/ML
60 INJECTION SUBCUTANEOUS DAILY
Refills: 0 | Status: DISCONTINUED | OUTPATIENT
Start: 2021-09-26 | End: 2021-09-27

## 2021-09-26 RX ADMIN — Medication 125 MICROGRAM(S): at 10:36

## 2021-09-26 RX ADMIN — PIPERACILLIN AND TAZOBACTAM 25 GRAM(S): 4; .5 INJECTION, POWDER, LYOPHILIZED, FOR SOLUTION INTRAVENOUS at 16:18

## 2021-09-26 RX ADMIN — Medication 3 MILLILITER(S): at 20:54

## 2021-09-26 RX ADMIN — Medication 40 MILLIGRAM(S): at 06:03

## 2021-09-26 RX ADMIN — PIPERACILLIN AND TAZOBACTAM 25 GRAM(S): 4; .5 INJECTION, POWDER, LYOPHILIZED, FOR SOLUTION INTRAVENOUS at 06:03

## 2021-09-26 RX ADMIN — Medication 3 MILLILITER(S): at 14:59

## 2021-09-26 RX ADMIN — Medication 100 MILLIEQUIVALENT(S): at 13:00

## 2021-09-26 RX ADMIN — Medication 100 MILLIEQUIVALENT(S): at 10:27

## 2021-09-26 RX ADMIN — SODIUM CHLORIDE 65 MILLILITER(S): 9 INJECTION, SOLUTION INTRAVENOUS at 10:51

## 2021-09-26 RX ADMIN — PANTOPRAZOLE SODIUM 40 MILLIGRAM(S): 20 TABLET, DELAYED RELEASE ORAL at 21:36

## 2021-09-26 RX ADMIN — Medication 100 MILLIEQUIVALENT(S): at 12:05

## 2021-09-26 RX ADMIN — ENOXAPARIN SODIUM 60 MILLIGRAM(S): 100 INJECTION SUBCUTANEOUS at 10:27

## 2021-09-26 RX ADMIN — Medication 250 MILLIGRAM(S): at 05:04

## 2021-09-26 NOTE — SWALLOW BEDSIDE ASSESSMENT ADULT - SWALLOW EVAL: DIAGNOSIS
Moderately-severe oral and at least moderate pharyngeal dysphagia suspected 2* significant oral residue post swallow, reduced A-P transit, lingual pumping, significantly delayed pharyngeal swallow trigger, multiple swallows elicited. Not appropriate for safe consumption of PO 2* significant risk for aspiration

## 2021-09-26 NOTE — SWALLOW BEDSIDE ASSESSMENT ADULT - SLP GENERAL OBSERVATIONS
Pt received awake and alert, upright on stretcher in ED +HFNC, SpO2: 97%, nonverbal, pt's spouse present

## 2021-09-26 NOTE — PROVIDER CONTACT NOTE (EICU) - BACKGROUND
66M with a history of CVA with bilateral BG and other infarcts, non verbal, AF/VT s/p CRT-D, CKD admitted for projectile emesis.  Consult called for worsening hypoxia, now on NRB with O2sat high 80s  CXR showed dilated bowel concerning of obstruction and other pathology.  CT abd not yet performed

## 2021-09-26 NOTE — SWALLOW BEDSIDE ASSESSMENT ADULT - ORAL PHASE
Decreased anterior-posterior movement of the bolus/Delayed oral transit time/Stasis in anterior sulcus/Stasis in lateral sulci/Palatal stasis/Lingual stasis

## 2021-09-26 NOTE — CONSULT NOTE ADULT - SUBJECTIVE AND OBJECTIVE BOX
Neurology Consult Note  [facility] [place] [address] [place] [address]  [phone]    HPI:  67 yo man with history of hemorrhagic stroke in 2012 due to uncontrolled HTN as per wife with residual R hemiparesis who was admitted due to episode of vomiting yesterday. Wife states that 2 weeks ago, patient had sudden onset left sided weakness and dysarthria and was taken to [facility] and was told weakness was due to underlying infection. As per wife, patient was more alert two weeks ago. He would communicate with a few words. Patient had a CT head w/o contrast which showed chronic bilateral basal ganglia infarcts and right parietal infarcts with no acute findings. He is on O2 right now, however, wife states he does not use this at home. He is on coumadin for Afib.    PMHx:  AICD  Afib  cardiomyopathy  CVA  HLD  HTN  RBBB  thrombus of left atrial appendage    PSHx:  AICD  jeancarlos filter in place for hx of dvt    Meds:  MEDICATIONS  (STANDING):  albuterol/ipratropium for Nebulization 3 milliLiter(s) Nebulizer every 6 hours  aspirin Suppository 300 milliGRAM(s) Rectal daily  dextrose 5% + sodium chloride 0.45%. 1000 milliLiter(s) (75 mL/Hr) IV Continuous <Continuous>  digoxin  Injectable 125 MICROGram(s) IV Push every other day  enoxaparin Injectable 60 milliGRAM(s) SubCutaneous daily  mineral oil enema 133 milliLiter(s) Rectal once  pantoprazole  Injectable 40 milliGRAM(s) IV Push at bedtime  piperacillin/tazobactam IVPB.. 3.375 Gram(s) IV Intermittent every 8 hours  potassium chloride  10 mEq/100 mL IVPB 10 milliEquivalent(s) IV Intermittent every 1 hour    MEDICATIONS  (PRN):  metoprolol tartrate Injectable 2.5 milliGRAM(s) IV Push every 6 hours PRN HR > 100  ondansetron Injectable 4 milliGRAM(s) IV Push every 8 hours PRN Nausea and/or Vomiting    Allergies:  latex, IV contrast    FamHx:  history of CVA    SocHx:  , wife denies pt having toxic habits    ROS: unable to obtain due to patient mental status    Physical Exam  Vital Signs Last 24 Hrs  T(C): 36.4 (26 Sep 2021 11:13), Max: 37.6 (25 Sep 2021 21:07)  T(F): 97.6 (26 Sep 2021 11:13), Max: 99.7 (25 Sep 2021 21:07)  HR: 94 (26 Sep 2021 11:13) (65 - 110)  BP: 91/65 (26 Sep 2021 11:13) (91/65 - 122/83)  BP(mean): --  RR: 18 (26 Sep 2021 11:13) (16 - 18)  SpO2: 98% (26 Sep 2021 11:13) (97% - 100%)  General: no acute distress  HEENT:NC/AT  Lungs: high flow oxygen  Skin: no rash or ecchymoses  Lower extremities: no edema    Mental Status: awake, alert, follows some simple commands, when asked his name says Je but is hypophonic  CN: PERRL, moves eyes in both directions, VFF, V1-V3 sensation intact, right facial droop (residual from prior stroke)  Motor:  Moves all extremities at least 2/5 throughout and symmetrically  Sensory: intact to light touch throughout  Gait: deferred      LABS:  cret                        12.6   9.79  )-----------( 496      ( 25 Sep 2021 15:10 )             42.1     09-26    137  |  96<L>  |  45.6<H>  ----------------------------<  158<H>  3.3<L>   |  21.0<L>  |  1.95<H>    Ca    9.4      26 Sep 2021 03:14  Phos  4.3     09-26    TPro  8.3  /  Alb  3.1<L>  /  TBili  0.9  /  DBili  x   /  AST  17  /  ALT  10  /  AlkPhos  72  09-25    PT/INR - ( 25 Sep 2021 15:10 )   PT: 19.9 sec;   INR: 1.76 ratio         PTT - ( 25 Sep 2021 15:10 )  PTT:34.5 sec    CT head w/o contrast - Mild volume loss, microvascular disease, no acute hemorrhage or midline shift.  Chronic bilateral basal ganglia and right parietal infarcts.

## 2021-09-26 NOTE — PROGRESS NOTE ADULT - ASSESSMENT
Assessment:  Je Smith is a 66 year old man with past medical history of Permanent atrial fibrillation (prior history of LUC thrombus on SHANELLE) on warfarin, CVA with residual right-sided weakness (2012), Hypertension, Monomorphic ventricular tachycardia s/p CRT-D and Ischemic cardiomyopathy (LVEF 35-40% in 2019 with recovery in LVEF to 66%) who was brought in to hospital due to episode of projectile vomiting today and with progressive fatigue and left-sided weakness.    Cardiology consulted due to elevated troponin, which is 0.08 in the setting of CKD. Patient denies angina or dyspnea and does not appear to have an acute coronary syndrome at this time. BNP markedly elevated at 4000s which is less than prior ER visit this month but still elevated, likely some component of CHF as well. There is concern for possible aspiration pneumonitis.     Recommendations:  [] Elevated troponin: Likely demand ischemia from CKD and possible aspiration. Continue to trend until it peaks. Awaiting echo. Wife states that patient would not want any invasive procedures, and she is his HCP. Patient not able to take in PO at this time.   [] Nausea/projective vomiting: Workup per primary team, concern for aspiration, follow up Speech/Swallow.   [] Ischemic cardiomyopathy: Recent echo with preserved LVEF, awaiting repeat echo. Will hold further diuresis as patient appears hypo-euvolemic at this time and Cr trended up  [] Permanent atrial fibrillation: If patient not able to take in PO, may require heparin drip (if Cr normalized can try therapeutic Lovenox injections instead)  [] Left-sided weakness: CT head reporting chronic b/l basal ganglia and right parietal infarcts. Follow up Neurology    Thank you for the consult. We will continue to follow along.    Discussed with wife at bedside on 9/25    Kale Duvall MD  Cardiology            Assessment:  Je Smith is a 66 year old man with past medical history of Permanent atrial fibrillation (prior history of LUC thrombus on SHANELLE) on warfarin, CVA with residual right-sided weakness (2012), Hypertension, Monomorphic ventricular tachycardia s/p CRT-D and Ischemic cardiomyopathy (LVEF 35-40% in 2019 with recovery in LVEF to 66%) who was brought in to hospital due to episode of projectile vomiting today and with progressive fatigue and left-sided weakness.    Cardiology consulted due to elevated troponin, which is 0.08 in the setting of CKD. Patient denies angina or dyspnea and does not appear to have an acute coronary syndrome at this time. BNP markedly elevated at 4000s which is less than prior ER visit this month but still elevated, likely some component of CHF as well. There is concern for possible aspiration pneumonitis.     Recommendations:  [] Elevated troponin: Likely demand ischemia from CKD and possible aspiration. Continue to trend until it peaks. Awaiting echo. Wife states that patient would not want any invasive procedures, and she is his HCP. Patient not able to take in PO at this time.   [] Nausea/projective vomiting: Workup per primary team, concern for aspiration, follow up Speech/Swallow.   [] Ischemic cardiomyopathy: Recent echo with preserved LVEF, awaiting repeat echo. Will hold further diuresis as patient appears hypo-euvolemic at this time and Cr trended up  [] Permanent atrial fibrillation: If patient not able to take in PO, may require heparin drip (if Cr normalized can try therapeutic Lovenox injections instead)  [] Left-sided weakness: CT head reporting chronic b/l basal ganglia and right parietal infarcts. Follow up Neurology    Thank you for the consult. We will continue to follow along. Prognosis appears guarded at this time, recommend goals of care discussion    Discussed with wife at bedside on 9/25    Kale Duvall MD  Cardiology            Assessment:  Je Smith is a 66 year old man with past medical history of Permanent atrial fibrillation (prior history of LUC thrombus on SHANELLE) on warfarin, CVA with residual right-sided weakness (2012), Hypertension, Monomorphic ventricular tachycardia s/p CRT-D and Ischemic cardiomyopathy (LVEF 35-40% in 2019 with recovery in LVEF to 66%) who was brought in to hospital due to episode of projectile vomiting and with progressive fatigue and left-sided weakness, overall failure to thrive.    Cardiology consulted due to elevated troponin, which is 0.08 in the setting of CKD. Patient denies angina or dyspnea and does not appear to have an acute coronary syndrome at this time. BNP markedly elevated at 4000s which is less than prior ER visit this month but still elevated, likely some component of CHF as well. There is concern for possible aspiration pneumonitis.     Recommendations:  [] Elevated troponin: Likely demand ischemia from CKD and possible aspiration. Continue to trend until it peaks. Awaiting echo. Wife states that patient would not want any invasive procedures, and she is his HCP. Patient not able to take in PO at this time.   [] Nausea/projective vomiting: Workup per primary team, concern for aspiration, follow up Speech/Swallow.   [] Ischemic cardiomyopathy: Recent echo with preserved LVEF, awaiting repeat echo. Will hold further diuresis as patient appears hypo-euvolemic at this time and Cr trended up  [] Permanent atrial fibrillation: If patient not able to take in PO, may require heparin drip (if Cr normalized can try therapeutic Lovenox injections instead)  [] Left-sided weakness: CT head reporting chronic b/l basal ganglia and right parietal infarcts. Follow up Neurology    Thank you for the consult. We will continue to follow along. Prognosis appears guarded at this time, recommend goals of care discussion    Discussed with wife at bedside on 9/25    Kale Duvall MD  Cardiology            Assessment:  Je Smith is a 66 year old man with past medical history of Permanent atrial fibrillation (prior history of LUC thrombus on SHANELLE) on warfarin, CVA with residual right-sided weakness (2012), Hypertension, Monomorphic ventricular tachycardia s/p CRT-D and Ischemic cardiomyopathy (LVEF 35-40% in 2019 with recovery in LVEF to 66%) who was brought in to hospital due to episode of projectile vomiting and with progressive fatigue and left-sided weakness, overall failure to thrive.    Cardiology consulted due to elevated troponin, which is 0.08 in the setting of CKD. Patient denies angina or dyspnea and does not appear to have an acute coronary syndrome at this time. BNP markedly elevated at 4000s which is less than prior ER visit this month. There is concern for possible aspiration.    Recommendations:  [] Elevated troponin: Likely demand ischemia from CKD and possible aspiration. Continue to trend until it peaks. Awaiting echo. Wife states that patient would not want any invasive procedures, and she is his HCP. Patient not able to take in PO at this time.   [] Nausea/projective vomiting: Workup per primary team, concern for aspiration, follow up Speech/Swallow.   [] Ischemic cardiomyopathy: Recent echo with preserved LVEF, awaiting repeat echo. Will hold further diuresis as patient appears hypo-euvolemic at this time and Cr trended up  [] Permanent atrial fibrillation: If patient not able to take in PO, may require heparin drip (if Cr normalized can try therapeutic Lovenox injections instead)  [] Left-sided weakness: CT head reporting chronic b/l basal ganglia and right parietal infarcts. Follow up Neurology    Thank you for the consult. We will continue to follow along. Prognosis appears guarded at this time, recommend goals of care discussion    Discussed with wife at bedside on 9/25    Kale Duvall MD  Cardiology

## 2021-09-26 NOTE — CONSULT NOTE ADULT - SUBJECTIVE AND OBJECTIVE BOX
This is a 66 M PMH CVA w R sided weakness and dysphagia 2012, AF with slow ventricular response (prior hx LA thrombus on SHANELLE) on warfarin, RBBB, monomorphic VT (5/2014) s/p MDT dual chamber ICD with upgrade to CRT-D (1/2016)and generator change , CKD III, HTN and stercoral colitis who was BIBEMS after projectile emesis.  Patient at baseline nonverbal and bedbound.  Information gathered by wife.  Wife states that he has had multiple episodes of vomiting.  He was admitted to medicine for presumed aspiration PNA        Wife reports that since  admission at [facility] he has been unable to use his L side.  She reports that she was told this was due to sepsis because of L arm cellulitis. ( treated with zosyn, vancomycin, linezolid and discontinued from Lasix per wife to ED)  He is seen weekly by his PCP in home  OT/PT in home but without improvement  Wife reports he is usually verbal but has not been himself  Can no longer stand so she lifts him into wheelchair    Was seen in [facility] on 09-05 for IV hydration and was discharged home.  She then reports that he was in heart failure and should have been treated in hospital.  PCP adjusted diuretics    Ambulance call report not available for review  Significant recent/past 24 hr events:    Subjective:    Review of Systems         [ ] A ten-point review of systems was otherwise negative except as noted.  [ ] Due to altered mental status/intubation, subjective information were not able to be obtained from the patient. History was obtained, to the extent possible, from review of the chart and collateral sources of information.      Patient is a 66y old  Male who presents with a chief complaint of CVA timing unclear of newer R parietal CVA  emesis poss constipation/fecal impaction (25 Sep 2021 19:52)    HPI:  66 year old male w hx CVA w R sided weakness and dysphagia 2012, permanent AF with slow ventricular  response (prior hx LA thrombus on SHANELLE) on warfarin, RBBB, monomorphic VT (5/2014) s/p MDT dual chamber ICD with upgrade to CRT-D (1/2016)and generator change , CKD III, HTN came to ED by EMS after projectile emesis    Since stroke pt has used pureed w thickened agent vs soft diet  today while at lunch w wife, had projectile vomiting of food  has been moving his bowels daily; she did report that he had emesis once before when he was severely constipated  did not complaint to her of pain    Wife reports that since  admission at [facility] he has been unable to use his L side.  She reports that she was told this was due to sepsis because of L arm cellulitis. ( treated with zosyn, vancomycin, linezolid and discontinued from Lasix per wife to ED)  He is seen weekly by his PCP in home  OT/PT in home but without improvement  Wife reports he is usually verbal but has not been himself  Can no longer stand so she lifts him into wheelchair    Was seen in [facility] on 09-05 for IV hydration and was discharged home.  She then reports that he was in heart failure and should have been treated in hospital.  PCP adjusted diuretics    Ambulance call report not available for review      In ED /65      RR 18   T 97.5   97% sat 3 L nc  EMS states sat 92% so was placed on O2  ceftriaxone for presumed asp PNA  head CT no acute change but chronic bilat basal ganglia infarcts and R parietal  CXR +AICD no infiltrate seen, no PVC// + loops of dilated bowel  cardiology consulted for elevated trop  ECHO from 09- reviewed    while in ED had emesis in ED dropping sat to 85% so he was placed on 4 L      PAST MEDICAL HX  AICD (automatic cardioverter/defibrillator) present 2012,  Atrial fibrillation: chronic  CAD (coronary artery disease)  Cardiomyopathy : ischemia  Chronic systolic CHF (congestive heart failure)(LVEF 35-40% in 2019 with recovery in LVEF to 66% 09-)   CVA (cerebral vascular accident) 2012 w R sided weakness and dysphagia  HLD hyperlipidemia   HTN hypertension  Monomorphic ventricular tachycardia s/p CRT-D   RBBB   Thrombus of left atrial appendage.       PAST SURGICAL HX  AICD w generator change  Dr. Marte  AICD (automatic cardioverter/defibrillator) present   [place] filter in place no h/o dvt and as per wife  it was placed as a precaution after cva.     FAMILY HX  Family history of cerebrovascular accident (CVA): mother      SOCIAL HX    wife takes care of him 24/7  never smoker     (25 Sep 2021 19:52)    PAST MEDICAL & SURGICAL HISTORY:  CVA (cerebral vascular accident)    Hypertension    Hyperlipidemia    Cardiomyopathy    RBBB    Atrial fibrillation    Thrombus of left atrial appendage    AICD (automatic cardioverter/defibrillator) present    [place] filter in place  no h/o dvt and as per wife  it was placed as a precaution after cva.    AICD (automatic cardioverter/defibrillator) present      FAMILY HISTORY:  Family history of cerebrovascular accident (CVA)        Vitals   ICU Vital Signs Last 24 Hrs  T(C): 37.6 (25 Sep 2021 21:07), Max: 37.6 (25 Sep 2021 21:07)  T(F): 99.7 (25 Sep 2021 21:07), Max: 99.7 (25 Sep 2021 21:07)  HR: 65 (25 Sep 2021 21:07) (65 - 110)  BP: 114/81 (25 Sep 2021 21:07) (103/65 - 122/83)  BP(mean): --  ABP: --  ABP(mean): --  RR: 16 (25 Sep 2021 21:07) (16 - 18)  SpO2: 99% (25 Sep 2021 21:07) (97% - 99%)      Physical Exam:   Constitutional: NAD, well-groomed, well-developed  HEENT: PERRLA, EOMI, no drainage or redness  Neck: supple,  No JVD, Trachea midline  Back: Normal spine flexure, No CVA tenderness, No deformity or limitation of movement  Respiratory: Breath Sounds equal & clear bilaterally to auscultation, no accessory muscle use noted  Cardiovascular: Regular rate, regular rhythm, normal S1, S2; no murmurs or rub  Gastrointestinal: Soft, non-tender, non distended, no hepatosplenomegaly, normal bowel sounds  Extremities: HARRIS x 4, no peripheral edema, no cyanosis, no clubbing   Vascular: Equal and normal pulses: 2+ peripheral pulses throughout  Neurological: A+O x 3; speech clear and intact; no sensory, motor  deficits, normal reflexes  Psychiatric: calm, normal mood, normal affect  Musculoskeletal: No joint swelling or deformity; no limitation of movement  Skin: warm, dry, well perfused, no rashes    VENT SETTINGS         I&O's Detail      LABS                        12.6   9.79  )-----------( 496      ( 25 Sep 2021 15:10 )             42.1     09-25    137  |  97<L>  |  36.4<H>  ----------------------------<  159<H>  3.8   |  24.0  |  1.42<H>    Ca    9.7      25 Sep 2021 15:10    TPro  8.3  /  Alb  3.1<L>  /  TBili  0.9  /  DBili  x   /  AST  17  /  ALT  10  /  AlkPhos  72  09-25    LIVER FUNCTIONS - ( 25 Sep 2021 15:10 )  Alb: 3.1 g/dL / Pro: 8.3 g/dL / ALK PHOS: 72 U/L / ALT: 10 U/L / AST: 17 U/L / GGT: x           PT/INR - ( 25 Sep 2021 15:10 )   PT: 19.9 sec;   INR: 1.76 ratio         PTT - ( 25 Sep 2021 15:10 )  PTT:34.5 sec  CARDIAC MARKERS ( 25 Sep 2021 15:10 )  CKx     / CKMBx     / Troponin T0.08 ng/mL /                MEDICATIONS  (STANDING):  dextrose 5% + sodium chloride 0.45%. 1000 milliLiter(s) (65 mL/Hr) IV Continuous <Continuous>  digoxin  IVPB 125 MICROGram(s) IV Intermittent every other day  furosemide   Injectable 40 milliGRAM(s) IV Push daily  pantoprazole  Injectable 40 milliGRAM(s) IV Push at bedtime  piperacillin/tazobactam IVPB.. 3.375 Gram(s) IV Intermittent every 8 hours  vancomycin  IVPB 750 milliGRAM(s) IV Intermittent every 12 hours    MEDICATIONS  (PRN):  metoprolol tartrate Injectable 2.5 milliGRAM(s) IV Push every 6 hours PRN HR > 100  ondansetron Injectable 4 milliGRAM(s) IV Push every 8 hours PRN Nausea and/or Vomiting      Allergies:  IV Contrast (Other)  latex (Rash)        CRITICAL CARE TIME SPENT:  minutes of critical care time spent providing medical care for patient's acute illness/conditions that impairs at least one vital organ system and/or poses a high risk of imminent or life threatening deterioration in the patient's condition. It includes time spent evaluating and treating the patient's acute illness as well as time spent reviewing labs, radiology, discussing goals of care with patient and/or patient's family, and discussing the case with a multidisciplinary team, in an effort to prevent further life threatening deterioration or end organ damage. This time is independent of any procedures performed.       This is a 66 M PMH CVA w R sided weakness and dysphagia 2012, AF with slow ventricular response (prior hx LA thrombus on SHANELLE) on warfarin, RBBB, monomorphic VT (5/2014) s/p MDT dual chamber ICD with upgrade to CRT-D (1/2016)and generator change , CKD III, HTN and stercoral colitis who was BIBEMS after projectile emesis.  Patient at baseline nonverbal and bedbound.  Information gathered by wife.  Wife states that he has had multiple episodes of vomiting.  He was admitted to medicine for presumed aspiration PNA.  While admitted, patient with escalating oxygen requirements with saturations in the mid to high 80's.  Multiple lengthy GOC discussions conducted by Dr. Carr with wife and PCP.  Wife has MOLST form at home which includes a DNR order, but wants all measures taken for her .    ICU consulted for further management and high risk for deterioration.          Subjective:    Review of Systems         [ ] A ten-point review of systems was otherwise negative except as noted.  [ ] Due to altered mental status/intubation, subjective information were not able to be obtained from the patient. History was obtained, to the extent possible, from review of the chart and collateral sources of information.      Patient is a 66y old  Male who presents with a chief complaint of CVA timing unclear of newer R parietal CVA  emesis poss constipation/fecal impaction (25 Sep 2021 19:52)    HPI:  66 year old male w hx CVA w R sided weakness and dysphagia 2012, permanent AF with slow ventricular  response (prior hx LA thrombus on SHANELLE) on warfarin, RBBB, monomorphic VT (5/2014) s/p MDT dual chamber ICD with upgrade to CRT-D (1/2016)and generator change , CKD III, HTN came to ED by EMS after projectile emesis    Since stroke pt has used pureed w thickened agent vs soft diet  today while at lunch w wife, had projectile vomiting of food  has been moving his bowels daily; she did report that he had emesis once before when he was severely constipated  did not complaint to her of pain    Wife reports that since  admission at [facility] he has been unable to use his L side.  She reports that she was told this was due to sepsis because of L arm cellulitis. ( treated with zosyn, vancomycin, linezolid and discontinued from Lasix per wife to ED)  He is seen weekly by his PCP in home  OT/PT in home but without improvement  Wife reports he is usually verbal but has not been himself  Can no longer stand so she lifts him into wheelchair    Was seen in [facility] on 09-05 for IV hydration and was discharged home.  She then reports that he was in heart failure and should have been treated in hospital.  PCP adjusted diuretics    Ambulance call report not available for review      In ED /65      RR 18   T 97.5   97% sat 3 L nc  EMS states sat 92% so was placed on O2  ceftriaxone for presumed asp PNA  head CT no acute change but chronic bilat basal ganglia infarcts and R parietal  CXR +AICD no infiltrate seen, no PVC// + loops of dilated bowel  cardiology consulted for elevated trop  ECHO from 09- reviewed    while in ED had emesis in ED dropping sat to 85% so he was placed on 4 L      PAST MEDICAL HX  AICD (automatic cardioverter/defibrillator) present 2012,  Atrial fibrillation: chronic  CAD (coronary artery disease)  Cardiomyopathy : ischemia  Chronic systolic CHF (congestive heart failure)(LVEF 35-40% in 2019 with recovery in LVEF to 66% 09-)   CVA (cerebral vascular accident) 2012 w R sided weakness and dysphagia  HLD hyperlipidemia   HTN hypertension  Monomorphic ventricular tachycardia s/p CRT-D   RBBB   Thrombus of left atrial appendage.       PAST SURGICAL HX  AICD w generator change  Dr. Marte  AICD (automatic cardioverter/defibrillator) present   [place] filter in place no h/o dvt and as per wife  it was placed as a precaution after cva.     FAMILY HX  Family history of cerebrovascular accident (CVA): mother      SOCIAL HX    wife takes care of him 24/7  never smoker     (25 Sep 2021 19:52)    PAST MEDICAL & SURGICAL HISTORY:  CVA (cerebral vascular accident)    Hypertension    Hyperlipidemia    Cardiomyopathy    RBBB    Atrial fibrillation    Thrombus of left atrial appendage    AICD (automatic cardioverter/defibrillator) present    [place] filter in place  no h/o dvt and as per wife  it was placed as a precaution after cva.    AICD (automatic cardioverter/defibrillator) present      FAMILY HISTORY:  Family history of cerebrovascular accident (CVA)        Vitals   ICU Vital Signs Last 24 Hrs  T(C): 37.6 (25 Sep 2021 21:07), Max: 37.6 (25 Sep 2021 21:07)  T(F): 99.7 (25 Sep 2021 21:07), Max: 99.7 (25 Sep 2021 21:07)  HR: 65 (25 Sep 2021 21:07) (65 - 110)  BP: 114/81 (25 Sep 2021 21:07) (103/65 - 122/83)  BP(mean): --  ABP: --  ABP(mean): --  RR: 16 (25 Sep 2021 21:07) (16 - 18)  SpO2: 99% (25 Sep 2021 21:07) (97% - 99%)      Physical Exam:   Constitutional: Moderate resp distress.  Chronically ill appearing with contractures  HEENT: PERRLA, EOMI, no drainage or redness  Neck: stiff,  No JVD, Trachea midline  Respiratory: Breath Sounds coarse bilaterally to auscultation, minor accessory muscle use noted  Cardiovascular: V-paced; no murmurs or rub  Gastrointestinal: Soft, non-tender, non distended, no hepatosplenomegaly, normal bowel sounds  Extremities: no peripheral edema, no cyanosis, no clubbing   Vascular: Equal and normal pulses: 2+ peripheral pulses throughout  Neurological: Nonverbal   Psychiatric: appears calm\  Skin: warm, dry, well perfused, no rashes    VENT SETTINGS         I&O's Detail      LABS                        12.6   9.79  )-----------( 496      ( 25 Sep 2021 15:10 )             42.1     09-25    137  |  97<L>  |  36.4<H>  ----------------------------<  159<H>  3.8   |  24.0  |  1.42<H>    Ca    9.7      25 Sep 2021 15:10    TPro  8.3  /  Alb  3.1<L>  /  TBili  0.9  /  DBili  x   /  AST  17  /  ALT  10  /  AlkPhos  72  09-25    LIVER FUNCTIONS - ( 25 Sep 2021 15:10 )  Alb: 3.1 g/dL / Pro: 8.3 g/dL / ALK PHOS: 72 U/L / ALT: 10 U/L / AST: 17 U/L / GGT: x           PT/INR - ( 25 Sep 2021 15:10 )   PT: 19.9 sec;   INR: 1.76 ratio         PTT - ( 25 Sep 2021 15:10 )  PTT:34.5 sec  CARDIAC MARKERS ( 25 Sep 2021 15:10 )  CKx     / CKMBx     / Troponin T0.08 ng/mL /                MEDICATIONS  (STANDING):  dextrose 5% + sodium chloride 0.45%. 1000 milliLiter(s) (65 mL/Hr) IV Continuous <Continuous>  digoxin  IVPB 125 MICROGram(s) IV Intermittent every other day  furosemide   Injectable 40 milliGRAM(s) IV Push daily  pantoprazole  Injectable 40 milliGRAM(s) IV Push at bedtime  piperacillin/tazobactam IVPB.. 3.375 Gram(s) IV Intermittent every 8 hours  vancomycin  IVPB 750 milliGRAM(s) IV Intermittent every 12 hours    MEDICATIONS  (PRN):  metoprolol tartrate Injectable 2.5 milliGRAM(s) IV Push every 6 hours PRN HR > 100  ondansetron Injectable 4 milliGRAM(s) IV Push every 8 hours PRN Nausea and/or Vomiting      Allergies:  IV Contrast (Other)  latex (Rash)

## 2021-09-26 NOTE — PROGRESS NOTE ADULT - SUBJECTIVE AND OBJECTIVE BOX
RUSH MARTINSHEREEN  [id number]      Chief Complaint: Failure to thrive/Possible aspiration/Elevated troponin    Interval History: The patient is now on high flow nasal cannula. Denies angina. Appears fatigued.    Tele: atrial fibrillation 90s BPM    Current meds:   albuterol/ipratropium for Nebulization 3 milliLiter(s) Nebulizer every 6 hours  dextrose 5% + sodium chloride 0.45%. 1000 milliLiter(s) IV Continuous <Continuous>  digoxin  Injectable 125 MICROGram(s) IV Push every other day  enoxaparin Injectable 60 milliGRAM(s) SubCutaneous daily  mineral oil enema 133 milliLiter(s) Rectal once  ondansetron Injectable 4 milliGRAM(s) IV Push every 8 hours PRN  pantoprazole  Injectable 40 milliGRAM(s) IV Push at bedtime  piperacillin/tazobactam IVPB.. 3.375 Gram(s) IV Intermittent every 8 hours  potassium chloride  10 mEq/100 mL IVPB 10 milliEquivalent(s) IV Intermittent every 1 hour      Objective:     Vital Signs:  T(C): 36.4 (09-26-21 @ 11:13), Max: 37.6 (09-25-21 @ 21:07)  HR: 106 (09-26-21 @ 15:08) (65 - 110)  BP: 91/65 (09-26-21 @ 11:13) (91/65 - 122/83)  RR: 18 (09-26-21 @ 11:13) (16 - 18)  SpO2: 98% (09-26-21 @ 15:08) (97% - 100%)  Wt(kg): --      PHYSICAL EXAM:  General: elderly man, minimally verbal, chronically ill appearing, on high flow nasal cannula  HEENT: sclera anicteric  Neck: supple  CVS: JVP ~ 9 cm H20, RRR, s1, s2, no murmurs  Chest: decreased breath sounds  Extremities: no lower extremity edema b/l  Neuro: awake, alert  Psych: normal affect      Labs:   26 Sep 2021 03:14    137    |  96     |  45.6   ----------------------------<  158    3.3     |  21.0   |  1.95     Ca    9.4        26 Sep 2021 03:14  Phos  4.3       26 Sep 2021 03:14    TPro  8.3    /  Alb  3.1    /  TBili  0.9    /  DBili  x      /  AST  17     /  ALT  10     /  AlkPhos  72     25 Sep 2021 15:10                          12.6   9.79  )-----------( 496      ( 25 Sep 2021 15:10 )             42.1     PT/INR - ( 25 Sep 2021 15:10 )   PT: 19.9 sec;   INR: 1.76 ratio         PTT - ( 25 Sep 2021 15:10 )  PTT:34.5 sec  CARDIAC MARKERS ( 25 Sep 2021 15:10 )  x     / 0.08 ng/mL / x     / x     / x              Outpatient TTE (9/10/21):  LVEF 66%, abnormal septal motion due to RV pacemaker  Mild aortic valve stenosis     ECG (9/25/21): artifact limits evaluation    CT Head (9/25/21):  HEAD CT: Mild volume loss, microvascular disease, no acute hemorrhage or midline shift.  Chronic bilateral basal ganglia and right parietal infarcts.    CXR (9/25/21):  IMPRESSION: Low lung volumes with clear lungs.    Home Cardiac Meds:  Warfarin  Carvedilol 6.25 mg daily  Digoxin 0.125 mg QOD  Lasix 40 mg/20 mg alternating daily       RUSH MARTINSHEREEN  [id number]      Chief Complaint: Failure to thrive/Possible aspiration/History of CVA/Elevated troponin    Interval History: The patient is now on high flow nasal cannula. Denies angina. Appears fatigued.    Tele: atrial fibrillation 90s BPM    Current meds:   albuterol/ipratropium for Nebulization 3 milliLiter(s) Nebulizer every 6 hours  dextrose 5% + sodium chloride 0.45%. 1000 milliLiter(s) IV Continuous <Continuous>  digoxin  Injectable 125 MICROGram(s) IV Push every other day  enoxaparin Injectable 60 milliGRAM(s) SubCutaneous daily  mineral oil enema 133 milliLiter(s) Rectal once  ondansetron Injectable 4 milliGRAM(s) IV Push every 8 hours PRN  pantoprazole  Injectable 40 milliGRAM(s) IV Push at bedtime  piperacillin/tazobactam IVPB.. 3.375 Gram(s) IV Intermittent every 8 hours  potassium chloride  10 mEq/100 mL IVPB 10 milliEquivalent(s) IV Intermittent every 1 hour      Objective:     Vital Signs:  T(C): 36.4 (09-26-21 @ 11:13), Max: 37.6 (09-25-21 @ 21:07)  HR: 106 (09-26-21 @ 15:08) (65 - 110)  BP: 91/65 (09-26-21 @ 11:13) (91/65 - 122/83)  RR: 18 (09-26-21 @ 11:13) (16 - 18)  SpO2: 98% (09-26-21 @ 15:08) (97% - 100%)  Wt(kg): --      PHYSICAL EXAM:  General: elderly man, minimally verbal, chronically ill appearing, on high flow nasal cannula  HEENT: sclera anicteric  Neck: supple  CVS: JVP ~ 9 cm H20, RRR, s1, s2, no murmurs  Chest: decreased breath sounds  Extremities: no lower extremity edema b/l  Neuro: awake, alert  Psych: normal affect      Labs:   26 Sep 2021 03:14    137    |  96     |  45.6   ----------------------------<  158    3.3     |  21.0   |  1.95     Ca    9.4        26 Sep 2021 03:14  Phos  4.3       26 Sep 2021 03:14    TPro  8.3    /  Alb  3.1    /  TBili  0.9    /  DBili  x      /  AST  17     /  ALT  10     /  AlkPhos  72     25 Sep 2021 15:10                          12.6   9.79  )-----------( 496      ( 25 Sep 2021 15:10 )             42.1     PT/INR - ( 25 Sep 2021 15:10 )   PT: 19.9 sec;   INR: 1.76 ratio         PTT - ( 25 Sep 2021 15:10 )  PTT:34.5 sec  CARDIAC MARKERS ( 25 Sep 2021 15:10 )  x     / 0.08 ng/mL / x     / x     / x              Outpatient TTE (9/10/21):  LVEF 66%, abnormal septal motion due to RV pacemaker  Mild aortic valve stenosis     ECG (9/25/21): artifact limits evaluation    CT Head (9/25/21):  HEAD CT: Mild volume loss, microvascular disease, no acute hemorrhage or midline shift.  Chronic bilateral basal ganglia and right parietal infarcts.    CXR (9/25/21):  IMPRESSION: Low lung volumes with clear lungs.    Home Cardiac Meds:  Warfarin  Carvedilol 6.25 mg daily  Digoxin 0.125 mg QOD  Lasix 40 mg/20 mg alternating daily

## 2021-09-26 NOTE — PROGRESS NOTE ADULT - ASSESSMENT
This is a 66 M PMH CVA w R sided weakness and dysphagia 2012, AF with slow ventricular response (prior hx LA thrombus on SHANELLE) on warfarin, RBBB, monomorphic VT (5/2014) s/p MDT dual chamber ICD with upgrade to CRT-D (1/2016)and generator change , CKD III, HTN and stercoral colitis who was BIBEMS after projectile emesis.  Patient at baseline nonverbal and bedbound.  Information gathered by wife.  Wife states that he has had multiple episodes of vomiting.  He was admitted to medicine for presumed aspiration PNA.  While admitted, patient with escalating oxygen requirements with saturations in the mid to high 80's.Pt is on high flow oxygen now     Multiple lengthy GOC discussions conducted by Dr. Carr with wife and PCP.  Wife has MOLST form at home which includes a DNR order, but wants all measures taken for her .ICU consulted for further management and high risk for deterioration.  Positive troponin noted cardiology consulted       1- Acute hypoxic respiratory failure likely due to aspiration pneumonia   cont oxygen support , iv zosyn   add neb therapy   wean off oxygen as tolerate   keep head elevated all the times       2- Bilateral basal ganglia and R parietal infracts   likely old   cont aspirin , statin once able to take po   speech kady;luation for swallowing   pt is with declined functional status recently per wife   Pt ordered   liekly with functional quadriplegia     3- Positive troponin   cardiology consult appreciated     4- Hypokalemia   replace iv K x3 doses     5- Diastolic CHF cronic   on iv lasix   cr is up   monitor hold am dose use diuretics as needed   no sign of peripheral fluid overload     6- ARF on CRF stage 3   trend monitor cr on diuretics   check bladder scan r/o retention   hold lasix in am , repeat labs     7- Atrial fib cronic with RBBB , CAD   cont metoprolol iv and digoxin iv   on warfarin at home , INR < 2   add lovenox renal dose full pending swallow evaluation     8- Constipation   will start bowel regimen P swallow evaluation   if can not eat will insert NGT   cont to discuss with wife goals if condition deteriorates  next 2-3 days     condition quarded    This is a 66 M PMH CVA w R sided weakness and dysphagia 2012, AF with slow ventricular response (prior hx LA thrombus on SHANELLE) on warfarin, RBBB, monomorphic VT (5/2014) s/p MDT dual chamber ICD with upgrade to CRT-D (1/2016)and generator change , CKD III, HTN and stercoral colitis who was BIBEMS after projectile emesis.  Patient at baseline nonverbal and bedbound.  Information gathered by wife.  Wife states that he has had multiple episodes of vomiting.  He was admitted to medicine for presumed aspiration PNA.  While admitted, patient with escalating oxygen requirements with saturations in the mid to high 80's.Pt is on high flow oxygen now     Multiple lengthy GOC discussions conducted by Dr. Carr with wife and PCP.  Wife has MOLST form at home which includes a DNR order, but wants all measures taken for her .ICU consulted for further management and high risk for deterioration.  Positive troponin noted cardiology consulted       1- Acute hypoxic respiratory failure likely due to aspiration pneumonia   cont oxygen support , iv zosyn   add neb therapy   wean off oxygen as tolerate   keep head elevated all the times       2- Bilateral basal ganglia and R parietal infracts   likely old   cont aspirin , statin once able to take po   speech kady;luation for swallowing   pt is with declined functional status recently per wife   Pt ordered   liegus with functional quadriplegia     3- Positive troponin   cardiology consult appreciated     4- Hypokalemia   replace iv K x3 doses     5- Diastolic CHF cronic   on iv lasix   cr is up   monitor hold am dose use diuretics as needed   no sign of peripheral fluid overload     6- ARF on CRF stage 3   trend monitor cr on diuretics   check bladder scan r/o retention   hold lasix in am , repeat labs     7- Atrial fib cronic with RBBB , CAD   cont metoprolol iv and digoxin iv   on warfarin at home , INR < 2   add lovenox renal dose full pending swallow evaluation     8- Nausea vomiting   h/o Constipation with BM on the day of admission per wife   CT of abd ordered P ; r/o contipation   if can not eat will insert NGT   cont to discuss with wife goals if condition deteriorates  next 2-3 days     condition quarded

## 2021-09-26 NOTE — PROGRESS NOTE ADULT - SUBJECTIVE AND OBJECTIVE BOX
Patient is a 66y old  Male who presents with a chief complaint of CVA timing unclear of newer R parietal CVA  emesis poss constipation/fecal impaction     seen examined at  the bedside , he is awake non verbal , no resp distress on high flow oxygen     pt is full code per chart and wife wishes as documented       ALLERGIES:  IV Contrast (Other)  latex (Rash)    MEDICATIONS  (STANDING):  dextrose 5% + sodium chloride 0.45%. 1000 milliLiter(s) (65 mL/Hr) IV Continuous <Continuous>  digoxin  IVPB 125 MICROGram(s) IV Intermittent every other day  furosemide   Injectable 40 milliGRAM(s) IV Push daily  pantoprazole  Injectable 40 milliGRAM(s) IV Push at bedtime  piperacillin/tazobactam IVPB.. 3.375 Gram(s) IV Intermittent every 8 hours  vancomycin  IVPB 750 milliGRAM(s) IV Intermittent every 12 hours    MEDICATIONS  (PRN):  metoprolol tartrate Injectable 2.5 milliGRAM(s) IV Push every 6 hours PRN HR > 100  ondansetron Injectable 4 milliGRAM(s) IV Push every 8 hours PRN Nausea and/or Vomiting    Vital Signs Last 24 Hrs  T(F): 98.6 (26 Sep 2021 07:54), Max: 99.7 (25 Sep 2021 21:07)  HR: 101 (26 Sep 2021 07:54) (65 - 110)  BP: 95/70 (26 Sep 2021 07:54) (95/70 - 122/83)  RR: 18 (26 Sep 2021 07:54) (16 - 18)  SpO2: 100% (26 Sep 2021 07:54) (97% - 100%)  I&O's Summary      PHYSICAL EXAM:  General: thin ederly male on high flow   ENT: mouth dry   Neck: supple   Lungs: diminished Bs , poor inspiratory effort anteriorly   Cardio: RRR, S1/S2, No murmur  Abdomen: Soft, Nontender, Nondistended; Bowel sounds present  Extremities: No calf tenderness, No pitting edema  Neuro : awake non verbal right hemiplegia , generalized weakness , MS all extremities decreased   LABS:                        12.6   9.79  )-----------( 496      ( 25 Sep 2021 15:10 )             42.1     09-26    137  |  96  |  45.6  ----------------------------<  158  3.3   |  21.0  |  1.95    Ca    9.4      26 Sep 2021 03:14    TPro  8.3  /  Alb  3.1  /  TBili  0.9  /  DBili  x   /  AST  17  /  ALT  10  /  AlkPhos  72  09-25        eGFR if African American: 40 mL/min/1.73M2 (09-26-21 @ 03:14)  eGFR if Non African American: 35 mL/min/1.73M2 (09-26-21 @ 03:14)    PT/INR - ( 25 Sep 2021 15:10 )   PT: 19.9 sec;   INR: 1.76 ratio         PTT - ( 25 Sep 2021 15:10 )  PTT:34.5 sec      CARDIAC MARKERS ( 25 Sep 2021 15:10 )  x     / 0.08 ng/mL / x     / x     / x          09-26 Chol 186 mg/dL LDL -- HDL 40 mg/dL Trig 82 mg/dL  TSH 3.47   TSH with FT4 reflex --  Total T3 --      ABG - ( 26 Sep 2021 00:54 )  pH, Arterial: 7.500 pH, Blood: x     /  pCO2: 34    /  pO2: 52    / HCO3: 26    / Base Excess: 3.3   /  SaO2: 87.5                CAPILLARY BLOOD GLUCOSE                  COVID-19 PCR: NotDetec (09-25-21 @ 22:14)    RADIOLOGY & ADDITIONAL TESTS:    c< from: CT Head No Cont (09.25.21 @ 17:13) >    IMPRESSION:    HEAD CT: Mild volume loss, microvascular disease, no acute hemorrhage or midline shift.  Chronic bilateral basal ganglia and right parietal infarcts.    --- End of Report ---        < end of copied text >     Patient is a 66y old  Male who presents with a chief complaint of CVA timing unclear of newer R parietal CVA  emesis     seen examined at  the bedside , he is awake non verbal , no resp distress on high flow oxygen     pt is full code per chart and wife wishes as documented       ALLERGIES:  IV Contrast (Other)  latex (Rash)    MEDICATIONS  (STANDING):  dextrose 5% + sodium chloride 0.45%. 1000 milliLiter(s) (65 mL/Hr) IV Continuous <Continuous>  digoxin  IVPB 125 MICROGram(s) IV Intermittent every other day  furosemide   Injectable 40 milliGRAM(s) IV Push daily  pantoprazole  Injectable 40 milliGRAM(s) IV Push at bedtime  piperacillin/tazobactam IVPB.. 3.375 Gram(s) IV Intermittent every 8 hours  vancomycin  IVPB 750 milliGRAM(s) IV Intermittent every 12 hours    MEDICATIONS  (PRN):  metoprolol tartrate Injectable 2.5 milliGRAM(s) IV Push every 6 hours PRN HR > 100  ondansetron Injectable 4 milliGRAM(s) IV Push every 8 hours PRN Nausea and/or Vomiting    Vital Signs Last 24 Hrs  T(F): 98.6 (26 Sep 2021 07:54), Max: 99.7 (25 Sep 2021 21:07)  HR: 101 (26 Sep 2021 07:54) (65 - 110)  BP: 95/70 (26 Sep 2021 07:54) (95/70 - 122/83)  RR: 18 (26 Sep 2021 07:54) (16 - 18)  SpO2: 100% (26 Sep 2021 07:54) (97% - 100%)  I&O's Summary      PHYSICAL EXAM:  General: thin ederly male on high flow   ENT: mouth dry   Neck: supple   Lungs: diminished Bs , poor inspiratory effort anteriorly   Cardio: RRR, S1/S2, No murmur  Abdomen: Soft, Nontender, Nondistended; Bowel sounds present  Extremities: No calf tenderness, No pitting edema  Neuro : awake non verbal right hemiplegia , generalized weakness , MS all extremities decreased   LABS:                        12.6   9.79  )-----------( 496      ( 25 Sep 2021 15:10 )             42.1     09-26    137  |  96  |  45.6  ----------------------------<  158  3.3   |  21.0  |  1.95    Ca    9.4      26 Sep 2021 03:14    TPro  8.3  /  Alb  3.1  /  TBili  0.9  /  DBili  x   /  AST  17  /  ALT  10  /  AlkPhos  72  09-25        eGFR if African American: 40 mL/min/1.73M2 (09-26-21 @ 03:14)  eGFR if Non African American: 35 mL/min/1.73M2 (09-26-21 @ 03:14)    PT/INR - ( 25 Sep 2021 15:10 )   PT: 19.9 sec;   INR: 1.76 ratio         PTT - ( 25 Sep 2021 15:10 )  PTT:34.5 sec      CARDIAC MARKERS ( 25 Sep 2021 15:10 )  x     / 0.08 ng/mL / x     / x     / x          09-26 Chol 186 mg/dL LDL -- HDL 40 mg/dL Trig 82 mg/dL  TSH 3.47   TSH with FT4 reflex --  Total T3 --      ABG - ( 26 Sep 2021 00:54 )  pH, Arterial: 7.500 pH, Blood: x     /  pCO2: 34    /  pO2: 52    / HCO3: 26    / Base Excess: 3.3   /  SaO2: 87.5                CAPILLARY BLOOD GLUCOSE                  COVID-19 PCR: NotDetec (09-25-21 @ 22:14)    RADIOLOGY & ADDITIONAL TESTS:    c< from: CT Head No Cont (09.25.21 @ 17:13) >    IMPRESSION:    HEAD CT: Mild volume loss, microvascular disease, no acute hemorrhage or midline shift.  Chronic bilateral basal ganglia and right parietal infarcts.    --- End of Report ---        < end of copied text >

## 2021-09-26 NOTE — PROVIDER CONTACT NOTE (EICU) - RECOMMENDATIONS
Plan  1. Needs NGT placement, surgery consult  2. Not a candidate of BiPAP secondary to mental status and vomiting  3. Use high flow with NRB  4. Need to clarify goals of care, his is non functional with multiple comorbidites  5. Agree with antibiotics

## 2021-09-26 NOTE — PATIENT PROFILE ADULT - HAS THE PATIENT RECEIVED THE INFLUENZA VACCINE THIS SEASON?
L THR   Authorized # of Visits:  [id number]         Next MD visit: none scheduled  Fall Risk: standard         Precautions: n/a           Medication Changes since last visit?: No   10 visit update:     Subjective: Patient reports she was able to get in and out of 4. Patient to walk with use of cane or no assistive device. 5. Patient to report no pain with daily activities. Plan: Continue with ROM, strengthening, gait work, balance , advance HEP as able,       Charges:  TherEx 2, MM1     Total Timed Tr no...

## 2021-09-26 NOTE — PROVIDER CONTACT NOTE (EICU) - ASSESSMENT
Problem List  1. Acute hypoxic resp distress likely from aspiration  2. Goals of care  3. Dilated bowel with projectile emesis, needs to r/o bowel obstruction  4. Code status, MOLST form includes a DNR order, but wife wants full code

## 2021-09-26 NOTE — SWALLOW BEDSIDE ASSESSMENT ADULT - COMMENTS
Pt's spouse reports she feeds pt all meals herself and removes oral residue via swab throughout meals. She verbalized understanding of aspiration risk and precautions and stated that she is willing to accept risk. She also reported she does "not want to hear any discussion of any feeding tubes," wishes respected and Dr. Perrin notified of findings and rx

## 2021-09-27 LAB
ALBUMIN SERPL ELPH-MCNC: 2.2 G/DL — LOW (ref 3.3–5.2)
ALP SERPL-CCNC: 60 U/L — SIGNIFICANT CHANGE UP (ref 40–120)
ALT FLD-CCNC: 11 U/L — SIGNIFICANT CHANGE UP
ANION GAP SERPL CALC-SCNC: 16 MMOL/L — SIGNIFICANT CHANGE UP (ref 5–17)
AST SERPL-CCNC: 29 U/L — SIGNIFICANT CHANGE UP
BASE EXCESS BLDA CALC-SCNC: 0.2 MMOL/L — SIGNIFICANT CHANGE UP (ref -2–3)
BILIRUB DIRECT SERPL-MCNC: 0.2 MG/DL — SIGNIFICANT CHANGE UP (ref 0–0.3)
BILIRUB INDIRECT FLD-MCNC: 0.3 MG/DL — SIGNIFICANT CHANGE UP (ref 0.2–1)
BILIRUB SERPL-MCNC: 0.5 MG/DL — SIGNIFICANT CHANGE UP (ref 0.4–2)
BLOOD GAS COMMENTS ARTERIAL: SIGNIFICANT CHANGE UP
BUN SERPL-MCNC: 62.4 MG/DL — HIGH (ref 8–20)
CALCIUM SERPL-MCNC: 8.6 MG/DL — SIGNIFICANT CHANGE UP (ref 8.6–10.2)
CHLORIDE SERPL-SCNC: 95 MMOL/L — LOW (ref 98–107)
CO2 SERPL-SCNC: 21 MMOL/L — LOW (ref 22–29)
CREAT SERPL-MCNC: 3.04 MG/DL — HIGH (ref 0.5–1.3)
GLUCOSE BLDC GLUCOMTR-MCNC: 137 MG/DL — HIGH (ref 70–99)
GLUCOSE BLDC GLUCOMTR-MCNC: 173 MG/DL — HIGH (ref 70–99)
GLUCOSE SERPL-MCNC: 161 MG/DL — HIGH (ref 70–99)
HCO3 BLDA-SCNC: 23 MMOL/L — SIGNIFICANT CHANGE UP (ref 21–28)
HCT VFR BLD CALC: 36.9 % — LOW (ref 39–50)
HGB BLD-MCNC: 11.5 G/DL — LOW (ref 13–17)
HOROWITZ INDEX BLDA+IHG-RTO: 50 — SIGNIFICANT CHANGE UP
INR BLD: 2.28 RATIO — HIGH (ref 0.88–1.16)
MAGNESIUM SERPL-MCNC: 2 MG/DL — SIGNIFICANT CHANGE UP (ref 1.6–2.6)
MCHC RBC-ENTMCNC: 23.2 PG — LOW (ref 27–34)
MCHC RBC-ENTMCNC: 31.2 GM/DL — LOW (ref 32–36)
MCV RBC AUTO: 74.4 FL — LOW (ref 80–100)
PCO2 BLDA: 30 MMHG — LOW (ref 35–48)
PH BLDA: 7.5 — HIGH (ref 7.35–7.45)
PHOSPHATE SERPL-MCNC: 3.9 MG/DL — SIGNIFICANT CHANGE UP (ref 2.4–4.7)
PLATELET # BLD AUTO: 340 K/UL — SIGNIFICANT CHANGE UP (ref 150–400)
PO2 BLDA: 92 MMHG — SIGNIFICANT CHANGE UP (ref 83–108)
POTASSIUM SERPL-MCNC: 3.8 MMOL/L — SIGNIFICANT CHANGE UP (ref 3.5–5.3)
POTASSIUM SERPL-SCNC: 3.8 MMOL/L — SIGNIFICANT CHANGE UP (ref 3.5–5.3)
PROCALCITONIN SERPL-MCNC: 24.13 NG/ML — HIGH (ref 0.02–0.1)
PROT SERPL-MCNC: 7.1 G/DL — SIGNIFICANT CHANGE UP (ref 6.6–8.7)
PROTHROM AB SERPL-ACNC: 25.4 SEC — HIGH (ref 10.6–13.6)
RBC # BLD: 4.96 M/UL — SIGNIFICANT CHANGE UP (ref 4.2–5.8)
RBC # FLD: 15.6 % — HIGH (ref 10.3–14.5)
SAO2 % BLDA: 99 % — HIGH (ref 94–98)
SODIUM SERPL-SCNC: 131 MMOL/L — LOW (ref 135–145)
WBC # BLD: 14.02 K/UL — HIGH (ref 3.8–10.5)
WBC # FLD AUTO: 14.02 K/UL — HIGH (ref 3.8–10.5)

## 2021-09-27 PROCEDURE — 99232 SBSQ HOSP IP/OBS MODERATE 35: CPT

## 2021-09-27 PROCEDURE — 99233 SBSQ HOSP IP/OBS HIGH 50: CPT

## 2021-09-27 PROCEDURE — 93880 EXTRACRANIAL BILAT STUDY: CPT | Mod: 26

## 2021-09-27 RX ORDER — WARFARIN SODIUM 2.5 MG/1
3 TABLET ORAL ONCE
Refills: 0 | Status: COMPLETED | OUTPATIENT
Start: 2021-09-27 | End: 2021-09-27

## 2021-09-27 RX ORDER — LEVALBUTEROL 1.25 MG/.5ML
0.63 SOLUTION, CONCENTRATE RESPIRATORY (INHALATION) EVERY 6 HOURS
Refills: 0 | Status: DISCONTINUED | OUTPATIENT
Start: 2021-09-27 | End: 2021-09-30

## 2021-09-27 RX ORDER — SENNA PLUS 8.6 MG/1
2 TABLET ORAL AT BEDTIME
Refills: 0 | Status: DISCONTINUED | OUTPATIENT
Start: 2021-09-27 | End: 2021-10-04

## 2021-09-27 RX ORDER — ATORVASTATIN CALCIUM 80 MG/1
40 TABLET, FILM COATED ORAL AT BEDTIME
Refills: 0 | Status: DISCONTINUED | OUTPATIENT
Start: 2021-09-27 | End: 2021-10-04

## 2021-09-27 RX ORDER — CARVEDILOL PHOSPHATE 80 MG/1
3.12 CAPSULE, EXTENDED RELEASE ORAL EVERY 12 HOURS
Refills: 0 | Status: DISCONTINUED | OUTPATIENT
Start: 2021-09-27 | End: 2021-10-02

## 2021-09-27 RX ORDER — MINERAL OIL
133 OIL (ML) MISCELLANEOUS ONCE
Refills: 0 | Status: COMPLETED | OUTPATIENT
Start: 2021-09-27 | End: 2021-09-27

## 2021-09-27 RX ORDER — SODIUM CHLORIDE 9 MG/ML
250 INJECTION INTRAMUSCULAR; INTRAVENOUS; SUBCUTANEOUS ONCE
Refills: 0 | Status: COMPLETED | OUTPATIENT
Start: 2021-09-27 | End: 2021-09-27

## 2021-09-27 RX ORDER — SODIUM CHLORIDE 9 MG/ML
1000 INJECTION INTRAMUSCULAR; INTRAVENOUS; SUBCUTANEOUS
Refills: 0 | Status: DISCONTINUED | OUTPATIENT
Start: 2021-09-27 | End: 2021-10-01

## 2021-09-27 RX ADMIN — WARFARIN SODIUM 3 MILLIGRAM(S): 2.5 TABLET ORAL at 21:13

## 2021-09-27 RX ADMIN — LEVALBUTEROL 0.63 MILLIGRAM(S): 1.25 SOLUTION, CONCENTRATE RESPIRATORY (INHALATION) at 20:22

## 2021-09-27 RX ADMIN — Medication 3 MILLILITER(S): at 03:07

## 2021-09-27 RX ADMIN — LEVALBUTEROL 0.63 MILLIGRAM(S): 1.25 SOLUTION, CONCENTRATE RESPIRATORY (INHALATION) at 15:15

## 2021-09-27 RX ADMIN — PIPERACILLIN AND TAZOBACTAM 25 GRAM(S): 4; .5 INJECTION, POWDER, LYOPHILIZED, FOR SOLUTION INTRAVENOUS at 00:41

## 2021-09-27 RX ADMIN — ATORVASTATIN CALCIUM 40 MILLIGRAM(S): 80 TABLET, FILM COATED ORAL at 21:13

## 2021-09-27 RX ADMIN — PIPERACILLIN AND TAZOBACTAM 25 GRAM(S): 4; .5 INJECTION, POWDER, LYOPHILIZED, FOR SOLUTION INTRAVENOUS at 08:27

## 2021-09-27 RX ADMIN — SODIUM CHLORIDE 500 MILLILITER(S): 9 INJECTION INTRAMUSCULAR; INTRAVENOUS; SUBCUTANEOUS at 17:28

## 2021-09-27 RX ADMIN — PANTOPRAZOLE SODIUM 40 MILLIGRAM(S): 20 TABLET, DELAYED RELEASE ORAL at 21:13

## 2021-09-27 RX ADMIN — PIPERACILLIN AND TAZOBACTAM 25 GRAM(S): 4; .5 INJECTION, POWDER, LYOPHILIZED, FOR SOLUTION INTRAVENOUS at 17:04

## 2021-09-27 RX ADMIN — ENOXAPARIN SODIUM 60 MILLIGRAM(S): 100 INJECTION SUBCUTANEOUS at 08:07

## 2021-09-27 RX ADMIN — SODIUM CHLORIDE 70 MILLILITER(S): 9 INJECTION INTRAMUSCULAR; INTRAVENOUS; SUBCUTANEOUS at 19:27

## 2021-09-27 RX ADMIN — SENNA PLUS 2 TABLET(S): 8.6 TABLET ORAL at 21:13

## 2021-09-27 NOTE — OCCUPATIONAL THERAPY INITIAL EVALUATION ADULT - EATING, PREVIOUS LEVEL OF FUNCTION, OT EVAL
Prior to 2 weeks before admission, spouse reports her  fed himself using his left hand.  Spouse has been feeding him the past couple of weeks./needed assist

## 2021-09-27 NOTE — OCCUPATIONAL THERAPY INITIAL EVALUATION ADULT - BED MOBILITY/TRANSFERS, PREVIOUS LEVEL OF FUNCTION, OT EVAL
Spouse reports that past couple of weeks, spouse has lifted pt into wheelchair ("slid him").  Prior to that she reports pt was able to stand with walker and transfer to wheelchair./needs device and assist

## 2021-09-27 NOTE — OCCUPATIONAL THERAPY INITIAL EVALUATION ADULT - GENERAL OBSERVATIONS, REHAB EVAL
Pt received semifowler in stretcher, +parry, +cardiac monitor with , +O2 via high flow nasal canula, spouse at bedside, both agreeable to OT eval. Pt received semifowler in stretcher, +parry, +cardiac monitor with , +60% O2 via high flow nasal canula, spouse at bedside, both agreeable to OT eval.

## 2021-09-27 NOTE — OCCUPATIONAL THERAPY INITIAL EVALUATION ADULT - FINE MOTOR COORDINATION, RIGHT HAND, GRAPHOMOTOR SKILLS, OT EVAL
pt is right handed but hasn't been able to use right hand functionally since CVA '12./unable to perform

## 2021-09-27 NOTE — OCCUPATIONAL THERAPY INITIAL EVALUATION ADULT - ANTICIPATED DISCHARGE DISPOSITION, OT EVAL
[facility] vs home with 24/7 assist pending family's ability to provide assistance needed. Acute Rehab vs home with 24/7 assist pending family's ability to provide assistance needed.

## 2021-09-27 NOTE — PROGRESS NOTE ADULT - SUBJECTIVE AND OBJECTIVE BOX
Patient is a 66y old  Male who presents with a chief complaint of CVA timing unclear of newer R parietal CVA  emesis with fecal impaction     pt is seen in am , on high flow 50 % now   pt is awake alert minimally verbal answers to questions not able to get history   spontaneous movements in right upper ext noted     no overnight events   d/w cardiology and neurology after my visit   wife also present at the bedside explained to her in details today's plan and condition     MEDICATIONS  (STANDING):  atorvastatin 40 milliGRAM(s) Oral at bedtime  carvedilol 3.125 milliGRAM(s) Oral every 12 hours  influenza   Vaccine 0.5 milliLiter(s) IntraMuscular once  mineral oil enema 133 milliLiter(s) Rectal once  pantoprazole  Injectable 40 milliGRAM(s) IV Push at bedtime  piperacillin/tazobactam IVPB.. 3.375 Gram(s) IV Intermittent every 8 hours  senna 2 Tablet(s) Oral at bedtime  sodium chloride 0.9% Bolus 250 milliLiter(s) IV Bolus once  sodium chloride 0.9%. 1000 milliLiter(s) (70 mL/Hr) IV Continuous <Continuous>  warfarin 3 milliGRAM(s) Oral once  omiting    Vital Signs Last 24 Hrs  T(C): 36.4 (27 Sep 2021 07:19), Max: 36.7 (26 Sep 2021 23:53)  T(F): 97.5 (27 Sep 2021 07:19), Max: 98 (26 Sep 2021 23:53)  HR: 80 (27 Sep 2021 07:19) (61 - 106)  BP: 92/59 (27 Sep 2021 07:19) (87/59 - 100/68)  BP(mean): --  RR: 20 (27 Sep 2021 07:19) (18 - 20)  SpO2: 95% (27 Sep 2021 07:19) (94% - 98%)    PHYSICAL EXAM:  General: thin elderly male on high flow ox , no resp distress   ENT: dry oral mucosa    Neck: supple , no JVD   Lungs: diminished Bs , poor inspiratory effort anteriorly   Cardio: RRR, S1/S2, No murmur  Abdomen: Soft, Nontender, Nondistended; Bowel sounds present  Extremities: No calf tenderness, No pitting edema  Neuro : awake non verbal right and left hemiplegia , generalized weakness , MS all extremities decreased left more than right   Skin : warm normal color                           11.5   14.02 )-----------( 340      ( 27 Sep 2021 07:38 )             36.9   09-27    131<L>  |  95<L>  |  62.4<H>  ----------------------------<  161<H>  3.8   |  21.0<L>  |  3.04<H>    Ca    8.6      27 Sep 2021 03:07  Phos  3.9     09-27  Mg     2.0     09-27    TPro  7.1  /  Alb  2.2<L>  /  TBili  0.5  /  DBili  0.2  /  AST  29  /  ALT  11  /  AlkPhos  60  09-27     blkood Gas Profile - Arterial (09.27.21 @ 09:29)   pH, Arterial: 7.500: As of 6/9/2020 Reference Ranges have been amended for: CHICHI, COHB, GLUWB,   HCO3, KWB, METHHB, NAWB, O2HB, PCO2.   pCO2, Arterial: 30 mmHg   pO2, Arterial: 92 mmHg   HCO3, Arterial: 23 mmol/L   Base Excess, Arterial: 0.2 mmol/L   Oxygen Saturation, Arterial: 99.0 %   FIO2, Arterial: 50   Blood Gas Comments Arterial: 50% hfnc     CAPILLARY BLOOD GLUCOSE                  COVID-19 PCR: NotDetec (09-25-21 @ 22:14)    RADIOLOGY & ADDITIONAL TESTS:    c< from: CT Head No Cont (09.25.21 @ 17:13) >    IMPRESSION:    HEAD CT: Mild volume loss, microvascular disease, no acute hemorrhage or midline shift.  Chronic bilateral basal ganglia and right parietal infarcts.    --- End of Report ---        t< from: CT Abdomen and Pelvis No Cont (09.26.21 @ 15:44) >  IMPRESSION:  *  Severe rectal fecal impaction with evidence of stercoral proctitis.  *  Mild gaseous distention of the remaining colon. No obstruction.        < end of copied text >

## 2021-09-27 NOTE — PROGRESS NOTE ADULT - ASSESSMENT
Assessment:  Je Smith is a 66 year old man with past medical history of Permanent atrial fibrillation (prior history of LUC thrombus on SHANELLE) on warfarin, CVA with residual right-sided weakness (2012), Hypertension, Monomorphic ventricular tachycardia s/p CRT-D and Ischemic cardiomyopathy (LVEF 35-40% in 2019 with recovery in LVEF to 66%) who was brought in to hospital due to episode of projectile vomiting and with progressive fatigue and left-sided weakness, overall failure to thrive.    Cardiology consulted due to elevated troponin, which is 0.08 in the setting of CKD. Patient denies angina or dyspnea and does not appear to have an acute coronary syndrome at this time. BNP markedly elevated at 4000s which is less than prior ER visit this month. There is concern for possible aspiration.    Recommendations:  [] Elevated troponin: Minimally elevated, likely demand ischemia from CKD and possible aspiration. Awaiting echo. Wife states that patient would not want any invasive procedures, and she is his HCP. Patient not able to take in PO at this time.   [] Nausea/projective vomiting: Workup per primary team, concern for aspiration, follow up Speech/Swallow. Appears intravascularly depleted now.   [] Ischemic cardiomyopathy: Recent echo with preserved LVEF, awaiting repeat echo. Cr rising and likely patient is intravascularly depleted as he is not taking in PO. Beta blocker on hold  [] Elevated Cr: Cr rising, concern for pre-renal state, consider Renal evaluation, currently on IV fluids   [] Permanent atrial fibrillation: If patient not able to take in PO, would recommend heparin drip if ok with Neurology and no concern for acute CVA (Cr too high for Lovenox)  [] Left-sided weakness: CT head reporting chronic b/l basal ganglia and right parietal infarcts. Follow up Neurology  [] Wean off high flow nasal cannula as tolerated    Thank you for the consult. We will continue to follow along. Prognosis appears guarded at this time, recommend goals of care discussion    Discussed with wife at bedside on 9/25    Kale Duvall MD  Cardiology

## 2021-09-27 NOTE — OCCUPATIONAL THERAPY INITIAL EVALUATION ADULT - NS ASR OT EQUIP NEEDS DISCH
Pt already has hospital bed, wheelchair.  Pal lift recommended at time of eval.  Recommendations may change pending pt's functional progress. Recommendations pending progress.  Pt already has hospital bed, wheelchair.  Pal lift recommended at time of eval.

## 2021-09-27 NOTE — OCCUPATIONAL THERAPY INITIAL EVALUATION ADULT - FINE MOTOR COORDINATION, LEFT HAND, GRAPHOMOTOR SKILLS, OT EVAL
Pt had been using Left hand to feed himself up until about 2 weeks ago.  Pt unable at this time/unable to perform

## 2021-09-27 NOTE — OCCUPATIONAL THERAPY INITIAL EVALUATION ADULT - RANGE OF MOTION EXAMINATION, UPPER EXTREMITY
+contractures right shoulder, elbow, wrist.  Pt able to actively flex fingers 1/4 normal range.  Pt unable to actively range left UE and demonstrated pain via facial grimace with attempt to passively range left shoulder.  Left elbow 1/2 active assisted ROM from resting position of 75 degrees flexion.  Left wrist and digits pt unable to tolerate passive ROM due to pain.  Flicker of movement seen when asked to squeeze pt's hand.

## 2021-09-27 NOTE — PROGRESS NOTE ADULT - SUBJECTIVE AND OBJECTIVE BOX
[facility]                                        Neurology at [place]                                 [address] [place]                                  [address]                                        [place], [place], [address]                                             [phone]        CC: stroke    HPI:   65 yo man with history of hemorrhagic stroke in 2012 due to uncontrolled HTN as per wife with residual R hemiparesis who was admitted due to episode of vomiting yesterday.  The patient had been at [facility] in July with left sided weakness and his wife reports that she was told it was secondary to cellulitis.   He has been to emergency rooms a few times since then.     His wife also reports that since the pacemaker battery change in April of 2020, he has been in the hospital almost every 3 months.     Interim history:  Remains in ER awaiting bed.     ROS:   Unobtainable due to patient's condition.     MEDICATIONS  (STANDING):  albuterol/ipratropium for Nebulization 3 milliLiter(s) Nebulizer every 6 hours  dextrose 5% + sodium chloride 0.45%. 1000 milliLiter(s) (75 mL/Hr) IV Continuous <Continuous>  enoxaparin Injectable 60 milliGRAM(s) SubCutaneous daily  influenza   Vaccine 0.5 milliLiter(s) IntraMuscular once  pantoprazole  Injectable 40 milliGRAM(s) IV Push at bedtime  piperacillin/tazobactam IVPB.. 3.375 Gram(s) IV Intermittent every 8 hours      Vital Signs Last 24 Hrs  T(C): 36.4 (27 Sep 2021 07:19), Max: 36.7 (26 Sep 2021 23:53)  T(F): 97.5 (27 Sep 2021 07:19), Max: 98 (26 Sep 2021 23:53)  HR: 80 (27 Sep 2021 07:19) (61 - 106)  BP: 92/59 (27 Sep 2021 07:19) (87/59 - 100/68)  RR: 20 (27 Sep 2021 07:19) (18 - 20)  SpO2: 95% (27 Sep 2021 07:19) (94% - 98%)    Detailed Neurologic Exam:    Mental status: The patient is awake but non verbal. Follows simple commands.     Cranial nerves: Pupils equal and react symmetrically to light. There is no visual field deficit to threat. Extraocular motion is full with no nystagmus. Facial sensation is intact. Facial musculature is asymmetric with a depression of the right nasolabial fold. Palate elevates symmetrically. Tongue is midline.    Motor: There is increased tone on the right.  There is spastic paresis on the right.  The left is 2-3/5 in the hand and 1-2/5 otherwise.     Sensation: Decreased on the right compared to the  left.    Reflexes: Brisker on the right and plantar responses are extensor.    Cerebellar: Cannot be tested.     [facility] SS:   Date: 9/27/21  Time:   1a) Level of consciousness (0-3): 0  1b) Questions (0-2): 2  1c) Commands (0-2): 0  2  ) Gaze (0-2): 0  3  ) Visual field (0-3): 0  4  ) Facial palsy (0-3): 1  Motor  5a) Left arm (0-4): 3  5b) Right arm (0-4): 3  6a) Left leg (0-4): 3  6b) Right leg (0-4): 3  7  ) Ataxia (0-2): 0  Sensory  8  ) Sensory (0-2): 1  Speech  9  ) Language (0-3): 3  10) Dysarthria (0-2): 2  Extinction  11) Extinction/inattention (0-2): 0    Total score: 21    Prestroke Modified [place]: 4-5    (0: No symptoms and no disability.  1: No significant disability despite symptoms; able to carry out all usual duties and activities.  2: Slight disability; unable to carry out all previous activity but able to look after own affairs without assistance.  3: Moderate disability; requiring some help but able to walk without assistance.   4: Moderately severe disability; unable to walk without assistance and unable to attend to own bodily needs without assistance.  5: Severe disability; bedridden, incontinent and requiring constant nursing care and attention.   6: Dead. )     Labs:     09-27    131<L>  |  95<L>  |  62.4<H>  ----------------------------<  161<H>  3.8   |  21.0<L>  |  3.04<H>    Ca    8.6      27 Sep 2021 03:07  Phos  3.9     09-27  Mg     2.0     09-27    TPro  7.1  /  Alb  2.2<L>  /  TBili  0.5  /  DBili  0.2  /  AST  29  /  ALT  11  /  AlkPhos  60  09-27                            11.5   14.02 )-----------( 340      ( 27 Sep 2021 07:38 )             36.9       Rad:   CT head images reviewed. There are chronic ischemic changes and bilateral basal ganglia infarcts as well as right parietal infarct.

## 2021-09-27 NOTE — OCCUPATIONAL THERAPY INITIAL EVALUATION ADULT - MANUAL MUSCLE TESTING RESULTS, REHAB EVAL
Right UE not tested due to contractures except hand 2/5.  Left shoulder pt guarding due to pain and unable to assess.  Left elbow 2/5, left wrist and hand motion not detected.  Pt appears to be guarding against pain.

## 2021-09-27 NOTE — CONSULT NOTE ADULT - SUBJECTIVE AND OBJECTIVE BOX
Patient is a 66y old  Male who presents with a chief complaint of CVA timing unclear of newer R parietal CVA  emesis possible  constipation (27 Sep 2021 10:52)      HPI:  66 year old male w hx CVA w R sided weakness and dysphagia 2012, permanent AF with slow ventricular  response (prior hx LA thrombus on SHANELLE) on warfarin, RBBB, monomorphic VT (5/2014) s/p MDT dual chamber ICD with upgrade to CRT-D (1/2016)and generator change , CKD III, HTN came to ED by EMS after projectile emesis    Since stroke pt has used pureed w thickened agent vs soft diet  today while at lunch w wife, had projectile vomiting of food  has been moving his bowels daily; she did report that he had emesis once before when he was severely constipated  did not complaint to her of pain    Wife reports that since  admission at [facility] he has been unable to use his L side.  She reports that she was told this was due to sepsis because of L arm cellulitis. ( treated with zosyn, vancomycin, linezolid and discontinued from Lasix per wife to ED)  He is seen weekly by his PCP in home  OT/PT in home but without improvement  Wife reports he is usually verbal but has not been himself  Can no longer stand so she lifts him into wheelchair    Was seen in [facility] on 09-05 for IV hydration and was discharged home.  She then reports that he was in heart failure and should have been treated in hospital.  PCP adjusted diuretics    Ambulance call report not available for review      In ED /65      RR 18   T 97.5   97% sat 3 L nc  EMS states sat 92% so was placed on O2  ceftriaxone for presumed asp PNA  head CT no acute change but chronic bilat basal ganglia infarcts and R parietal  CXR +AICD no infiltrate seen, no PVC// + loops of dilated bowel  cardiology consulted for elevated trop  ECHO from 09- reviewed    while in ED had emesis in ED dropping sat to 85% so he was placed on 4 L      PAST MEDICAL HX  AICD (automatic cardioverter/defibrillator) present 2012,  Atrial fibrillation: chronic  CAD (coronary artery disease)  Cardiomyopathy : ischemia  Chronic systolic CHF (congestive heart failure)(LVEF 35-40% in 2019 with recovery in LVEF to 66% 09-)   CVA (cerebral vascular accident) 2012 w R sided weakness and dysphagia  HLD hyperlipidemia   HTN hypertension  Monomorphic ventricular tachycardia s/p CRT-D   RBBB   Thrombus of left atrial appendage.       PAST SURGICAL HX  AICD w generator change  Dr. Marte  AICD (automatic cardioverter/defibrillator) present   Saul filter in place no h/o dvt and as per wife  it was placed as a precaution after cva.     FAMILY HX  Family history of cerebrovascular accident (CVA): mother      SOCIAL HX    wife takes care of him 24/7  never smoker     (25 Sep 2021 19:52)      PAST MEDICAL & SURGICAL HISTORY:  CVA (cerebral vascular accident)    Hypertension    Hyperlipidemia    Cardiomyopathy    RBBB    Atrial fibrillation    Thrombus of left atrial appendage    AICD (automatic cardioverter/defibrillator) present    Saul filter in place  no h/o dvt and as per wife  it was placed as a precaution after cva.    AICD (automatic cardioverter/defibrillator) present        FAMILY HISTORY:  Family history of cerebrovascular accident (CVA)        Social History:    MEDICATIONS  (STANDING):  atorvastatin 40 milliGRAM(s) Oral at bedtime  carvedilol 3.125 milliGRAM(s) Oral every 12 hours  influenza   Vaccine 0.5 milliLiter(s) IntraMuscular once  mineral oil enema 133 milliLiter(s) Rectal once  pantoprazole  Injectable 40 milliGRAM(s) IV Push at bedtime  piperacillin/tazobactam IVPB.. 3.375 Gram(s) IV Intermittent every 8 hours  senna 2 Tablet(s) Oral at bedtime  sodium chloride 0.9% Bolus 250 milliLiter(s) IV Bolus once  sodium chloride 0.9%. 1000 milliLiter(s) (70 mL/Hr) IV Continuous <Continuous>  warfarin 3 milliGRAM(s) Oral once    MEDICATIONS  (PRN):  ondansetron Injectable 4 milliGRAM(s) IV Push every 8 hours PRN Nausea and/or Vomiting      Allergies    IV Contrast (Other)  latex (Rash)    Intolerances      Vital Signs Last 24 Hrs  T(C): 36.4 (27 Sep 2021 07:19), Max: 36.7 (26 Sep 2021 23:53)  T(F): 97.5 (27 Sep 2021 07:19), Max: 98 (26 Sep 2021 23:53)  HR: 80 (27 Sep 2021 07:19) (61 - 106)  BP: 92/59 (27 Sep 2021 07:19) (87/59 - 100/68)  BP(mean): --  RR: 20 (27 Sep 2021 07:19) (18 - 20)  SpO2: 95% (27 Sep 2021 07:19) (94% - 98%)  Daily     Daily   I&O's Detail    I&O's Summary      PHYSICAL EXAM:    GENERAL: NAD, well-groomed, well-developed  HEAD:  Atraumatic, Normocephalic  EYES: EOMI, PERRLA, conjunctiva and sclera clear  ENMT: No tonsillar erythema, exudates, or enlargement; Moist mucous membranes, Good dentition, No lesions  NECK: Supple, No JVD, Normal thyroid  NERVOUS SYSTEM:  Alert & Oriented X3, Good concentration; Motor Strength 5/5 B/L upper and lower extremities; DTRs 2+ intact and symmetric  CHEST/LUNG: Clear to percussion bilaterally; No rales, rhonchi, wheezing, or rubs  HEART: Regular rate and rhythm; No murmurs, rubs, or gallops  ABDOMEN: Soft, Nontender, Nondistended; Bowel sounds present  EXTREMITIES:  2+ Peripheral Pulses, No clubbing, cyanosis, or edema  LYMPH: No lymphadenopathy noted  SKIN: No rashes or lesions      LABS:                        11.5   14.02 )-----------( 340      ( 27 Sep 2021 07:38 )             36.9     09-27    131<L>  |  95<L>  |  62.4<H>  ----------------------------<  161<H>  3.8   |  21.0<L>  |  3.04<H>    Ca    8.6      27 Sep 2021 03:07  Phos  3.9     09-27  Mg     2.0     09-27    TPro  7.1  /  Alb  2.2<L>  /  TBili  0.5  /  DBili  0.2  /  AST  29  /  ALT  11  /  AlkPhos  60  09-27    PT/INR - ( 27 Sep 2021 07:38 )   PT: 25.4 sec;   INR: 2.28 ratio         PTT - ( 25 Sep 2021 15:10 )  PTT:34.5 sec    Magnesium, Serum: 2.0 mg/dL (09-27 @ 03:07)  Phosphorus Level, Serum: 3.9 mg/dL (09-27 @ 03:07)    ABG - ( 27 Sep 2021 09:29 )  pH, Arterial: 7.500 pH, Blood: x     /  pCO2: 30    /  pO2: 92    / HCO3: 23    / Base Excess: 0.2   /  SaO2: 99.0                  RADIOLOGY & ADDITIONAL TESTS:   Patient is a 66y old  Male who presents with a chief complaint of CVA timing unclear of newer R parietal CVA  emesis possible  constipation (27 Sep 2021 10:52)      HPI:  66 year old male w hx CVA w R sided weakness and dysphagia 2012, permanent AF with slow ventricular  response (prior hx LA thrombus on SHANELLE) on warfarin, RBBB, monomorphic VT (5/2014) s/p MDT dual chamber ICD with upgrade to CRT-D (1/2016)and generator change , CKD III, HTN came to ED by EMS after projectile emesis    Since stroke pt has used pureed w thickened agent vs soft diet  today while at lunch w wife, had projectile vomiting of food  has been moving his bowels daily; she did report that he had emesis once before when he was severely constipated  did not complaint to her of pain    Wife reports that since  admission at [facility] he has been unable to use his L side.  She reports that she was told this was due to sepsis because of L arm cellulitis. ( treated with zosyn, vancomycin, linezolid and discontinued from Lasix per wife to ED)  He is seen weekly by his PCP in home  OT/PT in home but without improvement  Wife reports he is usually verbal but has not been himself  Can no longer stand so she lifts him into wheelchair    Was seen in [facility] on 09-05 for IV hydration and was discharged home.  She then reports that he was in heart failure and should have been treated in hospital.  PCP adjusted diuretics    Ambulance call report not available for review      In ED /65      RR 18   T 97.5   97% sat 3 L nc  EMS states sat 92% so was placed on O2  ceftriaxone for presumed asp PNA  head CT no acute change but chronic bilat basal ganglia infarcts and R parietal  CXR +AICD no infiltrate seen, no PVC// + loops of dilated bowel  cardiology consulted for elevated trop  ECHO from 09- reviewed    while in ED had emesis in ED dropping sat to 85% so he was placed on 4 L    Interim noted   Pt previously seen by us at [facility]   Odd loop diuretics   Suspect episode of aspiration , now on Zosyn   Neuro follow up noted         PAST MEDICAL HX  AICD (automatic cardioverter/defibrillator) present 2012,  Atrial fibrillation: chronic  CAD (coronary artery disease)  Cardiomyopathy : ischemia  Chronic systolic CHF (congestive heart failure)(LVEF 35-40% in 2019 with recovery in LVEF to 66% 09-)   CVA (cerebral vascular accident) 2012 w R sided weakness and dysphagia  HLD hyperlipidemia   HTN hypertension  Monomorphic ventricular tachycardia s/p CRT-D   RBBB   Thrombus of left atrial appendage.       PAST SURGICAL HX  AICD w generator change  Dr. Marte  AICD (automatic cardioverter/defibrillator) present   Saul filter in place no h/o dvt and as per wife  it was placed as a precaution after cva.     FAMILY HX  Family history of cerebrovascular accident (CVA): mother      SOCIAL HX    wife takes care of him 24/7  never smoker     (25 Sep 2021 19:52)      PAST MEDICAL & SURGICAL HISTORY:  CVA (cerebral vascular accident)    Hypertension    Hyperlipidemia    Cardiomyopathy    RBBB    Atrial fibrillation    Thrombus of left atrial appendage    AICD (automatic cardioverter/defibrillator) present    [place] filter in place  no h/o dvt and as per wife  it was placed as a precaution after cva.    AICD (automatic cardioverter/defibrillator) present        FAMILY HISTORY:  Family history of cerebrovascular accident (CVA)        Social History:    MEDICATIONS  (STANDING):  atorvastatin 40 milliGRAM(s) Oral at bedtime  carvedilol 3.125 milliGRAM(s) Oral every 12 hours  influenza   Vaccine 0.5 milliLiter(s) IntraMuscular once  mineral oil enema 133 milliLiter(s) Rectal once  pantoprazole  Injectable 40 milliGRAM(s) IV Push at bedtime  piperacillin/tazobactam IVPB.. 3.375 Gram(s) IV Intermittent every 8 hours  senna 2 Tablet(s) Oral at bedtime  sodium chloride 0.9% Bolus 250 milliLiter(s) IV Bolus once  sodium chloride 0.9%. 1000 milliLiter(s) (70 mL/Hr) IV Continuous <Continuous>  warfarin 3 milliGRAM(s) Oral once    MEDICATIONS  (PRN):  ondansetron Injectable 4 milliGRAM(s) IV Push every 8 hours PRN Nausea and/or Vomiting      Allergies    IV Contrast (Other)  latex (Rash)    Intolerances      Vital Signs Last 24 Hrs  T(C): 36.4 (27 Sep 2021 07:19), Max: 36.7 (26 Sep 2021 23:53)  T(F): 97.5 (27 Sep 2021 07:19), Max: 98 (26 Sep 2021 23:53)  HR: 80 (27 Sep 2021 07:19) (61 - 106)  BP: 92/59 (27 Sep 2021 07:19) (87/59 - 100/68)  BP(mean): --  RR: 20 (27 Sep 2021 07:19) (18 - 20)  SpO2: 95% (27 Sep 2021 07:19) (94% - 98%)  Daily     Daily   I&O's Detail    I&O's Summary      PHYSICAL EXAM:    GENERAL: awake , + speech defect   HEAD:  Atraumatic, dry membranes   NECK: Supple, No JVD,   CHEST/LUNG: EAE , few basilar crackles   HEART: Regular rate and rhythm; no rub   ABDOMEN: Soft, Nontender,  EXTREMITIES: contracted , + muscular atrophy, + [place] cath for urine           LABS:                        11.5   14.02 )-----------( 340      ( 27 Sep 2021 07:38 )             36.9     09-27    131<L>  |  95<L>  |  62.4<H>  ----------------------------<  161<H>  3.8   |  21.0<L>  |  3.04<H>    Ca    8.6      27 Sep 2021 03:07  Phos  3.9     09-27  Mg     2.0     09-27    TPro  7.1  /  Alb  2.2<L>  /  TBili  0.5  /  DBili  0.2  /  AST  29  /  ALT  11  /  AlkPhos  60  09-27    PT/INR - ( 27 Sep 2021 07:38 )   PT: 25.4 sec;   INR: 2.28 ratio         PTT - ( 25 Sep 2021 15:10 )  PTT:34.5 sec    Magnesium, Serum: 2.0 mg/dL (09-27 @ 03:07)  Phosphorus Level, Serum: 3.9 mg/dL (09-27 @ 03:07)    ABG - ( 27 Sep 2021 09:29 )  pH, Arterial: 7.500 pH, Blood: x     /  pCO2: 30    /  pO2: 92    / HCO3: 23    / Base Excess: 0.2   /  SaO2: 99.0        < from: CT Head No Cont (09.25.21 @ 17:13) >     EXAM:  CT BRAIN                          PROCEDURE DATE:  09/25/2021          INTERPRETATION:  CT HEAD  HEAD CT    INDICATIONS: ams, vomiting, pmhx of CVA with right-side residual weakness, HTN, afib, CHF on digoxin, CKD, DVT s/p IVC filter, gout presenting today via EMS with concern for aspiration. Per EMS, the pt vomited while eating lunch. Had cough and was tachypenic on ems arrival satting 92%. Pt presenting satting 98% on 4L nc.    TECHNIQUE:    HEAD CT:  Serial axial images were obtainedfrom the skull base to the vertex without the use of intravenous contrast.    COMPARISON EXAMINATION: Head CT 5/29/2020    FINDINGS:    HEAD CT:    VENTRICLES AND SULCI: Ventricles and sulci are unremarkable for patient age.  INTRA-AXIAL: No intracranial mass, acute hemorrhage, or midline shift is present. Chronic bilateral basal ganglia and right parietal infarcts.  Extensive confluent hypodensities within the periventricular and subcortical  white matter, although nonspecific, likely reflect chronic microvascular disease.    EXTRA-AXIAL: No extra-axial fluid collection is present.  INTRACRANIAL HEMORRHAGE: None.    VISUALIZED SINUSES: No air-fluid levels are identified.  VISUALIZED MASTOIDS:  Clear.  CALVARIUM:  Intact.  MISCELLANEOUS:  Suboptimal positioning.    SOFT TISSUES: Unremarkable.  BONES: Unremarkable.      IMPRESSION:    HEAD CT: Mild volume loss, microvascular disease, no acute hemorrhage or midline shift.  Chronic bilateral basal ganglia and right parietal infarcts.    --- End of Report ---            IVAN CHAVEZ MD; Attending Radiologist  This document has been electronically signed. Sep 25 2021  5:22PM    < end of copied text >  < from: CT Abdomen and Pelvis No Cont (09.26.21 @ 15:44) >     EXAM:  CT ABDOMEN AND PELVIS                          PROCEDURE DATE:  09/26/2021          INTERPRETATION:  CLINICAL INFORMATION: Vomiting, distention    COMPARISON: CT abdomen pelvis 5/29/2020    CONTRAST/COMPLICATIONS:  IV Contrast: NONE  Oral Contrast: NONE  Complications: None reported at time of study completion    PROCEDURE:  CT of the Abdomen and Pelvis was performed.  Sagittal and coronal reformats were performed.    FINDINGS:  LOWER CHEST: Bibasilar consolidation and tree-in-bud opacities likely related to aspiration.    LIVER: Normal.  BILE DUCTS: Nondilated.  GALLBLADDER: Gallstones.  SPLEEN: Normal.  PANCREAS: Normal.  ADRENALS: Normal.  KIDNEYS/URETERS: No hydronephrosis or urinary tract calculi.    BLADDER: Underdistended limiting evaluation.  REPRODUCTIVE ORGANS: Nonenlarged.    BOWEL: Severe rectal fecal impaction with evidence of stercoral proctitis. Mild gaseous distention of the remaining colon with minimal feces and some liquid stool. No small bowel obstruction.  PERITONEUM: No free air or ascites.  VESSELS: Aortoiliac atherosclerosis without aneurysm. IVC filter.  RETROPERITONEUM/LYMPH NODES: No adenopathy.  ABDOMINAL WALL: Normal.  BONES: No acute bony abnormality.    IMPRESSION:  *  Severe rectal fecal impaction with evidence of stercoral proctitis.  *  Mild gaseous distention of the remaining colon. No obstruction.      --- End of Report ---            LILIAIFRAH KERR MD; Attending Radiologist  This document has been electronically signed. Sep 26 2021  4:48PM    < end of copied text >            RADIOLOGY & ADDITIONAL TESTS:

## 2021-09-27 NOTE — PROGRESS NOTE ADULT - ASSESSMENT
This is a 66 M PMH CVA w R sided weakness and dysphagia 2012, AF with slow ventricular response (prior hx LA thrombus on SHANELLE) on warfarin, RBBB, monomorphic VT (5/2014) s/p MDT dual chamber ICD with upgrade to CRT-D (1/2016)and generator change , CKD III, HTN and stercoral colitis who was BIBEMS after projectile emesis.  Patient at baseline nonverbal and bedbound.  Information gathered by wife.  Wife states that he has had multiple episodes of vomiting.  He was admitted to medicine for presumed aspiration PNA.  While admitted, patient with escalating oxygen requirements with saturations in the mid to high 80's.Pt is on high flow oxygen now . Multiple lengthy GOC discussions conducted by Dr. Carr with wife and PCP.  Wife has MOLST form at home which includes a DNR order, but wants all measures taken for her . ICU consulted for further management and high risk for deterioration.  Positive troponin noted cardiology consulted , iv lasix given on admission , day 2 cr is rising , held , iv fluid initiated , cr cont to increase , nephrology consulted , CT of abd pelvis bilateral lung opacities likely aspiratioj speech consulted started on puree diet 9/27 no liquid ,      1- Acute hypoxic respiratory failure likely due to aspiration pneumonia , bilateral   cpnt iv abx , will send procalcitonin , blood cx , worsening leukocytosis   cont oxygen support wean as tolerate   agressive chest pt ordered   keep head elevated all the times   on purre diet aspiration precautions     2- Bilateral basal ganglia and R parietal infracts   likely old with recent stroke ?   neurology consult appreciated   repeat CT of head neck ordered this am   cont warfarin , d/w cardio re change to different anticoagulant since pt had stroke on warfarin   start statin ,  pt is with declined functional status recently per wife   Pt ordered     3- ARF on CRF prerenal component   stop diuretics avoid nephrotoxic agents   iv fluid bolus ordered   cont iv hydration   nephrology consulted this ma   strict intake out put     4- Positive troponin   cardiology consult appreciated     5- Hypokalemia   replaced iv K x3 doses given   resolved     6- Diastolic CHF ,cronic   s/p iv lasix , no sign of fluid overload   hold diuretics   cr is rising     7- Atrial fib cronic with RBBB and  CAD   cont coreg with holding parameters \check dig level   cont warfarin and monitor INR     8- Nausea vomiting with constipation fecal impaction on CT scan   will order enema ( wife refused yesterday )       condition quarded , overall prognosis is poor   will get palliative care involved   wife is unrealistic and trying to manage his treatment interfere with doctors orders at times

## 2021-09-27 NOTE — OCCUPATIONAL THERAPY INITIAL EVALUATION ADULT - NS ASR FOLLOW COMMAND OT EVAL
Spouse irritated by this writer's questions that assess pt's cognition despite rationale for routine questions./50% of the time/able to follow single-step instructions

## 2021-09-27 NOTE — PROGRESS NOTE ADULT - ASSESSMENT
66 year old man with prior stroke with right hemiparesis now with left sided weakness which began at least a few months ago according to his wife.     Stroke.   It appears he has had a new stroke affecting the left side.   Unclear exactly when onset was.   He has atrial fibrillation and has been on anticoagulation.    A fib.   Has been on Coumadin and appears to have had a new infarct.   Would consider changing to non-vitamin K dependant oral anticoagulant.   Will defer to cardiology.     Case discussed with wife at bedside and with Dr Perrin.

## 2021-09-27 NOTE — OCCUPATIONAL THERAPY INITIAL EVALUATION ADULT - ADDITIONAL COMMENTS
Pt unable to provide information.  He is very limited with his verbal output and hypophonic when he does offer orientation answers.  Pt nods/shakes head in response to yes/no questions.  Pt is inconsistent with responses.  Spouse reports they have a ramp to enter the home and pt stays on main level.  He has a wheelchair, hospital bed.  Self care tasks are performed while pt supine in bed.  Pt does not leave the home.  Doctors and therapists come to the home.

## 2021-09-27 NOTE — PROGRESS NOTE ADULT - SUBJECTIVE AND OBJECTIVE BOX
RUSH MARTINSHEREEN  [id number]        Chief Complaint: Failure to thrive/Possible aspiration/History of CVA/Elevated troponin    Interval History: The patient remains on high flow nasal cannula, denies angina.     Tele: artifact, HR 70-80s BPM      Current meds:   albuterol/ipratropium for Nebulization 3 milliLiter(s) Nebulizer every 6 hours  dextrose 5% + sodium chloride 0.45%. 1000 milliLiter(s) IV Continuous <Continuous>  digoxin  Injectable 125 MICROGram(s) IV Push every other day  enoxaparin Injectable 60 milliGRAM(s) SubCutaneous daily  influenza   Vaccine 0.5 milliLiter(s) IntraMuscular once  ondansetron Injectable 4 milliGRAM(s) IV Push every 8 hours PRN  pantoprazole  Injectable 40 milliGRAM(s) IV Push at bedtime  piperacillin/tazobactam IVPB.. 3.375 Gram(s) IV Intermittent every 8 hours      Objective:     Vital Signs:   T(C): 36.4 (09-27-21 @ 07:19), Max: 36.7 (09-26-21 @ 23:53)  HR: 80 (09-27-21 @ 07:19) (61 - 106)  BP: 92/59 (09-27-21 @ 07:19) (87/59 - 100/68)  RR: 20 (09-27-21 @ 07:19) (18 - 20)  SpO2: 95% (09-27-21 @ 07:19) (94% - 98%)  Wt(kg): --      PHYSICAL EXAM:  General: elderly man, minimally verbal, chronically ill appearing, on high flow nasal cannula  HEENT: sclera anicteric  Neck: supple  CVS: JVP ~ 9 cm H20, RRR, s1, s2, no murmurs  Chest: decreased breath sounds  Extremities: no lower extremity edema b/l  Neuro: awake, alert  Psych: normal affect        Labs:   27 Sep 2021 03:07    131    |  95     |  62.4   ----------------------------<  161    3.8     |  21.0   |  3.04     Ca    8.6        27 Sep 2021 03:07  Phos  3.9       27 Sep 2021 03:07  Mg     2.0       27 Sep 2021 03:07    TPro  7.1    /  Alb  2.2    /  TBili  0.5    /  DBili  0.2    /  AST  29     /  ALT  11     /  AlkPhos  60     27 Sep 2021 03:07                          11.5   14.02 )-----------( 340      ( 27 Sep 2021 07:38 )             36.9     PT/INR - ( 27 Sep 2021 07:38 )   PT: 25.4 sec;   INR: 2.28 ratio         PTT - ( 25 Sep 2021 15:10 )  PTT:34.5 sec  CARDIAC MARKERS ( 25 Sep 2021 15:10 )  x     / 0.08 ng/mL / x     / x     / x              Outpatient TTE (9/10/21):  LVEF 66%, abnormal septal motion due to RV pacemaker  Mild aortic valve stenosis     ECG (9/25/21): artifact limits evaluation    CT Head (9/25/21):  HEAD CT: Mild volume loss, microvascular disease, no acute hemorrhage or midline shift.  Chronic bilateral basal ganglia and right parietal infarcts.    CXR (9/25/21):  IMPRESSION: Low lung volumes with clear lungs.    Home Cardiac Meds:  Warfarin  Carvedilol 6.25 mg daily  Digoxin 0.125 mg QOD  Lasix 40 mg/20 mg alternating daily

## 2021-09-27 NOTE — OCCUPATIONAL THERAPY INITIAL EVALUATION ADULT - SHORT TERM MEMORY, REHAB EVAL
unable to assess.  Spouse's interrupting and providing answers despite being given rationale for this writer's questions.

## 2021-09-27 NOTE — OCCUPATIONAL THERAPY INITIAL EVALUATION ADULT - REFERRAL TO ANOTHER SERVICE NEEDED, OT EVAL
spouse very frustrated with hospital environment.  Pt may benefit from respite services that may be available.  Spouse irritated by all the "hospital rules."/social work

## 2021-09-27 NOTE — PHYSICAL THERAPY INITIAL EVALUATION ADULT - ADDITIONAL COMMENTS
Pt lives in a house with his spouse who takes care of him 24/7. They have a ramp to enter but never leave the house. Doctor's come to him. Pt had been able to stand with a walker and get into w/c but since new LUE weakness his spouse has been transferring him into the w/c by sliding him. He was getting home PT after discharge from [facility].

## 2021-09-28 LAB
ANION GAP SERPL CALC-SCNC: 15 MMOL/L — SIGNIFICANT CHANGE UP (ref 5–17)
BUN SERPL-MCNC: 61.3 MG/DL — HIGH (ref 8–20)
CALCIUM SERPL-MCNC: 8.4 MG/DL — LOW (ref 8.6–10.2)
CHLORIDE SERPL-SCNC: 98 MMOL/L — SIGNIFICANT CHANGE UP (ref 98–107)
CO2 SERPL-SCNC: 20 MMOL/L — LOW (ref 22–29)
CREAT SERPL-MCNC: 2.62 MG/DL — HIGH (ref 0.5–1.3)
DIGOXIN SERPL-MCNC: 0.7 NG/ML — LOW (ref 0.8–2)
GLUCOSE BLDC GLUCOMTR-MCNC: 110 MG/DL — HIGH (ref 70–99)
GLUCOSE BLDC GLUCOMTR-MCNC: 125 MG/DL — HIGH (ref 70–99)
GLUCOSE SERPL-MCNC: 125 MG/DL — HIGH (ref 70–99)
HCT VFR BLD CALC: 35.4 % — LOW (ref 39–50)
HGB BLD-MCNC: 11.1 G/DL — LOW (ref 13–17)
MCHC RBC-ENTMCNC: 23.6 PG — LOW (ref 27–34)
MCHC RBC-ENTMCNC: 31.4 GM/DL — LOW (ref 32–36)
MCV RBC AUTO: 75.3 FL — LOW (ref 80–100)
PLATELET # BLD AUTO: 299 K/UL — SIGNIFICANT CHANGE UP (ref 150–400)
POTASSIUM SERPL-MCNC: 3.5 MMOL/L — SIGNIFICANT CHANGE UP (ref 3.5–5.3)
POTASSIUM SERPL-SCNC: 3.5 MMOL/L — SIGNIFICANT CHANGE UP (ref 3.5–5.3)
RBC # BLD: 4.7 M/UL — SIGNIFICANT CHANGE UP (ref 4.2–5.8)
RBC # FLD: 15.4 % — HIGH (ref 10.3–14.5)
SODIUM SERPL-SCNC: 133 MMOL/L — LOW (ref 135–145)
WBC # BLD: 10.41 K/UL — SIGNIFICANT CHANGE UP (ref 3.8–10.5)
WBC # FLD AUTO: 10.41 K/UL — SIGNIFICANT CHANGE UP (ref 3.8–10.5)

## 2021-09-28 PROCEDURE — 99232 SBSQ HOSP IP/OBS MODERATE 35: CPT

## 2021-09-28 PROCEDURE — 71250 CT THORAX DX C-: CPT | Mod: 26

## 2021-09-28 PROCEDURE — 99223 1ST HOSP IP/OBS HIGH 75: CPT

## 2021-09-28 PROCEDURE — 72125 CT NECK SPINE W/O DYE: CPT | Mod: 26

## 2021-09-28 PROCEDURE — 70450 CT HEAD/BRAIN W/O DYE: CPT | Mod: 26

## 2021-09-28 RX ORDER — POTASSIUM CHLORIDE 20 MEQ
20 PACKET (EA) ORAL ONCE
Refills: 0 | Status: COMPLETED | OUTPATIENT
Start: 2021-09-28 | End: 2021-09-28

## 2021-09-28 RX ORDER — WARFARIN SODIUM 2.5 MG/1
3 TABLET ORAL ONCE
Refills: 0 | Status: COMPLETED | OUTPATIENT
Start: 2021-09-28 | End: 2021-09-28

## 2021-09-28 RX ADMIN — LEVALBUTEROL 0.63 MILLIGRAM(S): 1.25 SOLUTION, CONCENTRATE RESPIRATORY (INHALATION) at 16:39

## 2021-09-28 RX ADMIN — PIPERACILLIN AND TAZOBACTAM 25 GRAM(S): 4; .5 INJECTION, POWDER, LYOPHILIZED, FOR SOLUTION INTRAVENOUS at 08:54

## 2021-09-28 RX ADMIN — WARFARIN SODIUM 3 MILLIGRAM(S): 2.5 TABLET ORAL at 22:28

## 2021-09-28 RX ADMIN — SODIUM CHLORIDE 70 MILLILITER(S): 9 INJECTION INTRAMUSCULAR; INTRAVENOUS; SUBCUTANEOUS at 14:29

## 2021-09-28 RX ADMIN — ATORVASTATIN CALCIUM 40 MILLIGRAM(S): 80 TABLET, FILM COATED ORAL at 22:34

## 2021-09-28 RX ADMIN — LEVALBUTEROL 0.63 MILLIGRAM(S): 1.25 SOLUTION, CONCENTRATE RESPIRATORY (INHALATION) at 09:28

## 2021-09-28 RX ADMIN — PANTOPRAZOLE SODIUM 40 MILLIGRAM(S): 20 TABLET, DELAYED RELEASE ORAL at 22:34

## 2021-09-28 RX ADMIN — CARVEDILOL PHOSPHATE 3.12 MILLIGRAM(S): 80 CAPSULE, EXTENDED RELEASE ORAL at 05:05

## 2021-09-28 RX ADMIN — PIPERACILLIN AND TAZOBACTAM 25 GRAM(S): 4; .5 INJECTION, POWDER, LYOPHILIZED, FOR SOLUTION INTRAVENOUS at 17:23

## 2021-09-28 RX ADMIN — PIPERACILLIN AND TAZOBACTAM 25 GRAM(S): 4; .5 INJECTION, POWDER, LYOPHILIZED, FOR SOLUTION INTRAVENOUS at 00:30

## 2021-09-28 RX ADMIN — SENNA PLUS 2 TABLET(S): 8.6 TABLET ORAL at 22:34

## 2021-09-28 RX ADMIN — Medication 20 MILLIEQUIVALENT(S): at 12:34

## 2021-09-28 RX ADMIN — LEVALBUTEROL 0.63 MILLIGRAM(S): 1.25 SOLUTION, CONCENTRATE RESPIRATORY (INHALATION) at 03:59

## 2021-09-28 NOTE — PROGRESS NOTE ADULT - SUBJECTIVE AND OBJECTIVE BOX
RUSH MARTINSHEREEN  [id number]        Chief Complaint: Failure to thrive/Possible aspiration/History of CVA/Elevated troponin    Interval History: The patient remains on high flow nasal cannula, appears more alert today. Denies angina.     Tele: artifact, HR 80s BPM    Current meds:   atorvastatin 40 milliGRAM(s) Oral at bedtime  carvedilol 3.125 milliGRAM(s) Oral every 12 hours  influenza   Vaccine 0.5 milliLiter(s) IntraMuscular once  levalbuterol Inhalation 0.63 milliGRAM(s) Inhalation every 6 hours  ondansetron Injectable 4 milliGRAM(s) IV Push every 8 hours PRN  pantoprazole  Injectable 40 milliGRAM(s) IV Push at bedtime  piperacillin/tazobactam IVPB.. 3.375 Gram(s) IV Intermittent every 8 hours  senna 2 Tablet(s) Oral at bedtime  sodium chloride 0.9%. 1000 milliLiter(s) IV Continuous <Continuous>  warfarin 3 milliGRAM(s) Oral once      Objective:     Vital Signs:   T(C): 36.9 (09-28-21 @ 12:09), Max: 36.9 (09-28-21 @ 12:09)  HR: 71 (09-28-21 @ 12:09) (71 - 89)  BP: 102/65 (09-28-21 @ 12:09) (92/55 - 112/73)  RR: 20 (09-28-21 @ 12:09) (20 - 20)  SpO2: 96% (09-28-21 @ 12:09) (94% - 97%)  Wt(kg): --    PHYSICAL EXAM:  General: elderly man, minimally verbal, chronically ill appearing, on high flow nasal cannula  HEENT: sclera anicteric  Neck: supple  CVS: JVP ~ 9 cm H20, RRR, s1, s2, no murmurs  Chest: decreased breath sounds  Extremities: no lower extremity edema b/l  Neuro: awake, alert  Psych: normal affect      Labs:   28 Sep 2021 03:33    133    |  98     |  61.3   ----------------------------<  125    3.5     |  20.0   |  2.62     Ca    8.4        28 Sep 2021 03:33  Phos  3.9       27 Sep 2021 03:07  Mg     2.0       27 Sep 2021 03:07    TPro  7.1    /  Alb  2.2    /  TBili  0.5    /  DBili  0.2    /  AST  29     /  ALT  11     /  AlkPhos  60     27 Sep 2021 03:07                          11.1   10.41 )-----------( 299      ( 28 Sep 2021 03:33 )             35.4     PT/INR - ( 27 Sep 2021 07:38 )   PT: 25.4 sec;   INR: 2.28 ratio             Outpatient TTE (9/10/21):  LVEF 66%, abnormal septal motion due to RV pacemaker  Mild aortic valve stenosis     ECG (9/25/21): artifact limits evaluation    CT Head (9/25/21):  HEAD CT: Mild volume loss, microvascular disease, no acute hemorrhage or midline shift.  Chronic bilateral basal ganglia and right parietal infarcts.    CXR (9/25/21):  IMPRESSION: Low lung volumes with clear lungs.    Home Cardiac Meds:  Warfarin  Carvedilol 6.25 mg daily  Digoxin 0.125 mg QOD  Lasix 40 mg/20 mg alternating daily

## 2021-09-28 NOTE — PROGRESS NOTE ADULT - ASSESSMENT
This is a 66 M PMH CVA w R sided weakness and dysphagia 2012, AF with slow ventricular response (prior hx LA thrombus on SHANELLE) on warfarin, RBBB, monomorphic VT (5/2014) s/p MDT dual chamber ICD with upgrade to CRT-D (1/2016)and generator change , CKD III, HTN and stercoral colitis who was BIBEMS after projectile emesis.  Patient at baseline nonverbal and bedbound.  Information gathered by wife.  Wife states that he has had multiple episodes of vomiting.  He was admitted to medicine for presumed aspiration PNA.  While admitted, patient with escalating oxygen requirements with saturations in the mid to high 80's.Pt is on high flow oxygen now . Multiple lengthy GOC discussions conducted by Dr. Carr with wife and PCP.  Wife has MOLST form at home which includes a DNR order, but wants all measures taken for her . ICU consulted for further management and high risk for deterioration.  Positive troponin noted cardiology consulted , iv lasix given on admission , day 2 cr is rising , held , iv fluid initiated , cr cont to increase , nephrology consulted , CT of abd pelvis bilateral lung opacities likely aspiration  speech consulted started on puree diet 9/27 no liquid ,      1- Acute hypoxic respiratory failure likely due to aspiration pneumonia , bilateral   cont iv abx , will send procalcitonin , blood cx , worsening leukocytosis   cont oxygen support wean as tolerate   agressive chest pt ordered   Ct of chest   add mucinex   pulmonary consulted   keep head elevated all the times   on purre diet aspiration precautions     2- Bilateral basal ganglia and R parietal infracts   likely old with recent remote  stroke ?   neurology consult appreciated   repeat CT of head p[erformed     cont warfarin , d/w cardio re change to different anticoagulant since pt had stroke on warfarin   start statin ,  pt is with declined functional status recently per wife   Pt ordered     3- ARF on CRF prerenal component   stop diuretics avoid nephrotoxic agents   cont iv fluid  trend cr   nephrology on board   strict intake out put     4- Positive troponin   cardiology consult appreciated   not significant no further work up     5- Hypokalemia   replaced , resolved     6- Diastolic CHF ,cronic   s/p iv lasix , no sign of fluid overload now dry   hold diuretics due to ARF on cronic    7- Atrial fib cronic with RBBB and  CAD   cont coreg with holding parameters \check dig level   cont warfarin and monitor INR     8- Nausea vomiting with constipation fecal impaction on CT scan   will order enema ( wife refused yesterday )       condition quarded  , overall prognosis is poor   had long discussion with PCP Dr Bean last evening per wife request and permission

## 2021-09-28 NOTE — PROGRESS NOTE ADULT - SUBJECTIVE AND OBJECTIVE BOX
[facility]                                        Neurology at [place]                                 Noah Ramírez, & [place]                                  [address]                                        [place], [place], [address]                                             [phone]        CC: stroke    HPI:   67 yo man with history of hemorrhagic stroke in 2012 due to uncontrolled HTN as per wife with residual R hemiparesis who was admitted due to episode of vomiting yesterday.  The patient had been at [facility] in July with left sided weakness and his wife reports that she was told it was secondary to cellulitis.   He has been to emergency rooms a few times since then.     His wife also reports that since the pacemaker battery change in April of 2020, he has been in the hospital almost every 3 months.     Interim history:  Remains in ER awaiting bed.     ROS:   Unobtainable due to patient's condition.     MEDICATIONS  (STANDING):  atorvastatin 40 milliGRAM(s) Oral at bedtime  carvedilol 3.125 milliGRAM(s) Oral every 12 hours  influenza   Vaccine 0.5 milliLiter(s) IntraMuscular once  levalbuterol Inhalation 0.63 milliGRAM(s) Inhalation every 6 hours  pantoprazole  Injectable 40 milliGRAM(s) IV Push at bedtime  piperacillin/tazobactam IVPB.. 3.375 Gram(s) IV Intermittent every 8 hours  senna 2 Tablet(s) Oral at bedtime  sodium chloride 0.9%. 1000 milliLiter(s) (70 mL/Hr) IV Continuous <Continuous>      Vital Signs Last 24 Hrs  T(C): 36.4 (28 Sep 2021 07:37), Max: 36.8 (27 Sep 2021 23:00)  T(F): 97.6 (28 Sep 2021 07:37), Max: 98.2 (27 Sep 2021 23:00)  HR: 72 (28 Sep 2021 07:37) (72 - 89)  BP: 97/62 (28 Sep 2021 07:37) (92/55 - 112/73)  RR: 20 (28 Sep 2021 07:37) (20 - 20)  SpO2: 97% (28 Sep 2021 07:37) (94% - 97%)    Detailed Neurologic Exam:    Mental status: The patient is awake but non verbal. Follows simple commands.     Cranial nerves: Pupils equal and react symmetrically to light. There is no visual field deficit to threat. Extraocular motion is full with no nystagmus. Facial sensation is intact. Facial musculature is asymmetric with a depression of the right nasolabial fold. Palate elevates symmetrically. Tongue is midline.    Motor: There is increased tone on the right.  There is spastic paresis on the right.  The left is 2-3/5 in the hand and 1-2/5 otherwise.     Sensation: Decreased on the right compared to the  left.    Reflexes: Brisker on the right and plantar responses are extensor.    Cerebellar: Cannot be tested.     Labs:     09-28    133<L>  |  98  |  61.3<H>  ----------------------------<  125<H>  3.5   |  20.0<L>  |  2.62<H>    Ca    8.4<L>      28 Sep 2021 03:33  Phos  3.9     09-27  Mg     2.0     09-27    TPro  7.1  /  Alb  2.2<L>  /  TBili  0.5  /  DBili  0.2  /  AST  29  /  ALT  11  /  AlkPhos  60  09-27                            11.1   10.41 )-----------( 299      ( 28 Sep 2021 03:33 )             35.4       Rad:   CT head: There are chronic ischemic changes and bilateral basal ganglia infarcts as well as right parietal infarct.

## 2021-09-28 NOTE — PROGRESS NOTE ADULT - SUBJECTIVE AND OBJECTIVE BOX
Patient is a 66y old  Male who presents with a chief complaint of CVA timing unclear of newer R parietal CVA  emesis poss constipation/fecal impaction (28 Sep 2021 13:21)      Patient seen and examined in am , awake , answer questions short   wife is at the bedside this am , updated her about findings current plan testing treatment     all questions answered ,  Pt is with more cough today remains on high flow   d/w nurse rec to do agressive chest PT     pulmonary consulted   CT of chest ordered     d/w Pa and nurse     ALLERGIES:  IV Contrast (Other)  latex (Rash)    MEDICATIONS  (STANDING):  atorvastatin 40 milliGRAM(s) Oral at bedtime  carvedilol 3.125 milliGRAM(s) Oral every 12 hours  influenza   Vaccine 0.5 milliLiter(s) IntraMuscular once  levalbuterol Inhalation 0.63 milliGRAM(s) Inhalation every 6 hours  pantoprazole  Injectable 40 milliGRAM(s) IV Push at bedtime  piperacillin/tazobactam IVPB.. 3.375 Gram(s) IV Intermittent every 8 hours  senna 2 Tablet(s) Oral at bedtime  sodium chloride 0.9%. 1000 milliLiter(s) (70 mL/Hr) IV Continuous <Continuous>  warfarin 3 milliGRAM(s) Oral once    MEDICATIONS  (PRN):  ondansetron Injectable 4 milliGRAM(s) IV Push every 8 hours PRN Nausea and/or Vomiting    Vital Signs Last 24 Hrs  T(F): 98.4 (28 Sep 2021 15:19), Max: 98.4 (28 Sep 2021 12:09)  HR: 73 (28 Sep 2021 15:19) (71 - 89)  BP: 102/66 (28 Sep 2021 15:19) (93/58 - 112/73)  RR: 19 (28 Sep 2021 15:19) (19 - 20)  SpO2: 93% (28 Sep 2021 15:19) (93% - 97%)  I&O's Summary    28 Sep 2021 07:01  -  28 Sep 2021 16:35  --------------------------------------------------------  IN: 590 mL / OUT: 0 mL / NET: 590 mL        PHYSICAL EXAM:  General: awake looks ill, no resp distress , + intermittent cough noted   ENT: mouth moist   Neck: supple , no JVD   Lungs: CTA bilateral   Cardio: RRR, S1/S2, No murmur  Abdomen: Soft, Nontender, Nondistended; Bowel sounds present  Extremities: No calf tenderness, No pitting edema  Neuro generalized weakness , right and left sided weakness  left hemiplegia   speech very limited aphasia         LABS:                        11.1   10.41 )-----------( 299      ( 28 Sep 2021 03:33 )             35.4     09-28    133  |  98  |  61.3  ----------------------------<  125  3.5   |  20.0  |  2.62    Ca    8.4      28 Sep 2021 03:33  Phos  3.9     09-27  Mg     2.0     09-27    TPro  7.1  /  Alb  2.2  /  TBili  0.5  /  DBili  0.2  /  AST  29  /  ALT  11  /  AlkPhos  60  09-27        eGFR if Non African American: 24 mL/min/1.73M2 (09-28-21 @ 03:33)  eGFR if African American: 28 mL/min/1.73M2 (09-28-21 @ 03:33)    PT/INR - ( 27 Sep 2021 07:38 )   PT: 25.4 sec;   INR: 2.28 ratio             Procalcitonin, Serum: 24.13 ng/mL (09-27-21 @ 12:12)        09-26 Chol 186 mg/dL LDL -- HDL 40 mg/dL Trig 82 mg/dL  TSH 3.47   TSH with FT4 reflex --  Total T3 --      ABG - ( 27 Sep 2021 09:29 )  pH, Arterial: 7.500 pH, Blood: x     /  pCO2: 30    /  pO2: 92    / HCO3: 23    / Base Excess: 0.2   /  SaO2: 99.0                    POCT Blood Glucose.: 125 mg/dL (28 Sep 2021 16:29)  POCT Blood Glucose.: 110 mg/dL (28 Sep 2021 11:55)          COVID-19 PCR: NotDetec (09-25-21 @ 22:14)    RADIOLOGY & ADDITIONAL TESTS:

## 2021-09-28 NOTE — PROGRESS NOTE ADULT - ASSESSMENT
PITA on CKD   Baseline creat 1.6 Now 3> 2.62  Recent CT noted , No Hydro   Episode of hypotension , ? ATN   Continue to hold Lasix   PITA pre-dated Zosyn   Continue IVF   Follow trend in labs   Na better    Will follow   Discussed with the wife at bedside

## 2021-09-28 NOTE — PROGRESS NOTE ADULT - ASSESSMENT
Assessment:  Je Smith is a 66 year old man with past medical history of Permanent atrial fibrillation (prior history of LUC thrombus on SHANELLE) on warfarin, CVA with residual right-sided weakness (2012), Hypertension, Monomorphic ventricular tachycardia s/p CRT-D and Ischemic cardiomyopathy (LVEF 35-40% in 2019 with recovery in LVEF to 66%) who was brought in to hospital due to episode of projectile vomiting and with progressive fatigue and left-sided weakness, overall failure to thrive.    Cardiology consulted due to elevated troponin, which is 0.08 in the setting of CKD. Patient denies angina or dyspnea and does not appear to have an acute coronary syndrome at this time. BNP markedly elevated at 4000s which is less than prior ER visit this month. There is concern for possible aspiration.    Recommendations:  [] Elevated troponin: Minimally elevated, likely demand ischemia from CKD and possible aspiration. Awaiting echo. Wife states that patient would not want any invasive procedures, and she is his HCP.   [] Nausea/projective vomiting: Workup per primary team, concern for aspiration, follow up Speech/Swallow. Appears intravascularly depleted now, Cr now improving after IV fluids.  [] Ischemic cardiomyopathy: Recent echo with preserved LVEF, awaiting repeat echo. Now restarted on low dose Coreg  [] Permanent atrial fibrillation: Has been restarted on coumadin and would monitor INR.  [] Left-sided weakness: CT head reporting chronic b/l basal ganglia and right parietal infarcts. Neurology concerned for new stroke and onset appears unclear per Neuro  [] Wean off high flow nasal cannula as tolerated    Thank you for the consult. We will continue to follow along.     Discussed with wife at bedside     Kale Duvall MD  Cardiology

## 2021-09-28 NOTE — CONSULT NOTE ADULT - SUBJECTIVE AND OBJECTIVE BOX
66yM was admitted on 09-25 with progressive weakness thought related to a new right CVA on top of a left CVA that he sustained in 2012.     Imaging performed:  CT ABD/PEL 9/26 - *  Severe rectal fecal impaction with evidence of stercoral proctitis. *  Mild gaseous distention of the remaining colon. No obstruction.    HEAD CT 9/26 - Mild volume loss, microvascular disease, no acute hemorrhage or midline shift. Chronic bilateral basal ganglia and right parietal infarcts.    Patient has wife at bedside who provides all info.  Patient able to nod head for yes/no.  Noted worsening of the left side and has pain with PROM.     REVIEW OF SYSTEMS - unable to fully provide    VITALS  T(C): 36.4 (09-28-21 @ 07:37), Max: 36.8 (09-27-21 @ 23:00)  HR: 72 (09-28-21 @ 07:37) (72 - 89)  BP: 97/62 (09-28-21 @ 07:37) (92/55 - 112/73)  RR: 20 (09-28-21 @ 07:37) (20 - 20)  SpO2: 97% (09-28-21 @ 07:37) (94% - 97%)  Wt(kg): --    PAST MEDICAL & SURGICAL HISTORY  CVA (cerebral vascular accident)    Hypertension    Hyperlipidemia    Cardiomyopathy    RBBB    Atrial fibrillation    Thrombus of left atrial appendage    AICD (automatic cardioverter/defibrillator) present    No significant past surgical history    Saul filter in place    AICD (automatic cardioverter/defibrillator) present      FUNCTIONAL HISTORY  Lives with spouse  Wife assists patient (level of assist based on info may vary according to task) with all ADLs and mobility  Reports he can feed himself     CURRENT FUNCTIONAL STATUS  9/28 SLP  Speech Language Pathology Recommendations: 1. Puree, HONEY thick fluids, via TSPN ONLY 2. Plastic utensils only, as per spouse request 3. Strict 100% supervision/assist w/all meals4. Meds crushed in puree, as able 5. Upright for all PO/>30 minutes following6. Small bites/slow rate7. Oral care8. Rx nutrition consult for calorie count9. SLP to follow to check PO tolerance, a sschedule permits     9/27 PT  Bed Mobility: Sit to Supine:     · Level of [place]	maximum assist (25% patients effort)  · Physical Assist/Nonphysical Assist	1 person assist    Bed Mobility: Supine to Sit:     · Level of [place]	moderate assist (50% patients effort)  · Physical Assist/Nonphysical Assist	1 person assist    Bed Mobility Analysis:     · Impairments Contributing to Impaired Bed Mobility	postural control improves with time at EOB, able to sit unsupported x 2 minutes before fatiguing.    Transfer: Bed to Chair:     Transfer Skill: Bed to Chair   · Level of [place]	unable to perform; fatigue    Transfer: Sit to Stand:     · Level of [place]	unable to perform; poor LE strength and muscle contractures.    Gait Skills:     · Level of [place]	does not perform at baseline.    Balance Skills Assessment:     · Sitting Balance: Static	fair balance  · Sitting Balance: Dynamic	fair balance  · Identified Impairments Contributing to Balance Disturbance	impaired postural control; impaired motor control; decreased ROM    9/27  OT  Bathing Training:     · Level of [place]	dependent (less than 25% patients effort)  · Physical Assist/Nonphysical Assist	2 person assist    Upper Body Dressing Training:     · Level of [place]	dependent (less than 25% patients effort)    Lower Body Dressing Training:     · Level of [place]	dependent (less than 25% patients effort)    Toilet Hygiene Training:     · Level of [place]	dependent (less than 25% patients effort); +parry    Grooming Training:     · Level of [place]	dependent (less than 25% patients effort)    Eating/Self-Feeding Training:     · Level of [place]	dependent (less than 25% patients effort); pt's spouse feeding pt puree food.      SOCIAL HISTORY - as per documentation/history  Smoking - None  EtOH - None  Drugs - None    FAMILY HISTORY   Family history of cerebrovascular accident (CVA)        RECENT LABS - Reviewed  CBC Full  -  ( 28 Sep 2021 03:33 )  WBC Count : 10.41 K/uL  RBC Count : 4.70 M/uL  Hemoglobin : 11.1 g/dL  Hematocrit : 35.4 %  Platelet Count - Automated : 299 K/uL  Mean Cell Volume : 75.3 fl  Mean Cell Hemoglobin : 23.6 pg  Mean Cell Hemoglobin Concentration : 31.4 gm/dL  Auto Neutrophil # : x  Auto Lymphocyte # : x  Auto Monocyte # : x  Auto Eosinophil # : x  Auto Basophil # : x  Auto Neutrophil % : x  Auto Lymphocyte % : x  Auto Monocyte % : x  Auto Eosinophil % : x  Auto Basophil % : x    09-28    133<L>  |  98  |  61.3<H>  ----------------------------<  125<H>  3.5   |  20.0<L>  |  2.62<H>    Ca    8.4<L>      28 Sep 2021 03:33  Phos  3.9     09-27  Mg     2.0     09-27    TPro  7.1  /  Alb  2.2<L>  /  TBili  0.5  /  DBili  0.2  /  AST  29  /  ALT  11  /  AlkPhos  60  09-27        ALLERGIES  IV Contrast (Other)  latex (Rash)      MEDICATIONS   atorvastatin 40 milliGRAM(s) Oral at bedtime  carvedilol 3.125 milliGRAM(s) Oral every 12 hours  influenza   Vaccine 0.5 milliLiter(s) IntraMuscular once  levalbuterol Inhalation 0.63 milliGRAM(s) Inhalation every 6 hours  ondansetron Injectable 4 milliGRAM(s) IV Push every 8 hours PRN  pantoprazole  Injectable 40 milliGRAM(s) IV Push at bedtime  piperacillin/tazobactam IVPB.. 3.375 Gram(s) IV Intermittent every 8 hours  potassium chloride    Tablet ER 20 milliEquivalent(s) Oral once  senna 2 Tablet(s) Oral at bedtime  sodium chloride 0.9%. 1000 milliLiter(s) IV Continuous <Continuous>  warfarin 3 milliGRAM(s) Oral once      ----------------------------------------------------------------------------------------  PHYSICAL EXAM  Constitutional - NAD, Comfortable  HEENT - NCAT, EOMI  Neck - Supple, No limited ROM  Chest - Increased work of breathing, +Wet cough, +HFNC  Cardiovascular - S1S2   Abdomen - Soft   Extremities - BUE erythema/dependent/pain on PROM, left>right  Neurologic Exam -                    Cognitive - AAO to self, place, situation with yes/no     Communication - Expressive deficits, Dysarthria, Wet voice     Motor -                      LEFT    UE - ShAB 1/5, EF 1/5, EE 1/5,  1/5                    RIGHT UE - ShAB 0/5, EF 1/5, EE 1/5,  1/5                    LEFT    LE - HF 1/5, KE 1/5, DF 1/5                     RIGHT LE - HF 1/5, KE 1/5, DF 2/5      Sensory - Difficult to assess - appears itnact     Coordination - FTN impaired  Psychiatric - Calm   ----------------------------------------------------------------------------------------  ASSESSMENT/PLAN  66yMale with functional deficits after new weakness  Old + New CVAs - Lipitor  AFIB - Coumadin  PNA - Zosyn, Xopenex, HFNC  HTN - Coreg  Pain - Tylenol  Oral Dysphagia - Puree/Honey as per SLP 9/28  DVT PPX - SCDs  Rehab - Although patient may have new CVA's patient has significant deficits that would likely have limited improvement in given extent of previous functional status. Wife does not want MIGUEL. Recommend DC HOME with resumption of home care. Recommend CM/SW to discuss additional services available. Agree with Palliative Care.     Recommend ongoing mobilization by staff to maintain cardiopulmonary function and prevention of secondary complications related to debility. Discussed with rehab team.

## 2021-09-28 NOTE — CONSULT NOTE ADULT - ASSESSMENT
67 yo man with new left sided weakness 2 weeks ago    Left sided weakness  CT head with chronic infarcts, no acute findings  Repeat CT head w/o contrast tomorrow and obtain CT C spine w/o contrast  Patient unlikely to tolerate MRI and has AICD  Resume coumadin for Afib  Wife states patient unable to tolerate statins  Agree with carotid dopplers  Routine EEG as wife states that patient has been less interactive    Plan discussed with Dr. Perrin    Will continue to follow
Assessment:  Je Smith is a 66 year old man with past medical history of Permanent atrial fibrillation (prior history of LUC thrombus on SHANELLE) on warfarin, CVA with residual right-sided weakness (2012), Hypertension, Monomorphic ventricular tachycardia s/p CRT-D and Ischemic cardiomyopathy (LVEF 35-40% in 2019 with recovery in LVEF to 66%) who was brought in to hospital due to episode of projectile vomiting today and with progressive fatigue and left-sided weakness.    Cardiology consulted due to elevated troponin, which is 0.08 in the setting of CKD. Patient denies angina or dyspnea and does not appear to have an acute coronary syndrome at this time. BNP markedly elevated at 4000s which is less than prior ER visit this month but still elevated, likely some component of CHF as well. There is concern for possible aspiration pneumonitis.     Recommendations:  [] Elevated troponin: Likely demand ischemia from CKD, volume overload and possible aspiration. Continue to trend until it peaks. Will repeat echo. Wife states that patient would not want any invasive procedures, and she is his HCP. Continue home Carvedilol 6.25 mg daily if BP tolerates  [] Nausea/projective vomiting: Workup per primary team  [] Volume overload in setting of CMPY: Check CXR to evaluate for aspiration pneumonia/pneumonitis. Dose Lasix 40 mg IVP daily  [] Permanent atrial fibrillation: Dose coumadin for goal INR 2-3  [] Left-sided weakness: Consider Neurology evaluation, CT head reporting chronic b/l basal ganglia and right parietal infarcts    Thank you for the consult. We will continue to follow along.    Discussed with ER team. Discussed with wife at bedside.    Kale Duvall MD  Cardiology 
 66 M PMH CVA w R sided weakness and dysphagia 2012, AF with slow ventricular response (prior hx LA thrombus on SHANELLE) on warfarin, RBBB, monomorphic VT (5/2014) s/p MDT dual chamber ICD with upgrade to CRT-D (1/2016)and generator change , CKD III, HTN and stercoral colitis who was BIBEMS after projectile emesis.  ICU consulted for progressively worsening tachypnea and hypoxemia.    Hypoxemia  Tachypnea  Aspiration Pneumonitis    - Agree with current plan of empiric abx coverage for aspiration PNA in setting of multiple episodes of emesis  - Saturations in the high 80's on 100% NRB  - Please send blood gas to evaluate degree of hypoxemia and acid-base disturbances   - Aggressive chest PT and suctioning  - Duonebs PRN for SOB/wheezing  - If purely hypoxemia, would initiate HFNC + NRB as patient unable to close mouth and titrate to Sp02 > 92%  - Agree that bilevel inappropriate as patient unable to clear secretions well in setting of vomiting  - Antiemetics PRN  - Antipyretics to optimize D02/V02  - Will follow up PRN  - Consider admitting to step-down with continuous pulse oximetry  - Patient not a candidate at this time.  Please reconsult as needed.   Case discussed with EICU attending. 
PITA on CKD   Baseline creat 1.6   Recent CT noted , No Hydro   Episode of hypotension , ? ATN   Continue to hold Lasix   PITA pre-dated Zosyn   Continue IVF   Follow trend in labs   Check FENA     Will follow   Discussed with the wife at bedside   
Aspiration s/p new parietal CVA  Improved with cough and drug nebs  Not septic at present  High risk of recurrent aspirations  Chronic elevated BNP with diastolic dysfunction    Plan:  HOB raised  ABX  drug neb  follow speech recommendations of 9/26

## 2021-09-28 NOTE — PROGRESS NOTE ADULT - SUBJECTIVE AND OBJECTIVE BOX
Patient seen and examined    Feels weak and tired, on NC O2  wife present  no c/o CP SOB NV   No swelling feet    Vital Signs Last 24 Hrs  T(C): 36.9 (28 Sep 2021 15:19), Max: 36.9 (28 Sep 2021 12:09)  T(F): 98.4 (28 Sep 2021 15:19), Max: 98.4 (28 Sep 2021 12:09)  HR: 73 (28 Sep 2021 15:19) (71 - 89)  BP: 102/66 (28 Sep 2021 15:19) (93/58 - 112/73)  BP(mean): --  RR: 19 (28 Sep 2021 15:19) (19 - 20)  SpO2: 93% (28 Sep 2021 15:19) (93% - 97%)    PHYSICAL EXAM    GENERAL: NAD, appears frail  EYES:  conjunctiva and sclera clear  NECK: Supple, No JVD/Bruit  NERVOUS SYSTEM:  A/O x3,   CHEST:  No rales, few rhonchi  HEART:  RRR, No murmur  ABDOMEN: Soft, NT/ND BS+  EXTREMITIES:  + Edema;   SKIN: No rashes    28 Sep 2021 03:33    133    |  98     |  61.3   ----------------------------<  125    3.5     |  20.0   |  2.62     Ca    8.4        28 Sep 2021 03:33  Phos  3.9       27 Sep 2021 03:07  Mg     2.0       27 Sep 2021 03:07    TPro  7.1    /  Alb  2.2    /  TBili  0.5    /  DBili  0.2    /  AST  29     /  ALT  11     /  AlkPhos  60     27 Sep 2021 03:07                          11.1   10.41 )-----------( 299      ( 28 Sep 2021 03:33 )             35.4

## 2021-09-28 NOTE — PROGRESS NOTE ADULT - ASSESSMENT
66 year old man with prior stroke with right hemiparesis now with left sided weakness which began at least a few months ago according to his wife.     Stroke.   It appears he has had a new stroke affecting the left side.   Unclear exactly when onset was.   He has atrial fibrillation and has been on anticoagulation.    A fib.   Has been on Coumadin and appears to have had a new infarct.   OK for Heparin from neurologic standpoint.

## 2021-09-28 NOTE — CONSULT NOTE ADULT - SUBJECTIVE AND OBJECTIVE BOX
PULMONARY CONSULT NOTE      [id number]    Patient is a 66y old  Male who presents with a chief complaint of CVA timing unclear of newer R parietal CVA  emesis poss constipation/fecal impaction (28 Sep 2021 11:41)      HISTORY OF PRESENT ILLNESS:  66 year old male w hx CVA w R sided weakness and dysphagia 2012, permanent pacemaker with  AF and slow ventricular response (prior hx LA thrombus on SHANELLE) on warfarin, RBBB, monomorphic VT (5/2014) s/p MDT dual chamber ICD with upgrade to CRT-D (1/2016)and generator change , CKD III, HTN came to ED by EMS after projectile emesis despite change in diet to pureed with thickit. Left arm cellulitis treated at [facility] 8/2021. Recently had diuretics changed due to dehydration. Suspected aspiration  Pt now on HFNCO and Zosyn    MEDICATIONS  (STANDING):  atorvastatin 40 milliGRAM(s) Oral at bedtime  carvedilol 3.125 milliGRAM(s) Oral every 12 hours  influenza   Vaccine 0.5 milliLiter(s) IntraMuscular once  levalbuterol Inhalation 0.63 milliGRAM(s) Inhalation every 6 hours  pantoprazole  Injectable 40 milliGRAM(s) IV Push at bedtime  piperacillin/tazobactam IVPB.. 3.375 Gram(s) IV Intermittent every 8 hours  potassium chloride    Tablet ER 20 milliEquivalent(s) Oral once  senna 2 Tablet(s) Oral at bedtime  sodium chloride 0.9%. 1000 milliLiter(s) (70 mL/Hr) IV Continuous <Continuous>  warfarin 3 milliGRAM(s) Oral once      MEDICATIONS  (PRN):  ondansetron Injectable 4 milliGRAM(s) IV Push every 8 hours PRN Nausea and/or Vomiting      Allergies    IV Contrast (Other)  latex (Rash)    Intolerances        PAST MEDICAL & SURGICAL HISTORY:  CVA (cerebral vascular accident)    Hypertension    Hyperlipidemia    Cardiomyopathy    RBBB    Atrial fibrillation    Thrombus of left atrial appendage    AICD (automatic cardioverter/defibrillator) present    [place] filter in place  no h/o dvt and as per wife  it was placed as a precaution after cva.    AICD (automatic cardioverter/defibrillator) present        FAMILY HISTORY:  Family history of cerebrovascular accident (CVA)        SOCIAL HISTORY  Smoking History:     REVIEW OF SYSTEMS:    CONSTITUTIONAL:  No fevers, chills, sweats    HEENT:  Eyes:  No diplopia or blurred vision. ENT:  No earache, sore throat or runny nose. sinus headache or postnasl drip    CARDIOVASCULAR:  No pressure, squeezing, tightness, or heaviness about the chest; no palpitations, leg swelling, orthopnea or PND    RESPIRATORY:  No cough, shortness of breath, PND or orthopnea. Mild SOBOE    GASTROINTESTINAL:  No abdominal pain, nausea, vomiting or diarrhea.    GENITOURINARY:  No dysuria, frequency or urgency.    NEUROLOGIC:  No paresthesias, fasciculations, seizures or weakness.    PSYCHIATRIC:  No disorder of thought or mood.    Vital Signs Last 24 Hrs  T(C): 36.4 (28 Sep 2021 07:37), Max: 36.8 (27 Sep 2021 23:00)  T(F): 97.6 (28 Sep 2021 07:37), Max: 98.2 (27 Sep 2021 23:00)  HR: 72 (28 Sep 2021 07:37) (72 - 89)  BP: 97/62 (28 Sep 2021 07:37) (92/55 - 112/73)  BP(mean): --  RR: 20 (28 Sep 2021 07:37) (20 - 20)  SpO2: 97% (28 Sep 2021 07:37) (94% - 97%)    PHYSICAL EXAMINATION:    GENERAL: The patient is a well-developed, well-nourished _____in no apparent distress.     HEENT: Head is normocephalic and atraumatic. Extraocular muscles are intact. Mucous membranes are moist.     NECK: Supple.     LUNGS: Clear to auscultation without wheezing, rales, or rhonchi. Respirations unlabored    HEART: Regular rate and rhythm without murmur.    ABDOMEN: Soft, nontender, and nondistended.  No hepatosplenomegaly is noted.    EXTREMITIES: Without any cyanosis, clubbing, rash, lesions or edema.    NEUROLOGIC: Grossly intact.      LABS:                        11.1   10.41 )-----------( 299      ( 28 Sep 2021 03:33 )             35.4     09-28    133<L>  |  98  |  61.3<H>  ----------------------------<  125<H>  3.5   |  20.0<L>  |  2.62<H>    Ca    8.4<L>      28 Sep 2021 03:33  Phos  3.9     09-27  Mg     2.0     09-27    TPro  7.1  /  Alb  2.2<L>  /  TBili  0.5  /  DBili  0.2  /  AST  29  /  ALT  11  /  AlkPhos  60  09-27    PT/INR - ( 27 Sep 2021 07:38 )   PT: 25.4 sec;   INR: 2.28 ratio             ABG - ( 27 Sep 2021 09:29 )  pH, Arterial: 7.500 pH, Blood: x     /  pCO2: 30    /  pO2: 92    / HCO3: 23    / Base Excess: 0.2   /  SaO2: 99.0                    Serum Pro-Brain Natriuretic Peptide: 4651 pg/mL (09-25-21 @ 15:10)      Procalcitonin, Serum: 24.13 ng/mL (09-27-21 @ 12:12)      MICROBIOLOGY:    RADIOLOGY & ADDITIONAL STUDIES:  < from: US Duplex Carotid Arteries Complete, Bilateral (09.27.21 @ 15:56) >   EXAM:  US DPLX CAROTIDS COMPL BI                          PROCEDURE DATE:  09/27/2021          INTERPRETATION:  CLINICAL INFORMATION: Bilateral weakness. CVA.    COMPARISON: January 7, 2014.    TECHNIQUE: Grayscale, color and spectral Doppler examination of both carotid arteries was performed.    FINDINGS:    No elevated velocities or abnormal waveforms are encountered.    Peak systolic velocities are as follows:    RIGHT:  PROX CCA = 40 cm/s  DIST CCA = 53 cm/s  PROX ICA = 26 cm/s  DIST ICA =43 cm/s  ECA = 87 cm/s    LEFT:  PROX CCA = 74 cm/s  DIST CCA = 47 cm/s  PROX ICA = 28 cm/s  DIST ICA = 55 cm/s  ECA = 78 cm/s    Right vertebral artery is not visualized. Antegrade flow is noted within the left vertebral artery.    IMPRESSION: No significant hemodynamic stenosis of either carotid artery.    Measurement of carotid stenosis is based on velocity parameters that correlate the residual internal carotid diameter with that of the more distal vessel in accordance with a method such as the North American Symptomatic Carotid Endarterectomy Trial (NASCET).      --- End of Report ---            CHIOMA DOMINGUEZ MD; Attending Radiologist  This document has been electronically signed. Sep 27 2021  4:51PM    < end of copied text >  < from: CT Abdomen and Pelvis No Cont (09.26.21 @ 15:44) >     EXAM:  CT ABDOMEN AND PELVIS                          PROCEDURE DATE:  09/26/2021          INTERPRETATION:  CLINICAL INFORMATION: Vomiting, distention    COMPARISON: CT abdomen pelvis 5/29/2020    CONTRAST/COMPLICATIONS:  IV Contrast: NONE  Oral Contrast: NONE  Complications: None reported at time of study completion    PROCEDURE:  CT of the Abdomen and Pelvis was performed.  Sagittal and coronal reformats were performed.    FINDINGS:  LOWER CHEST: Bibasilar consolidation and tree-in-bud opacities likely related to aspiration.    LIVER: Normal.  BILE DUCTS: Nondilated.  GALLBLADDER: Gallstones.  SPLEEN: Normal.  PANCREAS: Normal.  ADRENALS: Normal.  KIDNEYS/URETERS: No hydronephrosis or urinary tract calculi.    BLADDER: Underdistended limiting evaluation.  REPRODUCTIVE ORGANS: Nonenlarged.    BOWEL: Severe rectal fecal impaction with evidence of stercoral proctitis. Mild gaseous distention of the remaining colon with minimal feces and some liquid stool. No small bowel obstruction.  PERITONEUM: No free air or ascites.  VESSELS: Aortoiliac atherosclerosis without aneurysm. IVC filter.  RETROPERITONEUM/LYMPH NODES: No adenopathy.  ABDOMINAL WALL: Normal.  BONES: No acute bony abnormality.    IMPRESSION:  *  Severe rectal fecal impaction with evidence of stercoral proctitis.  *  Mild gaseous distention of the remaining colon. No obstruction.      --- End of Report ---            LILIAIFRAH KERR MD; Attending Radiologist  This document has been electronically signed. Sep 26 2021  4:48PM    < end of copied text >  < from: CT Head No Cont (09.25.21 @ 17:13) >     EXAM:  CT BRAIN                          PROCEDURE DATE:  09/25/2021          INTERPRETATION:  CT HEAD  HEAD CT    INDICATIONS: ams, vomiting, pmhx of CVA with right-side residual weakness, HTN, afib, CHF on digoxin, CKD, DVT s/p IVC filter, gout presenting today via EMS with concern for aspiration. Per EMS, the pt vomited while eating lunch. Had cough and was tachypenic on ems arrival satting 92%. Pt presenting satting 98% on 4L nc.    TECHNIQUE:    HEAD CT:  Serial axial images were obtainedfrom the skull base to the vertex without the use of intravenous contrast.    COMPARISON EXAMINATION: Head CT 5/29/2020    FINDINGS:    HEAD CT:    VENTRICLES AND SULCI: Ventricles and sulci are unremarkable for patient age.  INTRA-AXIAL: No intracranial mass, acute hemorrhage, or midline shift is present. Chronic bilateral basal ganglia and right parietal infarcts.  Extensive confluent hypodensities within the periventricular and subcortical  white matter, although nonspecific, likely reflect chronic microvascular disease.    EXTRA-AXIAL: No extra-axial fluid collection is present.  INTRACRANIAL HEMORRHAGE: None.    VISUALIZED SINUSES: No air-fluid levels are identified.  VISUALIZED MASTOIDS:  Clear.  CALVARIUM:  Intact.  MISCELLANEOUS:  Suboptimal positioning.    SOFT TISSUES: Unremarkable.  BONES: Unremarkable.      IMPRESSION:    HEAD CT: Mild volume loss, microvascular disease, no acute hemorrhage or midline shift.  Chronic bilateral basal ganglia and right parietal infarcts.    --- End of Report ---            IVAN CHAVEZ MD; Attending Radiologist  This document has been electronically signed. Sep 25 2021  5:22PM    < end of copied text >  < from: Xray Chest 1 View- PORTABLE-Urgent (Xray Chest 1 View- PORTABLE-Urgent .) (09.25.21 @ 15:54) >     EXAM:  XR CHEST PORTABLE URGENT 1V                          PROCEDURE DATE:  09/25/2021          INTERPRETATION:  HISTORY: Hypoxia    TECHNIQUE: A single portable view of the chest was obtained.    COMPARISON: 9/5/2021    FINDINGS: The heart size cannot be adequately assessed on this single view. There is a left-sided AICD. The lung volumes are slightly low. The lungs are clear.    IMPRESSION: Low lung volumes with clear lungs.    --- End of Report ---            SATISH MULLINS MD; Attending Radiologist  This document has been electronically signed. Sep 26 2021  1:56PM    < end of copied text >   PULMONARY CONSULT NOTE      [id number]    Patient is a 66y old  Male who presents with a chief complaint of CVA timing unclear of newer R parietal CVA  emesis poss constipation/fecal impaction (28 Sep 2021 11:41)      HISTORY OF PRESENT ILLNESS:  66 year old male w hx CVA w R sided weakness and dysphagia 2012, permanent pacemaker with  AF and slow ventricular response (prior hx LA thrombus on SHANELLE) on warfarin, RBBB, monomorphic VT (5/2014) s/p MDT dual chamber ICD with upgrade to CRT-D (1/2016)and generator change , CKD III, HTN came to ED by EMS after projectile emesis despite change in diet to pureed with thickit. Left arm cellulitis treated at [facility] 8/2021. Recently had diuretics changed due to dehydration. Suspected aspiration  Pt now on awake alert and weaned to NCO with Zosyn    MEDICATIONS  (STANDING):  atorvastatin 40 milliGRAM(s) Oral at bedtime  carvedilol 3.125 milliGRAM(s) Oral every 12 hours  influenza   Vaccine 0.5 milliLiter(s) IntraMuscular once  levalbuterol Inhalation 0.63 milliGRAM(s) Inhalation every 6 hours  pantoprazole  Injectable 40 milliGRAM(s) IV Push at bedtime  piperacillin/tazobactam IVPB.. 3.375 Gram(s) IV Intermittent every 8 hours  potassium chloride    Tablet ER 20 milliEquivalent(s) Oral once  senna 2 Tablet(s) Oral at bedtime  sodium chloride 0.9%. 1000 milliLiter(s) (70 mL/Hr) IV Continuous <Continuous>  warfarin 3 milliGRAM(s) Oral once      MEDICATIONS  (PRN):  ondansetron Injectable 4 milliGRAM(s) IV Push every 8 hours PRN Nausea and/or Vomiting      Allergies    IV Contrast (Other)  latex (Rash)    Intolerances        PAST MEDICAL & SURGICAL HISTORY:  CVA (cerebral vascular accident)    Hypertension    Hyperlipidemia    Cardiomyopathy    RBBB    Atrial fibrillation    Thrombus of left atrial appendage    AICD (automatic cardioverter/defibrillator) present    [place] filter in place  no h/o dvt and as per wife  it was placed as a precaution after cva.    AICD (automatic cardioverter/defibrillator) present        FAMILY HISTORY:  Family history of cerebrovascular accident (CVA)        SOCIAL HISTORY  Smoking History:     REVIEW OF SYSTEMS:    CONSTITUTIONAL:  aphasic     Vital Signs Last 24 Hrs  T(C): 36.4 (28 Sep 2021 07:37), Max: 36.8 (27 Sep 2021 23:00)  T(F): 97.6 (28 Sep 2021 07:37), Max: 98.2 (27 Sep 2021 23:00)  HR: 72 (28 Sep 2021 07:37) (72 - 89)  BP: 97/62 (28 Sep 2021 07:37) (92/55 - 112/73)  BP(mean): --  RR: 20 (28 Sep 2021 07:37) (20 - 20)  SpO2: 97% (28 Sep 2021 07:37) (94% - 97%)    PHYSICAL EXAMINATION:    GENERAL: The patient is a well-developed, well-nourished _____in no apparent distress.     HEENT: Head is normocephalic and atraumatic. Extraocular muscles are intact. Mucous membranes are moist.     NECK: Supple.     LUNGS: Clear to auscultation without wheezing, rales, or rhonchi. Respirations unlabored, left AICD    HEART: Regular rate and rhythm without murmur.    ABDOMEN: Soft, nontender, and nondistended.  No hepatosplenomegaly is noted.    EXTREMITIES: Without any cyanosis, clubbing, rash, lesions or edema.    NEUROLOGIC: 1-2/6 muscle strenght in all groups      LABS:                        11.1   10.41 )-----------( 299      ( 28 Sep 2021 03:33 )             35.4     09-28    133<L>  |  98  |  61.3<H>  ----------------------------<  125<H>  3.5   |  20.0<L>  |  2.62<H>    Ca    8.4<L>      28 Sep 2021 03:33  Phos  3.9     09-27  Mg     2.0     09-27    TPro  7.1  /  Alb  2.2<L>  /  TBili  0.5  /  DBili  0.2  /  AST  29  /  ALT  11  /  AlkPhos  60  09-27    PT/INR - ( 27 Sep 2021 07:38 )   PT: 25.4 sec;   INR: 2.28 ratio             ABG - ( 27 Sep 2021 09:29 )  pH, Arterial: 7.500 pH, Blood: x     /  pCO2: 30    /  pO2: 92    / HCO3: 23    / Base Excess: 0.2   /  SaO2: 99.0                    Serum Pro-Brain Natriuretic Peptide: 4651 pg/mL (09-25-21 @ 15:10)      Procalcitonin, Serum: 24.13 ng/mL (09-27-21 @ 12:12)      MICROBIOLOGY:    RADIOLOGY & ADDITIONAL STUDIES:  < from: US Duplex Carotid Arteries Complete, Bilateral (09.27.21 @ 15:56) >   EXAM:  US DPLX CAROTIDS COMPL BI                          PROCEDURE DATE:  09/27/2021          INTERPRETATION:  CLINICAL INFORMATION: Bilateral weakness. CVA.    COMPARISON: January 7, 2014.    TECHNIQUE: Grayscale, color and spectral Doppler examination of both carotid arteries was performed.    FINDINGS:    No elevated velocities or abnormal waveforms are encountered.    Peak systolic velocities are as follows:    RIGHT:  PROX CCA = 40 cm/s  DIST CCA = 53 cm/s  PROX ICA = 26 cm/s  DIST ICA =43 cm/s  ECA = 87 cm/s    LEFT:  PROX CCA = 74 cm/s  DIST CCA = 47 cm/s  PROX ICA = 28 cm/s  DIST ICA = 55 cm/s  ECA = 78 cm/s    Right vertebral artery is not visualized. Antegrade flow is noted within the left vertebral artery.    IMPRESSION: No significant hemodynamic stenosis of either carotid artery.    Measurement of carotid stenosis is based on velocity parameters that correlate the residual internal carotid diameter with that of the more distal vessel in accordance with a method such as the North American Symptomatic Carotid Endarterectomy Trial (NASCET).      --- End of Report ---            CHIOMA DOMINGUEZ MD; Attending Radiologist  This document has been electronically signed. Sep 27 2021  4:51PM    < end of copied text >  < from: CT Abdomen and Pelvis No Cont (09.26.21 @ 15:44) >     EXAM:  CT ABDOMEN AND PELVIS                          PROCEDURE DATE:  09/26/2021          INTERPRETATION:  CLINICAL INFORMATION: Vomiting, distention    COMPARISON: CT abdomen pelvis 5/29/2020    CONTRAST/COMPLICATIONS:  IV Contrast: NONE  Oral Contrast: NONE  Complications: None reported at time of study completion    PROCEDURE:  CT of the Abdomen and Pelvis was performed.  Sagittal and coronal reformats were performed.    FINDINGS:  LOWER CHEST: Bibasilar consolidation and tree-in-bud opacities likely related to aspiration.    LIVER: Normal.  BILE DUCTS: Nondilated.  GALLBLADDER: Gallstones.  SPLEEN: Normal.  PANCREAS: Normal.  ADRENALS: Normal.  KIDNEYS/URETERS: No hydronephrosis or urinary tract calculi.    BLADDER: Underdistended limiting evaluation.  REPRODUCTIVE ORGANS: Nonenlarged.    BOWEL: Severe rectal fecal impaction with evidence of stercoral proctitis. Mild gaseous distention of the remaining colon with minimal feces and some liquid stool. No small bowel obstruction.  PERITONEUM: No free air or ascites.  VESSELS: Aortoiliac atherosclerosis without aneurysm. IVC filter.  RETROPERITONEUM/LYMPH NODES: No adenopathy.  ABDOMINAL WALL: Normal.  BONES: No acute bony abnormality.    IMPRESSION:  *  Severe rectal fecal impaction with evidence of stercoral proctitis.  *  Mild gaseous distention of the remaining colon. No obstruction.      --- End of Report ---            LILIAIFRAH KERR MD; Attending Radiologist  This document has been electronically signed. Sep 26 2021  4:48PM    < end of copied text >  < from: CT Head No Cont (09.25.21 @ 17:13) >     EXAM:  CT BRAIN                          PROCEDURE DATE:  09/25/2021          INTERPRETATION:  CT HEAD  HEAD CT    INDICATIONS: ams, vomiting, pmhx of CVA with right-side residual weakness, HTN, afib, CHF on digoxin, CKD, DVT s/p IVC filter, gout presenting today via EMS with concern for aspiration. Per EMS, the pt vomited while eating lunch. Had cough and was tachypenic on ems arrival satting 92%. Pt presenting satting 98% on 4L nc.    TECHNIQUE:    HEAD CT:  Serial axial images were obtainedfrom the skull base to the vertex without the use of intravenous contrast.    COMPARISON EXAMINATION: Head CT 5/29/2020    FINDINGS:    HEAD CT:    VENTRICLES AND SULCI: Ventricles and sulci are unremarkable for patient age.  INTRA-AXIAL: No intracranial mass, acute hemorrhage, or midline shift is present. Chronic bilateral basal ganglia and right parietal infarcts.  Extensive confluent hypodensities within the periventricular and subcortical  white matter, although nonspecific, likely reflect chronic microvascular disease.    EXTRA-AXIAL: No extra-axial fluid collection is present.  INTRACRANIAL HEMORRHAGE: None.    VISUALIZED SINUSES: No air-fluid levels are identified.  VISUALIZED MASTOIDS:  Clear.  CALVARIUM:  Intact.  MISCELLANEOUS:  Suboptimal positioning.    SOFT TISSUES: Unremarkable.  BONES: Unremarkable.      IMPRESSION:    HEAD CT: Mild volume loss, microvascular disease, no acute hemorrhage or midline shift.  Chronic bilateral basal ganglia and right parietal infarcts.    --- End of Report ---            IVAN CHAVEZ MD; Attending Radiologist  This document has been electronically signed. Sep 25 2021  5:22PM    < end of copied text >  < from: Xray Chest 1 View- PORTABLE-Urgent (Xray Chest 1 View- PORTABLE-Urgent .) (09.25.21 @ 15:54) >     EXAM:  XR CHEST PORTABLE URGENT 1V                          PROCEDURE DATE:  09/25/2021          INTERPRETATION:  HISTORY: Hypoxia    TECHNIQUE: A single portable view of the chest was obtained.    COMPARISON: 9/5/2021    FINDINGS: The heart size cannot be adequately assessed on this single view. There is a left-sided AICD. The lung volumes are slightly low. The lungs are clear.    IMPRESSION: Low lung volumes with clear lungs.    --- End of Report ---            SATISH MULLINS MD; Attending Radiologist  This document has been electronically signed. Sep 26 2021  1:56PM    < end of copied text >

## 2021-09-29 LAB
ANION GAP SERPL CALC-SCNC: 17 MMOL/L — SIGNIFICANT CHANGE UP (ref 5–17)
BUN SERPL-MCNC: 44.3 MG/DL — HIGH (ref 8–20)
CALCIUM SERPL-MCNC: 8.3 MG/DL — LOW (ref 8.6–10.2)
CHLORIDE SERPL-SCNC: 104 MMOL/L — SIGNIFICANT CHANGE UP (ref 98–107)
CO2 SERPL-SCNC: 21 MMOL/L — LOW (ref 22–29)
CREAT SERPL-MCNC: 1.75 MG/DL — HIGH (ref 0.5–1.3)
GLUCOSE BLDC GLUCOMTR-MCNC: 114 MG/DL — HIGH (ref 70–99)
GLUCOSE BLDC GLUCOMTR-MCNC: 132 MG/DL — HIGH (ref 70–99)
GLUCOSE BLDC GLUCOMTR-MCNC: 136 MG/DL — HIGH (ref 70–99)
GLUCOSE SERPL-MCNC: 116 MG/DL — HIGH (ref 70–99)
INR BLD: 4.4 RATIO — HIGH (ref 0.88–1.16)
MAGNESIUM SERPL-MCNC: 2.1 MG/DL — SIGNIFICANT CHANGE UP (ref 1.8–2.6)
PHOSPHATE SERPL-MCNC: 1.9 MG/DL — LOW (ref 2.4–4.7)
POTASSIUM SERPL-MCNC: 2.6 MMOL/L — CRITICAL LOW (ref 3.5–5.3)
POTASSIUM SERPL-SCNC: 2.6 MMOL/L — CRITICAL LOW (ref 3.5–5.3)
PROTHROM AB SERPL-ACNC: 47.6 SEC — HIGH (ref 10.6–13.6)
SODIUM SERPL-SCNC: 142 MMOL/L — SIGNIFICANT CHANGE UP (ref 135–145)

## 2021-09-29 PROCEDURE — 99233 SBSQ HOSP IP/OBS HIGH 50: CPT

## 2021-09-29 PROCEDURE — 99232 SBSQ HOSP IP/OBS MODERATE 35: CPT

## 2021-09-29 RX ORDER — SACCHAROMYCES BOULARDII 250 MG
250 POWDER IN PACKET (EA) ORAL
Refills: 0 | Status: DISCONTINUED | OUTPATIENT
Start: 2021-09-29 | End: 2021-09-30

## 2021-09-29 RX ORDER — SODIUM,POTASSIUM PHOSPHATES 278-250MG
1 POWDER IN PACKET (EA) ORAL
Refills: 0 | Status: DISCONTINUED | OUTPATIENT
Start: 2021-09-29 | End: 2021-10-04

## 2021-09-29 RX ORDER — POTASSIUM CHLORIDE 20 MEQ
40 PACKET (EA) ORAL EVERY 4 HOURS
Refills: 0 | Status: COMPLETED | OUTPATIENT
Start: 2021-09-29 | End: 2021-09-29

## 2021-09-29 RX ORDER — POLYETHYLENE GLYCOL 3350 17 G/17G
17 POWDER, FOR SOLUTION ORAL DAILY
Refills: 0 | Status: DISCONTINUED | OUTPATIENT
Start: 2021-09-29 | End: 2021-09-30

## 2021-09-29 RX ORDER — POTASSIUM CHLORIDE 20 MEQ
10 PACKET (EA) ORAL
Refills: 0 | Status: COMPLETED | OUTPATIENT
Start: 2021-09-29 | End: 2021-09-29

## 2021-09-29 RX ORDER — POTASSIUM PHOSPHATE, MONOBASIC POTASSIUM PHOSPHATE, DIBASIC 236; 224 MG/ML; MG/ML
15 INJECTION, SOLUTION INTRAVENOUS ONCE
Refills: 0 | Status: COMPLETED | OUTPATIENT
Start: 2021-09-29 | End: 2021-09-29

## 2021-09-29 RX ORDER — FUROSEMIDE 40 MG
1 TABLET ORAL
Qty: 0 | Refills: 0 | DISCHARGE

## 2021-09-29 RX ADMIN — Medication 1 PACKET(S): at 18:08

## 2021-09-29 RX ADMIN — CARVEDILOL PHOSPHATE 3.12 MILLIGRAM(S): 80 CAPSULE, EXTENDED RELEASE ORAL at 18:08

## 2021-09-29 RX ADMIN — Medication 100 MILLIEQUIVALENT(S): at 12:09

## 2021-09-29 RX ADMIN — SENNA PLUS 2 TABLET(S): 8.6 TABLET ORAL at 21:44

## 2021-09-29 RX ADMIN — Medication 40 MILLIEQUIVALENT(S): at 13:43

## 2021-09-29 RX ADMIN — PANTOPRAZOLE SODIUM 40 MILLIGRAM(S): 20 TABLET, DELAYED RELEASE ORAL at 21:44

## 2021-09-29 RX ADMIN — PIPERACILLIN AND TAZOBACTAM 25 GRAM(S): 4; .5 INJECTION, POWDER, LYOPHILIZED, FOR SOLUTION INTRAVENOUS at 21:44

## 2021-09-29 RX ADMIN — ATORVASTATIN CALCIUM 40 MILLIGRAM(S): 80 TABLET, FILM COATED ORAL at 21:44

## 2021-09-29 RX ADMIN — SODIUM CHLORIDE 70 MILLILITER(S): 9 INJECTION INTRAMUSCULAR; INTRAVENOUS; SUBCUTANEOUS at 18:23

## 2021-09-29 RX ADMIN — POTASSIUM PHOSPHATE, MONOBASIC POTASSIUM PHOSPHATE, DIBASIC 62.5 MILLIMOLE(S): 236; 224 INJECTION, SOLUTION INTRAVENOUS at 12:10

## 2021-09-29 RX ADMIN — Medication 100 MILLIEQUIVALENT(S): at 09:02

## 2021-09-29 RX ADMIN — Medication 250 MILLIGRAM(S): at 18:09

## 2021-09-29 RX ADMIN — Medication 40 MILLIEQUIVALENT(S): at 18:07

## 2021-09-29 RX ADMIN — LEVALBUTEROL 0.63 MILLIGRAM(S): 1.25 SOLUTION, CONCENTRATE RESPIRATORY (INHALATION) at 14:26

## 2021-09-29 RX ADMIN — PIPERACILLIN AND TAZOBACTAM 25 GRAM(S): 4; .5 INJECTION, POWDER, LYOPHILIZED, FOR SOLUTION INTRAVENOUS at 14:03

## 2021-09-29 RX ADMIN — Medication 1 PACKET(S): at 13:43

## 2021-09-29 RX ADMIN — Medication 100 MILLIEQUIVALENT(S): at 10:52

## 2021-09-29 RX ADMIN — PIPERACILLIN AND TAZOBACTAM 25 GRAM(S): 4; .5 INJECTION, POWDER, LYOPHILIZED, FOR SOLUTION INTRAVENOUS at 00:16

## 2021-09-29 RX ADMIN — LEVALBUTEROL 0.63 MILLIGRAM(S): 1.25 SOLUTION, CONCENTRATE RESPIRATORY (INHALATION) at 20:26

## 2021-09-29 RX ADMIN — CARVEDILOL PHOSPHATE 3.12 MILLIGRAM(S): 80 CAPSULE, EXTENDED RELEASE ORAL at 05:27

## 2021-09-29 NOTE — ADVANCED PRACTICE NURSE CONSULT - ASSESSMENT
Patient with limited mobility and total assist with care.  Documented history of IAD, urinary/fecal incontinence, Current Yossi Score of 13.  Wound and Primary RN at bedside for site assessment.      ·	B/L Gluteal Cleft - IAD Skin Breakdown (9cm x 5cm) - deep red, purplish hyperpigmentation that blanches with excoriation to inner areas, red, moist scattered epidermal loss, minimal serous drainage noted.      ·	Right Buttock - DTI (1cm x 1cm) area of intact non-blanching purplish/red discoloration within hyperpigmented area.  no drainage noted     Spouse at bedside.  Spouse initially refusing patient to be repositioned stating "there is nothing wrong with his butt" but allowed patient to be washed.  Spouse requesting to use pillows to offload heels and to use patient socks.  Patient educated on hospital measures for injury prevention as well as current skin breakdown to gluteal cleft.  Spouse verbalized agreement at this time.

## 2021-09-29 NOTE — PROGRESS NOTE ADULT - SUBJECTIVE AND OBJECTIVE BOX
Patient seen and examined    Feels weak and tired, on NC O2  wife present  no c/o CP SOB NV   No swelling feet    Vital Signs Last 24 Hrs  T(C): 36.9 (29 Sep 2021 10:32), Max: 36.9 (28 Sep 2021 20:43)  T(F): 98.4 (29 Sep 2021 10:32), Max: 98.4 (28 Sep 2021 20:43)  HR: 83 (29 Sep 2021 10:32) (77 - 83)  BP: 102/67 (29 Sep 2021 10:32) (94/58 - 105/67)  BP(mean): --  RR: 18 (29 Sep 2021 10:32) (18 - 18)  SpO2: 96% (29 Sep 2021 14:43) (95% - 98%)  PHYSICAL EXAM    GENERAL: NAD, appears frail  EYES:  conjunctiva and sclera clear  NECK: Supple, No JVD/Bruit  NERVOUS SYSTEM:  A/O; L weakness +,   CHEST:  No rales, few rhonchi, on NC O2  HEART:  RRR, No murmur  ABDOMEN: Soft, NT/ND BS+  EXTREMITIES:  + Edema;   SKIN: No rashes    28 Sep 2021 03:33    133    |  98     |  61.3   ----------------------------<  125    3.5     |  20.0   |  2.62     Ca    8.4        28 Sep 2021 03:33  Phos  3.9       27 Sep 2021 03:07  Mg     2.0       27 Sep 2021 03:07    TPro  7.1    /  Alb  2.2    /  TBili  0.5    /  DBili  0.2    /  AST  29     /  ALT  11     /  AlkPhos  60     27 Sep 2021 03:07  29 Sep 2021 07:01    142    |  104    |  44.3   ----------------------------<  116    2.6     |  21.0   |  1.75     Ca    8.3        29 Sep 2021 07:01  Phos  1.9       29 Sep 2021 07:01  Mg     2.1       29 Sep 2021 07:01                            11.1   10.41 )-----------( 299      ( 28 Sep 2021 03:33 )             35.4

## 2021-09-29 NOTE — PROGRESS NOTE ADULT - SUBJECTIVE AND OBJECTIVE BOX
PULMONARY PROGRESS NOTE      RUSH CARDENAS  [id number]    Patient is a 66y old  Male who presents with a chief complaint of CVA timing unclear of newer R parietal CVA  emesis poss constipation/fecal impaction (29 Sep 2021 08:15)      INTERVAL HPI/OVERNIGHT EVENTS:    Pt is comfortable   Off O2    MEDICATIONS  (STANDING):  atorvastatin 40 milliGRAM(s) Oral at bedtime  carvedilol 3.125 milliGRAM(s) Oral every 12 hours  influenza   Vaccine 0.5 milliLiter(s) IntraMuscular once  levalbuterol Inhalation 0.63 milliGRAM(s) Inhalation every 6 hours  pantoprazole  Injectable 40 milliGRAM(s) IV Push at bedtime  piperacillin/tazobactam IVPB.. 3.375 Gram(s) IV Intermittent every 8 hours  potassium chloride    Tablet ER 40 milliEquivalent(s) Oral every 4 hours  potassium phosphate / sodium phosphate Powder (PHOS-NaK) 1 Packet(s) Oral two times a day  senna 2 Tablet(s) Oral at bedtime  sodium chloride 0.9%. 1000 milliLiter(s) (70 mL/Hr) IV Continuous <Continuous>      MEDICATIONS  (PRN):  ondansetron Injectable 4 milliGRAM(s) IV Push every 8 hours PRN Nausea and/or Vomiting      Allergies    IV Contrast (Other)  latex (Rash)    Intolerances        PAST MEDICAL & SURGICAL HISTORY:  CVA (cerebral vascular accident)    Hypertension    Hyperlipidemia    Cardiomyopathy    RBBB    Atrial fibrillation    Thrombus of left atrial appendage    AICD (automatic cardioverter/defibrillator) present    Azle filter in place  no h/o dvt and as per wife  it was placed as a precaution after cva.    AICD (automatic cardioverter/defibrillator) present          REVIEW OF SYSTEMS: Pt unable to provide    Vital Signs Last 24 Hrs  T(C): 36.9 (29 Sep 2021 10:32), Max: 36.9 (28 Sep 2021 15:19)  T(F): 98.4 (29 Sep 2021 10:32), Max: 98.4 (28 Sep 2021 15:19)  HR: 83 (29 Sep 2021 10:32) (73 - 83)  BP: 102/67 (29 Sep 2021 10:32) (94/58 - 105/67)  BP(mean): --  RR: 18 (29 Sep 2021 10:32) (18 - 19)  SpO2: 98% (29 Sep 2021 10:32) (93% - 98%)    PHYSICAL EXAMINATION:    GENERAL: The patient is in no apparent distress.     HEENT: Head is normocephalic and atraumatic.     NECK: Supple.    LUNGS: Clear to auscultation without wheezing, rales or but mild rhonchi; respirations unlabored    HEART: Regular rate and rhythm without murmur.    ABDOMEN: Soft, nontender, and nondistended.      EXTREMITIES: Without any cyanosis, clubbing, rash, lesions or edema.      LABS:                        11.1   10.41 )-----------( 299      ( 28 Sep 2021 03:33 )             35.4     09-29    142  |  104  |  44.3<H>  ----------------------------<  116<H>  2.6<LL>   |  21.0<L>  |  1.75<H>    Ca    8.3<L>      29 Sep 2021 07:01  Phos  1.9     09-29  Mg     2.1     09-29      PT/INR - ( 29 Sep 2021 07:01 )   PT: 47.6 sec;   INR: 4.40 ratio           Procalcitonin, Serum: 24.13 ng/mL (09-27-21 @ 12:12)      MICROBIOLOGY:    COVID-19 [place] Domain Antibody (09.26.21 @ 10:00)    COVID-19 [place] Domain Antibody Result: 0.40: Roche ECLIA Total AB (BERTO)  NOTE: This result index represents a total antibody measurement, which  includes IgG, IgA and IgM.  Measures Receptor Binding Domain of the [place] Protein  Negative <= 0.79 U/mL  Positive  >= 0.80 U/mL U/mL    COVID-19 [place] Domain AB Interp: Negative: This test has been authorized for emergency use by the FDA. [employer] has validated this test to be accurate.  Results from antibody testing should not be used to inform infection  status.  A positive result is consistent with vaccination, prior infection, or  rarely be due to past or present infection with non-SARS-CoV-2  coronavirus strains, such as  coronavirus HKU1,  NL63, OC43, A3 or 229E.  A negative result does not rule out SARS-CoV-2 infection, particularly in  those who have been in recent contact with the virus.    COVID-19 PCR . (09.25.21 @ 22:14)    COVID-19 PCR: NotDetec: You can help in the fight against COVID-19. [facility] may contact  you to see if you are interested in voluntarily participating in one of  our clinical trials.  Testing is performed using polymerase chain reaction (PCR) or  transcription mediated amplification (TMA). This COVID-19 (SARS-CoV-2)  nucleic acid amplification test was validated by [facility] and is  in use under the FDA Emergency Use Authorization (EUA) for clinical labs  CLIA-certified to perform high complexity testing. Test results should be  correlated with clinical presentation, patient history, and epidemiology.        RADIOLOGY & ADDITIONAL STUDIES:     EXAM:  CT CHEST                          PROCEDURE DATE:  09/28/2021          INTERPRETATION:  CLINICAL INFORMATION: 66 years  Male with pna.    COMPARISON: 5/29/2020    CONTRAST/COMPLICATIONS:  IV Contrast: NONE  Oral Contrast: NONE  Complications: None reported at time of study completion    PROCEDURE:  CT of the Chest was performed.  Sagittal and coronal reformats were performed.    FINDINGS:    LUNGS AND AIRWAYS: Patent central airways.  Bilateral lower lobe consolidation. Patchy bilateralupper lobe airspace infiltrates.  PLEURA: No pleural effusion.  MEDIASTINUM AND XOCHITL: Shotty subcentimeter mediastinal lymph nodes. Limited evaluation of xochitl without IV contrast.  VESSELS: Aortic and coronary atherosclerosis.  HEART: Moderate cardiomegaly. Pacemaker leads in the right atrium and right ventricle. No pericardial effusion.  CHEST WALL AND LOWER NECK: Pacemaker pulse generator in the left chest wall.  VISUALIZED UPPER ABDOMEN: Partially visualized IVC filter.  BONES: Within normal limits.    IMPRESSION:  Bilateral pneumonia, most severe at the lung bases.    Moderate cardiomegaly. Pacemaker.          JILL GRAHAM MD; Attending Radiologist  This document has been electronically signed. Sep 28 2021  8:38PM

## 2021-09-29 NOTE — PROGRESS NOTE ADULT - SUBJECTIVE AND OBJECTIVE BOX
Patient is aspiration  PNA , arf , constipation     seen in am wife at the bedside   he is on oxygen via Nasal canula off high flow overnight   awake ayes open , generalized weakness     he is in no distress     chart reviewed               MEDICATIONS  (STANDING):  atorvastatin 40 milliGRAM(s) Oral at bedtime  carvedilol 3.125 milliGRAM(s) Oral every 12 hours  influenza   Vaccine 0.5 milliLiter(s) IntraMuscular once  levalbuterol Inhalation 0.63 milliGRAM(s) Inhalation every 6 hours  pantoprazole  Injectable 40 milliGRAM(s) IV Push at bedtime  piperacillin/tazobactam IVPB.. 3.375 Gram(s) IV Intermittent every 8 hours  potassium chloride    Tablet ER 40 milliEquivalent(s) Oral every 4 hours  potassium phosphate / sodium phosphate Powder (PHOS-NaK) 1 Packet(s) Oral two times a day  senna 2 Tablet(s) Oral at bedtime  sodium chloride 0.9%. 1000 milliLiter(s) (70 mL/Hr) IV Continuous <Continuous>  IN: 590 mL / OUT: 0 mL / NET: 590 mL    Vital Signs Last 24 Hrs  T(C): 36.9 (29 Sep 2021 10:32), Max: 36.9 (28 Sep 2021 20:43)  T(F): 98.4 (29 Sep 2021 10:32), Max: 98.4 (28 Sep 2021 20:43)  HR: 83 (29 Sep 2021 10:32) (77 - 83)  BP: 102/67 (29 Sep 2021 10:32) (94/58 - 105/67)  BP(mean): --  RR: 18 (29 Sep 2021 10:32) (18 - 18)  SpO2: 96% (29 Sep 2021 14:43) (95% - 98%)      PHYSICAL EXAM:  General: awake looks ill, no resp distress , + intermittent cough noted   Neck: supple , no JVD   Lungs: bilateral rhonchi diminished BS   Cardio: RRR, S1/S2, No murmur  Abdomen: Soft, Nontender, Nondistended; Bowel sounds present  Extremities: No calf tenderness, No pitting edema  Neuro generalized weakness , right and left sided weakness  left hemiplegia   speech very limited aphasia                             11.1   10.41 )-----------( 299      ( 28 Sep 2021 03:33 )             35.4   09-29    142  |  104  |  44.3<H>  ----------------------------<  116<H>  2.6<LL>   |  21.0<L>  |  1.75<H>    Ca    8.3<L>      29 Sep 2021 07:01  Phos  1.9     09-29  Mg     2.1     09-29

## 2021-09-29 NOTE — ADVANCED PRACTICE NURSE CONSULT - RECOMMEDATIONS
·	Cavilon Advanced Q3days to B/L Gluteal Cleft and Buttocks  ·	If patient has persistent fecal incontinence, Triad cream to gluteal cleft with perineal care  ·	Turn and Reposition Q2H   ·	Offload to alternating side with repositioning with Z-phong pillow   ·	Frequent incontinence care with repositioning   ·	2 RN skin assessment daily and with transfer onto new unit - document in Summary POC

## 2021-09-29 NOTE — PROGRESS NOTE ADULT - ASSESSMENT
This is a 66 M PMH CVA w R sided weakness and dysphagia 2012, AF with slow ventricular response (prior hx LA thrombus on SHANELLE) on warfarin, RBBB, monomorphic VT (5/2014) s/p MDT dual chamber ICD with upgrade to CRT-D (1/2016)and generator change , CKD III, HTN and stercoral colitis who was BIBEMS after projectile emesis.  Patient at baseline nonverbal and bedbound.  Information gathered by wife.  Wife states that he has had multiple episodes of vomiting.  He was admitted to medicine for presumed aspiration PNA.  While admitted, patient with escalating oxygen requirements with saturations in the mid to high 80's.Pt is on high flow oxygen now . Multiple lengthy GOC discussions conducted by Dr. Carr with wife and PCP.  Wife has MOLST form at home which includes a DNR order, but wants all measures taken for her . ICU consulted for further management and high risk for deterioration.  Positive troponin noted cardiology consulted , iv lasix given on admission , day 2 cr is rising , held , iv fluid initiated , cr cont to increase , nephrology consulted , CT of abd pelvis bilateral lung opacities likely aspiration  speech consulted started on puree diet 9/27 no liquid ,      1- Acute hypoxic respiratory failure  due to aspiration pneumonia , bilateral multifocal PNA   cont iv abx, supportive therapy   agressive Chest PT   chest vest   CT scan result reviewed with the wife   wean off oxygen as tolerate   cont neb   aspiartion precautions     2- Bilateral basal ganglia and R parietal infracts ,  cronic with recent stroke supected   neuro consult   PM and R input appreciated   pt is with declining condition per wife he is bed chair bound at home   she is reluctant to send him to [facility] on discharge   cont warfarin , statin     3- Hypokalemia  hypophosphatemia   IV phos , IV k and po supplement ordered   likely old with recent remote  stroke ?     4- ARF on CRF prerenal component   off diuretics   avoid nephrotoxic agents   cont iv fluid, cr is trending down   nephrology on board   strict intake out put     5- Diastolic CHF ,cronic   s/p iv lasix , no sign of fluid overload now   hold diuretics due to ARF     6- Severe protein wil malnutrition   add ensure pudding to meals   MVI     7- Atrial fib cronic with RBBB and  CAD   cont coreg with holding parameters resume dig   INR is over 4   will hold warfarin today     8- Nausea vomiting with constipation fecal impaction on CT scan   had BM   cont senna , miralax   monitor BM   laxative as needed      overall prognosis is poor   had long discussion with wife around 2 pm at the bedside   all questions answered

## 2021-09-29 NOTE — PROGRESS NOTE ADULT - ASSESSMENT
Aspiration s/p new parietal CVA  Bibasilar consolidations on CT  Improved with cough and drug nebs  High risk of recurrent aspirations  Chronic elevated BNP with diastolic dysfunction  Not hypoxic    Plan:    HOB raised  ABX  Drug nebs  Follow speech recommendations of 9/26    Follow CT for improvement  Prognosis guarded    d/w wife at bedside

## 2021-09-29 NOTE — PROGRESS NOTE ADULT - ASSESSMENT
66 year old man with prior stroke with right hemiparesis now with left sided weakness which began at least a few months ago according to his wife.     Stroke.   It appears he has had a new stroke affecting the left side.   Unclear exactly when onset was.   He has atrial fibrillation and has been on anticoagulation.    A fib.   Has been on Coumadin and appears to have had a new infarct.   OK for Heparin from neurologic standpoint if needed    Thank you for allowing me to participate in the care of your patient    Jordon Zamora MD, PhD   [id number]

## 2021-09-29 NOTE — PROGRESS NOTE ADULT - ASSESSMENT
Assessment:  Je Smith is a 66 year old man with past medical history of Permanent atrial fibrillation (prior history of LUC thrombus on SHANELLE) on warfarin, CVA with residual right-sided weakness (2012), Hypertension, Monomorphic ventricular tachycardia s/p CRT-D and Ischemic cardiomyopathy (LVEF 35-40% in 2019 with recovery in LVEF to 66%) who was brought in to hospital due to episode of projectile vomiting and with progressive fatigue and left-sided weakness, overall failure to thrive.    Cardiology consulted due to elevated troponin, which is 0.08 in the setting of CKD. Patient denies angina or dyspnea and does not appear to have an acute coronary syndrome at this time. BNP markedly elevated at 4000s which is less than prior ER visit this month. There is concern for possible aspiration.    Recommendations:  [] Bilateral pneumonia: CT chest consistent with pneumonia, antibiotic treatment per primary team  [] Elevated troponin: Minimally elevated, likely demand ischemia from CKD and possible aspiration. Awaiting echo. Wife states that patient would not want any invasive procedures, and she is his HCP.   [] Ischemic cardiomyopathy: Recent echo with preserved LVEF, awaiting repeat echo. Now restarted on low dose Coreg  [] Permanent atrial fibrillation: Has been restarted on coumadin, INR supratherapeutic today, would hold coumadin  [] Left-sided weakness: CT head reporting chronic b/l basal ganglia and right parietal infarcts. Neurology concerned for new stroke and onset appears unclear per Neuro    Thank you for the consult. We will continue to follow along.     Discussed with wife at bedside     Kale Duvall MD  Cardiology

## 2021-09-29 NOTE — PROGRESS NOTE ADULT - SUBJECTIVE AND OBJECTIVE BOX
Patient fatigued.  Does not want to move or have his arm moved because he reports pain.   Noted ongoing swelling.    REVIEW OF SYSTEMS  Constitutional - No fever,  +fatigue  Neurological - +loss of strength   Musculoskeletal - +joint pain, +joint swelling, +muscle pain    FUNCTIONAL PROGRESS  9/28 SLP  Speech Language Pathology Recommendations: 1. Puree, HONEY thick fluids, via TSPN ONLY 2. Plastic utensils only, as per spouse request 3. Strict 100% supervision/assist w/all meals4. Meds crushed in puree, as able 5. Upright for all PO/>30 minutes following6. Small bites/slow rate7. Oral care8. Rx nutrition consult for calorie count9. SLP to follow to check PO tolerance, a sschedule permits     9/28 PT  Therapeutic Exercise  Therapeutic Exercise Detail: b/l UE/LE AAROM in supine. Pt demonstrates increased AROM in LUE today as compared to yesterday. B/L shoulder flexion, b/l elbow flexion/ext, finger flex/ext, SLR, ankle pumps, passive gastroc stretch.     VITALS  T(C): 36.9 (09-29-21 @ 05:11), Max: 36.9 (09-28-21 @ 12:09)  HR: 78 (09-29-21 @ 05:11) (71 - 78)  BP: 105/67 (09-29-21 @ 05:11) (94/58 - 105/67)  RR: 18 (09-29-21 @ 05:11) (18 - 20)  SpO2: 96% (09-29-21 @ 05:11) (93% - 96%)  Wt(kg): --    MEDICATIONS   atorvastatin 40 milliGRAM(s) at bedtime  carvedilol 3.125 milliGRAM(s) every 12 hours  influenza   Vaccine 0.5 milliLiter(s) once  levalbuterol Inhalation 0.63 milliGRAM(s) every 6 hours  ondansetron Injectable 4 milliGRAM(s) every 8 hours PRN  pantoprazole  Injectable 40 milliGRAM(s) at bedtime  piperacillin/tazobactam IVPB.. 3.375 Gram(s) every 8 hours  senna 2 Tablet(s) at bedtime  sodium chloride 0.9%. 1000 milliLiter(s) <Continuous>      RECENT LABS/IMAGING                          11.1   10.41 )-----------( 299      ( 28 Sep 2021 03:33 )             35.4     09-29    142  |  104  |  44.3<H>  ----------------------------<  116<H>  2.6<LL>   |  21.0<L>  |  1.75<H>    Ca    8.3<L>      29 Sep 2021 07:01  Phos  1.9     09-29      PT/INR - ( 29 Sep 2021 07:01 )   PT: 47.6 sec;   INR: 4.40 ratio         CT ABD/PEL 9/26 - *  Severe rectal fecal impaction with evidence of stercoral proctitis. *  Mild gaseous distention of the remaining colon. No obstruction.    HEAD CT 9/26 - Mild volume loss, microvascular disease, no acute hemorrhage or midline shift. Chronic bilateral basal ganglia and right parietal infarcts.    CT CHEST 9/28 - Bilateral pneumonia, most severe at the lung bases. Moderate cardiomegaly. Pacemaker.    ----------------------------------------------------------------------------------------  PHYSICAL EXAM  Extremities - BUE erythema/dependent/pain on PROM, left>right  Neurologic Exam -                    Cognitive - Awake/Alert, Slowed processing     Communication - Expressive deficits, Dysarthria     Motor -                      LEFT    UE - ShAB 1/5, EF 0/5, EE 0/5,  1/5                    RIGHT UE - ShAB 0/5, EF 1/5, EE 1/5,  1/5                    LEFT    LE - HF 1/5, KE 1/5, DF 1/5                     RIGHT LE - HF 1/5, KE 1/5, DF 2/5      Sensory - Difficult to assess - appears intact     Coordination - FTN impaired  Psychiatric - Calm   ----------------------------------------------------------------------------------------  ASSESSMENT/PLAN  66yMale with functional deficits after new weakness  Old + New CVAs - Lipitor, TTE Planned  AFIB - Coumadin  PNA - Zosyn, Xopenex, NC   HTN - Coreg  Pain - Tylenol  Oral Dysphagia - Puree/Honey as per SLP 9/28  DVT PPX - SCDs  Rehab - Although patient may have new CVA's patient has significant deficits that would likely have limited improvement in given extent of previous functional status. Wife does not want MIGUEL. Recommend DC HOME with resumption of home care. Recommend CM/SW to discuss additional services available. Would continue to recommend Palliative Care.      Recommend ongoing mobilization by staff to maintain cardiopulmonary function and prevention of secondary complications related to debility.     Will sign off at this time. Thank you for allowing me to be part of your patient's care. Please reconsult PMR for additional rehab recommendations or dispo needs if functional status changes.     Discussed with rehab clinical care team.

## 2021-09-29 NOTE — PROGRESS NOTE ADULT - ASSESSMENT
PITA on CKD   Baseline creat 1.6 Now 3> 2.62>1.75  Recent CT noted , No Hydro   Episode of hypotension , ? ATN   Continue to hold Lasix   PITA pre-dated Zosyn   Continue IVF   Follow trend in labs     Chest CT - bilat PNA and Cardiomegaly - Pulm noted ; concerned re recurrent aspiration    ? new CVA    Will follow   Discussed with the wife at bedside

## 2021-09-29 NOTE — PROGRESS NOTE ADULT - SUBJECTIVE AND OBJECTIVE BOX
RUSH MARTINSHEREEN  [id number]      Chief Complaint: Failure to thrive/Possible aspiration/Pneumonia/History of CVA/Elevated troponin    Interval History: The patient is resting, in no distress.    Tele: ventricular paced, 70s BPM      Current meds:   atorvastatin 40 milliGRAM(s) Oral at bedtime  carvedilol 3.125 milliGRAM(s) Oral every 12 hours  influenza   Vaccine 0.5 milliLiter(s) IntraMuscular once  levalbuterol Inhalation 0.63 milliGRAM(s) Inhalation every 6 hours  ondansetron Injectable 4 milliGRAM(s) IV Push every 8 hours PRN  pantoprazole  Injectable 40 milliGRAM(s) IV Push at bedtime  piperacillin/tazobactam IVPB.. 3.375 Gram(s) IV Intermittent every 8 hours  potassium chloride    Tablet ER 40 milliEquivalent(s) Oral every 4 hours  potassium phosphate / sodium phosphate Powder (PHOS-NaK) 1 Packet(s) Oral two times a day  senna 2 Tablet(s) Oral at bedtime  sodium chloride 0.9%. 1000 milliLiter(s) IV Continuous <Continuous>      Objective:     Vital Signs:   T(C): 36.9 (09-29-21 @ 10:32), Max: 36.9 (09-28-21 @ 15:19)  HR: 83 (09-29-21 @ 10:32) (73 - 83)  BP: 102/67 (09-29-21 @ 10:32) (94/58 - 105/67)  RR: 18 (09-29-21 @ 10:32) (18 - 19)  SpO2: 98% (09-29-21 @ 10:32) (93% - 98%)  Wt(kg): --    PHYSICAL EXAM:  General: elderly man, minimally verbal, chronically ill appearing, resting  HEENT: sclera anicteric  Neck: supple  CVS: JVP ~ 9 cm H20, RRR, s1, s2, no murmurs  Chest: decreased breath sounds  Extremities: no lower extremity edema b/l  Neuro: awake, alert  Psych: normal affect      Labs:   29 Sep 2021 07:01    142    |  104    |  44.3   ----------------------------<  116    2.6     |  21.0   |  1.75     Ca    8.3        29 Sep 2021 07:01  Phos  1.9       29 Sep 2021 07:01  Mg     2.1       29 Sep 2021 07:01                            11.1   10.41 )-----------( 299      ( 28 Sep 2021 03:33 )             35.4     PT/INR - ( 29 Sep 2021 07:01 )   PT: 47.6 sec;   INR: 4.40 ratio           Outpatient TTE (9/10/21):  LVEF 66%, abnormal septal motion due to RV pacemaker  Mild aortic valve stenosis     ECG (9/25/21): artifact limits evaluation    CT Head (9/25/21):  HEAD CT: Mild volume loss, microvascular disease, no acute hemorrhage or midline shift.  Chronic bilateral basal ganglia and right parietal infarcts.    CT Chest (9/28/21):  Bilateral pneumonia, most severe at the lung bases.  Moderate cardiomegaly. Pacemaker.        Home Cardiac Meds:  Warfarin  Carvedilol 6.25 mg daily  Digoxin 0.125 mg QOD  Lasix 40 mg/20 mg alternating daily

## 2021-09-30 LAB
ANION GAP SERPL CALC-SCNC: 9 MMOL/L — SIGNIFICANT CHANGE UP (ref 5–17)
BUN SERPL-MCNC: 30.5 MG/DL — HIGH (ref 8–20)
CALCIUM SERPL-MCNC: 8.3 MG/DL — LOW (ref 8.6–10.2)
CHLORIDE SERPL-SCNC: 117 MMOL/L — HIGH (ref 98–107)
CO2 SERPL-SCNC: 21 MMOL/L — LOW (ref 22–29)
CREAT SERPL-MCNC: 1.36 MG/DL — HIGH (ref 0.5–1.3)
GLUCOSE BLDC GLUCOMTR-MCNC: 128 MG/DL — HIGH (ref 70–99)
GLUCOSE BLDC GLUCOMTR-MCNC: 144 MG/DL — HIGH (ref 70–99)
GLUCOSE BLDC GLUCOMTR-MCNC: 172 MG/DL — HIGH (ref 70–99)
GLUCOSE BLDC GLUCOMTR-MCNC: 181 MG/DL — HIGH (ref 70–99)
GLUCOSE SERPL-MCNC: 136 MG/DL — HIGH (ref 70–99)
HCT VFR BLD CALC: 31.3 % — LOW (ref 39–50)
HGB BLD-MCNC: 9.4 G/DL — LOW (ref 13–17)
INR BLD: 4.71 RATIO — HIGH (ref 0.88–1.16)
LACTATE BLDV-MCNC: 2.4 MMOL/L — HIGH (ref 0.5–2)
MCHC RBC-ENTMCNC: 23.4 PG — LOW (ref 27–34)
MCHC RBC-ENTMCNC: 30 GM/DL — LOW (ref 32–36)
MCV RBC AUTO: 77.9 FL — LOW (ref 80–100)
PHOSPHATE SERPL-MCNC: 2 MG/DL — LOW (ref 2.4–4.7)
PLATELET # BLD AUTO: 329 K/UL — SIGNIFICANT CHANGE UP (ref 150–400)
POTASSIUM SERPL-MCNC: 4.1 MMOL/L — SIGNIFICANT CHANGE UP (ref 3.5–5.3)
POTASSIUM SERPL-SCNC: 4.1 MMOL/L — SIGNIFICANT CHANGE UP (ref 3.5–5.3)
PROCALCITONIN SERPL-MCNC: 1.79 NG/ML — HIGH (ref 0.02–0.1)
PROTHROM AB SERPL-ACNC: 50.7 SEC — HIGH (ref 10.6–13.6)
RAPID RVP RESULT: SIGNIFICANT CHANGE UP
RBC # BLD: 4.02 M/UL — LOW (ref 4.2–5.8)
RBC # FLD: 16.2 % — HIGH (ref 10.3–14.5)
SARS-COV-2 RNA SPEC QL NAA+PROBE: SIGNIFICANT CHANGE UP
SODIUM SERPL-SCNC: 147 MMOL/L — HIGH (ref 135–145)
URATE SERPL-MCNC: 7.4 MG/DL — HIGH (ref 3.4–7)
WBC # BLD: 10.92 K/UL — HIGH (ref 3.8–10.5)
WBC # FLD AUTO: 10.92 K/UL — HIGH (ref 3.8–10.5)

## 2021-09-30 PROCEDURE — 99233 SBSQ HOSP IP/OBS HIGH 50: CPT

## 2021-09-30 PROCEDURE — 99232 SBSQ HOSP IP/OBS MODERATE 35: CPT

## 2021-09-30 PROCEDURE — 73110 X-RAY EXAM OF WRIST: CPT | Mod: 26,LT

## 2021-09-30 PROCEDURE — 93306 TTE W/DOPPLER COMPLETE: CPT | Mod: 26

## 2021-09-30 PROCEDURE — 99223 1ST HOSP IP/OBS HIGH 75: CPT

## 2021-09-30 RX ORDER — POTASSIUM PHOSPHATE, MONOBASIC POTASSIUM PHOSPHATE, DIBASIC 236; 224 MG/ML; MG/ML
15 INJECTION, SOLUTION INTRAVENOUS ONCE
Refills: 0 | Status: COMPLETED | OUTPATIENT
Start: 2021-09-30 | End: 2021-09-30

## 2021-09-30 RX ORDER — ASCORBIC ACID 60 MG
500 TABLET,CHEWABLE ORAL
Refills: 0 | Status: DISCONTINUED | OUTPATIENT
Start: 2021-09-30 | End: 2021-10-05

## 2021-09-30 RX ORDER — SODIUM CHLORIDE 9 MG/ML
250 INJECTION INTRAMUSCULAR; INTRAVENOUS; SUBCUTANEOUS ONCE
Refills: 0 | Status: DISCONTINUED | OUTPATIENT
Start: 2021-09-30 | End: 2021-10-01

## 2021-09-30 RX ORDER — ACETAMINOPHEN 500 MG
650 TABLET ORAL EVERY 6 HOURS
Refills: 0 | Status: DISCONTINUED | OUTPATIENT
Start: 2021-09-30 | End: 2021-10-05

## 2021-09-30 RX ORDER — ACETAMINOPHEN 500 MG
1000 TABLET ORAL ONCE
Refills: 0 | Status: COMPLETED | OUTPATIENT
Start: 2021-09-30 | End: 2021-09-30

## 2021-09-30 RX ORDER — LACTOBACILLUS ACIDOPHILUS 100MM CELL
1 CAPSULE ORAL
Refills: 0 | Status: DISCONTINUED | OUTPATIENT
Start: 2021-09-30 | End: 2021-10-05

## 2021-09-30 RX ORDER — VANCOMYCIN HCL 1 G
1000 VIAL (EA) INTRAVENOUS ONCE
Refills: 0 | Status: COMPLETED | OUTPATIENT
Start: 2021-09-30 | End: 2021-09-30

## 2021-09-30 RX ADMIN — Medication 400 MILLIGRAM(S): at 16:33

## 2021-09-30 RX ADMIN — POTASSIUM PHOSPHATE, MONOBASIC POTASSIUM PHOSPHATE, DIBASIC 62.5 MILLIMOLE(S): 236; 224 INJECTION, SOLUTION INTRAVENOUS at 10:57

## 2021-09-30 RX ADMIN — ATORVASTATIN CALCIUM 40 MILLIGRAM(S): 80 TABLET, FILM COATED ORAL at 21:24

## 2021-09-30 RX ADMIN — SENNA PLUS 2 TABLET(S): 8.6 TABLET ORAL at 21:24

## 2021-09-30 RX ADMIN — SODIUM CHLORIDE 70 MILLILITER(S): 9 INJECTION INTRAMUSCULAR; INTRAVENOUS; SUBCUTANEOUS at 21:31

## 2021-09-30 RX ADMIN — CARVEDILOL PHOSPHATE 3.12 MILLIGRAM(S): 80 CAPSULE, EXTENDED RELEASE ORAL at 05:27

## 2021-09-30 RX ADMIN — Medication 250 MILLIGRAM(S): at 17:35

## 2021-09-30 RX ADMIN — Medication 250 MILLIGRAM(S): at 18:24

## 2021-09-30 RX ADMIN — SODIUM CHLORIDE 70 MILLILITER(S): 9 INJECTION INTRAMUSCULAR; INTRAVENOUS; SUBCUTANEOUS at 18:24

## 2021-09-30 RX ADMIN — PIPERACILLIN AND TAZOBACTAM 25 GRAM(S): 4; .5 INJECTION, POWDER, LYOPHILIZED, FOR SOLUTION INTRAVENOUS at 14:22

## 2021-09-30 RX ADMIN — Medication 500 MILLIGRAM(S): at 21:24

## 2021-09-30 RX ADMIN — LEVALBUTEROL 0.63 MILLIGRAM(S): 1.25 SOLUTION, CONCENTRATE RESPIRATORY (INHALATION) at 03:42

## 2021-09-30 RX ADMIN — Medication 1 PACKET(S): at 05:27

## 2021-09-30 RX ADMIN — CARVEDILOL PHOSPHATE 3.12 MILLIGRAM(S): 80 CAPSULE, EXTENDED RELEASE ORAL at 17:36

## 2021-09-30 RX ADMIN — Medication 1 PACKET(S): at 17:36

## 2021-09-30 RX ADMIN — PANTOPRAZOLE SODIUM 40 MILLIGRAM(S): 20 TABLET, DELAYED RELEASE ORAL at 21:24

## 2021-09-30 RX ADMIN — PIPERACILLIN AND TAZOBACTAM 25 GRAM(S): 4; .5 INJECTION, POWDER, LYOPHILIZED, FOR SOLUTION INTRAVENOUS at 05:28

## 2021-09-30 RX ADMIN — PIPERACILLIN AND TAZOBACTAM 25 GRAM(S): 4; .5 INJECTION, POWDER, LYOPHILIZED, FOR SOLUTION INTRAVENOUS at 21:25

## 2021-09-30 NOTE — DIETITIAN INITIAL EVALUATION ADULT. - ORAL INTAKE PTA/DIET HISTORY
Per Pts wife, weight has been relatively stable ~135lbs. Pt with good PO intake at home, tolerates pureed diet well. Pt receives meals from Mom's meals along with wife preparing meals. Aware specific dietary requests, discussed with diet ambassadors to review menu daily for Pt preferences. Wife requesting Ensure Pudding and Ensure Enlive. SLP following.

## 2021-09-30 NOTE — DIETITIAN INITIAL EVALUATION ADULT. - OTHER INFO
66 year old male w hx CVA (2012) w/R. sided weakness and dysphagia, permanent AF with slow ventricular response (prior hx LA thrombus on SHANELLE), RBBB, monomorphic VT (5/2014) s/p MDT dual chamber ICD with upgrade to CRT-D (1/2016)and generator change , CKD III, HTN came to ED by EMS after projectile emesis, presumed aspiration PNA. Since stroke pt has used pureed w thickened agent vs soft diet. His wife also reports that since the pacemaker battery change in April of 2020, he has been in the hospital almost every 3 months.   Wife reports that since  admission at [facility] he has been unable to use his L side.  She reports that she was told this was due to sepsis because of L arm cellulitis. OT/PT in home but without improvement. Wife reports he is usually verbal but has not been himself, can no longer stand so she lifts him into wheelchair. Was seen in [facility] on 09-05 for IV hydration and was discharged home.  She then reports that he was in heart failure and should have been treated in hospital.   At this admission: Patient had a CT head w/o contrast which showed chronic bilateral basal ganglia infarcts and right parietal infarcts, per neuro unclear when stroke occurred. Renal insufficiency noted, nephrology following. Fecal impaction on CT scan, resolved. Fecal incontinence per flowsheets, last documented BM 9/30.

## 2021-09-30 NOTE — CONSULT NOTE ADULT - SUBJECTIVE AND OBJECTIVE BOX
INFECTIOUS DISEASES AND INTERNAL MEDICINE at [place]  =======================================================  Nabil Can MD  Diplomates American Board of Internal Medicine and Infectious Diseases  Telephone [phone]  Fax            [phone]  =======================================================    RUSH [id number]      HPI:  66 year old male w hx CVA w R sided weakness and dysphagia 2012, permanent AF with slow ventricular  response (prior hx LA thrombus on SHANELLE) on warfarin, RBBB, monomorphic VT (5/2014) s/p MDT dual chamber ICD with upgrade to CRT-D (1/2016)and generator change , CKD III, HTN came to ED by EMS after projectile emesis    Since stroke pt has used pureed w thickened agent vs soft diet  today while at lunch w wife, had projectile vomiting of food  has been moving his bowels daily; she did report that he had emesis once before when he was severely constipated  did not complaint to her of pain    Wife reports that since  admission at [facility] he has been unable to use his L side.  She reports that she was told this was due to sepsis because of L arm cellulitis. ( treated with zosyn, vancomycin, linezolid and discontinued from Lasix per wife to ED)  He is seen weekly by his PCP in home  OT/PT in home but without improvement  Wife reports he is usually verbal but has not been himself  Can no longer stand so she lifts him into wheelchair    Was seen in [facility] on 09-05 for IV hydration and was discharged home.  She then reports that he was in heart failure and should have been treated in hospital.  PCP adjusted diuretics     In ED /65      RR 18   T 97.5   97% sat 3 L nc  EMS states sat 92% so was placed on O2  ceftriaxone for presumed asp PNA   PT PLACED   ON IV ZOSYN FOR PNEUMONIA   AND DEVELOPED FEVERS THIS AFTERNOON  ASKED TO EVALUATE FROM ID STANDPOINT       PAST MEDICAL HX  AICD (automatic cardioverter/defibrillator) present 2012,  Atrial fibrillation: chronic  CAD (coronary artery disease)  Cardiomyopathy : ischemia  Chronic systolic CHF (congestive heart failure)(LVEF 35-40% in 2019 with recovery in LVEF to 66% 09-)   CVA (cerebral vascular accident) 2012 w R sided weakness and dysphagia  HLD hyperlipidemia   HTN hypertension  Monomorphic ventricular tachycardia s/p CRT-D   RBBB   Thrombus of left atrial appendage.       PAST SURGICAL HX  AICD w generator change  Dr. Marte  AICD (automatic cardioverter/defibrillator) present   [place]ul filter in place no h/o dvt and as per wife  it was placed as a precaution after cva.     FAMILY HX  Family history of cerebrovascular accident (CVA): mother      SOCIAL HX    wife takes care of him 24/7  never smoker     (25 Sep 2021 19:52)      PAST MEDICAL & SURGICAL HISTORY:  CVA (cerebral vascular accident)    Hypertension    Hyperlipidemia    Cardiomyopathy    RBBB    Atrial fibrillation    Thrombus of left atrial appendage    AICD (automatic cardioverter/defibrillator) present    Saul filter in place  no h/o dvt and as per wife  it was placed as a precaution after cva.    AICD (automatic cardioverter/defibrillator) present        ANTIBIOTICS  piperacillin/tazobactam IVPB.. 3.375 Gram(s) IV Intermittent every 8 hours  vancomycin  IVPB 1000 milliGRAM(s) IV Intermittent once      Allergies    IV Contrast (Other)  latex (Rash)    Intolerances        SOCIAL HISTORY:     FAMILY HX   FAMILY HISTORY:  Family history of cerebrovascular accident (CVA)        Vital Signs Last 24 Hrs  T(C): 36.9 (30 Sep 2021 16:39), Max: 38.7 (30 Sep 2021 16:20)  T(F): 98.5 (30 Sep 2021 16:39), Max: 101.6 (30 Sep 2021 16:20)  HR: 105 (30 Sep 2021 16:39) (69 - 105)  BP: 93/64 (30 Sep 2021 16:39) (93/64 - 121/79)  BP(mean): --  RR: 18 (30 Sep 2021 16:39) (16 - 18)  SpO2: 94% (30 Sep 2021 16:39) (94% - 100%)  Drug Dosing Weight  Height (cm): 170.2 (25 Sep 2021 14:23)  Weight (kg): 61.2 (25 Sep 2021 14:23)  BMI (kg/m2): 21.1 (25 Sep 2021 14:23)  BSA (m2): 1.71 (25 Sep 2021 14:23)      REVIEW OF SYSTEMS:    CONSTITUTIONAL:  As per HPI.    HEENT:  Eyes:  No diplopia or blurred vision. ENT:  No earache, sore throat or runny nose.    CARDIOVASCULAR:  No pressure, squeezing, strangling, tightness, heaviness or aching about the chest, neck, axilla or epigastrium.    RESPIRATORY:  No cough, shortness of breath, PND or orthopnea.    GASTROINTESTINAL:  No nausea, vomiting or diarrhea.    GENITOURINARY:  No dysuria, frequency or urgency.    MUSCULOSKELETAL:  As per HPI.        NEUROLOGIC:  No paresthesias, fasciculations, seizures or weakness.                  PHYSICAL EXAMINATION:    GENERAL: The patient is a _____in no apparent distress. ___     VITAL SIGNS: T(C): 36.9 (09-30-21 @ 16:39), Max: 38.7 (09-30-21 @ 16:20)  HR: 105 (09-30-21 @ 16:39) (69 - 105)  BP: 93/64 (09-30-21 @ 16:39) (93/64 - 121/79)  RR: 18 (09-30-21 @ 16:39) (16 - 18)  SpO2: 94% (09-30-21 @ 16:39) (94% - 100%)  Wt(kg): --    HEENT: Head is normocephalic and atraumatic.  ANICTERIC  NECK: Supple. No carotid bruits.  No lymphadenopathy or thyromegaly.    LUNGS:COARSE BREATH SOUNDS    HEART: Regular rate and rhythm without murmur.    ABDOMEN: Soft, nontender, and nondistended.  Positive bowel sounds.  No hepatosplenomegaly was noted. NO REBOUND NO GUARDING    EXTREMITIES: LEFT WRIST TENDERNESS     NEUROLOGIC :RIGHT HEMIPLEGIA      SKIN: No ulceration or induration present. NO RASH        BLOOD CULTURES  Culture Results:   No growth at 48 hours (09-27 @ 12:12)       URINE CX          LABS:                        9.4    10.92 )-----------( 329      ( 30 Sep 2021 17:59 )             31.3     09-30    147<H>  |  117<H>  |  30.5<H>  ----------------------------<  136<H>  4.1   |  21.0<L>  |  1.36<H>    Ca    8.3<L>      30 Sep 2021 07:12  Phos  2.0     09-30  Mg     2.1     09-29      PT/INR - ( 30 Sep 2021 07:12 )   PT: 50.7 sec;   INR: 4.71 ratio               RADIOLOGY & ADDITIONAL STUDIES:      ASSESSMENT/PLAN  66 year old male w hx CVA w R sided weakness and dysphagia 2012, permanent AF with slow ventricular  response (prior hx LA thrombus on SHANELLE) on warfarin, RBBB, monomorphic VT (5/2014) s/p MDT dual chamber ICD with upgrade to CRT-D (1/2016)and generator change , CKD III, HTN came to ED by EMS after projectile emesis    Since stroke pt has used pureed w thickened agent vs soft diet  today while at lunch w wife, had projectile vomiting of food  has been moving his bowels daily; she did report that he had emesis once before when he was severely constipated  did not complaint to her of pain    Wife reports that since  admission at [facility] he has been unable to use his L side.  She reports that she was told this was due to sepsis because of L arm cellulitis. ( treated with zosyn, vancomycin, linezolid and discontinued from Lasix per wife to ED)  He is seen weekly by his PCP in home  OT/PT in home but without improvement  Wife reports he is usually verbal but has not been himself  Can no longer stand so she lifts him into wheelchair    Was seen in [facility] on 09-05 for IV hydration and was discharged home.  She then reports that he was in heart failure and should have been treated in hospital.  PCP adjusted diuretics     In ED /65      RR 18   T 97.5   97% sat 3 L nc  EMS states sat 92% so was placed on O2  ceftriaxone for presumed asp PNA   PT PLACED   ON IV ZOSYN FOR PNEUMONIA   AND DEVELOPED FEVERS THIS AFTERNOON  PT WITH LEFT WRIST PAIN CONCERN FOR ? GOUT VS INFECTION  SUGGEST XRAY   WILL ORDER URIC ACID  WILL RECOMMEND ORTHO EVAL FOR WRIST ? Aspiration  WILL FOLLOWUP   CAN CONTINUE IV ABX FOR NOW  LONG D/W WIFE AT [facility]                 HARLEEN PERRY MD

## 2021-09-30 NOTE — DIETITIAN INITIAL EVALUATION ADULT. - ETIOLOGY
related to inability to consume sufficient protein-energy needs 2/2 aspiration PNA, hx CVA with residual weakness and dysphagia, bedbound, impaired skin integrity

## 2021-09-30 NOTE — DIETITIAN INITIAL EVALUATION ADULT. - PERTINENT LABORATORY DATA
09-30 Na147 mmol/L<H> Glu 136 mg/dL<H> K+ 4.1 mmol/L Cr  1.36 mg/dL<H> BUN 30.5 mg/dL<H> Phos 2.0 mg/dL<L> Alb n/a   PAB n/a

## 2021-09-30 NOTE — DIETITIAN NUTRITION RISK NOTIFICATION - ADDITIONAL COMMENTS/DIETITIAN RECOMMENDATIONS
1) Add Ensure Enlive, honey thickened  2) Rx MVI and vit C 500mg daily  3) Patrice BID to facilitate wound healing   4) Obtain/provide food preferences daily to inc PO   4) Monitor weights daily for trend/accuracy

## 2021-09-30 NOTE — PROGRESS NOTE ADULT - SUBJECTIVE AND OBJECTIVE BOX
PULMONARY PROGRESS NOTE      RUSH CARDENAS  [id number]    Patient is a 66y old  Male who presents with a chief complaint of CVA timing unclear of newer R parietal CVA  emesis poss constipation/fecal impaction (29 Sep 2021 16:33)      INTERVAL HPI/OVERNIGHT EVENTS:    Pt is comfortable  Off O2    MEDICATIONS  (STANDING):  atorvastatin 40 milliGRAM(s) Oral at bedtime  carvedilol 3.125 milliGRAM(s) Oral every 12 hours  influenza   Vaccine 0.5 milliLiter(s) IntraMuscular once  pantoprazole  Injectable 40 milliGRAM(s) IV Push at bedtime  piperacillin/tazobactam IVPB.. 3.375 Gram(s) IV Intermittent every 8 hours  polyethylene glycol 3350 17 Gram(s) Oral daily  potassium phosphate / sodium phosphate Powder (PHOS-NaK) 1 Packet(s) Oral two times a day  saccharomyces boulardii 250 milliGRAM(s) Oral two times a day  senna 2 Tablet(s) Oral at bedtime  sodium chloride 0.9%. 1000 milliLiter(s) (70 mL/Hr) IV Continuous <Continuous>      MEDICATIONS  (PRN):  ondansetron Injectable 4 milliGRAM(s) IV Push every 8 hours PRN Nausea and/or Vomiting      Allergies    IV Contrast (Other)  latex (Rash)    Intolerances        PAST MEDICAL & SURGICAL HISTORY:  CVA (cerebral vascular accident)    Hypertension    Hyperlipidemia    Cardiomyopathy    RBBB    Atrial fibrillation    Thrombus of left atrial appendage    AICD (automatic cardioverter/defibrillator) present    [place] filter in place  no h/o dvt and as per wife  it was placed as a precaution after cva.    AICD (automatic cardioverter/defibrillator) present          REVIEW OF SYSTEMS: Pt unable to provide    Vital Signs Last 24 Hrs  T(C): 36.9 (30 Sep 2021 11:25), Max: 37.2 (29 Sep 2021 16:36)  T(F): 98.4 (30 Sep 2021 11:25), Max: 99 (29 Sep 2021 23:37)  HR: 89 (30 Sep 2021 11:25) (69 - 89)  BP: 98/60 (30 Sep 2021 11:25) (98/60 - 121/79)  BP(mean): --  RR: 17 (30 Sep 2021 11:25) (16 - 18)  SpO2: 95% (30 Sep 2021 11:25) (94% - 100%)    PHYSICAL EXAMINATION:    GENERAL: The patient is awake in no apparent distress.     HEENT: Head is normocephalic and atraumatic.     NECK: Supple.    LUNGS: Clear to auscultation without wheezing, rales or rhonchi; respirations unlabored    HEART: Regular rate and rhythm without murmur.    ABDOMEN: Soft, nontender, and nondistended.      EXTREMITIES: Without any cyanosis, clubbing, rash, lesions or edema.    NEUROLOGIC: Post prior CVA.    LABS:    09-30    147<H>  |  117<H>  |  30.5<H>  ----------------------------<  136<H>  4.1   |  21.0<L>  |  1.36<H>    Ca    8.3<L>      30 Sep 2021 07:12  Phos  2.0     09-30  Mg     2.1     09-29      PT/INR - ( 30 Sep 2021 07:12 )   PT: 50.7 sec;   INR: 4.71 ratio           Procalcitonin, Serum: 1.79 ng/mL (09-30-21 @ 07:12)      MICROBIOLOGY:    COVID-19 PCR . (09.25.21 @ 22:14)    COVID-19 PCR: NotDetec: You can help in the fight against COVID-19. [employer] [facility] may contact  you to see if you are interested in voluntarily participating in one of  our clinical trials.  Testing is performed using polymerase chain reaction (PCR) or  transcription mediated amplification (TMA). This COVID-19 (SARS-CoV-2)  nucleic acid amplification test was validated by [employer] and is  in use under the FDA Emergency Use Authorization (EUA) for clinical labs  CLIA-certified to perform high complexity testing. Test results should be  correlated with clinical presentation, patient history, and epidemiology.    Culture - Blood (09.27.21 @ 12:12)    Specimen Source: .Blood Blood-Peripheral    Culture Results:   No growth at 48 hours        RADIOLOGY & ADDITIONAL STUDIES:       EXAM:  CT CHEST                          PROCEDURE DATE:  09/28/2021          INTERPRETATION:  CLINICAL INFORMATION: 66 years  Male with pna.    COMPARISON: 5/29/2020    CONTRAST/COMPLICATIONS:  IV Contrast: NONE  Oral Contrast: NONE  Complications: None reported at time of study completion    PROCEDURE:  CT of the Chest was performed.  Sagittal and coronal reformats were performed.    FINDINGS:    LUNGS AND AIRWAYS: Patent central airways.  Bilateral lower lobe consolidation. Patchy bilateralupper lobe airspace infiltrates.  PLEURA: No pleural effusion.  MEDIASTINUM AND PRINCE: Shotty subcentimeter mediastinal lymph nodes. Limited evaluation of prince without IV contrast.  VESSELS: Aortic and coronary atherosclerosis.  HEART: Moderate cardiomegaly. Pacemaker leads in the right atrium and right ventricle. No pericardial effusion.  CHEST WALL AND LOWER NECK: Pacemaker pulse generator in the left chest wall.  VISUALIZED UPPER ABDOMEN: Partially visualized IVC filter.  BONES: Within normal limits.    IMPRESSION:  Bilateral pneumonia, most severe at the lung bases.    Moderate cardiomegaly. Pacemaker.          JILL GRAHAM MD; Attending Radiologist  This document has been electronically signed. Sep 28 2021  8:38PM

## 2021-09-30 NOTE — PROGRESS NOTE ADULT - SUBJECTIVE AND OBJECTIVE BOX
NEPHROLOGY INTERVAL HPI/OVERNIGHT EVENTS:    Examined  No distress    MEDICATIONS  (STANDING):  ascorbic acid 500 milliGRAM(s) Oral two times a day  atorvastatin 40 milliGRAM(s) Oral at bedtime  carvedilol 3.125 milliGRAM(s) Oral every 12 hours  influenza   Vaccine 0.5 milliLiter(s) IntraMuscular once  pantoprazole  Injectable 40 milliGRAM(s) IV Push at bedtime  piperacillin/tazobactam IVPB.. 3.375 Gram(s) IV Intermittent every 8 hours  potassium phosphate / sodium phosphate Powder (PHOS-NaK) 1 Packet(s) Oral two times a day  saccharomyces boulardii 250 milliGRAM(s) Oral two times a day  senna 2 Tablet(s) Oral at bedtime  sodium chloride 0.9%. 1000 milliLiter(s) (70 mL/Hr) IV Continuous <Continuous>  vancomycin  IVPB 1000 milliGRAM(s) IV Intermittent once    MEDICATIONS  (PRN):  acetaminophen   Tablet .. 650 milliGRAM(s) Oral every 6 hours PRN Temp greater or equal to 38C (100.4F)  ondansetron Injectable 4 milliGRAM(s) IV Push every 8 hours PRN Nausea and/or Vomiting      Allergies    IV Contrast (Other)  latex (Rash)    Intolerances        Vital Signs Last 24 Hrs  T(C): 36.9 (30 Sep 2021 16:39), Max: 38.7 (30 Sep 2021 16:20)  T(F): 98.5 (30 Sep 2021 16:39), Max: 101.6 (30 Sep 2021 16:20)  HR: 105 (30 Sep 2021 16:39) (69 - 105)  BP: 93/64 (30 Sep 2021 16:39) (93/64 - 121/79)  BP(mean): --  RR: 18 (30 Sep 2021 16:39) (16 - 18)  SpO2: 94% (30 Sep 2021 16:39) (94% - 100%)  Daily     Daily     PHYSICAL EXAM:  GENERAL: NAD, Frail  EYES:  conjunctiva and sclera clear  NECK: Supple, No JVD/Bruit  NERVOUS SYSTEM:  A/O; L weakness +,   CHEST:  No rales, few rhonchi, on NC O2  HEART:  RRR, No murmur  ABDOMEN: Soft, NT/ND BS+  EXTREMITIES:  + Edema    LABS:                        9.4    10.92 )-----------( 329      ( 30 Sep 2021 17:59 )             31.3     09-30    147<H>  |  117<H>  |  30.5<H>  ----------------------------<  136<H>  4.1   |  21.0<L>  |  1.36<H>    Ca    8.3<L>      30 Sep 2021 07:12  Phos  2.0     09-30  Mg     2.1     09-29      PT/INR - ( 30 Sep 2021 07:12 )   PT: 50.7 sec;   INR: 4.71 ratio             Phosphorus Level, Serum: 2.0 mg/dL (09-30 @ 07:12)          RADIOLOGY & ADDITIONAL TESTS:

## 2021-09-30 NOTE — DIETITIAN NUTRITION RISK NOTIFICATION - BUCCAL DEPLETION IS
Nutrition Services    Patient Name:  Lydia Hendrix  YOB: 1991  MRN: [id number]  Admit Date:  8/22/2021    LOS x 7 days. Patient continues to have variable po intake, but is currently receiving Ensure Enlive TID to promote calorie and protein intake, providing an additional 1050 kcal and 60 g protein daily. Diet and supplements are adequate to meet patient's estimated energy needs. RD will continue to monitor and follow per protocol.    Electronically signed by:  Edgardo Moraes RD  09/01/21 12:39 EDT  
severe

## 2021-09-30 NOTE — PROGRESS NOTE ADULT - SUBJECTIVE AND OBJECTIVE BOX
Patient is aspiration  PNA , arf , constipation     seen earlier today , on oxygen 1 liter via NC this am     pt is awake non verbal , no dyspnea       Vital Signs Last 24 Hrs  T(C): 36.9 (30 Sep 2021 11:25), Max: 37.2 (29 Sep 2021 16:36)  T(F): 98.4 (30 Sep 2021 11:25), Max: 99 (29 Sep 2021 23:37)  HR: 89 (30 Sep 2021 11:25) (69 - 89)  BP: 98/60 (30 Sep 2021 11:25) (98/60 - 121/79)  BP(mean): --  RR: 17 (30 Sep 2021 11:25) (16 - 18)  SpO2: 95% (30 Sep 2021 11:25) (94% - 100%)      PHYSICAL EXAM:  General: awake looks ill, no resp distress , + intermittent cough noted   Lungs: bilateral rhonchi diminished BS   Cardio: RRR, S1/S2, No murmur  Abdomen: Soft, Nontender, Nondistended; Bowel sounds present  Extremities: No calf tenderness, No pitting edema  Neuro generalized weakness , right and left sided weakness  left hemiplegia   speech very limited aphasia                09-30    147<H>  |  117<H>  |  30.5<H>  ----------------------------<  136<H>  4.1   |  21.0<L>  |  1.36<H>    Ca    8.3<L>      30 Sep 2021 07:12  Phos  2.0     09-30  Mg     2.1     09-29    INR 4.70 Patient is aspiration  PNA , arf , constipation     seen earlier today , on oxygen 1 liter via NC this am     pt is awake non verbal , no dyspnea     wife at the bedside     Vital Signs Last 24 Hrs  T(C): 36.9 (30 Sep 2021 11:25), Max: 37.2 (29 Sep 2021 16:36)  T(F): 98.4 (30 Sep 2021 11:25), Max: 99 (29 Sep 2021 23:37)  HR: 89 (30 Sep 2021 11:25) (69 - 89)  BP: 98/60 (30 Sep 2021 11:25) (98/60 - 121/79)  BP(mean): --  RR: 17 (30 Sep 2021 11:25) (16 - 18)  SpO2: 95% (30 Sep 2021 11:25) (94% - 100%)      PHYSICAL EXAM:  General: awake, ill looking  , + intermittent cough noted   Lungs: bilateral rhonchi diminished BS   Cardio: RRR, S1/S2, No murmur  Abdomen: Soft, Nontender, Nondistended; Bowel sounds present  Extremities: No calf tenderness, No pitting edema  Neuro generalized weakness , right and left sided weakness  left hemiplegia   speech  limited aphasia     09-30    147<H>  |  117<H>  |  30.5<H>  ----------------------------<  136<H>  4.1   |  21.0<L>  |  1.36<H>    Ca    8.3<L>      30 Sep 2021 07:12  Phos  2.0     09-30  Mg     2.1     09-29

## 2021-09-30 NOTE — PROGRESS NOTE ADULT - ASSESSMENT
This is a 66 M PMH CVA w R sided weakness and dysphagia 2012, AF with slow ventricular response (prior hx LA thrombus on SHANELLE) on warfarin, RBBB, monomorphic VT (5/2014) s/p MDT dual chamber ICD with upgrade to CRT-D (1/2016)and generator change , CKD III, HTN and stercoral colitis who was BIBEMS after projectile emesis.  Patient at baseline nonverbal and bedbound.  Information gathered by wife.  Wife states that he has had multiple episodes of vomiting.  He was admitted to medicine for presumed aspiration PNA.  While admitted, patient with escalating oxygen requirements with saturations in the mid to high 80's.Pt is on high flow oxygen now . Multiple lengthy GOC discussions conducted by Dr. Carr with wife and PCP.  Wife has MOLST form at home which includes a DNR order, but wants all measures taken for her . ICU consulted for further management and high risk for deterioration.  Positive troponin noted cardiology consulted , iv lasix given on admission , day 2 cr is rising , held , iv fluid initiated , cr cont to increase , nephrology consulted , CT of abd pelvis bilateral lung opacities likely aspiration  speech consulted started on puree diet 9/27 no liquid ,      1- Acute hypoxic respiratory failure  due to aspiration pneumonia , bilateral multifocal PNA   cont iv abx, supportive therapy   agressive Chest PT   chest vest   CT scan result reviewed with the wife   wean off oxygen as tolerate   cont neb   aspiartion precautions     2- Bilateral basal ganglia and R parietal infracts ,  cronic with recent stroke supected   neuro consult   PM and R input appreciated   pt is with declining condition per wife he is bed chair bound at home   she is reluctant to send him to [facility] on discharge   cont warfarin , statin     3- Hypokalemia  hypophosphatemia   IV phos , IV k and po supplement ordered   likely old with recent remote  stroke ?     4- ARF on CRF prerenal component   off diuretics   avoid nephrotoxic agents   cont iv fluid, cr is trending down   nephrology on board   strict intake out put     5- Diastolic CHF ,cronic   s/p iv lasix , no sign of fluid overload now   hold diuretics due to ARF     6- Severe protein wil malnutrition   add ensure pudding to meals   MVI     7- Atrial fib cronic with RBBB and  CAD   cont coreg with holding parameters resume dig   INR is over 4   will hold warfarin today     8- Nausea vomiting with constipation fecal impaction on CT scan   had BM   cont senna , miralax   monitor BM   laxative as needed      overall prognosis is poor   had long discussion with wife around 2 pm at the bedside   all questions answered    This is a 66 M PMH CVA w R sided weakness and dysphagia 2012, AF with slow ventricular response (prior hx LA thrombus on SHANELLE) on warfarin, RBBB, monomorphic VT (5/2014) s/p MDT dual chamber ICD with upgrade to CRT-D (1/2016)and generator change , CKD III, HTN and stercoral colitis who was BIBEMS after projectile emesis.  Patient at baseline nonverbal and bedbound.  Information gathered by wife.  Wife states that he has had multiple episodes of vomiting.  He was admitted to medicine for presumed aspiration PNA.  While admitted, patient with escalating oxygen requirements with saturations in the mid to high 80's.Pt is on high flow oxygen now . Multiple lengthy GOC discussions conducted by Dr. Carr with wife and PCP.  Wife has MOLST form at home which includes a DNR order, but wants all measures taken for her . ICU consulted for further management and high risk for deterioration.  Positive troponin noted cardiology consulted , iv lasix given on admission , day 2 cr is rising , held , iv fluid initiated , cr cont to increase , nephrology consulted , CT of abd pelvis bilateral lung opacities likely aspiration  speech consulted started on puree diet 9/27 no liquid ,      1- Acute hypoxic respiratory failure  due to aspiration pneumonia , diffuse bilateral multifocal PNA   cont iv abx, supportive therapy   agressive Chest PT   off oxygen hypoxia improving   aspiration precautions , keep head elevated all the times       2- Bilateral basal ganglia and R parietal infracts ,  cronic with recent stroke supected   neuro input appreciated   stable   cont PT   at baseline pt is chair and bed bound   pt is with declining condition   cont warfarin , statin     3- Hypokalemia / hypophosphatemia / hypernatremia   replaced , iv phos , K given   monitor BMP in am     4- ARF on CRF prerenal component   off diuretics   avoid nephrotoxic agents   cont iv fluid, cr is trending down   nephrology on board   resume lasix soon     5- Diastolic CHF ,cronic   s/p iv lasix on admission   diuretics on hold due to dehydration worsening cr   improved ARF   cardiology input appreciated   resume diuretics in 1-2 days   Na is up     6- Severe protein wil malnutrition   added  ensure pudding to meals   MVI     7- Atrial fib cronic with RBBB /   CAD   cont coreg with holding parameters   INR is over 4 cont t hold warfarin   will hold warfarin today     8- Nausea vomiting with constipation fecal impaction on CT scan   cont bowel regimen   monitor BM   resolved        overall prognosis is poor   wife is updated

## 2021-09-30 NOTE — DIETITIAN NUTRITION RISK NOTIFICATION - TREATMENT: THE FOLLOWING DIET HAS BEEN RECOMMENDED
Diet, Pureed:   Honey Consistency (HONCON)  Low Sodium  Supplement Feeding Modality:  Oral  Ensure Pudding Cans or Servings Per Day:  1       Frequency:  Two Times a day (09-29-21 @ 10:11) [Active]

## 2021-09-30 NOTE — DIETITIAN INITIAL EVALUATION ADULT. - ADD RECOMMEND
Add Ensure Enlive, honey-thickened; Rx MVI and vit C 500mg daily; Patrice BID to facilitate wound healing

## 2021-09-30 NOTE — PROGRESS NOTE ADULT - ASSESSMENT
PITA on CKD on stage III  Baseline creat 1.6 Now  2.62--> 1.75--> 1.3  Recent CT noted , No Hydro   Episode of hypotension , ? ATN   Continue to hold Lasix for now  PITA pre-dated Zosyn   Continue IVF low rate  Follow trend in labs     Chest CT - bilat PNA and Cardiomegaly     Will follow

## 2021-09-30 NOTE — PROGRESS NOTE ADULT - SUBJECTIVE AND OBJECTIVE BOX
RUSH MARTINSHEREEN  [id number]      Chief Complaint:  Failure to thrive/Possible aspiration/Pneumonia/History of CVA/Elevated troponin    Interval History:  Patient resting comfortably without distress.    Tele:  V-Paced      atorvastatin 40 milliGRAM(s) Oral at bedtime  carvedilol 3.125 milliGRAM(s) Oral every 12 hours  influenza   Vaccine 0.5 milliLiter(s) IntraMuscular once  levalbuterol Inhalation 0.63 milliGRAM(s) Inhalation every 6 hours  ondansetron Injectable 4 milliGRAM(s) IV Push every 8 hours PRN  pantoprazole  Injectable 40 milliGRAM(s) IV Push at bedtime  piperacillin/tazobactam IVPB.. 3.375 Gram(s) IV Intermittent every 8 hours  polyethylene glycol 3350 17 Gram(s) Oral daily  potassium phosphate / sodium phosphate Powder (PHOS-NaK) 1 Packet(s) Oral two times a day  saccharomyces boulardii 250 milliGRAM(s) Oral two times a day  senna 2 Tablet(s) Oral at bedtime  sodium chloride 0.9%. 1000 milliLiter(s) IV Continuous <Continuous>          Physical Exam:  T(C): 37 (09-30-21 @ 04:56), Max: 37.2 (09-29-21 @ 16:36)  HR: 84 (09-30-21 @ 04:56) (69 - 86)  BP: 121/79 (09-30-21 @ 04:56) (101/62 - 121/79)  RR: 16 (09-30-21 @ 04:56) (16 - 18)  SpO2: 94% (09-30-21 @ 04:56) (94% - 100%)  Wt(kg): --  General: Comfortable in NAD  Neck: No JVD  CVS: nl s1s2, no s3  Pulm: CTA b/l anteriorly, poor effort  Abd: soft, non-tender  Ext: No c/c/e  Neuro A&O x3  Psych: Normal affect      Labs:   30 Sep 2021 07:12    147    |  117    |  30.5   ----------------------------<  136    4.1     |  21.0   |  1.36     Ca    8.3        30 Sep 2021 07:12  Phos  2.0       30 Sep 2021 07:12  Mg     2.1       29 Sep 2021 07:01        PT/INR - ( 30 Sep 2021 07:12 )   PT: 50.7 sec;   INR: 4.71 ratio                   Assessment:  66 year old man with past medical history of Permanent atrial fibrillation (prior history of LUC thrombus on SHANELLE) on warfarin, CVA with residual right-sided weakness (2012), Hypertension, Monomorphic ventricular tachycardia s/p CRT-D and Ischemic cardiomyopathy (LVEF 35-40% in 2019 with recovery in LVEF to 66%) who was brought in to hospital due to episode of projectile vomiting and with progressive fatigue and left-sided weakness, overall failure to thrive. Cardiology consulted due to elevated troponin, which is 0.08 in the setting of CKD. Patient denies angina or dyspnea and does not appear to have an acute coronary syndrome at this time. BNP markedly elevated at 4000s which is less than prior ER visit this month.   -Patient with b/l PNA 2/2 aspiration, being treated and improving.  -Noted to have ARF and no significant acute CHF on exam.  Diuretics held.  Cr improving, may consider restarting po Lasix by tomorrow.  -Also with new CVA.  Now on Coumadin and INR elevated today.  No further intervention planned.    Plan:  1. Consider restarting po Lasix by tomorrow if Cr stable.  2. Hold Coumadin today and recheck INR tomorrow.  3. Continue other current CV meds at current doses.  4. F/u echo.  5. Abx per med.  6,. Neuro f/u.

## 2021-09-30 NOTE — PROGRESS NOTE ADULT - ASSESSMENT
Aspiration s/p new parietal CVA  Bibasilar consolidations on CT  Improved with cough and drug nebs  High risk of recurrent aspirations  Chronic elevated BNP with diastolic dysfunction  Not hypoxic    Plan:    HOB raised  Complete Abx  Currently not requiring O2  Drug nebs  Follow speech recommendations   Follow CT for improvement  Prognosis guarded  Little else to add    d/w wife at bedside

## 2021-10-01 LAB
ANION GAP SERPL CALC-SCNC: 13 MMOL/L — SIGNIFICANT CHANGE UP (ref 5–17)
APPEARANCE UR: CLEAR — SIGNIFICANT CHANGE UP
BACTERIA # UR AUTO: ABNORMAL
BILIRUB UR-MCNC: NEGATIVE — SIGNIFICANT CHANGE UP
BUN SERPL-MCNC: 22.2 MG/DL — HIGH (ref 8–20)
CALCIUM SERPL-MCNC: 8.2 MG/DL — LOW (ref 8.6–10.2)
CHLORIDE SERPL-SCNC: 115 MMOL/L — HIGH (ref 98–107)
CO2 SERPL-SCNC: 19 MMOL/L — LOW (ref 22–29)
COLOR SPEC: YELLOW — SIGNIFICANT CHANGE UP
CREAT ?TM UR-MCNC: 113 MG/DL — SIGNIFICANT CHANGE UP
CREAT SERPL-MCNC: 1.14 MG/DL — SIGNIFICANT CHANGE UP (ref 0.5–1.3)
DIFF PNL FLD: ABNORMAL
EOSINOPHIL NFR URNS MANUAL: SIGNIFICANT CHANGE UP
EPI CELLS # UR: SIGNIFICANT CHANGE UP
GLUCOSE BLDC GLUCOMTR-MCNC: 173 MG/DL — HIGH (ref 70–99)
GLUCOSE BLDC GLUCOMTR-MCNC: 96 MG/DL — SIGNIFICANT CHANGE UP (ref 70–99)
GLUCOSE SERPL-MCNC: 111 MG/DL — HIGH (ref 70–99)
GLUCOSE UR QL: NEGATIVE MG/DL — SIGNIFICANT CHANGE UP
HCT VFR BLD CALC: 34.5 % — LOW (ref 39–50)
HGB BLD-MCNC: 10.2 G/DL — LOW (ref 13–17)
INR BLD: 3.57 RATIO — HIGH (ref 0.88–1.16)
KETONES UR-MCNC: ABNORMAL
LACTATE SERPL-SCNC: 1.3 MMOL/L — SIGNIFICANT CHANGE UP (ref 0.5–2)
LEUKOCYTE ESTERASE UR-ACNC: NEGATIVE — SIGNIFICANT CHANGE UP
MCHC RBC-ENTMCNC: 23 PG — LOW (ref 27–34)
MCHC RBC-ENTMCNC: 29.6 GM/DL — LOW (ref 32–36)
MCV RBC AUTO: 77.9 FL — LOW (ref 80–100)
NITRITE UR-MCNC: NEGATIVE — SIGNIFICANT CHANGE UP
PH UR: 5 — SIGNIFICANT CHANGE UP (ref 5–8)
PHOSPHATE SERPL-MCNC: 2.9 MG/DL — SIGNIFICANT CHANGE UP (ref 2.4–4.7)
PLATELET # BLD AUTO: 368 K/UL — SIGNIFICANT CHANGE UP (ref 150–400)
POTASSIUM SERPL-MCNC: 4 MMOL/L — SIGNIFICANT CHANGE UP (ref 3.5–5.3)
POTASSIUM SERPL-SCNC: 4 MMOL/L — SIGNIFICANT CHANGE UP (ref 3.5–5.3)
PROT UR-MCNC: 30 MG/DL
PROTHROM AB SERPL-ACNC: 39 SEC — HIGH (ref 10.6–13.6)
RBC # BLD: 4.43 M/UL — SIGNIFICANT CHANGE UP (ref 4.2–5.8)
RBC # FLD: 16.4 % — HIGH (ref 10.3–14.5)
RBC CASTS # UR COMP ASSIST: SIGNIFICANT CHANGE UP /HPF (ref 0–4)
SODIUM SERPL-SCNC: 147 MMOL/L — HIGH (ref 135–145)
SODIUM UR-SCNC: 40 MMOL/L — SIGNIFICANT CHANGE UP
SP GR SPEC: 1.01 — SIGNIFICANT CHANGE UP (ref 1.01–1.02)
UROBILINOGEN FLD QL: NEGATIVE MG/DL — SIGNIFICANT CHANGE UP
WBC # BLD: 12.34 K/UL — HIGH (ref 3.8–10.5)
WBC # FLD AUTO: 12.34 K/UL — HIGH (ref 3.8–10.5)
WBC UR QL: ABNORMAL

## 2021-10-01 PROCEDURE — 99232 SBSQ HOSP IP/OBS MODERATE 35: CPT

## 2021-10-01 PROCEDURE — 99233 SBSQ HOSP IP/OBS HIGH 50: CPT

## 2021-10-01 RX ORDER — SODIUM CHLORIDE 0.65 %
1 AEROSOL, SPRAY (ML) NASAL EVERY 4 HOURS
Refills: 0 | Status: DISCONTINUED | OUTPATIENT
Start: 2021-10-01 | End: 2021-10-05

## 2021-10-01 RX ORDER — SODIUM CHLORIDE 0.65 %
1 AEROSOL, SPRAY (ML) NASAL THREE TIMES A DAY
Refills: 0 | Status: DISCONTINUED | OUTPATIENT
Start: 2021-10-01 | End: 2021-10-01

## 2021-10-01 RX ORDER — SODIUM CHLORIDE 9 MG/ML
1000 INJECTION INTRAMUSCULAR; INTRAVENOUS; SUBCUTANEOUS
Refills: 0 | Status: DISCONTINUED | OUTPATIENT
Start: 2021-10-01 | End: 2021-10-03

## 2021-10-01 RX ORDER — DIGOXIN 250 MCG
125 TABLET ORAL EVERY OTHER DAY
Refills: 0 | Status: DISCONTINUED | OUTPATIENT
Start: 2021-10-01 | End: 2021-10-03

## 2021-10-01 RX ORDER — ACETAMINOPHEN 500 MG
650 TABLET ORAL EVERY 6 HOURS
Refills: 0 | Status: DISCONTINUED | OUTPATIENT
Start: 2021-10-01 | End: 2021-10-05

## 2021-10-01 RX ORDER — IRON SUCROSE 20 MG/ML
100 INJECTION, SOLUTION INTRAVENOUS EVERY 24 HOURS
Refills: 0 | Status: DISCONTINUED | OUTPATIENT
Start: 2021-10-01 | End: 2021-10-01

## 2021-10-01 RX ORDER — IPRATROPIUM/ALBUTEROL SULFATE 18-103MCG
3 AEROSOL WITH ADAPTER (GRAM) INHALATION ONCE
Refills: 0 | Status: COMPLETED | OUTPATIENT
Start: 2021-10-01 | End: 2021-10-01

## 2021-10-01 RX ORDER — FLUTICASONE PROPIONATE 50 MCG
1 SPRAY, SUSPENSION NASAL
Refills: 0 | Status: DISCONTINUED | OUTPATIENT
Start: 2021-10-01 | End: 2021-10-04

## 2021-10-01 RX ORDER — IRON SUCROSE 20 MG/ML
100 INJECTION, SOLUTION INTRAVENOUS EVERY 24 HOURS
Refills: 0 | Status: DISCONTINUED | OUTPATIENT
Start: 2021-10-01 | End: 2021-10-02

## 2021-10-01 RX ORDER — COLCHICINE 0.6 MG
0.6 TABLET ORAL ONCE
Refills: 0 | Status: COMPLETED | OUTPATIENT
Start: 2021-10-01 | End: 2021-10-01

## 2021-10-01 RX ORDER — ALLOPURINOL 300 MG
100 TABLET ORAL DAILY
Refills: 0 | Status: DISCONTINUED | OUTPATIENT
Start: 2021-10-01 | End: 2021-10-05

## 2021-10-01 RX ORDER — ACETAMINOPHEN 500 MG
650 TABLET ORAL EVERY 8 HOURS
Refills: 0 | Status: COMPLETED | OUTPATIENT
Start: 2021-10-01 | End: 2021-10-02

## 2021-10-01 RX ADMIN — PIPERACILLIN AND TAZOBACTAM 25 GRAM(S): 4; .5 INJECTION, POWDER, LYOPHILIZED, FOR SOLUTION INTRAVENOUS at 14:36

## 2021-10-01 RX ADMIN — IRON SUCROSE 210 MILLIGRAM(S): 20 INJECTION, SOLUTION INTRAVENOUS at 13:11

## 2021-10-01 RX ADMIN — PIPERACILLIN AND TAZOBACTAM 25 GRAM(S): 4; .5 INJECTION, POWDER, LYOPHILIZED, FOR SOLUTION INTRAVENOUS at 22:17

## 2021-10-01 RX ADMIN — Medication 1 PACKET(S): at 05:30

## 2021-10-01 RX ADMIN — CARVEDILOL PHOSPHATE 3.12 MILLIGRAM(S): 80 CAPSULE, EXTENDED RELEASE ORAL at 20:45

## 2021-10-01 RX ADMIN — Medication 500 MILLIGRAM(S): at 05:30

## 2021-10-01 RX ADMIN — Medication 125 MICROGRAM(S): at 20:48

## 2021-10-01 RX ADMIN — Medication 3 MILLILITER(S): at 18:03

## 2021-10-01 RX ADMIN — Medication 1 TABLET(S): at 09:16

## 2021-10-01 RX ADMIN — Medication 1 TABLET(S): at 11:59

## 2021-10-01 RX ADMIN — Medication 40 MILLIGRAM(S): at 11:59

## 2021-10-01 RX ADMIN — CARVEDILOL PHOSPHATE 3.12 MILLIGRAM(S): 80 CAPSULE, EXTENDED RELEASE ORAL at 05:30

## 2021-10-01 RX ADMIN — PIPERACILLIN AND TAZOBACTAM 25 GRAM(S): 4; .5 INJECTION, POWDER, LYOPHILIZED, FOR SOLUTION INTRAVENOUS at 05:31

## 2021-10-01 RX ADMIN — SODIUM CHLORIDE 50 MILLILITER(S): 9 INJECTION INTRAMUSCULAR; INTRAVENOUS; SUBCUTANEOUS at 14:36

## 2021-10-01 NOTE — CONSULT NOTE ADULT - SUBJECTIVE AND OBJECTIVE BOX
Pt Name: RUSH CARDENAS    MRN: [id number]      Ortho consult to eval patient for left wrist pain/swelling. Patient is a poor historian due to hx of CVA, patient is able to say "yes" or "no" and follow command. History obtained from chart and Dr. Callejas. Patient was admitted to [facility] for treatment of PNA. + febrile yesterday (101.6). Patient has history of gout.  .      REVIEW OF SYSTEMS      General:	+ fever    Musculoskeletal:	 see HPI      ROS is otherwise negative.    PAST MEDICAL & SURGICAL HISTORY:  PAST MEDICAL & SURGICAL HISTORY:  CVA (cerebral vascular accident)    Hypertension    Hyperlipidemia    Cardiomyopathy    RBBB    Atrial fibrillation    Thrombus of left atrial appendage    AICD (automatic cardioverter/defibrillator) present    Saul filter in place  no h/o dvt and as per wife  it was placed as a precaution after cva.    AICD (automatic cardioverter/defibrillator) present        Allergies: IV Contrast (Other)  latex (Rash)      Medications: acetaminophen   Tablet .. 650 milliGRAM(s) Oral every 6 hours PRN  acetaminophen   Tablet .. 650 milliGRAM(s) Oral every 6 hours PRN  acetaminophen   Tablet .. 650 milliGRAM(s) Oral every 8 hours  allopurinol 100 milliGRAM(s) Oral daily  ascorbic acid 500 milliGRAM(s) Oral two times a day  atorvastatin 40 milliGRAM(s) Oral at bedtime  carvedilol 3.125 milliGRAM(s) Oral every 12 hours  colchicine 0.6 milliGRAM(s) Oral once  influenza   Vaccine 0.5 milliLiter(s) IntraMuscular once  iron sucrose IVPB 100 milliGRAM(s) IV Intermittent every 24 hours  lactobacillus acidophilus 1 Tablet(s) Oral three times a day with meals  methylPREDNISolone sodium succinate Injectable 40 milliGRAM(s) IV Push daily  ondansetron Injectable 4 milliGRAM(s) IV Push every 8 hours PRN  pantoprazole  Injectable 40 milliGRAM(s) IV Push at bedtime  piperacillin/tazobactam IVPB.. 3.375 Gram(s) IV Intermittent every 8 hours  potassium phosphate / sodium phosphate Powder (PHOS-NaK) 1 Packet(s) Oral two times a day  senna 2 Tablet(s) Oral at bedtime  sodium chloride 0.225% 1000 milliLiter(s) IV Continuous <Continuous>      FAMILY HISTORY:  Family history of cerebrovascular accident (CVA)    : non-contributory    Social History:     Ambulation: unable to obtain at this time    uric acid 9/30: 7.4                          10.2   12.34 )-----------( 368      ( 01 Oct 2021 07:01 )             34.5     10-01    147<H>  |  115<H>  |  22.2<H>  ----------------------------<  111<H>  4.0   |  19.0<L>  |  1.14    Ca    8.2<L>      01 Oct 2021 07:01  Phos  2.9     10-01        PHYSICAL EXAM:    Vital Signs Last 24 Hrs  T(C): 36.6 (01 Oct 2021 13:13), Max: 38.7 (30 Sep 2021 16:20)  T(F): 97.9 (01 Oct 2021 13:13), Max: 101.6 (30 Sep 2021 16:20)  HR: 110 (01 Oct 2021 13:13) (77 - 110)  BP: 115/78 (01 Oct 2021 13:13) (93/64 - 116/74)  BP(mean): --  RR: 17 (01 Oct 2021 13:13) (16 - 18)  SpO2: 95% (01 Oct 2021 13:13) (94% - 98%)  Daily     Daily     Appearance: Alert, responsive, in no acute distress.    Neurological: Patient responds with "yes" when asked if can feel to light touch.    Skin: no rash on visible skin.    Vascular: 2+ distal pulses.    Musculoskeletal:         Left Upper Extremity: + gross mild swelling to wrist and hand. no erythema. no fluctuance. + gross TTP to wrist, hand, and elbow. + IV noted in antecubital fossa. patient able to move 1st digit, however is unable to remove remainder of LUE. Patient responds with "yes" when asked if can feel to light touch. radial pulse 2+.      Imaging Studies:    < from: Xray Wrist 3 Views, Left (09.30.21 @ 19:03) >   EXAM:  XR WRIST COMP MIN 3 VIEWS LT                          PROCEDURE DATE:  09/30/2021          INTERPRETATION:  EXAMINATION: XR WRIST LEFT    CLINICAL INDICATION:r/o septic arthritis    COMPARISON: None    TECHNIQUE: Left wrist, 4 views    INTERPRETATION:  There is extensive pan carpal osteopenia as well as periarticular osteopenia at the carpometacarpal joints and at the wrist. There is no definite bony destruction. There is no soft tissue gas. There is soft tissue edema about the wrist. The carpal alignment is maintained.    IMPRESSION:    Pancarpal and periarticular osteopenia, with soft tissue edema about the wrist. Findings may be on the basis of inflammatory process, among other etiologies.    No soft tissue gas or radiographic evidence for osteomyelitis.    There is further clinical concern MRI should be obtained, if not clinically contraindicated.    --- End of Report ---            SHLOMIT A GOLDBERG-STEIN MD; Attending Radiologist  This document has been electronically signed. Sep 30 2021  8:50PM    < end of copied text >      A/P:  Pt is a  66y Male with left wrist/hand swelling, ?gout    PLAN:   D/W Dr. Harding  Recommend MRI wrist/hand if patient can tolerate  consider CT if cannot  further planning pending images

## 2021-10-01 NOTE — CONSULT NOTE ADULT - REASON FOR ADMISSION
CVA timing unclear of newer R parietal CVA  emesis poss constipation/fecal impaction

## 2021-10-01 NOTE — PROGRESS NOTE ADULT - SUBJECTIVE AND OBJECTIVE BOX
NEPHROLOGY INTERVAL HPI/OVERNIGHT EVENTS:    No new events.    MEDICATIONS  (STANDING):  allopurinol 100 milliGRAM(s) Oral daily  ascorbic acid 500 milliGRAM(s) Oral two times a day  atorvastatin 40 milliGRAM(s) Oral at bedtime  carvedilol 3.125 milliGRAM(s) Oral every 12 hours  influenza   Vaccine 0.5 milliLiter(s) IntraMuscular once  lactobacillus acidophilus 1 Tablet(s) Oral three times a day with meals  methylPREDNISolone sodium succinate Injectable 40 milliGRAM(s) IV Push daily  pantoprazole  Injectable 40 milliGRAM(s) IV Push at bedtime  piperacillin/tazobactam IVPB.. 3.375 Gram(s) IV Intermittent every 8 hours  potassium phosphate / sodium phosphate Powder (PHOS-NaK) 1 Packet(s) Oral two times a day  senna 2 Tablet(s) Oral at bedtime  sodium chloride 0.9% Bolus 250 milliLiter(s) IV Bolus once  sodium chloride 0.9%. 1000 milliLiter(s) (70 mL/Hr) IV Continuous <Continuous>    MEDICATIONS  (PRN):  acetaminophen   Tablet .. 650 milliGRAM(s) Oral every 6 hours PRN Temp greater or equal to 38C (100.4F)  acetaminophen   Tablet .. 650 milliGRAM(s) Oral every 6 hours PRN Moderate Pain (4 - 6)  ondansetron Injectable 4 milliGRAM(s) IV Push every 8 hours PRN Nausea and/or Vomiting      Allergies    IV Contrast (Other)  latex (Rash)          Vital Signs Last 24 Hrs  T(C): 36.4 (01 Oct 2021 04:35), Max: 38.7 (30 Sep 2021 16:20)  T(F): 97.6 (01 Oct 2021 04:35), Max: 101.6 (30 Sep 2021 16:20)  HR: 90 (01 Oct 2021 04:35) (77 - 105)  BP: 116/74 (01 Oct 2021 04:35) (93/64 - 116/74)  BP(mean): --  RR: 16 (01 Oct 2021 04:35) (16 - 18)  SpO2: 95% (01 Oct 2021 04:35) (94% - 98%)       PHYSICAL EXAM:    GENERAL: In bed, family member at bedside  HEAD:  same  EYES: open  ENMT:   NECK: veins wnl  NERVOUS SYSTEM:  prior CVA residuals  same  CHEST/LUNG: decreased bs bases, no 02  HEART: No rub  ABDOMEN: NT  EXTREMITIES:  muscle wasting with left wrist  contraction  LYMPH:   SKIN: pale  : parry    LABS:                        10.2   12.34 )-----------( 368      ( 01 Oct 2021 07:01 )             34.5     10-    147<H>  |  115<H>  |  22.2<H>  ----------------------------<  111<H>  4.0   |  19.0<L>  |  1.14    Ca    8.2<L>      01 Oct 2021 07:01  Phos  2.9     10-      PT/INR - ( 01 Oct 2021 07:01 )   PT: 39.0 sec;   INR: 3.57 ratio           Urinalysis Basic - ( 01 Oct 2021 00:50 )    Color: Yellow / Appearance: Clear / S.015 / pH: x  Gluc: x / Ketone: Trace  / Bili: Negative / Urobili: Negative mg/dL   Blood: x / Protein: 30 mg/dL / Nitrite: Negative   Leuk Esterase: Negative / RBC: 0-2 /HPF / WBC 11-25   Sq Epi: x / Non Sq Epi: Few / Bacteria: Many      Phosphorus Level, Serum: 2.9 mg/dL (10-01 @ 07:01)          RADIOLOGY & ADDITIONAL TESTS:

## 2021-10-01 NOTE — PROGRESS NOTE ADULT - ASSESSMENT
66 year old man with prior stroke with right hemiparesis now with left sided weakness which began at least a few months ago according to his wife.     Stroke.   It appears he has had a new stroke affecting the left side.   Unclear exactly when onset was.   He has atrial fibrillation and has been on anticoagulation.    A fib.   Has been on Coumadin and appears to have had a new infarct.   OK for Heparin from neurologic standpoint if needed    Thank you for allowing me to participate in the care of your patient    Jordon Zamora MD, PhD   [id number]

## 2021-10-01 NOTE — PROGRESS NOTE ADULT - SUBJECTIVE AND OBJECTIVE BOX
Patient is aspiration  PNA , arf ,fever new   pt spiked fever yesterday afternoon   blood cx P, covid RVP swab neg   ID input appreciated     pt's wife at the bedside with cardiologist , had long discussion about findings and current plan   Xray of left wrist reviewed   Pt is more verbal awake this am , answer questions , + pain in the hand and wrist admitted by pt       `MEDICATIONS  (STANDING):  acetaminophen   Tablet .. 650 milliGRAM(s) Oral every 8 hours  allopurinol 100 milliGRAM(s) Oral daily  ascorbic acid 500 milliGRAM(s) Oral two times a day  atorvastatin 40 milliGRAM(s) Oral at bedtime  carvedilol 3.125 milliGRAM(s) Oral every 12 hours  colchicine 0.6 milliGRAM(s) Oral once  influenza   Vaccine 0.5 milliLiter(s) IntraMuscular once  iron sucrose IVPB 100 milliGRAM(s) IV Intermittent every 24 hours  lactobacillus acidophilus 1 Tablet(s) Oral three times a day with meals  methylPREDNISolone sodium succinate Injectable 40 milliGRAM(s) IV Push daily  pantoprazole  Injectable 40 milliGRAM(s) IV Push at bedtime  piperacillin/tazobactam IVPB.. 3.375 Gram(s) IV Intermittent every 8 hours  potassium phosphate / sodium phosphate Powder (PHOS-NaK) 1 Packet(s) Oral two times a day  senna 2 Tablet(s) Oral at bedtime  sodium chloride 0.225% 1000 milliLiter(s) (50 mL/Hr) IV Continuous <Continuous>      PHYSICAL EXAM:  General: awake, alert on RA , no resp distress   Lungs: poor inspiratory  effort , diminished BS   Cardio: RRR, S1/S2, No murmur  Abdomen: Soft, Nontender, Nondistended; Bowel sounds present  Extremities: No calf tenderness, No pitting edema  Neuro generalized weakness , right and left sided weakness  left hemiplegia   speech  limited aphasia   Musculoscletal : left wrist hand finger swollen , tender on palpation , + pain he reports                           10.2   12.34 )-----------( 368      ( 01 Oct 2021 07:01 )             34.5   10-01    147<H>  |  115<H>  |  22.2<H>  ----------------------------<  111<H>  4.0   |  19.0<L>  |  1.14    Ca    8.2<L>      01 Oct 2021 07:01  Phos  2.9     10-01

## 2021-10-01 NOTE — PROGRESS NOTE ADULT - ASSESSMENT
This is a 66 M PMH CVA w R sided weakness and dysphagia 2012, AF with slow ventricular response (prior hx LA thrombus on SHANELLE) on warfarin, RBBB, monomorphic VT (5/2014) s/p MDT dual chamber ICD with upgrade to CRT-D (1/2016)and generator change , CKD III, HTN and stercoral colitis who was BIBEMS after projectile emesis.  Patient at baseline nonverbal and bedbound.  Information gathered by wife.  Wife states that he has had multiple episodes of vomiting.  He was admitted to medicine for presumed aspiration PNA.  While admitted, patient with escalating oxygen requirements with saturations in the mid to high 80's.Pt is on high flow oxygen now . Multiple lengthy GOC discussions conducted by Dr. Carr with wife and PCP.  Wife has MOLST form at home which includes a DNR order, but wants all measures taken for her . ICU consulted for further management and high risk for deterioration.  Positive troponin noted cardiology consulted , iv lasix given on admission , day 2 cr is rising , held , iv fluid initiated , cr cont to increase , nephrology consulted , CT of abd pelvis bilateral lung opacities likely aspiration  speech consulted started on puree diet 9/27 no liquid ,Pt is with improved hypoxia off oxygeb m cr trending down , spiked fever 9/30 , blood cx ordered ID consulted     1- Fever   blood cx x2 , urinanlaysis ordered   ID consult appreciated   ? gout r/o bacteremia   xray of left hand soft tissue swelling   Ortho consulted   can not obtain MR due to AICD/ PPM ?   CT of hand     2- Left wrist hand pain swelling   CT of hand wrist ordered   possible gout exacerbation   add allupurinol   prednisone 40 mg x2 days   tylenol ATC for pain   uric acid checked     3- Acute hypoxic respiratory failure  due to aspiration pneumonia   hypoxia resolved   off ox on RA   cont iv zosyn   complete course   keep head elevated all times     3- h/o CVA / righ hemiplagia at baseline with suspected new CVA left sided weakness i  Ct of brain showed lateral basal ganglia and R parietal infracts   cont warfarin , statin   neuro input appreciated   pt at baseline home bed bound recently  declining functions condition   4- Dysphagia   on puree diet thickened fluid     5- ARF on CRF stage 3   cr improving   hypernatremia ivf per nephrology     6-Hypokalemia / hypophosphatemia / hypernatremia   replaced , iv phos , K given   monitor BMP in am     7 Diastolic CHF ,cronic   s/p iv lasix on admission   diuretics on hold due to dehydration / worsening cr   cr improving    cardiology on board TTE no change   cardiac status stable   resume lasix in few days if na improves     8- Severe protein wil malnutrition   added  ensure pudding to meals   MVI     9- Atrial fib cronic  /   CAD   cont coreg with holding parameters   INR is over 4 cont t hold warfarin   will hold warfarin today   restart digoxin every other day     10 Nausea vomiting with constipation fecal impaction on CT scan   cont bowel regimen   monitor BM   resolved        overall prognosis is poor   wife at bedside   needs update daily

## 2021-10-01 NOTE — PROGRESS NOTE ADULT - SUBJECTIVE AND OBJECTIVE BOX
INFECTIOUS DISEASES AND INTERNAL MEDICINE at [facility]  =======================================================  Nabil Can MD  Diplomates American Board of Internal Medicine and Infectious Diseases  Telephone [phone]  Fax            [phone]  =======================================================    RUSH CARDENAS [id number]    Follow up: [facility]     Allergies:  IV Contrast (Other)  latex (Rash)      Medications:  acetaminophen   Tablet .. 650 milliGRAM(s) Oral every 6 hours PRN  acetaminophen   Tablet .. 650 milliGRAM(s) Oral every 6 hours PRN  allopurinol 100 milliGRAM(s) Oral daily  ascorbic acid 500 milliGRAM(s) Oral two times a day  atorvastatin 40 milliGRAM(s) Oral at bedtime  carvedilol 3.125 milliGRAM(s) Oral every 12 hours  influenza   Vaccine 0.5 milliLiter(s) IntraMuscular once  iron sucrose IVPB 100 milliGRAM(s) IV Intermittent every 24 hours  lactobacillus acidophilus 1 Tablet(s) Oral three times a day with meals  methylPREDNISolone sodium succinate Injectable 40 milliGRAM(s) IV Push daily  ondansetron Injectable 4 milliGRAM(s) IV Push every 8 hours PRN  pantoprazole  Injectable 40 milliGRAM(s) IV Push at bedtime  piperacillin/tazobactam IVPB.. 3.375 Gram(s) IV Intermittent every 8 hours  potassium phosphate / sodium phosphate Powder (PHOS-NaK) 1 Packet(s) Oral two times a day  senna 2 Tablet(s) Oral at bedtime  sodium chloride 0.225% 1000 milliLiter(s) IV Continuous <Continuous>  sodium chloride 0.9% Bolus 250 milliLiter(s) IV Bolus once    SOCIAL       FAMILY   FAMILY HISTORY:  Family history of cerebrovascular accident (CVA)      REVIEW OF SYSTEMS:  CONSTITUTIONAL:  No Fever or chills  HEENT:   No diplopia or blurred vision.  No earache, sore throat or runny nose.  CARDIOVASCULAR:  No pressure, squeezing, strangling, tightness, heaviness or aching about the chest, neck, axilla or epigastrium.  RESPIRATORY:  No cough, shortness of breath, PND or orthopnea.  GASTROINTESTINAL:  No nausea, vomiting or diarrhea.  GENITOURINARY:  No dysuria, frequency or urgency. No Blood in urine  MUSCULOSKELETAL:   moves all joints  SKIN:  No change in skin, hair or nails.  NEUROLOGIC:   AS PER HPI            Physical Exam:  ICU Vital Signs Last 24 Hrs  T(C): 36.6 (01 Oct 2021 13:13), Max: 38.7 (30 Sep 2021 16:20)  T(F): 97.9 (01 Oct 2021 13:13), Max: 101.6 (30 Sep 2021 16:20)  HR: 110 (01 Oct 2021 13:13) (77 - 110)  BP: 115/78 (01 Oct 2021 13:13) (93/64 - 116/74)  BP(mean): --  ABP: --  ABP(mean): --  RR: 17 (01 Oct 2021 13:13) (16 - 18)  SpO2: 95% (01 Oct 2021 13:13) (94% - 98%)    GEN: NAD,   HEENT: normocephalic and atraumatic. EOMI. BRAYAN.    NECK: Supple. No carotid bruits.  No lymphadenopathy or thyromegaly.  LUNGS: Clear to auscultation.  HEART: Regular rate and rhythm without murmur.  ABDOMEN: Soft, nontender, and nondistended.  Positive bowel sounds.    : No CVA tenderness  EXTREMITIES: Without any cyanosis, clubbing, rash, lesions or edema.  MSK: no joint swelling  NEUROLOGIC:  HEMIPLEGIA        Labs:  Vitals:  ============  T(F): 97.9 (01 Oct 2021 13:13), Max: 101.6 (30 Sep 2021 16:20)  HR: 110 (01 Oct 2021 13:13)  BP: 115/78 (01 Oct 2021 13:13)  RR: 17 (01 Oct 2021 13:13)  SpO2: 95% (01 Oct 2021 13:13) (94% - 98%)  temp max in last 48H T(F): , Max: 101.6 (09-30-21 @ 16:20)    =======================================================  Current Antibiotics:  piperacillin/tazobactam IVPB.. 3.375 Gram(s) IV Intermittent every 8 hours    Other medications:  allopurinol 100 milliGRAM(s) Oral daily  ascorbic acid 500 milliGRAM(s) Oral two times a day  atorvastatin 40 milliGRAM(s) Oral at bedtime  carvedilol 3.125 milliGRAM(s) Oral every 12 hours  influenza   Vaccine 0.5 milliLiter(s) IntraMuscular once  iron sucrose IVPB 100 milliGRAM(s) IV Intermittent every 24 hours  lactobacillus acidophilus 1 Tablet(s) Oral three times a day with meals  methylPREDNISolone sodium succinate Injectable 40 milliGRAM(s) IV Push daily  pantoprazole  Injectable 40 milliGRAM(s) IV Push at bedtime  potassium phosphate / sodium phosphate Powder (PHOS-NaK) 1 Packet(s) Oral two times a day  senna 2 Tablet(s) Oral at bedtime  sodium chloride 0.225% 1000 milliLiter(s) IV Continuous <Continuous>  sodium chloride 0.9% Bolus 250 milliLiter(s) IV Bolus once      =======================================================  Labs:                        10.2   12.34 )-----------( 368      ( 01 Oct 2021 07:01 )             34.5     10-01    147<H>  |  115<H>  |  22.2<H>  ----------------------------<  111<H>  4.0   |  19.0<L>  |  1.14    Ca    8.2<L>      01 Oct 2021 07:01  Phos  2.9     10-01        Culture - Blood (collected 09-27-21 @ 12:12)  Source: .Blood Blood-Peripheral      Creatinine, Serum: 1.14 mg/dL (10-01-21 @ 07:01)  Creatinine, Serum: 1.36 mg/dL (09-30-21 @ 07:12)  Creatinine, Serum: 1.75 mg/dL (09-29-21 @ 07:01)  Creatinine, Serum: 2.62 mg/dL (09-28-21 @ 03:33)  Creatinine, Serum: 3.04 mg/dL (09-27-21 @ 03:07)    Procalcitonin, Serum: 1.79 ng/mL (09-30-21 @ 07:12)  Procalcitonin, Serum: 24.13 ng/mL (09-27-21 @ 12:12)          WBC Count: 12.34 K/uL (10-01-21 @ 07:01)  WBC Count: 10.92 K/uL (09-30-21 @ 17:59)  WBC Count: 10.41 K/uL (09-28-21 @ 03:33)  WBC Count: 14.02 K/uL (09-27-21 @ 07:38)    SARS-CoV-2: NotDetec (09-30-21 @ 17:32)  Rapid RVP Result: NotDetec (09-30-21 @ 17:32)    COVID-19 PCR: NotDetec (09-25-21 @ 22:14)

## 2021-10-01 NOTE — PROGRESS NOTE ADULT - SUBJECTIVE AND OBJECTIVE BOX
RUSH MARTINYVROSE  [id number]      Chief Complaint:  Failure to thrive/Aspiration Pneumonia/History of CVA/Elevated troponin    Interval History:  Patient much more awake and alert today, c/o only pain in L wrist.  No other c/o.  Denies CP, SOB, palps.      Tele:  V-Paced        acetaminophen   Tablet .. 650 milliGRAM(s) Oral every 6 hours PRN  acetaminophen   Tablet .. 650 milliGRAM(s) Oral every 6 hours PRN  allopurinol 100 milliGRAM(s) Oral daily  ascorbic acid 500 milliGRAM(s) Oral two times a day  atorvastatin 40 milliGRAM(s) Oral at bedtime  carvedilol 3.125 milliGRAM(s) Oral every 12 hours  influenza   Vaccine 0.5 milliLiter(s) IntraMuscular once  iron sucrose Injectable 100 milliGRAM(s) IV Push every 24 hours  lactobacillus acidophilus 1 Tablet(s) Oral three times a day with meals  methylPREDNISolone sodium succinate Injectable 40 milliGRAM(s) IV Push daily  ondansetron Injectable 4 milliGRAM(s) IV Push every 8 hours PRN  pantoprazole  Injectable 40 milliGRAM(s) IV Push at bedtime  piperacillin/tazobactam IVPB.. 3.375 Gram(s) IV Intermittent every 8 hours  potassium phosphate / sodium phosphate Powder (PHOS-NaK) 1 Packet(s) Oral two times a day  senna 2 Tablet(s) Oral at bedtime  sodium chloride 0.225% 1000 milliLiter(s) IV Continuous <Continuous>  sodium chloride 0.9% Bolus 250 milliLiter(s) IV Bolus once          Physical Exam:  T(C): 36.4 (10-01-21 @ 04:35), Max: 38.7 (09-30-21 @ 16:20)  HR: 90 (10-01-21 @ 04:35) (77 - 105)  BP: 116/74 (10-01-21 @ 04:35) (93/64 - 116/74)  RR: 16 (10-01-21 @ 04:35) (16 - 18)  SpO2: 95% (10-01-21 @ 04:35) (94% - 98%)  Wt(kg): --  General: Comfortable in NAD  Neck: No JVD  CVS: nl s1s2, no s3  Pulm: CTA b/l anteriorly  Abd: soft, non-tender  Ext: No c/c/e  Neuro Awake and alert, responding appropriately  Psych: Normal affect        Labs:   01 Oct 2021 07:01    147    |  115    |  22.2   ----------------------------<  111    4.0     |  19.0   |  1.14     Ca    8.2        01 Oct 2021 07:01  Phos  2.9       01 Oct 2021 07:01                            10.2   12.34 )-----------( 368      ( 01 Oct 2021 07:01 )             34.5     PT/INR - ( 01 Oct 2021 07:01 )   PT: 39.0 sec;   INR: 3.57 ratio         Echo:   1. Left ventricular ejection fraction, by visual estimation, is 35 to 40%.   2. Moderately decreased global left ventricular systolic function.   3. The left ventricular diastolic function could not be assessed in this study.   4. Normal left ventricular internal cavity size.   5. Moderately enlarged right ventricle. Mildly reduced RV systolic function.   6. Moderately enlarged left atrium.   7. Moderately enlarged right atrium.   8. Mild mitral annular calcification.   9. Mild thickening and calcification of the anterior and posterior mitral valve leaflets.  10. Mild mitral valve regurgitation.  11. Sclerotic aortic valve with normal opening.  12. Mild to moderate aortic regurgitation.  13. Moderate tricuspid regurgitation.  14. Mild pulmonic valve regurgitation.  15. Estimated pulmonary artery systolic pressure is 47.2 mmHg assuming a right atrial pressure of 8 mmHg, which is consistent with mild pulmonary hypertension.  16. There is no evidence of pericardial effusion.            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 Cardiology consulted due to elevated troponin, which is 0.08 in the setting of CKD. Patient denies angina or dyspnea and does not appear to have an acute coronary syndrome at this time. BNP markedly elevated at 4000s which is less than prior ER visit this month.   -Patient with b/l PNA 2/2 aspiration, being treated and improving.  -Noted to have ARF and no significant acute CHF on exam.  Diuretics held.  Cr improving, may consider restarting po Lasix by tomorrow.  -Also with new CVA.  Now on Coumadin and INR elevated today.  No further intervention planned.  -Now more alert.  Echo with no change from prior echo from 2019.  EF moderately depressed.  No severe VHD.  Mild PHTN.    Plan:  1. Restart Dig QOD.  Consider restarting po Lasix.  2. Hold Coumadin today and recheck INR tomorrow.  3. Continue other current CV meds at current doses.  4. Abx per med/ID.  5. Gout per med.  6. Neuro f/u.    D/w patient, his wife and Dr. Levine.

## 2021-10-01 NOTE — PROGRESS NOTE ADULT - ASSESSMENT
A) Prior CVA, ARF improving; metabolic acidosis with normal anion gap and urine pH 5.0      P) Change iv, follow up labs.

## 2021-10-01 NOTE — CONSULT NOTE ADULT - PROVIDER SPECIALTY LIST ADULT
Critical Care
Infectious Disease
Orthopedics
Cardiology
Neurology
Nephrology
Rehab Medicine
Pulmonology

## 2021-10-01 NOTE — CONSULT NOTE ADULT - ATTENDING COMMENTS
Patient likely with gout attack and no signs of septic arthritis.   Patient to continue medical management and advised to start medicine for acute gout attack versus steroids  - await further imaging.

## 2021-10-01 NOTE — CONSULT NOTE ADULT - CONSULT REASON
CVA
left sided weakness
Elevated troponin
FEVERS
Hypoxemia
PITA on CKD
SOB, PNA
left wrist pain/swelling

## 2021-10-01 NOTE — PROGRESS NOTE ADULT - ASSESSMENT
6 year old male w hx CVA w R sided weakness and dysphagia 2012, permanent AF with slow ventricular  response (prior hx LA thrombus on SHANELLE) on warfarin, RBBB, monomorphic VT (5/2014) s/p MDT dual chamber ICD with upgrade to CRT-D (1/2016)and generator change , CKD III, HTN came to ED by EMS after projectile emesis    Since stroke pt has used pureed w thickened agent vs soft diet  today while at lunch w wife, had projectile vomiting of food  has been moving his bowels daily; she did report that he had emesis once before when he was severely constipated  did not complaint to her of pain    Wife reports that since  admission at [facility] he has been unable to use his L side.  She reports that she was told this was due to sepsis because of L arm cellulitis. ( treated with zosyn, vancomycin, linezolid and discontinued from Lasix per wife to ED)  He is seen weekly by his PCP in home  OT/PT in home but without improvement  Wife reports he is usually verbal but has not been himself  Can no longer stand so she lifts him into wheelchair    Was seen in [facility] on 09-05 for IV hydration and was discharged home.  She then reports that he was in heart failure and should have been treated in hospital.  PCP adjusted diuretics    PT PLACED   ON IV ZOSYN FOR PNEUMONIA   AND DEVELOPED FEVERS THIS AFTERNOON  PT WITH LEFT WRIST PAIN CONCERN FOR ? GOUT VS INFECTION    XRAY done     URIC ACID elevated  7.4   STEROIDS ADDED   WILL RECOMMEND ORTHO/HAND  EVAL FOR WRIST    WILL FOLLOWUP   CAN CONTINUE IV ABX FOR NOW FOR PNEUMONIA  LONG D/W WIFE AT BEDSIDE

## 2021-10-01 NOTE — PROGRESS NOTE ADULT - SUBJECTIVE AND OBJECTIVE BOX
[facility]                                        Neurology [facility]                                 Noah Ramírez, & [place]                                  [address]                                        [place], [place], [address]                                             [phone]        CC: stroke    HPI:   67 yo man with history of hemorrhagic stroke in 2012 due to uncontrolled HTN as per wife with residual R hemiparesis who was admitted due to episode of vomiting yesterday.  The patient had been at [facility] in July with left sided weakness and his wife reports that she was told it was secondary to cellulitis.   He has been to emergency rooms a few times since then.     His wife also reports that since the pacemaker battery change in April of 2020, he has been in the hospital almost every 3 months. (JW)    Interim history: no new neuro symptoms    ROS:   Unobtainable due to patient's condition.     MEDICATIONS  (STANDING):  acetaminophen   Tablet .. 650 milliGRAM(s) Oral every 8 hours  allopurinol 100 milliGRAM(s) Oral daily  ascorbic acid 500 milliGRAM(s) Oral two times a day  atorvastatin 40 milliGRAM(s) Oral at bedtime  carvedilol 3.125 milliGRAM(s) Oral every 12 hours  colchicine 0.6 milliGRAM(s) Oral once  digoxin     Tablet 125 MICROGram(s) Oral every other day  fluticasone propionate 50 MICROgram(s)/spray Nasal Spray 1 Spray(s) Both Nostrils two times a day  influenza   Vaccine 0.5 milliLiter(s) IntraMuscular once  iron sucrose IVPB 100 milliGRAM(s) IV Intermittent every 24 hours  lactobacillus acidophilus 1 Tablet(s) Oral three times a day with meals  piperacillin/tazobactam IVPB.. 3.375 Gram(s) IV Intermittent every 8 hours  potassium phosphate / sodium phosphate Powder (PHOS-NaK) 1 Packet(s) Oral two times a day  senna 2 Tablet(s) Oral at bedtime  sodium chloride 0.225% 1000 milliLiter(s) (50 mL/Hr) IV Continuous <Continuous>    MEDICATIONS  (PRN):  acetaminophen   Tablet .. 650 milliGRAM(s) Oral every 6 hours PRN Temp greater or equal to 38C (100.4F)  acetaminophen   Tablet .. 650 milliGRAM(s) Oral every 6 hours PRN Moderate Pain (4 - 6)  ondansetron Injectable 4 milliGRAM(s) IV Push every 8 hours PRN Nausea and/or Vomiting  sodium chloride 0.65% Nasal 1 Spray(s) Both Nostrils every 4 hours PRN Nasal Congestion      Vital Signs Last 24 Hrs  T(C): 36.6 (01 Oct 2021 13:13), Max: 36.7 (01 Oct 2021 00:39)  T(F): 97.9 (01 Oct 2021 13:13), Max: 98.1 (01 Oct 2021 00:39)  HR: 95 (01 Oct 2021 15:15) (77 - 110)  BP: 120/86 (01 Oct 2021 15:15) (95/67 - 120/86)  BP(mean): --  RR: 18 (01 Oct 2021 15:15) (16 - 18)  SpO2: 98% (01 Oct 2021 15:15) (95% - 98%)    Detailed Neurologic Exam:    Mental status: The patient is awake but non verbal. Follows simple commands.     Cranial nerves: Pupils equal and react symmetrically to light. There is no visual field deficit to threat. Extraocular motion is full with no nystagmus. Facial sensation is intact. Facial musculature is asymmetric with a depression of the right nasolabial fold. Palate elevates symmetrically. Tongue is midline.    Motor: There is increased tone on the right.  There is spastic paresis on the right.  The left is 2/5 in the hand and 1-2/5 otherwise.     Sensation: Decreased on the right compared to the left.    Reflexes: 2-3+on the right 2+ left and plantar responses are extensor.    Cerebellar: Cannot be tested.     Labs:                           10.2   12.34 )-----------( 368      ( 01 Oct 2021 07:01 )             34.5     10-01    147<H>  |  115<H>  |  22.2<H>  ----------------------------<  111<H>  4.0   |  19.0<L>  |  1.14    Ca    8.2<L>      01 Oct 2021 07:01  Phos  2.9     10-01        PT/INR - ( 01 Oct 2021 07:01 )   PT: 39.0 sec;   INR: 3.57 ratio      Rad:   CT head: There are chronic ischemic changes, left thalamic infarct, bilateral basal ganglia infarcts as well as right parietal infarct.

## 2021-10-02 ENCOUNTER — TRANSCRIPTION ENCOUNTER (OUTPATIENT)
Age: 66
End: 2021-10-02

## 2021-10-02 LAB
ANION GAP SERPL CALC-SCNC: 15 MMOL/L — SIGNIFICANT CHANGE UP (ref 5–17)
BUN SERPL-MCNC: 21 MG/DL — HIGH (ref 8–20)
CALCIUM SERPL-MCNC: 8.3 MG/DL — LOW (ref 8.6–10.2)
CHLORIDE SERPL-SCNC: 116 MMOL/L — HIGH (ref 98–107)
CO2 SERPL-SCNC: 17 MMOL/L — LOW (ref 22–29)
CREAT SERPL-MCNC: 1.12 MG/DL — SIGNIFICANT CHANGE UP (ref 0.5–1.3)
CRP SERPL-MCNC: 85 MG/L — HIGH
CULTURE RESULTS: NO GROWTH — SIGNIFICANT CHANGE UP
CULTURE RESULTS: SIGNIFICANT CHANGE UP
ERYTHROCYTE [SEDIMENTATION RATE] IN BLOOD: 56 MM/HR — HIGH (ref 0–20)
GLUCOSE BLDC GLUCOMTR-MCNC: 151 MG/DL — HIGH (ref 70–99)
GLUCOSE BLDC GLUCOMTR-MCNC: 166 MG/DL — HIGH (ref 70–99)
GLUCOSE BLDC GLUCOMTR-MCNC: 169 MG/DL — HIGH (ref 70–99)
GLUCOSE SERPL-MCNC: 164 MG/DL — HIGH (ref 70–99)
INR BLD: 2.48 RATIO — HIGH (ref 0.88–1.16)
MAGNESIUM SERPL-MCNC: 1.9 MG/DL — SIGNIFICANT CHANGE UP (ref 1.8–2.6)
PHOSPHATE SERPL-MCNC: 3.6 MG/DL — SIGNIFICANT CHANGE UP (ref 2.4–4.7)
POTASSIUM SERPL-MCNC: 3.8 MMOL/L — SIGNIFICANT CHANGE UP (ref 3.5–5.3)
POTASSIUM SERPL-SCNC: 3.8 MMOL/L — SIGNIFICANT CHANGE UP (ref 3.5–5.3)
PROTHROM AB SERPL-ACNC: 27.6 SEC — HIGH (ref 10.6–13.6)
SODIUM SERPL-SCNC: 147 MMOL/L — HIGH (ref 135–145)
SPECIMEN SOURCE: SIGNIFICANT CHANGE UP
SPECIMEN SOURCE: SIGNIFICANT CHANGE UP

## 2021-10-02 PROCEDURE — 73200 CT UPPER EXTREMITY W/O DYE: CPT | Mod: 26,LT

## 2021-10-02 PROCEDURE — 99497 ADVNCD CARE PLAN 30 MIN: CPT | Mod: 25

## 2021-10-02 PROCEDURE — 99233 SBSQ HOSP IP/OBS HIGH 50: CPT

## 2021-10-02 PROCEDURE — 99232 SBSQ HOSP IP/OBS MODERATE 35: CPT

## 2021-10-02 RX ORDER — WARFARIN SODIUM 2.5 MG/1
2 TABLET ORAL AT BEDTIME
Refills: 0 | Status: COMPLETED | OUTPATIENT
Start: 2021-10-02 | End: 2021-10-02

## 2021-10-02 RX ORDER — CARVEDILOL PHOSPHATE 80 MG/1
6.25 CAPSULE, EXTENDED RELEASE ORAL EVERY 12 HOURS
Refills: 0 | Status: DISCONTINUED | OUTPATIENT
Start: 2021-10-02 | End: 2021-10-03

## 2021-10-02 RX ADMIN — CARVEDILOL PHOSPHATE 3.12 MILLIGRAM(S): 80 CAPSULE, EXTENDED RELEASE ORAL at 05:23

## 2021-10-02 RX ADMIN — Medication 1 TABLET(S): at 08:11

## 2021-10-02 RX ADMIN — Medication 1 TABLET(S): at 11:46

## 2021-10-02 RX ADMIN — PIPERACILLIN AND TAZOBACTAM 25 GRAM(S): 4; .5 INJECTION, POWDER, LYOPHILIZED, FOR SOLUTION INTRAVENOUS at 05:23

## 2021-10-02 RX ADMIN — Medication 125 MICROGRAM(S): at 12:51

## 2021-10-02 RX ADMIN — PIPERACILLIN AND TAZOBACTAM 25 GRAM(S): 4; .5 INJECTION, POWDER, LYOPHILIZED, FOR SOLUTION INTRAVENOUS at 14:06

## 2021-10-02 RX ADMIN — Medication 1 TABLET(S): at 16:25

## 2021-10-02 RX ADMIN — Medication 100 MILLIGRAM(S): at 11:47

## 2021-10-02 RX ADMIN — SODIUM CHLORIDE 50 MILLILITER(S): 9 INJECTION INTRAMUSCULAR; INTRAVENOUS; SUBCUTANEOUS at 03:23

## 2021-10-02 RX ADMIN — WARFARIN SODIUM 2 MILLIGRAM(S): 2.5 TABLET ORAL at 22:01

## 2021-10-02 RX ADMIN — CARVEDILOL PHOSPHATE 6.25 MILLIGRAM(S): 80 CAPSULE, EXTENDED RELEASE ORAL at 16:28

## 2021-10-02 RX ADMIN — ATORVASTATIN CALCIUM 40 MILLIGRAM(S): 80 TABLET, FILM COATED ORAL at 22:01

## 2021-10-02 RX ADMIN — SODIUM CHLORIDE 50 MILLILITER(S): 9 INJECTION INTRAMUSCULAR; INTRAVENOUS; SUBCUTANEOUS at 16:24

## 2021-10-02 NOTE — PROGRESS NOTE ADULT - SUBJECTIVE AND OBJECTIVE BOX
[facility] Division of Hospital Medicine    Chief Complaint:  vomiting    SUBJECTIVE / OVERNIGHT EVENTS: Patient seen and examined still with left hand pain. Wife at bedside. Upset about episode patient had yesterday evening where he was coughing and anxious after received a new medication. All concerns addressed.       MEDICATIONS  (STANDING):  acetaminophen   Tablet .. 650 milliGRAM(s) Oral every 8 hours  allopurinol 100 milliGRAM(s) Oral daily  ascorbic acid 500 milliGRAM(s) Oral two times a day  atorvastatin 40 milliGRAM(s) Oral at bedtime  carvedilol 6.25 milliGRAM(s) Oral every 12 hours  digoxin     Tablet 125 MICROGram(s) Oral every other day  fluticasone propionate 50 MICROgram(s)/spray Nasal Spray 1 Spray(s) Both Nostrils two times a day  influenza   Vaccine 0.5 milliLiter(s) IntraMuscular once  lactobacillus acidophilus 1 Tablet(s) Oral three times a day with meals  piperacillin/tazobactam IVPB.. 3.375 Gram(s) IV Intermittent every 8 hours  potassium phosphate / sodium phosphate Powder (PHOS-NaK) 1 Packet(s) Oral two times a day  senna 2 Tablet(s) Oral at bedtime  sodium chloride 0.225% 1000 milliLiter(s) (50 mL/Hr) IV Continuous <Continuous>    MEDICATIONS  (PRN):  acetaminophen   Tablet .. 650 milliGRAM(s) Oral every 6 hours PRN Temp greater or equal to 38C (100.4F)  acetaminophen   Tablet .. 650 milliGRAM(s) Oral every 6 hours PRN Moderate Pain (4 - 6)  ondansetron Injectable 4 milliGRAM(s) IV Push every 8 hours PRN Nausea and/or Vomiting  sodium chloride 0.65% Nasal 1 Spray(s) Both Nostrils every 4 hours PRN Nasal Congestion        I&O's Summary      PHYSICAL EXAM:  Vital Signs Last 24 Hrs  T(C): 36.3 (02 Oct 2021 05:20), Max: 37 (01 Oct 2021 20:44)  T(F): 97.4 (02 Oct 2021 05:20), Max: 98.6 (01 Oct 2021 20:44)  HR: 90 (02 Oct 2021 05:20) (77 - 95)  BP: 131/70 (02 Oct 2021 05:20) (115/80 - 131/70)  BP(mean): --  RR: 16 (02 Oct 2021 05:20) (16 - 20)  SpO2: 100% (02 Oct 2021 05:20) (96% - 100%)        CONSTITUTIONAL: NAD, resting comfortably  ENMT: Moist oral mucosa, no pharyngeal injection or exudates;   RESPIRATORY: Normal respiratory effort; lungs are clear to auscultation bilaterally  CARDIOVASCULAR: Regular rate and rhythm, normal S1 and S2, no murmur/rub/gallop; No lower extremity edema; Peripheral pulses are 2+ bilaterally  ABDOMEN: Nontender to palpation, normoactive bowel sounds, no rebound/guarding; No hepatosplenomegaly  MUSCLOSKELETAL:  Normal gait; no clubbing or cyanosis of digits; no joint swelling or tenderness to palpation  PSYCH: A+O to person, place, and time; affect appropriate  NEUROLOGY: CN 2-12 are intact and symmetric; no gross sensory deficits;   SKIN: No rashes; no palpable lesions    LABS:                        10.2   12.34 )-----------( 368      ( 01 Oct 2021 07:01 )             34.5     10-02    147<H>  |  116<H>  |  21.0<H>  ----------------------------<  164<H>  3.8   |  17.0<L>  |  1.12    Ca    8.3<L>      02 Oct 2021 06:51  Phos  3.6     10-02  Mg     1.9     10-02      PT/INR - ( 02 Oct 2021 06:51 )   PT: 27.6 sec;   INR: 2.48 ratio               Urinalysis Basic - ( 01 Oct 2021 00:50 )    Color: Yellow / Appearance: Clear / S.015 / pH: x  Gluc: x / Ketone: Trace  / Bili: Negative / Urobili: Negative mg/dL   Blood: x / Protein: 30 mg/dL / Nitrite: Negative   Leuk Esterase: Negative / RBC: 0-2 /HPF / WBC 11-25   Sq Epi: x / Non Sq Epi: Few / Bacteria: Many        Culture - Urine (collected 01 Oct 2021 05:31)  Source: Catheterized Catheterized  Final Report (02 Oct 2021 08:21):    No growth      CAPILLARY BLOOD GLUCOSE      POCT Blood Glucose.: 169 mg/dL (02 Oct 2021 11:15)  POCT Blood Glucose.: 151 mg/dL (02 Oct 2021 05:22)  POCT Blood Glucose.: 173 mg/dL (01 Oct 2021 22:20)        RADIOLOGY & ADDITIONAL TESTS:  Results Reviewed:   Imaging Personally Reviewed:  Electrocardiogram Personally Reviewed:                                           [facility] Division of Hospital Medicine    Chief Complaint:  vomiting    SUBJECTIVE / OVERNIGHT EVENTS: Patient seen and examined still with left hand pain. Wife at bedside. Upset about episode patient had yesterday evening where he was coughing and anxious after received a new medication. All concerns addressed.       MEDICATIONS  (STANDING):  acetaminophen   Tablet .. 650 milliGRAM(s) Oral every 8 hours  allopurinol 100 milliGRAM(s) Oral daily  ascorbic acid 500 milliGRAM(s) Oral two times a day  atorvastatin 40 milliGRAM(s) Oral at bedtime  carvedilol 6.25 milliGRAM(s) Oral every 12 hours  digoxin     Tablet 125 MICROGram(s) Oral every other day  fluticasone propionate 50 MICROgram(s)/spray Nasal Spray 1 Spray(s) Both Nostrils two times a day  influenza   Vaccine 0.5 milliLiter(s) IntraMuscular once  lactobacillus acidophilus 1 Tablet(s) Oral three times a day with meals  piperacillin/tazobactam IVPB.. 3.375 Gram(s) IV Intermittent every 8 hours  potassium phosphate / sodium phosphate Powder (PHOS-NaK) 1 Packet(s) Oral two times a day  senna 2 Tablet(s) Oral at bedtime  sodium chloride 0.225% 1000 milliLiter(s) (50 mL/Hr) IV Continuous <Continuous>    MEDICATIONS  (PRN):  acetaminophen   Tablet .. 650 milliGRAM(s) Oral every 6 hours PRN Temp greater or equal to 38C (100.4F)  acetaminophen   Tablet .. 650 milliGRAM(s) Oral every 6 hours PRN Moderate Pain (4 - 6)  ondansetron Injectable 4 milliGRAM(s) IV Push every 8 hours PRN Nausea and/or Vomiting  sodium chloride 0.65% Nasal 1 Spray(s) Both Nostrils every 4 hours PRN Nasal Congestion        I&O's Summary      PHYSICAL EXAM:  Vital Signs Last 24 Hrs  T(C): 36.3 (02 Oct 2021 05:20), Max: 37 (01 Oct 2021 20:44)  T(F): 97.4 (02 Oct 2021 05:20), Max: 98.6 (01 Oct 2021 20:44)  HR: 90 (02 Oct 2021 05:20) (77 - 95)  BP: 131/70 (02 Oct 2021 05:20) (115/80 - 131/70)  BP(mean): --  RR: 16 (02 Oct 2021 05:20) (16 - 20)  SpO2: 100% (02 Oct 2021 05:20) (96% - 100%)      CONSTITUTIONAL: NAD, resting comfortably  ENMT: Moist oral mucosa, no pharyngeal injection or exudates;   RESPIRATORY: Normal respiratory effort; lungs are clear to auscultation bilaterally  CARDIOVASCULAR: Regular rate and rhythm, normal S1 and S2,  No lower extremity edema  ABDOMEN: Nontender to palpation, normoactive bowel sounds, no rebound/guarding;  MUSCLOSKELETAL:   no joint swelling or tenderness to palpation  PSYCH: A+O to person, place, and time; affect appropriate  NEUROLOGY: right sided weakness  SKIN: No rashes; no palpable lesions    LABS:                        10.2   12.34 )-----------( 368      ( 01 Oct 2021 07:01 )             34.5     10-02    147<H>  |  116<H>  |  21.0<H>  ----------------------------<  164<H>  3.8   |  17.0<L>  |  1.12    Ca    8.3<L>      02 Oct 2021 06:51  Phos  3.6     10-02  Mg     1.9     10-02      PT/INR - ( 02 Oct 2021 06:51 )   PT: 27.6 sec;   INR: 2.48 ratio               Urinalysis Basic - ( 01 Oct 2021 00:50 )    Color: Yellow / Appearance: Clear / S.015 / pH: x  Gluc: x / Ketone: Trace  / Bili: Negative / Urobili: Negative mg/dL   Blood: x / Protein: 30 mg/dL / Nitrite: Negative   Leuk Esterase: Negative / RBC: 0-2 /HPF / WBC 11-25   Sq Epi: x / Non Sq Epi: Few / Bacteria: Many        Culture - Urine (collected 01 Oct 2021 05:31)  Source: Catheterized Catheterized  Final Report (02 Oct 2021 08:21):    No growth      CAPILLARY BLOOD GLUCOSE      POCT Blood Glucose.: 169 mg/dL (02 Oct 2021 11:15)  POCT Blood Glucose.: 151 mg/dL (02 Oct 2021 05:22)  POCT Blood Glucose.: 173 mg/dL (01 Oct 2021 22:20)        RADIOLOGY & ADDITIONAL TESTS:  Results Reviewed:   Imaging Personally Reviewed:  Electrocardiogram Personally Reviewed:

## 2021-10-02 NOTE — PROGRESS NOTE ADULT - ASSESSMENT
6 year old male w hx CVA w R sided weakness and dysphagia 2012, permanent AF with slow ventricular  response (prior hx LA thrombus on SHANELLE) on warfarin, RBBB, monomorphic VT (5/2014) s/p MDT dual chamber ICD with upgrade to CRT-D (1/2016)and generator change , CKD III, HTN came to ED by EMS after projectile emesis    Since stroke pt has used pureed w thickened agent vs soft diet  today while at lunch w wife, had projectile vomiting of food  has been moving his bowels daily; she did report that he had emesis once before when he was severely constipated  did not complaint to her of pain    Wife reports that since  admission at [facility] he has been unable to use his L side.  She reports that she was told this was due to sepsis because of L arm cellulitis. ( treated with zosyn, vancomycin, linezolid and discontinued from Lasix per wife to ED)  He is seen weekly by his PCP in home  OT/PT in home but without improvement  Wife reports he is usually verbal but has not been himself  Can no longer stand so she lifts him into wheelchair    Was seen in [facility] on 09-05 for IV hydration and was discharged home.  She then reports that he was in heart failure and should have been treated in hospital.  PCP adjusted diuretics    PT PLACED   ON IV ZOSYN FOR PNEUMONIA   completed the course on 10/2/21    fevers on 9/30; resolved  PT WITH LEFT WRIST PAIN CONCERN FOR GOUT   - wife confims hx of gout as well      XRAY done     URIC ACID elevated  7.4   s/p steroids  on allopurinol  pain better      ID will sign off.     Consider rheum consult if needed.

## 2021-10-02 NOTE — DISCHARGE NOTE PROVIDER - NSDCMRMEDTOKEN_GEN_ALL_CORE_FT
Coreg 6.25 mg oral tablet: 1 tab(s) orally once a day  digoxin 125 mcg (0.125 mg) oral tablet: 1 tab(s) orally every other day (at bedtime)  furosemide 40 mg oral tablet: 1 tab(s) orally every 48 hours  pantoprazole 40 mg oral delayed release tablet: 1 tab(s) orally once a day  warfarin 2 mg oral tablet: 1 tab(s) orally once a day   allopurinol 100 mg oral tablet: 1 tab(s) orally once a day  carvedilol 6.25 mg oral tablet: 1 tab(s) orally once a day  digoxin 125 mcg (0.125 mg) oral tablet: 1 tab(s) orally every other day  furosemide 20 mg oral tablet: 1 tab(s) orally every other day  furosemide 40 mg oral tablet: 1 tab(s) orally every other day  pantoprazole 40 mg oral delayed release tablet: 1 tab(s) orally once a day  warfarin 2 mg oral tablet: 1 tab(s) orally once a day

## 2021-10-02 NOTE — DISCHARGE NOTE PROVIDER - CARE PROVIDERS DIRECT ADDRESSES
,[email or web address],DirectAddress_Unknown,DirectAddress_Unknown,[email or web address]

## 2021-10-02 NOTE — PROGRESS NOTE ADULT - ASSESSMENT
A) Prior CVA, ARF improving; metabolic acidosis with normal anion gap and urine pH 5.0, high sed rate.      P) B-hydroxybutyrate  , lactic acid in am.

## 2021-10-02 NOTE — DISCHARGE NOTE PROVIDER - HOSPITAL COURSE
This is a 66 M PMH CVA w R sided weakness and dysphagia 2012, AF with slow ventricular response (prior hx LA thrombus on SHANELLE) on warfarin, RBBB, monomorphic VT (5/2014) s/p MDT dual chamber ICD with upgrade to CRT-D (1/2016) and generator change , CKD III, HTN and stercoral colitis who was BIBEMS after projectile emesis. Pt underwent CTAP consistent with fecal impaction. Pt initiated on bowel regimen with benefit. Patient at baseline nonverbal and bedbound.  Information gathered by wife. Pt admitted to medicine for presumed aspiration PNA. Pt initiated on abx. While admitted, patient with escalating oxygen requirements with saturations in the mid to high 80's. Pt is on high flow oxygen now. Multiple lengthy GOC discussions conducted by Dr. Carr with wife and PCP.  Wife has MOLST form at home which includes a DNR order, but wants all measures taken for her . ICU consulted for further management and high risk for deterioration. Pt now on RA s/p zosyn course. Pt has a h/o CVA/  right hemiplegia at baseline with suspected new CVA left sided weakness. Neurology was consulted. CT of brain showed lateral basal ganglia and R parietal infracts. Unclear exactly when onset was. To continue statin and warfarin. Positive troponin noted cardiology consulted. Pt administered lasix on admission. TTE without any acute changes. Pt began having cough, likely 2/2 chronic diastolic HF, diuretics were held d/t dehydration/ worsening Scr levels. Given one dose lasix 10/03 with benefit. Nephrology was consulted. Pt administered PO bicarb for metabolic acidosis with improvement.  Pt had fevers during hospitalization likely 2/2 gout flare. L wrist CT consistent with gout, Ortho consulted and recommending no further w/u. Administered colchicine and allopurinol. Bcx x2 NGTD. Pt now medically stable for DC.    All electrolyte abnormalities were monitored carefully and repleted as necessary during this hospitalization. At the time of discharge patient was hemodynamically stable and amenable to all terms of discharge. The patient has received verbal instructions from myself regarding discharge plans.     Length of Discharge: 45MIN    Vital Signs Last 24 Hrs  T(C): 36.7 (05 Oct 2021 11:31), Max: 36.8 (04 Oct 2021 12:20)  T(F): 98.1 (05 Oct 2021 11:31), Max: 98.2 (04 Oct 2021 12:20)  HR: 89 (05 Oct 2021 11:31) (82 - 89)  BP: 106/65 (05 Oct 2021 11:31) (105/68 - 131/66)  BP(mean): --  RR: 18 (05 Oct 2021 11:31) (18 - 18)  SpO2: 97% (05 Oct 2021 11:31) (94% - 97%)     PHYSICAL EXAM:  GENERAL: Pt lying in bed comfortably in NAD  HEAD:  Atraumatic   CHEST/LUNG: Clear to auscultation bilaterally; Unlabored respirations  HEART: Regular rate and rhythm  ABDOMEN: Bowel sounds present; Soft, Nontender, Nondistended  NEUROLOGY: right sided weakness  SKIN: Warm and dry        This is a 66 M PMH CVA w R sided weakness and dysphagia 2012, AF with slow ventricular response (prior hx LA thrombus on SHANELLE) on warfarin, RBBB, monomorphic VT (5/2014) s/p MDT dual chamber ICD with upgrade to CRT-D (1/2016) and generator change , CKD III, HTN and stercoral colitis who was BIBEMS after projectile emesis. Pt underwent CTAP consistent with fecal impaction. Pt initiated on bowel regimen with benefit. Patient at baseline nonverbal and bedbound.  Information gathered by wife. Pt admitted to medicine for presumed aspiration PNA. Pt initiated on abx. While admitted, patient with escalating oxygen requirements with saturations in the mid to high 80's. Pt was on high flow oxygen. Pt now on RA s/p zosyn course. Pt has a h/o CVA/  right hemiplegia at baseline with suspected new CVA left sided weakness. Neurology was consulted. CT of brain showed lateral basal ganglia and R parietal infracts. Unclear exactly when onset was. To continue statin and warfarin. Positive troponin noted cardiology consulted. Pt administered lasix on admission. TTE without any acute changes. Pt began having cough, likely 2/2 chronic diastolic HF, diuretics were held d/t dehydration/ worsening Scr levels. Given one dose lasix 10/03 with benefit. Nephrology was consulted. Pt administered PO bicarb for metabolic acidosis with improvement.  Pt had fevers during hospitalization likely 2/2 gout flare. L wrist CT consistent with gout, Ortho consulted and recommending no further w/u. Administered colchicine and allopurinol. Bcx x2 NGTD. Pt now medically ready for DC.    All electrolyte abnormalities were monitored carefully and repleted as necessary during this hospitalization. At the time of discharge patient was hemodynamically stable and amenable to all terms of discharge.     Vital Signs Last 24 Hrs  T(C): 36.7 (05 Oct 2021 11:31), Max: 36.8 (04 Oct 2021 12:20)  T(F): 98.1 (05 Oct 2021 11:31), Max: 98.2 (04 Oct 2021 12:20)  HR: 89 (05 Oct 2021 11:31) (82 - 89)  BP: 106/65 (05 Oct 2021 11:31) (105/68 - 131/66)  BP(mean): --  RR: 18 (05 Oct 2021 11:31) (18 - 18)  SpO2: 97% (05 Oct 2021 11:31) (94% - 97%)     PHYSICAL EXAM:  GENERAL: Pt lying in bed comfortably in NAD  HEAD:  Atraumatic   CHEST/LUNG: Clear to auscultation bilaterally; Unlabored respirations  HEART: Regular rate and rhythm  ABDOMEN: Bowel sounds present; Soft, Nontender, Nondistended  NEUROLOGY: right sided weakness  SKIN: Warm and dry     45 minutes spent on discharge.

## 2021-10-02 NOTE — DISCHARGE NOTE PROVIDER - NSDCCPCAREPLAN_GEN_ALL_CORE_FT
PRINCIPAL DISCHARGE DIAGNOSIS  Diagnosis: Pneumonia, aspiration  Assessment and Plan of Treatment: - Keep head of bed elevated  - Feeds with supervision   - Aspiration precuations   - Follow up with PCP in 1 week      SECONDARY DISCHARGE DIAGNOSES  Diagnosis: History of CVA (cerebrovascular accident)  Assessment and Plan of Treatment: - CT of brain showed lateral basal ganglia and R parietal infracts   - To continue statin and warfarin  - Follow up with Neurology in 1 week    Diagnosis: Acute renal failure  Assessment and Plan of Treatment: - Continue with bicarb as directed  - Follow up with Nephrology in 1 week    Diagnosis: Fecal impaction  Assessment and Plan of Treatment: - Resolved   - Continue with bowel regimen    Diagnosis: Chronic atrial fibrillation  Assessment and Plan of Treatment: - Continue coreg, digoxin and warfarin as directed  - Monitor INRs weekly   - Monitor dig levels   - Follow up with Cardiology in 1 week     PRINCIPAL DISCHARGE DIAGNOSIS  Diagnosis: Pneumonia, aspiration  Assessment and Plan of Treatment: - Keep head of bed elevated  - Feeds with supervision   - Aspiration precuations   - Follow up with PCP in 1 week      SECONDARY DISCHARGE DIAGNOSES  Diagnosis: History of CVA (cerebrovascular accident)  Assessment and Plan of Treatment: - CT of brain showed lateral basal ganglia and R parietal infracts   - To continue statin and warfarin  - Follow up with Neurology in 1 week    Diagnosis: Acute renal failure  Assessment and Plan of Treatment: - Continue with bicarb as directed  - Follow up with Nephrology in 1 week    Diagnosis: Fecal impaction  Assessment and Plan of Treatment: - Resolved   - Continue with bowel regimen    Diagnosis: Chronic atrial fibrillation  Assessment and Plan of Treatment: - Continue coreg, digoxin and warfarin as directed  - Monitor INRs weekly   - Monitor dig levels   - Follow up with Cardiology in 1 week    Diagnosis: Gout  Assessment and Plan of Treatment: - Avoid purine rich foods  - Continue with allopurinol   - Follow up with Ortho in 1 week     PRINCIPAL DISCHARGE DIAGNOSIS  Diagnosis: Pneumonia, aspiration  Assessment and Plan of Treatment: - Keep head of bed elevated  - Feeds with supervision   - Aspiration precuations   - Follow up with PCP in 1 week      SECONDARY DISCHARGE DIAGNOSES  Diagnosis: Chronic atrial fibrillation  Assessment and Plan of Treatment: - Continue coreg, digoxin and warfarin as directed  - Monitor INRs weekly   - Monitor dig levels   - Follow up with Cardiology in 1 week    Diagnosis: Fecal impaction  Assessment and Plan of Treatment: - Resolved   - Continue with bowel regimen    Diagnosis: Acute renal failure  Assessment and Plan of Treatment:   - Follow up with Nephrology in 1 week    Diagnosis: History of CVA (cerebrovascular accident)  Assessment and Plan of Treatment: - CT of brain showed lateral basal ganglia and R parietal infracts   - To continue statin and warfarin  - Follow up with Neurology in 1 week    Diagnosis: Gout  Assessment and Plan of Treatment: - Avoid purine rich foods  - Continue with allopurinol   - Follow up with Ortho in 1 week    Diagnosis: Chronic CHF  Assessment and Plan of Treatment: Alternate 40mg and 20mg of oral lasix daily

## 2021-10-02 NOTE — DISCHARGE NOTE PROVIDER - PROVIDER TOKENS
PROVIDER:[TOKEN:[[id number]],FOLLOWUP:[1 week]],PROVIDER:[TOKEN:[[id number]],FOLLOWUP:[1 week]],PROVIDER:[TOKEN:[[id number]],FOLLOWUP:[1 week]],FREE:[LAST:[PCP],PHONE:[(   )    -],FAX:[(   )    -],FOLLOWUP:[1 week]],PROVIDER:[TOKEN:[[id number]],FOLLOWUP:[1 week]]

## 2021-10-02 NOTE — DISCHARGE NOTE PROVIDER - NSDCFUADDINST_GEN_ALL_CORE_FT
ORTHO recommendations: Follow up with Dr. Diallo as needed in the office. Continue medical management of gout as per medicine team recommendations.

## 2021-10-02 NOTE — PROGRESS NOTE ADULT - ASSESSMENT
PT WITH LEFT WRIST PAIN CONSISTENT W/GOUT   - wife confims hx of gout as well  - elevated wbc likely secondary to steroid use   - XRAY done   - s/p steroids  - on allopurinol pain better  - ortho to follow up in am

## 2021-10-02 NOTE — GOALS OF CARE CONVERSATION - ADVANCED CARE PLANNING - CONVERSATION/DISCUSSION
Diagnosis/Prognosis/MOLST Discussed
Diagnosis/Prognosis/MOLST Discussed/Treatment Options
MOLST Discussed

## 2021-10-02 NOTE — PROGRESS NOTE ADULT - SUBJECTIVE AND OBJECTIVE BOX
NEPHROLOGY INTERVAL HPI/OVERNIGHT EVENTS:    No new events.    MEDICATIONS  (STANDING):  allopurinol 100 milliGRAM(s) Oral daily  ascorbic acid 500 milliGRAM(s) Oral two times a day  atorvastatin 40 milliGRAM(s) Oral at bedtime  carvedilol 3.125 milliGRAM(s) Oral every 12 hours  influenza   Vaccine 0.5 milliLiter(s) IntraMuscular once  lactobacillus acidophilus 1 Tablet(s) Oral three times a day with meals  methylPREDNISolone sodium succinate Injectable 40 milliGRAM(s) IV Push daily  pantoprazole  Injectable 40 milliGRAM(s) IV Push at bedtime  piperacillin/tazobactam IVPB.. 3.375 Gram(s) IV Intermittent every 8 hours  potassium phosphate / sodium phosphate Powder (PHOS-NaK) 1 Packet(s) Oral two times a day  senna 2 Tablet(s) Oral at bedtime  sodium chloride 0.9% Bolus 250 milliLiter(s) IV Bolus once  sodium chloride 0.9%. 1000 milliLiter(s) (70 mL/Hr) IV Continuous <Continuous>    MEDICATIONS  (PRN):  acetaminophen   Tablet .. 650 milliGRAM(s) Oral every 6 hours PRN Temp greater or equal to 38C (100.4F)  acetaminophen   Tablet .. 650 milliGRAM(s) Oral every 6 hours PRN Moderate Pain (4 - 6)  ondansetron Injectable 4 milliGRAM(s) IV Push every 8 hours PRN Nausea and/or Vomiting      Allergies    IV Contrast (Other)  latex (Rash)          Vital Signs Last 24 Hrs  T(C): 36.4 (02 Oct 2021 16:14), Max: 37 (01 Oct 2021 20:44)  T(F): 97.5 (02 Oct 2021 16:14), Max: 98.6 (01 Oct 2021 20:44)  HR: 73 (02 Oct 2021 16:14) (73 - 90)  BP: 115/77 (02 Oct 2021 16:14) (115/77 - 131/70)  BP(mean): --  RR: 18 (02 Oct 2021 16:14) (16 - 20)  SpO2: 99% (02 Oct 2021 16:14) (96% - 100%)  T(C): 36.4 (01 Oct 2021 04:35), Max: 38.7 (30 Sep 2021 16:20)  T(F): 97.6 (01 Oct 2021 04:35), Max: 101.6 (30 Sep 2021 16:20)  HR: 90 (01 Oct 2021 04:35) (77 - 105)  BP: 116/74 (01 Oct 2021 04:35) (93/64 - 116/74)         PHYSICAL EXAM:    GENERAL: In bed, appears chronically ill  HEAD:  same  EYES: open  ENMT:   NECK: veins wnl  NERVOUS SYSTEM:  prior CVA residuals  same  CHEST/LUNG: decreased bs bases, no 02  HEART: No rub  ABDOMEN: NT  EXTREMITIES:  muscle wasting with left wrist  contraction  LYMPH:   SKIN: pale  : parry    LABS:    10-02    147<H>  |  116<H>  |  21.0<H>  ----------------------------<  164<H>  3.8   |  17.0<L>  |  1.12    Ca    8.3<L>      02 Oct 2021 06:51  Phos  3.6     10-02  Mg     1.9     10-                            10.2   12.34 )-----------( 368      ( 01 Oct 2021 07:01 )             34.5     10-    147<H>  |  115<H>  |  22.2<H>  ----------------------------<  111<H>  4.0   |  19.0<L>  |  1.14    Ca    8.2<L>      01 Oct 2021 07:01  Phos  2.9     10-01      PT/INR - ( 01 Oct 2021 07:01 )   PT: 39.0 sec;   INR: 3.57 ratio           Urinalysis Basic - ( 01 Oct 2021 00:50 )    Color: Yellow / Appearance: Clear / S.015 / pH: x  Gluc: x / Ketone: Trace  / Bili: Negative / Urobili: Negative mg/dL   Blood: x / Protein: 30 mg/dL / Nitrite: Negative   Leuk Esterase: Negative / RBC: 0-2 /HPF / WBC 11-25   Sq Epi: x / Non Sq Epi: Few / Bacteria: Many      Phosphorus Level, Serum: 2.9 mg/dL (10-01 @ 07:01)          RADIOLOGY & ADDITIONAL TESTS:

## 2021-10-02 NOTE — PROGRESS NOTE ADULT - SUBJECTIVE AND OBJECTIVE BOX
RUSH RANDAL  [id number]        Chief Complaint:  Failure to thrive/Aspiration Pneumonia/History of CVA/Elevated troponin    Interval History:  Patient resting comfortably without c/o.    Tele:  V-Paced        acetaminophen   Tablet .. 650 milliGRAM(s) Oral every 6 hours PRN  acetaminophen   Tablet .. 650 milliGRAM(s) Oral every 6 hours PRN  acetaminophen   Tablet .. 650 milliGRAM(s) Oral every 8 hours  allopurinol 100 milliGRAM(s) Oral daily  ascorbic acid 500 milliGRAM(s) Oral two times a day  atorvastatin 40 milliGRAM(s) Oral at bedtime  carvedilol 6.25 milliGRAM(s) Oral every 12 hours  digoxin     Tablet 125 MICROGram(s) Oral every other day  fluticasone propionate 50 MICROgram(s)/spray Nasal Spray 1 Spray(s) Both Nostrils two times a day  influenza   Vaccine 0.5 milliLiter(s) IntraMuscular once  iron sucrose IVPB 100 milliGRAM(s) IV Intermittent every 24 hours  lactobacillus acidophilus 1 Tablet(s) Oral three times a day with meals  ondansetron Injectable 4 milliGRAM(s) IV Push every 8 hours PRN  piperacillin/tazobactam IVPB.. 3.375 Gram(s) IV Intermittent every 8 hours  potassium phosphate / sodium phosphate Powder (PHOS-NaK) 1 Packet(s) Oral two times a day  senna 2 Tablet(s) Oral at bedtime  sodium chloride 0.225% 1000 milliLiter(s) IV Continuous <Continuous>  sodium chloride 0.65% Nasal 1 Spray(s) Both Nostrils every 4 hours PRN          Physical Exam:  T(C): 36.3 (10-02-21 @ 05:20), Max: 37 (10-01-21 @ 20:44)  HR: 90 (10-02-21 @ 05:20) (77 - 110)  BP: 131/70 (10-02-21 @ 05:20) (115/78 - 131/70)  RR: 16 (10-02-21 @ 05:20) (16 - 20)  SpO2: 100% (10-02-21 @ 05:20) (95% - 100%)  Wt(kg): --  General: Comfortable in NAD  Neck: No JVD  CVS: nl s1s2, no s3  Pulm: CTA b/l anteriorly  Abd: soft, non-tender  Ext: No c/c/e  Neuro Awake and alert, responding appropriately  Psych: Normal affect      Labs:   02 Oct 2021 06:51    147    |  116    |  21.0   ----------------------------<  164    3.8     |  17.0   |  1.12     Ca    8.3        02 Oct 2021 06:51  Phos  3.6       02 Oct 2021 06:51  Mg     1.9       02 Oct 2021 06:51                            10.2   12.34 )-----------( 368      ( 01 Oct 2021 07:01 )             34.5     PT/INR - ( 02 Oct 2021 06:51 )   PT: 27.6 sec;   INR: 2.48 ratio           Echo:   1. Left ventricular ejection fraction, by visual estimation, is 35 to 40%.   2. Moderately decreased global left ventricular systolic function.   3. The left ventricular diastolic function could not be assessed in this study.   4. Normal left ventricular internal cavity size.   5. Moderately enlarged right ventricle. Mildly reduced RV systolic function.   6. Moderately enlarged left atrium.   7. Moderately enlarged right atrium.   8. Mild mitral annular calcification.   9. Mild thickening and calcification of the anterior and posterior mitral valve leaflets.  10. Mild mitral valve regurgitation.  11. Sclerotic aortic valve with normal opening.  12. Mild to moderate aortic regurgitation.  13. Moderate tricuspid regurgitation.  14. Mild pulmonic valve regurgitation.  15. Estimated pulmonary artery systolic pressure is 47.2 mmHg assuming a right atrial pressure of 8 mmHg, which is consistent with mild pulmonary hypertension.  16. There is no evidence of pericardial effusion.            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 Cardiology consulted due to elevated troponin, which is 0.08 in the setting of CKD. Patient denies angina or dyspnea and does not appear to have an acute coronary syndrome at this time. BNP markedly elevated at 4000s which is less than prior ER visit this month.   -Patient with b/l PNA 2/2 aspiration, being treated and improving.  -Noted to have ARF and no significant acute CHF on exam.  Diuretics held.  Cr improving, may consider restarting po Lasix by tomorrow.  -Also with new CVA.  Now on Coumadin and INR elevated today.  No further intervention planned.  -Now more alert.  Echo with no change from prior echo from 2019.  EF moderately depressed.  No severe VHD.  Mild PHTN.    Plan:  1. Restart Dig QOD.  Consider restarting po Lasix, would restart home dose by d/c.  2. Hold Coumadin today and recheck INR tomorrow.  3. Continue other current CV meds at current doses.  4. Abx per med/ID.  5. Gout per med.  6. Neuro f/u.

## 2021-10-02 NOTE — DISCHARGE NOTE PROVIDER - DETAILS OF MALNUTRITION DIAGNOSIS/DIAGNOSES
This patient has been assessed with a concern for Malnutrition and was treated during this hospitalization for the following Nutrition diagnosis/diagnoses:     -  09/30/2021: Moderate protein-calorie malnutrition

## 2021-10-02 NOTE — DISCHARGE NOTE PROVIDER - CARE PROVIDER_API CALL
Frantz Jordan (MD)  Cardiovascular Disease  [address] [place] [address]  Phone: [phone]  Fax: [phone]  Follow Up Time: 1 week    Cm Arteaga)  Internal Medicine; Nephrology  [address] [facility], [address]  [place], [place] [id number]  Phone: [phone]  Fax: [phone]  Follow Up Time: 1 week    Devang Mcintosh (DO)  Orthopaedic Surgery  [address] [place] [address]  Phone: [phone]  Fax: [phone]  Follow Up Time: 1 week    PCP,   Phone: (   )    -  Fax: (   )    -  Follow Up Time: 1 week    Jordon Zamora; PhD)  Neurology; Vascular Neurology  [address] [facility] [facility]  [place] [address]  Phone: [phone]  Fax: [phone]  Follow Up Time: 1 week

## 2021-10-02 NOTE — PROGRESS NOTE ADULT - SUBJECTIVE AND OBJECTIVE BOX
INFECTIOUS DISEASES AND INTERNAL MEDICINE at [facility]  =======================================================  Nabil Can MD  Diplomates American Board of Internal Medicine and Infectious Diseases  Telephone [phone]  Fax            [phone]  =======================================================    [facility] [id number]    Follow up: FEVERS ; left wrist pain    wrist CT done, osteopenia; also motion artifact.   wife reports hx of gout in same wrist.     Allergies:  IV Contrast (Other)  latex (Rash)         SOCIAL       FAMILY   FAMILY HISTORY:  Family history of cerebrovascular accident (CVA)      REVIEW OF SYSTEMS:  CONSTITUTIONAL:  No Fever or chills  HEENT:   No diplopia or blurred vision.  No earache, sore throat or runny nose.  CARDIOVASCULAR:  No pressure, squeezing, strangling, tightness, heaviness or aching about the chest, neck, axilla or epigastrium.  RESPIRATORY:  No cough, shortness of breath, PND or orthopnea.  GASTROINTESTINAL:  No nausea, vomiting or diarrhea.  GENITOURINARY:  No dysuria, frequency or urgency. No Blood in urine  MUSCULOSKELETAL:   LEFT WRIST PAIN  SKIN:  No change in skin, hair or nails.  NEUROLOGIC:   AS PER HPI            Physical Exam:     GEN: NAD,   HEENT: normocephalic and atraumatic. EOMI. BRAYAN.    NECK: Supple. No carotid bruits.  No lymphadenopathy or thyromegaly.  LUNGS: Clear to auscultation.  HEART: Regular rate and rhythm without murmur.  ABDOMEN: Soft, nontender, and nondistended.  Positive bowel sounds.    : No CVA tenderness  EXTREMITIES: Without any cyanosis, clubbing, rash, lesions or edema.  MSK: MILD SWELLING OF THE LEFT WRIST. TENDER ON MOTION.   NEUROLOGIC:  HEMIPLEGIA       Vitals:  ============  T(F): 97.4 (02 Oct 2021 05:20), Max: 98.6 (01 Oct 2021 20:44)  HR: 90 (02 Oct 2021 05:20)  BP: 131/70 (02 Oct 2021 05:20)  RR: 16 (02 Oct 2021 05:20)  SpO2: 100% (02 Oct 2021 05:20) (96% - 100%)  temp max in last 48H T(F): , Max: 101.6 (09-30-21 @ 16:20)    =======================================================  Current Antibiotics:    Other medications:  allopurinol 100 milliGRAM(s) Oral daily  ascorbic acid 500 milliGRAM(s) Oral two times a day  atorvastatin 40 milliGRAM(s) Oral at bedtime  carvedilol 6.25 milliGRAM(s) Oral every 12 hours  digoxin     Tablet 125 MICROGram(s) Oral every other day  fluticasone propionate 50 MICROgram(s)/spray Nasal Spray 1 Spray(s) Both Nostrils two times a day  influenza   Vaccine 0.5 milliLiter(s) IntraMuscular once  lactobacillus acidophilus 1 Tablet(s) Oral three times a day with meals  potassium phosphate / sodium phosphate Powder (PHOS-NaK) 1 Packet(s) Oral two times a day  senna 2 Tablet(s) Oral at bedtime  sodium chloride 0.225% 1000 milliLiter(s) IV Continuous <Continuous>  warfarin 2 milliGRAM(s) Oral at bedtime    Steroids Course:  methylPREDNISolone sodium succinate Injectable   40 milliGRAM(s) (10-01-21 @ 11:59)      =======================================================  Labs:                        10.2   12.34 )-----------( 368      ( 01 Oct 2021 07:01 )             34.5     10-02    147<H>  |  116<H>  |  21.0<H>  ----------------------------<  164<H>  3.8   |  17.0<L>  |  1.12    Ca    8.3<L>      02 Oct 2021 06:51  Phos  3.6     10-02  Mg     1.9     10-02        Culture - Urine (collected 10-01-21 @ 05:31)  Source: Catheterized Catheterized  Final Report (10-02-21 @ 08:21):    No growth    Culture - Blood (collected 09-27-21 @ 12:12)  Source: .Blood Blood-Peripheral  Final Report (10-02-21 @ 13:00):    No growth at 5 days.      Creatinine, Serum: 1.12 mg/dL (10-02-21 @ 06:51)  Creatinine, Serum: 1.14 mg/dL (10-01-21 @ 07:01)  Creatinine, Serum: 1.36 mg/dL (09-30-21 @ 07:12)  Creatinine, Serum: 1.75 mg/dL (09-29-21 @ 07:01)  Creatinine, Serum: 2.62 mg/dL (09-28-21 @ 03:33)    Procalcitonin, Serum: 1.79 ng/mL (09-30-21 @ 07:12)  Procalcitonin, Serum: 24.13 ng/mL (09-27-21 @ 12:12)        C-Reactive Protein, Serum: 85 mg/L (10-02-21 @ 06:51)    WBC Count: 12.34 K/uL (10-01-21 @ 07:01)  WBC Count: 10.92 K/uL (09-30-21 @ 17:59)  WBC Count: 10.41 K/uL (09-28-21 @ 03:33)    SARS-CoV-2: NotDetec (09-30-21 @ 17:32)  Rapid RVP Result: NotDetec (09-30-21 @ 17:32)    COVID-19 PCR: NotDetec (09-25-21 @ 22:14)      < from: CT Hand No Cont, Left (10.02.21 @ 10:40) >     EXAM:  CT HAND ONLY LT                          PROCEDURE DATE:  10/02/2021          INTERPRETATION:  HISTORY: Hand pain and swelling. Concern for underlying septic arthrosis.    Helical CT imaging of the left hand was performed without intravenous contrast. Sagittal and coronal reformats were provided. 3-D reformats were performed on a separate workstation.    Correlation is made with prior radiographs from September 30, 2021.    FINDINGS:    Patient was imaged with her hand over the chest resulting in significant artifact which limits evaluation of the soft tissues and bones. As noted on the previous radiographs there is extensive pancarpal osteopenia as well as osteopenia along the distal radial and ulnar subarticular surfaces. Findingsmay be on the basis of underlying inflammatory etiologies, infectious etiologies, or complex regional pain syndrome. Carpal bones are not well delineated on this study due to combination of artifact and osteopenia. There is limited evaluation for radiocarpal joint fluid. Consider further characterization with ultrasound to evaluate for any radiocarpal fusion. Further evaluation with a technetium sulfur colloid marrow scan and indium labeled White blood cell study can be performed to evaluate for any underlying infection.    Soft tissue swelling is seen about the carpus. There is an IVC filter in place. Leads of a cardiac device are noted.    IMPRESSION:    Markedly limited study. Extensive and carpal osteopenia as well as osteopenia along thedistal radial and ulnar subtalar articular surfaces. Findings may be on the basis of underlying inflammatory etiologies, infectious etiologies, or complex regional pain syndrome. Carpal bones are not well delineated on this study due to a combinationof artifact and osteopenia. Consider further characterization with ultrasound to evaluate for any underlying radial carpal joint effusion. Further evaluation with technetium sulfur colloid marrow scan and indium labeled White blood cell study can be performed to evaluate for any underlying infection.    --- End of Report ---            NAVIN WALL MD; Attending Radiologist  This document has been electronically signed. Oct  2 2021 11:30AM    < end of copied text >

## 2021-10-02 NOTE — PROGRESS NOTE ADULT - ASSESSMENT
This is a 66 M PMH CVA w R sided weakness and dysphagia 2012, AF with slow ventricular response (prior hx LA thrombus on SHANELLE) on warfarin, RBBB, monomorphic VT (5/2014) s/p MDT dual chamber ICD with upgrade to CRT-D (1/2016)and generator change , CKD III, HTN and stercoral colitis who was BIBEMS after projectile emesis.  Patient at baseline nonverbal and bedbound.  Information gathered by wife.  Wife states that he has had multiple episodes of vomiting.  He was admitted to medicine for presumed aspiration PNA.  While admitted, patient with escalating oxygen requirements with saturations in the mid to high 80's.Pt is on high flow oxygen now . Multiple lengthy GOC discussions conducted by Dr. Carr with wife and PCP.  Wife has MOLST form at home which includes a DNR order, but wants all measures taken for her . ICU consulted for further management and high risk for deterioration.  Positive troponin noted cardiology consulted , iv lasix given on admission , day 2 cr is rising , held , iv fluid initiated , cr cont to increase , nephrology consulted , CT of abd pelvis bilateral lung opacities likely aspiration  speech consulted started on puree diet 9/27 no liquid ,Pt is with improved hypoxia off oxygen  cr trending down , spiked fever 9/30 , blood cx ordered ID consulted     1- Fever   blood cx x2 , urinalaysis ordered   ID consult appreciated   ? gout r/o bacteremia   xray of left hand soft tissue swelling   Ortho consulted   can not obtain MR due to AICD/ PPM ?   CT of hand reviewed awaiting ortho follow up    2- Left wrist hand pain swelling   CT of hand wrist ordered   possible gout exacerbation   patient received colchicine  add allupurinol   off prednisone as wife feels patient had reaction to prednisone  tylenol ATC for pain     3- Acute hypoxic respiratory failure  due to aspiration pneumonia   hypoxia resolved   off ox on RA   cont iv zosyn   complete course   keep head elevated all times     3- h/o CVA / righ hemiplagia at baseline with suspected new CVA left sided weakness i  Ct of brain showed lateral basal ganglia and R parietal infracts   cont warfarin , statin   neuro input appreciated   pt at baseline home bed bound recently  declining functions condition     4- Dysphagia   on puree diet thickened fluid   aspiration precautions    5- ARF on CRF stage 3   cr improving   hypernatremia ivf per nephrology     6-Hypokalemia / hypophosphatemia / hypernatremia   replaced , iv phos , K given   monitor BMP in am     7 Diastolic CHF ,cronic   s/p iv lasix on admission   diuretics on hold due to dehydration / worsening cr   cr improving    cardiology on board TTE no change   cardiac status stable   resume lasix in few days if na improves     8- Severe protein wil malnutrition   added  ensure pudding to meals   MVI     9- Atrial fib cronic  /   CAD   cont coreg with holding parameters   INR 2.48  digoxin every other day     10 Nausea vomiting with constipation fecal impaction on CT scan   cont bowel regimen   monitor BM   resolved       overall prognosis is poor   wife at bedside   needs update daily   code statusL DNI only prior molst in chart   This is a 66 M PMH CVA w R sided weakness and dysphagia 2012, AF with slow ventricular response (prior hx LA thrombus on SHANELLE) on warfarin, RBBB, monomorphic VT (5/2014) s/p MDT dual chamber ICD with upgrade to CRT-D (1/2016)and generator change , CKD III, HTN and stercoral colitis who was BIBEMS after projectile emesis.  Patient at baseline nonverbal and bedbound.  Information gathered by wife.  Wife states that he has had multiple episodes of vomiting.  He was admitted to medicine for presumed aspiration PNA.  While admitted, patient with escalating oxygen requirements with saturations in the mid to high 80's.Pt is on high flow oxygen now . Multiple lengthy GOC discussions conducted by Dr. Carr with wife and PCP.  Wife has MOLST form at home which includes a DNR order, but wants all measures taken for her . ICU consulted for further management and high risk for deterioration.  Positive troponin noted cardiology consulted , iv lasix given on admission , day 2 cr is rising , held , iv fluid initiated , cr cont to increase , nephrology consulted , CT of abd pelvis bilateral lung opacities likely aspiration  speech consulted started on puree diet 9/27 no liquid ,Pt is with improved hypoxia off oxygen  cr trending down , spiked fever 9/30 , blood cx ordered ID consulted     1- Fever   blood cx x2 , urinalaysis ordered   ID consult appreciated   ? gout r/o bacteremia   xray of left hand soft tissue swelling   Ortho consulted   can not obtain MR due to AICD/ PPM ?   CT of hand reviewed awaiting ortho follow up    2- Left wrist hand pain swelling   CT of hand wrist ordered   possible gout exacerbation   patient received colchicine  add allupurinol   off prednisone as wife feels patient had reaction to prednisone  tylenol ATC for pain     3- Acute hypoxic respiratory failure  due to aspiration pneumonia   hypoxia resolved   off ox on RA   cont iv zosyn   complete course   keep head elevated all times     3- h/o CVA / righ hemiplagia at baseline with suspected new CVA left sided weakness i  Ct of brain showed lateral basal ganglia and R parietal infracts   cont warfarin , statin   neuro input appreciated   pt at baseline home bed bound recently  declining functions condition     4- Dysphagia   on puree diet thickened fluid   aspiration precautions    5- ARF on CRF stage 3   nonanion gap metabolic acidosis  cr improving   hypernatremia ivf per nephrology   on IV bicarb, attempting to wil wife to see if we can transition to po bicarb as she wants to be notified if any medication changes    6-Hypokalemia / hypophosphatemia / hypernatremia   replaced , iv phos , K given   monitor BMP in am     7 Diastolic CHF ,chronic   s/p iv lasix on admission   diuretics on hold due to dehydration / worsening cr   cr improving    cardiology on board TTE no change   cardiac status stable   resume lasix in few days if na improves     8- Severe protein wil malnutrition   added  ensure pudding to meals   MVI     9- Atrial fib chronic  /   CAD   cont coreg with holding parameters   INR 2.48  digoxin every other day     10 Nausea vomiting with constipation fecal impaction on CT scan   cont bowel regimen   monitor BM   resolved       overall prognosis is poor   wife at bedside   needs update daily   code statusL DNI only prior molst in chart   This is a 66 M PMH CVA w R sided weakness and dysphagia 2012, AF with slow ventricular response (prior hx LA thrombus on SHANELLE) on warfarin, RBBB, monomorphic VT (5/2014) s/p MDT dual chamber ICD with upgrade to CRT-D (1/2016)and generator change , CKD III, HTN and stercoral colitis who was BIBEMS after projectile emesis.  Patient at baseline nonverbal and bedbound.  Information gathered by wife.  Wife states that he has had multiple episodes of vomiting.  He was admitted to medicine for presumed aspiration PNA.  While admitted, patient with escalating oxygen requirements with saturations in the mid to high 80's.Pt is on high flow oxygen now . Multiple lengthy GOC discussions conducted by Dr. Carr with wife and PCP.  Wife has MOLST form at home which includes a DNR order, but wants all measures taken for her . ICU consulted for further management and high risk for deterioration.  Positive troponin noted cardiology consulted , iv lasix given on admission , day 2 cr is rising , held , iv fluid initiated , cr cont to increase , nephrology consulted , CT of abd pelvis bilateral lung opacities likely aspiration  speech consulted started on puree diet 9/27 no liquid ,Pt is with improved hypoxia off oxygen  cr trending down , spiked fever 9/30 , blood cx ordered ID consulted     1- Fever   blood cx x2 , urinalaysis ordered   ID consult appreciated   ? gout r/o bacteremia   xray of left hand soft tissue swelling   Ortho consulted   can not obtain MR due to AICD/ PPM ?   CT of hand reviewed awaiting ortho follow up    2- Left wrist hand pain swelling   CT of hand wrist ordered   possible gout exacerbation   patient received colchicine  add allupurinol   off prednisone as wife feels patient had reaction to prednisone  tylenol ATC for pain     3- Acute hypoxic respiratory failure  due to aspiration pneumonia   hypoxia resolved   off ox on RA   cont iv zosyn   complete course   keep head elevated all times     3- h/o CVA / righ hemiplagia at baseline with suspected new CVA left sided weakness i  Ct of brain showed lateral basal ganglia and R parietal infracts   cont warfarin , statin   neuro input appreciated   pt at baseline home bed bound recently  declining functions condition     4- Dysphagia   on puree diet thickened fluid   aspiration precautions    5- ARF on CRF stage 3   nonanion gap metabolic acidosis  cr improving   hypernatremia ivf per nephrology   on IV bicarb, attempting to wil wife to see if we can transition to po bicarb as she wants to be notified if any medication changes    6-Hypokalemia / hypophosphatemia / hypernatremia   replaced , iv phos , K given   monitor BMP in am     7 Diastolic CHF ,chronic   s/p iv lasix on admission   diuretics on hold due to dehydration / worsening cr   cr improving    cardiology on board TTE no change   cardiac status stable   resume lasix in few days if na improves     8- Severe protein wil malnutrition   added  ensure pudding to meals   MVI     9- Atrial fib chronic  /   CAD   cont coreg with holding parameters   INR 2.48  digoxin every other day   resume coumadin today    10 Nausea vomiting with constipation fecal impaction on CT scan   cont bowel regimen   monitor BM   resolved       overall prognosis is poor   wife at bedside updated  needs update daily   code statusL DNI only prior molst in chart

## 2021-10-02 NOTE — GOALS OF CARE CONVERSATION - ADVANCED CARE PLANNING - CONVERSATION DETAILS
I had a very lengthy discussion with patient's wife (who is the health care proxy) at bed side as well as PCP via phone. As per PCP, patient is DNR but not DNI. Wife said she agreed with what the PCP said. She mentioned she has completed MOLST in the past (2014) but no record found. She did not agree to complete another form.
Pt's wife informed about his current condition , current treatment plan , medications he is receiving and recommandation from speech about his oral intake risk of aspiration   Pt's wife refuses to give him aspirin and metoprolol stating that his PCP told her not to give aspirin , told her the indication for stroke and preventtion in his case   she also reports that he has allergy to metoprolol and not to change his cardiac meds , she would like o be informed and discused with her first   I also tols her the pt's overall prognosis , labs reviewed   asked about advance directives and DNR/ DNI   she showed me the MOLTS form completed in 10/2016 by her and the team , states that CPR , and DNI   explained to her in details the process how it goes in that case . clarification re these orders   she refused to give me the copy or putting this in place at present   she does not want him to suffer and no pain , however at present wishes to do everything and decided if condition changes or worsens     will try to reach his PCP DR Bean and discuss with his cardiologist    will stop metoprolol and aspirin per her request\discharge planing also discussed and to consider MIGUEL trial on discharge since pt's functional status apparently has been declining since August
reviewed patient prior MOLST form. per Nicky patient is DNI only, patient would not want to be placed on a ventilator. He would agree for a trial of CPR if his heart were to stop.   Copy mage of patient's MOLST and placed in chart.

## 2021-10-02 NOTE — PROGRESS NOTE ADULT - SUBJECTIVE AND OBJECTIVE BOX
Patient seen and examined at bedside with caregiver. Patient resting comfortably and no new complaints.  Patient denies any other issues presently and as per caregiver, patient now moving hand more and not complaining.        Vitals:  ============  T(F): 97.4 (02 Oct 2021 05:20), Max: 98.6 (01 Oct 2021 20:44)  HR: 90 (02 Oct 2021 05:20)  BP: 131/70 (02 Oct 2021 05:20)  RR: 16 (02 Oct 2021 05:20)  SpO2: 100% (02 Oct 2021 05:20) (96% - 100%)  temp max in last 48H T(F): , Max: 101.6 (09-30-21 @ 16:20)    =======================================================  Current Antibiotics:    Other medications:  allopurinol 100 milliGRAM(s) Oral daily  ascorbic acid 500 milliGRAM(s) Oral two times a day  atorvastatin 40 milliGRAM(s) Oral at bedtime  carvedilol 6.25 milliGRAM(s) Oral every 12 hours  digoxin     Tablet 125 MICROGram(s) Oral every other day  fluticasone propionate 50 MICROgram(s)/spray Nasal Spray 1 Spray(s) Both Nostrils two times a day  influenza   Vaccine 0.5 milliLiter(s) IntraMuscular once  lactobacillus acidophilus 1 Tablet(s) Oral three times a day with meals  potassium phosphate / sodium phosphate Powder (PHOS-NaK) 1 Packet(s) Oral two times a day  senna 2 Tablet(s) Oral at bedtime  sodium chloride 0.225% 1000 milliLiter(s) IV Continuous <Continuous>  warfarin 2 milliGRAM(s) Oral at bedtime    Steroids Course:  methylPREDNISolone sodium succinate Injectable   40 milliGRAM(s) (10-01-21 @ 11:59)      =======================================================  Labs:                        10.2   12.34 )-----------( 368      ( 01 Oct 2021 07:01 )             34.5     10-02    147<H>  |  116<H>  |  21.0<H>  ----------------------------<  164<H>  3.8   |  17.0<L>  |  1.12    Ca    8.3<L>      02 Oct 2021 06:51  Phos  3.6     10-02  Mg     1.9     10-02        Culture - Urine (collected 10-01-21 @ 05:31)  Source: Catheterized Catheterized  Final Report (10-02-21 @ 08:21):    No growth    Culture - Blood (collected 09-27-21 @ 12:12)  Source: .Blood Blood-Peripheral  Final Report (10-02-21 @ 13:00):    No growth at 5 days.      Creatinine, Serum: 1.12 mg/dL (10-02-21 @ 06:51)  Creatinine, Serum: 1.14 mg/dL (10-01-21 @ 07:01)  Creatinine, Serum: 1.36 mg/dL (09-30-21 @ 07:12)  Creatinine, Serum: 1.75 mg/dL (09-29-21 @ 07:01)  Creatinine, Serum: 2.62 mg/dL (09-28-21 @ 03:33)    Procalcitonin, Serum: 1.79 ng/mL (09-30-21 @ 07:12)  Procalcitonin, Serum: 24.13 ng/mL (09-27-21 @ 12:12)        C-Reactive Protein, Serum: 85 mg/L (10-02-21 @ 06:51)    WBC Count: 12.34 K/uL (10-01-21 @ 07:01)  WBC Count: 10.92 K/uL (09-30-21 @ 17:59)  WBC Count: 10.41 K/uL (09-28-21 @ 03:33)    SARS-CoV-2: NotDetec (09-30-21 @ 17:32)  Rapid RVP Result: NotDetec (09-30-21 @ 17:32)    COVID-19 PCR: NotDetec (09-25-21 @ 22:14)      < from: CT Hand No Cont, Left (10.02.21 @ 10:40) >     EXAM:  CT HAND ONLY LT                          PROCEDURE DATE:  10/02/2021          INTERPRETATION:  HISTORY: Hand pain and swelling. Concern for underlying septic arthrosis.    Helical CT imaging of the left hand was performed without intravenous contrast. Sagittal and coronal reformats were provided. 3-D reformats were performed on a separate workstation.    Correlation is made with prior radiographs from September 30, 2021.    FINDINGS:    Patient was imaged with her hand over the chest resulting in significant artifact which limits evaluation of the soft tissues and bones. As noted on the previous radiographs there is extensive pancarpal osteopenia as well as osteopenia along the distal radial and ulnar subarticular surfaces. Findingsmay be on the basis of underlying inflammatory etiologies, infectious etiologies, or complex regional pain syndrome. Carpal bones are not well delineated on this study due to combination of artifact and osteopenia. There is limited evaluation for radiocarpal joint fluid. Consider further characterization with ultrasound to evaluate for any radiocarpal fusion. Further evaluation with a technetium sulfur colloid marrow scan and indium labeled White blood cell study can be performed to evaluate for any underlying infection.    Soft tissue swelling is seen about the carpus. There is an IVC filter in place. Leads of a cardiac device are noted.    IMPRESSION:    Markedly limited study. Extensive and carpal osteopenia as well as osteopenia along thedistal radial and ulnar subtalar articular surfaces. Findings may be on the basis of underlying inflammatory etiologies, infectious etiologies, or complex regional pain syndrome. Carpal bones are not well delineated on this study due to a combinationof artifact and osteopenia. Consider further characterization with ultrasound to evaluate for any underlying radial carpal joint effusion. Further evaluation with technetium sulfur colloid marrow scan and indium labeled White blood cell study can be performed to evaluate for any underlying infection.    --- End of Report ---            NAVIN WALL MD; Attending Radiologist  This document has been electronically signed. Oct  2 2021 11:30AM        fevers on 9/30; resolved

## 2021-10-03 LAB
ALBUMIN SERPL ELPH-MCNC: 2.4 G/DL — LOW (ref 3.3–5.2)
ALP SERPL-CCNC: 81 U/L — SIGNIFICANT CHANGE UP (ref 40–120)
ALT FLD-CCNC: 15 U/L — SIGNIFICANT CHANGE UP
ANION GAP SERPL CALC-SCNC: 14 MMOL/L — SIGNIFICANT CHANGE UP (ref 5–17)
AST SERPL-CCNC: 23 U/L — SIGNIFICANT CHANGE UP
B-OH-BUTYR SERPL-SCNC: 0.3 MMOL/L — SIGNIFICANT CHANGE UP
BILIRUB SERPL-MCNC: 0.4 MG/DL — SIGNIFICANT CHANGE UP (ref 0.4–2)
BUN SERPL-MCNC: 21.9 MG/DL — HIGH (ref 8–20)
CALCIUM SERPL-MCNC: 8.4 MG/DL — LOW (ref 8.6–10.2)
CHLORIDE SERPL-SCNC: 112 MMOL/L — HIGH (ref 98–107)
CO2 SERPL-SCNC: 19 MMOL/L — LOW (ref 22–29)
CREAT SERPL-MCNC: 1.12 MG/DL — SIGNIFICANT CHANGE UP (ref 0.5–1.3)
DIGOXIN SERPL-MCNC: 0.6 NG/ML — LOW (ref 0.8–2)
GLUCOSE BLDC GLUCOMTR-MCNC: 131 MG/DL — HIGH (ref 70–99)
GLUCOSE SERPL-MCNC: 90 MG/DL — SIGNIFICANT CHANGE UP (ref 70–99)
HCT VFR BLD CALC: 34.6 % — LOW (ref 39–50)
HGB BLD-MCNC: 10.7 G/DL — LOW (ref 13–17)
INR BLD: 1.81 RATIO — HIGH (ref 0.88–1.16)
LACTATE SERPL-SCNC: 1.3 MMOL/L — SIGNIFICANT CHANGE UP (ref 0.5–2)
MAGNESIUM SERPL-MCNC: 1.9 MG/DL — SIGNIFICANT CHANGE UP (ref 1.6–2.6)
MCHC RBC-ENTMCNC: 23.4 PG — LOW (ref 27–34)
MCHC RBC-ENTMCNC: 30.9 GM/DL — LOW (ref 32–36)
MCV RBC AUTO: 75.7 FL — LOW (ref 80–100)
PLATELET # BLD AUTO: 432 K/UL — HIGH (ref 150–400)
POTASSIUM SERPL-MCNC: 3.9 MMOL/L — SIGNIFICANT CHANGE UP (ref 3.5–5.3)
POTASSIUM SERPL-SCNC: 3.9 MMOL/L — SIGNIFICANT CHANGE UP (ref 3.5–5.3)
PROT SERPL-MCNC: 6.1 G/DL — LOW (ref 6.6–8.7)
PROTHROM AB SERPL-ACNC: 20.4 SEC — HIGH (ref 10.6–13.6)
RBC # BLD: 4.57 M/UL — SIGNIFICANT CHANGE UP (ref 4.2–5.8)
RBC # FLD: 16.3 % — HIGH (ref 10.3–14.5)
SODIUM SERPL-SCNC: 145 MMOL/L — SIGNIFICANT CHANGE UP (ref 135–145)
URATE SERPL-MCNC: 6 MG/DL — SIGNIFICANT CHANGE UP (ref 3.4–7)
WBC # BLD: 14.2 K/UL — HIGH (ref 3.8–10.5)
WBC # FLD AUTO: 14.2 K/UL — HIGH (ref 3.8–10.5)

## 2021-10-03 PROCEDURE — 99233 SBSQ HOSP IP/OBS HIGH 50: CPT

## 2021-10-03 PROCEDURE — 99232 SBSQ HOSP IP/OBS MODERATE 35: CPT

## 2021-10-03 PROCEDURE — 71045 X-RAY EXAM CHEST 1 VIEW: CPT | Mod: 26

## 2021-10-03 RX ORDER — WARFARIN SODIUM 2.5 MG/1
2 TABLET ORAL AT BEDTIME
Refills: 0 | Status: COMPLETED | OUTPATIENT
Start: 2021-10-03 | End: 2021-10-03

## 2021-10-03 RX ORDER — FUROSEMIDE 40 MG
40 TABLET ORAL
Refills: 0 | Status: DISCONTINUED | OUTPATIENT
Start: 2021-10-04 | End: 2021-10-05

## 2021-10-03 RX ORDER — FUROSEMIDE 40 MG
40 TABLET ORAL ONCE
Refills: 0 | Status: COMPLETED | OUTPATIENT
Start: 2021-10-03 | End: 2021-10-03

## 2021-10-03 RX ORDER — CARVEDILOL PHOSPHATE 80 MG/1
6.25 CAPSULE, EXTENDED RELEASE ORAL DAILY
Refills: 0 | Status: DISCONTINUED | OUTPATIENT
Start: 2021-10-04 | End: 2021-10-05

## 2021-10-03 RX ORDER — DIGOXIN 250 MCG
125 TABLET ORAL EVERY OTHER DAY
Refills: 0 | Status: DISCONTINUED | OUTPATIENT
Start: 2021-10-04 | End: 2021-10-05

## 2021-10-03 RX ORDER — SODIUM BICARBONATE 1 MEQ/ML
650 SYRINGE (ML) INTRAVENOUS THREE TIMES A DAY
Refills: 0 | Status: DISCONTINUED | OUTPATIENT
Start: 2021-10-03 | End: 2021-10-04

## 2021-10-03 RX ORDER — FUROSEMIDE 40 MG
20 TABLET ORAL
Refills: 0 | Status: DISCONTINUED | OUTPATIENT
Start: 2021-10-05 | End: 2021-10-05

## 2021-10-03 RX ADMIN — CARVEDILOL PHOSPHATE 6.25 MILLIGRAM(S): 80 CAPSULE, EXTENDED RELEASE ORAL at 05:45

## 2021-10-03 RX ADMIN — Medication 650 MILLIGRAM(S): at 21:02

## 2021-10-03 RX ADMIN — Medication 40 MILLIGRAM(S): at 09:55

## 2021-10-03 RX ADMIN — WARFARIN SODIUM 2 MILLIGRAM(S): 2.5 TABLET ORAL at 21:02

## 2021-10-03 RX ADMIN — Medication 1 TABLET(S): at 08:03

## 2021-10-03 RX ADMIN — Medication 1 PACKET(S): at 05:43

## 2021-10-03 RX ADMIN — Medication 500 MILLIGRAM(S): at 05:41

## 2021-10-03 RX ADMIN — Medication 500 MILLIGRAM(S): at 17:31

## 2021-10-03 RX ADMIN — Medication 1 PACKET(S): at 17:31

## 2021-10-03 RX ADMIN — Medication 650 MILLIGRAM(S): at 14:31

## 2021-10-03 RX ADMIN — Medication 1 TABLET(S): at 11:59

## 2021-10-03 RX ADMIN — Medication 100 MILLIGRAM(S): at 11:58

## 2021-10-03 RX ADMIN — Medication 1 SPRAY(S): at 05:43

## 2021-10-03 NOTE — PROGRESS NOTE ADULT - SUBJECTIVE AND OBJECTIVE BOX
[facility] Division of Hospital Medicine    Chief Complaint:  vomiting    SUBJECTIVE / OVERNIGHT EVENTS: Patient seen and examined. Coughing more this AM.     Patient denies chest pain, SOB, abd pain, N/V, fever, chills, dysuria or any other complaints. All remainder ROS negative.     MEDICATIONS  (STANDING):  allopurinol 100 milliGRAM(s) Oral daily  ascorbic acid 500 milliGRAM(s) Oral two times a day  atorvastatin 40 milliGRAM(s) Oral at bedtime  fluticasone propionate 50 MICROgram(s)/spray Nasal Spray 1 Spray(s) Both Nostrils two times a day  influenza   Vaccine 0.5 milliLiter(s) IntraMuscular once  lactobacillus acidophilus 1 Tablet(s) Oral three times a day with meals  potassium phosphate / sodium phosphate Powder (PHOS-NaK) 1 Packet(s) Oral two times a day  senna 2 Tablet(s) Oral at bedtime  sodium bicarbonate 650 milliGRAM(s) Oral three times a day  warfarin 2 milliGRAM(s) Oral at bedtime    MEDICATIONS  (PRN):  acetaminophen   Tablet .. 650 milliGRAM(s) Oral every 6 hours PRN Temp greater or equal to 38C (100.4F)  acetaminophen   Tablet .. 650 milliGRAM(s) Oral every 6 hours PRN Moderate Pain (4 - 6)  ondansetron Injectable 4 milliGRAM(s) IV Push every 8 hours PRN Nausea and/or Vomiting  sodium chloride 0.65% Nasal 1 Spray(s) Both Nostrils every 4 hours PRN Nasal Congestion        I&O's Summary    02 Oct 2021 07:01  -  03 Oct 2021 07:00  --------------------------------------------------------  IN: 0 mL / OUT: 500 mL / NET: -500 mL        PHYSICAL EXAM:  Vital Signs Last 24 Hrs  T(C): 37.1 (03 Oct 2021 11:00), Max: 37.1 (03 Oct 2021 11:00)  T(F): 98.7 (03 Oct 2021 11:00), Max: 98.7 (03 Oct 2021 11:00)  HR: 92 (03 Oct 2021 11:00) (73 - 92)  BP: 104/70 (03 Oct 2021 11:00) (104/70 - 141/71)  BP(mean): --  RR: 16 (03 Oct 2021 11:00) (16 - 18)  SpO2: 95% (03 Oct 2021 11:00) (95% - 99%)      CONSTITUTIONAL: NAD, resting comfortably  ENMT: Moist oral mucosa, no pharyngeal injection or exudates;   RESPIRATORY: Normal respiratory effort;  + rhonchi  CARDIOVASCULAR: Regular rate and rhythm, normal S1 and S2,  No lower extremity edema  ABDOMEN: Nontender to palpation, normoactive bowel sounds, no rebound/guarding;  MUSCLOSKELETAL: minimal left hand swelling  PSYCH: A+O to person, place, and time; affect appropriate  NEUROLOGY: right sided weakness  SKIN: No rashes; no palpable lesions    LABS:                        10.7   14.20 )-----------( 432      ( 03 Oct 2021 07:40 )             34.6     10-03    145  |  112<H>  |  21.9<H>  ----------------------------<  90  3.9   |  19.0<L>  |  1.12    Ca    8.4<L>      03 Oct 2021 07:40  Phos  3.6     10-02  Mg     1.9     10-03    TPro  6.1<L>  /  Alb  2.4<L>  /  TBili  0.4  /  DBili  x   /  AST  23  /  ALT  15  /  AlkPhos  81  10-03    PT/INR - ( 03 Oct 2021 07:40 )   PT: 20.4 sec;   INR: 1.81 ratio                   Culture - Urine (collected 01 Oct 2021 05:31)  Source: Catheterized Catheterized  Final Report (02 Oct 2021 08:21):    No growth    Culture - Blood (collected 30 Sep 2021 17:59)  Source: .Blood Blood  Preliminary Report (02 Oct 2021 19:00):    No growth at 48 hours    Culture - Blood (collected 30 Sep 2021 17:59)  Source: .Blood Blood  Preliminary Report (02 Oct 2021 19:00):    No growth at 48 hours      CAPILLARY BLOOD GLUCOSE      POCT Blood Glucose.: 166 mg/dL (02 Oct 2021 16:04)        RADIOLOGY & ADDITIONAL TESTS:  Results Reviewed:   Imaging Personally Reviewed:  Electrocardiogram Personally Reviewed:

## 2021-10-03 NOTE — PROGRESS NOTE ADULT - ASSESSMENT
This is a 66 M PMH CVA w R sided weakness and dysphagia 2012, AF with slow ventricular response (prior hx LA thrombus on SHANELLE) on warfarin, RBBB, monomorphic VT (5/2014) s/p MDT dual chamber ICD with upgrade to CRT-D (1/2016)and generator change , CKD III, HTN and stercoral colitis who was BIBEMS after projectile emesis.  Patient at baseline nonverbal and bedbound.  Information gathered by wife.  Wife states that he has had multiple episodes of vomiting.  He was admitted to medicine for presumed aspiration PNA.  While admitted, patient with escalating oxygen requirements with saturations in the mid to high 80's.Pt is on high flow oxygen now . Multiple lengthy GOC discussions conducted by Dr. Carr with wife and PCP.  Wife has MOLST form at home which includes a DNR order, but wants all measures taken for her . ICU consulted for further management and high risk for deterioration.  Positive troponin noted cardiology consulted , iv lasix given on admission , day 2 cr is rising , held , iv fluid initiated , cr cont to increase , nephrology consulted , CT of abd pelvis bilateral lung opacities likely aspiration  speech consulted started on puree diet 9/27 no liquid ,Pt is with improved hypoxia off oxygen  cr trending down , spiked fever 9/30 , blood cx ordered ID consulted.     Cough likely due to acute on chronic Diastolic CHF   s/p iv lasix on admission   diuretics were on hold due to dehydration / worsening cr   cr improving    cardiology on board TTE no change   cardiac status stable   cxr with venous congestion  IV lasix 40mg today and then resume PO tomorrow alternating 40mg and 20 mg      Fever, resolved likely due to gout  blood cx x2 NGTD ,   ID consult appreciated   xray of left hand soft tissue swelling   Ortho consulted CT reviewed no further inpt work up  can not obtain MR due to AICD/ PPM ?     Left wrist hand pain swelling   CT of hand wrist ordered reviewed by ID and Ortho  no further inpt work up  possible gout exacerbation   patient received colchicine  add allupurinol   off prednisone as wife feels patient had reaction to prednisone  tylenol ATC for pain     Acute hypoxic respiratory failure  due to aspiration pneumonia   hypoxia resolved   off ox on RA   completed iv zosyn   complete course   keep head elevated all times     h/o CVA / righ hemiplagia at baseline with suspected new CVA left sided weakness i  Ct of brain showed lateral basal ganglia and R parietal infracts   cont warfarin , statin   neuro input appreciated   pt at baseline home bed bound recently  declining functions condition      Dysphagia   on puree diet thickened fluid   aspiration precautions     ARF on CRF stage 3   nonanion gap metabolic acidosis  cr improving   stop IV bicarb and change to po    Hypokalemia / hypophosphatemia / hypernatremia   replaced , iv phos , K given   monitor BMP in am        Severe protein wil malnutrition   added  ensure pudding to meals   MVI      Atrial fib chronic  /   CAD   cont coreg with holding parameters   inr 1.81  INR daily  digoxin every other day   coumadin 2mg , no need to bridge    Nausea vomiting with constipation fecal impaction on CT scan   cont bowel regimen   monitor BM   resolved     overall prognosis is poor   wife at bedside updated  needs update daily   code status DNI only prior molst in chart    Discussed with Nephro and cardiology  dispo: pending improvement with IV Lasix, wife wants patient to come home.

## 2021-10-03 NOTE — PROGRESS NOTE ADULT - SUBJECTIVE AND OBJECTIVE BOX
RUSH MARTINYVROSE  [id number]      Chief Complaint:  Failure to thrive/Aspiration Pneumonia/History of CVA/Elevated troponin    Interval History:  Patient awake and alert without c/o.  +cough.  Denies CP, SOB.    Tele:  V-Paced        acetaminophen   Tablet .. 650 milliGRAM(s) Oral every 6 hours PRN  acetaminophen   Tablet .. 650 milliGRAM(s) Oral every 6 hours PRN  allopurinol 100 milliGRAM(s) Oral daily  ascorbic acid 500 milliGRAM(s) Oral two times a day  atorvastatin 40 milliGRAM(s) Oral at bedtime  digoxin     Tablet 125 MICROGram(s) Oral every other day  fluticasone propionate 50 MICROgram(s)/spray Nasal Spray 1 Spray(s) Both Nostrils two times a day  furosemide   Injectable 40 milliGRAM(s) IV Push once  influenza   Vaccine 0.5 milliLiter(s) IntraMuscular once  lactobacillus acidophilus 1 Tablet(s) Oral three times a day with meals  ondansetron Injectable 4 milliGRAM(s) IV Push every 8 hours PRN  potassium phosphate / sodium phosphate Powder (PHOS-NaK) 1 Packet(s) Oral two times a day  senna 2 Tablet(s) Oral at bedtime  sodium chloride 0.65% Nasal 1 Spray(s) Both Nostrils every 4 hours PRN  warfarin 2 milliGRAM(s) Oral at bedtime          Physical Exam:  T(C): 36.8 (10-03-21 @ 05:51), Max: 36.8 (10-03-21 @ 05:51)  HR: 76 (10-03-21 @ 05:51) (73 - 76)  BP: 141/71 (10-03-21 @ 05:51) (115/77 - 141/71)  RR: 18 (10-03-21 @ 05:51) (18 - 18)  SpO2: 98% (10-03-21 @ 05:51) (98% - 99%)  Wt(kg): --  General: Comfortable in NAD  Neck: No JVD  CVS: nl s1s2, no s3  Pulm: Coarse b/l   Abd: soft, non-tender  Ext: No c/c/e  Neuro Awake and alert, responding appropriately  Psych: Normal affect      Labs:   03 Oct 2021 07:40    145    |  112    |  21.9   ----------------------------<  90     3.9     |  19.0   |  1.12     Ca    8.4        03 Oct 2021 07:40  Phos  3.6       02 Oct 2021 06:51  Mg     1.9       03 Oct 2021 07:40    TPro  6.1    /  Alb  2.4    /  TBili  0.4    /  DBili  x      /  AST  23     /  ALT  15     /  AlkPhos  81     03 Oct 2021 07:40                          10.7   14.20 )-----------( 432      ( 03 Oct 2021 07:40 )             34.6     PT/INR - ( 03 Oct 2021 07:40 )   PT: 20.4 sec;   INR: 1.81 ratio                 Echo:   1. Left ventricular ejection fraction, by visual estimation, is 35 to 40%.   2. Moderately decreased global left ventricular systolic function.   3. The left ventricular diastolic function could not be assessed in this study.   4. Normal left ventricular internal cavity size.   5. Moderately enlarged right ventricle. Mildly reduced RV systolic function.   6. Moderately enlarged left atrium.   7. Moderately enlarged right atrium.   8. Mild mitral annular calcification.   9. Mild thickening and calcification of the anterior and posterior mitral valve leaflets.  10. Mild mitral valve regurgitation.  11. Sclerotic aortic valve with normal opening.  12. Mild to moderate aortic regurgitation.  13. Moderate tricuspid regurgitation.  14. Mild pulmonic valve regurgitation.  15. Estimated pulmonary artery systolic pressure is 47.2 mmHg assuming a right atrial pressure of 8 mmHg, which is consistent with mild pulmonary hypertension.  16. There is no evidence of pericardial effusion.            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 Cardiology consulted due to elevated troponin, which is 0.08 in the setting of CKD. Patient denies angina or dyspnea and does not appear to have an acute coronary syndrome at this time. BNP markedly elevated at 4000s which is less than prior ER visit this month.   -Patient with b/l PNA 2/2 aspiration, being treated and improving.  -Noted to have ARF and no significant acute CHF on exam.  Diuretics held.  Cr improving, may consider restarting po Lasix by tomorrow.  -Also with new CVA.  Now on Coumadin and INR elevated today.  No further intervention planned.  -Now more alert.  Echo with no change from prior echo from 2019.  EF moderately depressed.  No severe VHD.  Mild PHTN.  -Patient with persistent cough.  Likely mostly 2/2 PNA and ?ongoing aspiration.  Also has been getting IVF with NaHCO3, some mild fluid overload noted.    Plan:  1. D/c IVF.  Lasix 40mg IVx1 and then restart home regimen of 40mg po QOD, alternating with 20mg QOD.  2. Continue Dig QOD.   3. Dose Coumadin for INR 2-3.  4. Continue other current CV meds at current doses.  5. Abx per med/ID.  6. Gout per med/ortho.  7. Neuro f/u.      D/w Dr. Thomas.

## 2021-10-04 LAB
ANION GAP SERPL CALC-SCNC: 12 MMOL/L — SIGNIFICANT CHANGE UP (ref 5–17)
BUN SERPL-MCNC: 17.5 MG/DL — SIGNIFICANT CHANGE UP (ref 8–20)
CALCIUM SERPL-MCNC: 8 MG/DL — LOW (ref 8.6–10.2)
CHLORIDE SERPL-SCNC: 111 MMOL/L — HIGH (ref 98–107)
CO2 SERPL-SCNC: 23 MMOL/L — SIGNIFICANT CHANGE UP (ref 22–29)
CREAT SERPL-MCNC: 1.05 MG/DL — SIGNIFICANT CHANGE UP (ref 0.5–1.3)
GLUCOSE BLDC GLUCOMTR-MCNC: 108 MG/DL — HIGH (ref 70–99)
GLUCOSE BLDC GLUCOMTR-MCNC: 112 MG/DL — HIGH (ref 70–99)
GLUCOSE BLDC GLUCOMTR-MCNC: 173 MG/DL — HIGH (ref 70–99)
GLUCOSE SERPL-MCNC: 109 MG/DL — HIGH (ref 70–99)
HCT VFR BLD CALC: 33.4 % — LOW (ref 39–50)
HGB BLD-MCNC: 10.3 G/DL — LOW (ref 13–17)
INR BLD: 1.55 RATIO — HIGH (ref 0.88–1.16)
MAGNESIUM SERPL-MCNC: 1.6 MG/DL — SIGNIFICANT CHANGE UP (ref 1.6–2.6)
MCHC RBC-ENTMCNC: 23.1 PG — LOW (ref 27–34)
MCHC RBC-ENTMCNC: 30.8 GM/DL — LOW (ref 32–36)
MCV RBC AUTO: 74.9 FL — LOW (ref 80–100)
NRBC # BLD: 1 /100 WBCS — HIGH (ref 0–0)
PLATELET # BLD AUTO: 397 K/UL — SIGNIFICANT CHANGE UP (ref 150–400)
POTASSIUM SERPL-MCNC: 3.4 MMOL/L — LOW (ref 3.5–5.3)
POTASSIUM SERPL-SCNC: 3.4 MMOL/L — LOW (ref 3.5–5.3)
PROTHROM AB SERPL-ACNC: 17.6 SEC — HIGH (ref 10.6–13.6)
RBC # BLD: 4.46 M/UL — SIGNIFICANT CHANGE UP (ref 4.2–5.8)
RBC # FLD: 16.5 % — HIGH (ref 10.3–14.5)
SODIUM SERPL-SCNC: 146 MMOL/L — HIGH (ref 135–145)
WBC # BLD: 11.53 K/UL — HIGH (ref 3.8–10.5)
WBC # FLD AUTO: 11.53 K/UL — HIGH (ref 3.8–10.5)

## 2021-10-04 PROCEDURE — 99232 SBSQ HOSP IP/OBS MODERATE 35: CPT

## 2021-10-04 RX ORDER — POTASSIUM CHLORIDE 20 MEQ
40 PACKET (EA) ORAL ONCE
Refills: 0 | Status: COMPLETED | OUTPATIENT
Start: 2021-10-04 | End: 2021-10-04

## 2021-10-04 RX ORDER — MAGNESIUM SULFATE 500 MG/ML
2 VIAL (ML) INJECTION ONCE
Refills: 0 | Status: DISCONTINUED | OUTPATIENT
Start: 2021-10-04 | End: 2021-10-04

## 2021-10-04 RX ORDER — POTASSIUM CHLORIDE 20 MEQ
20 PACKET (EA) ORAL ONCE
Refills: 0 | Status: DISCONTINUED | OUTPATIENT
Start: 2021-10-04 | End: 2021-10-04

## 2021-10-04 RX ORDER — WARFARIN SODIUM 2.5 MG/1
2.5 TABLET ORAL AT BEDTIME
Refills: 0 | Status: COMPLETED | OUTPATIENT
Start: 2021-10-04 | End: 2021-10-04

## 2021-10-04 RX ADMIN — Medication 1 SPRAY(S): at 09:05

## 2021-10-04 RX ADMIN — Medication 125 MICROGRAM(S): at 17:25

## 2021-10-04 RX ADMIN — Medication 650 MILLIGRAM(S): at 05:25

## 2021-10-04 RX ADMIN — Medication 1 TABLET(S): at 21:24

## 2021-10-04 RX ADMIN — Medication 40 MILLIGRAM(S): at 05:25

## 2021-10-04 RX ADMIN — Medication 500 MILLIGRAM(S): at 17:25

## 2021-10-04 RX ADMIN — Medication 1 PACKET(S): at 05:25

## 2021-10-04 RX ADMIN — Medication 500 MILLIGRAM(S): at 05:25

## 2021-10-04 RX ADMIN — WARFARIN SODIUM 2.5 MILLIGRAM(S): 2.5 TABLET ORAL at 21:25

## 2021-10-04 RX ADMIN — Medication 100 MILLIGRAM(S): at 17:24

## 2021-10-04 RX ADMIN — Medication 1 TABLET(S): at 09:06

## 2021-10-04 RX ADMIN — Medication 40 MILLIEQUIVALENT(S): at 17:25

## 2021-10-04 RX ADMIN — CARVEDILOL PHOSPHATE 6.25 MILLIGRAM(S): 80 CAPSULE, EXTENDED RELEASE ORAL at 05:25

## 2021-10-04 NOTE — PROGRESS NOTE ADULT - SUBJECTIVE AND OBJECTIVE BOX
[facility] Division of Hospital Medicine    Chief Complaint:  Vomiting    SUBJECTIVE / OVERNIGHT EVENTS: patient seen and examined. He is resting comfortably in no acute distress. Wife at bedside all questions answered.       MEDICATIONS  (STANDING):  allopurinol 100 milliGRAM(s) Oral daily  ascorbic acid 500 milliGRAM(s) Oral two times a day  carvedilol 6.25 milliGRAM(s) Oral daily  digoxin     Tablet 125 MICROGram(s) Oral every other day  fluticasone propionate 50 MICROgram(s)/spray Nasal Spray 1 Spray(s) Both Nostrils two times a day  furosemide    Tablet 40 milliGRAM(s) Oral <User Schedule>  influenza   Vaccine 0.5 milliLiter(s) IntraMuscular once  lactobacillus acidophilus 1 Tablet(s) Oral three times a day with meals  potassium chloride    Tablet ER 20 milliEquivalent(s) Oral once  potassium phosphate / sodium phosphate Powder (PHOS-NaK) 1 Packet(s) Oral two times a day  sodium bicarbonate 650 milliGRAM(s) Oral three times a day  warfarin 2.5 milliGRAM(s) Oral at bedtime    MEDICATIONS  (PRN):  acetaminophen   Tablet .. 650 milliGRAM(s) Oral every 6 hours PRN Temp greater or equal to 38C (100.4F)  acetaminophen   Tablet .. 650 milliGRAM(s) Oral every 6 hours PRN Moderate Pain (4 - 6)  ondansetron Injectable 4 milliGRAM(s) IV Push every 8 hours PRN Nausea and/or Vomiting  sodium chloride 0.65% Nasal 1 Spray(s) Both Nostrils every 4 hours PRN Nasal Congestion        I&O's Summary    03 Oct 2021 07:01  -  04 Oct 2021 07:00  --------------------------------------------------------  IN: 0 mL / OUT: 1700 mL / NET: -1700 mL        PHYSICAL EXAM:  Vital Signs Last 24 Hrs  T(C): 36.9 (04 Oct 2021 04:22), Max: 37.6 (03 Oct 2021 21:02)  T(F): 98.5 (04 Oct 2021 04:22), Max: 99.7 (03 Oct 2021 21:02)  HR: 95 (04 Oct 2021 04:22) (95 - 99)  BP: 116/72 (04 Oct 2021 04:22) (102/67 - 116/72)  BP(mean): --  RR: 18 (04 Oct 2021 04:22) (18 - 18)  SpO2: 95% (04 Oct 2021 04:22) (95% - 95%)      CONSTITUTIONAL: NAD, resting comfortably  ENMT: Moist oral mucosa, no pharyngeal injection or exudates;   RESPIRATORY: Normal respiratory effort;  CTA BL  CARDIOVASCULAR: Regular rate and rhythm, normal S1 and S2,  No lower extremity edema  ABDOMEN: Nontender to palpation, normoactive bowel sounds, no rebound/guarding;  MUSCLOSKELETAL: no swelling  PSYCH: Alert nods yes and no. Can answer simple questions  NEUROLOGY: right sided weakness  SKIN: No rashes; no palpable lesions    LABS:                        10.3   11.53 )-----------( 397      ( 04 Oct 2021 07:08 )             33.4     10-04    146<H>  |  111<H>  |  17.5  ----------------------------<  109<H>  3.4<L>   |  23.0  |  1.05    Ca    8.0<L>      04 Oct 2021 07:08  Mg     1.6     10-04    TPro  6.1<L>  /  Alb  2.4<L>  /  TBili  0.4  /  DBili  x   /  AST  23  /  ALT  15  /  AlkPhos  81  10-03    PT/INR - ( 04 Oct 2021 07:08 )   PT: 17.6 sec;   INR: 1.55 ratio                   CAPILLARY BLOOD GLUCOSE      POCT Blood Glucose.: 108 mg/dL (04 Oct 2021 06:01)  POCT Blood Glucose.: 131 mg/dL (03 Oct 2021 23:28)        RADIOLOGY & ADDITIONAL TESTS:  Results Reviewed:   Imaging Personally Reviewed:  Electrocardiogram Personally Reviewed:

## 2021-10-04 NOTE — PROGRESS NOTE ADULT - NUTRITIONAL ASSESSMENT
This patient has been assessed with a concern for Malnutrition and has been determined to have a diagnosis/diagnoses of Moderate protein-calorie malnutrition.    This patient is being managed with:   Diet Pureed-  Honey Consistency (HONCON)  Low Sodium  Supplement Feeding Modality:  Oral  Ensure Pudding Cans or Servings Per Day:  1       Frequency:  Two Times a day  Entered: Oct  3 2021  1:01PM    
This patient has been assessed with a concern for Malnutrition and has been determined to have a diagnosis/diagnoses of Moderate protein-calorie malnutrition.    This patient is being managed with:   Diet Pureed-  Honey Consistency (HONCON)  Low Sodium  Supplement Feeding Modality:  Oral  Ensure Pudding Cans or Servings Per Day:  1       Frequency:  Two Times a day  Entered: Oct  3 2021  1:01PM    
This patient has been assessed with a concern for Malnutrition and has been determined to have a diagnosis/diagnoses of Moderate protein-calorie malnutrition.    This patient is being managed with:   Diet Pureed-  Honey Consistency (HONCON)  Low Sodium  Supplement Feeding Modality:  Oral  Ensure Pudding Cans or Servings Per Day:  1       Frequency:  Two Times a day  Entered: Sep 29 2021 10:11AM    

## 2021-10-04 NOTE — PROGRESS NOTE ADULT - ASSESSMENT
This is a 66 M PMH CVA w R sided weakness and dysphagia 2012, AF with slow ventricular response (prior hx LA thrombus on SHANELLE) on warfarin, RBBB, monomorphic VT (5/2014) s/p MDT dual chamber ICD with upgrade to CRT-D (1/2016)and generator change , CKD III, HTN and stercoral colitis who was BIBEMS after projectile emesis.  Patient at baseline nonverbal and bedbound.  Information gathered by wife.  Wife states that he has had multiple episodes of vomiting.  He was admitted to medicine for presumed aspiration PNA.  While admitted, patient with escalating oxygen requirements with saturations in the mid to high 80's.Pt is on high flow oxygen now . Multiple lengthy GOC discussions conducted by Dr. Carr with wife and PCP.  Wife has MOLST form at home which includes a DNR order, but wants all measures taken for her . ICU consulted for further management and high risk for deterioration.  Positive troponin noted cardiology consulted , iv lasix given on admission , day 2 cr is rising , held , iv fluid initiated , cr cont to increase , nephrology consulted , CT of abd pelvis bilateral lung opacities likely aspiration  speech consulted started on puree diet 9/27 no liquid ,Pt is with improved hypoxia off oxygen  cr trending down , spiked fever 9/30 , blood cx ordered ID consulted. He completed antibiotics. He developed fluid overload and was given IV lasix.     Cough likely due to acute on chronic Diastolic CHF   s/p iv lasix on admission and again on 10/3  diuretics were on hold due to dehydration / worsening cr   cardiology on board TTE no change   cxr with venous congestion  continue PO Lasix alternating 40mg and 20 mg      Fever, resolved likely due to gout  blood cx x2 NGTD ,   ID consult appreciated   xray of left hand soft tissue swelling   Ortho consulted CT reviewed no further inpt work up  can not obtain MR due to AICD/ PPM ?     Left wrist hand pain swelling   CT of hand wrist ordered reviewed by ID and Ortho  no further inpt work up  possible gout exacerbation   patient received colchicine  add allopurinol   off prednisone as wife feels patient had reaction to prednisone  tylenol ATC for pain     Acute hypoxic respiratory failure  due to aspiration pneumonia, resolved  hypoxia resolved   off ox on RA   completed iv zosyn   complete course   keep head elevated all times     h/o CVA / right hemiplegia at baseline with suspected new CVA left sided weakness i  Ct of brain showed lateral basal ganglia and R parietal infracts   cont warfarin , statin   neuro input appreciated   pt at baseline home bed bound recently  declining functions condition      Dysphagia   on puree diet thickened fluid   aspiration precautions     ARF on CRF stage 3   nonanion gap metabolic acidosis  cr improving   po bicarb    Hypokalemia / hypophosphatemia / hypernatremia   replaced     Severe protein wil malnutrition   added  ensure pudding to meals   MVI      Atrial fib chronic  /   CAD   cont coreg with holding parameters   inr 1.55  INR daily  digoxin every other day   coumadin 2.5mg , no need to bridge    Nausea vomiting with constipation fecal impaction on CT scan , resolved  cont bowel regimen   monitor BM   resolved     overall prognosis is poor   wife at bedside updated  needs update daily   code status DNI only prior molst in chart    dispo: dc planing in 24 hours

## 2021-10-04 NOTE — PROGRESS NOTE ADULT - SUBJECTIVE AND OBJECTIVE BOX
RUSH MARTINSHEREEN  [id number]        Chief Complaint:  follow up Failure to thrive/Aspiration Pneumonia/History of CVA/Elevated troponin    Interval History:  Patient awake and alert without c/o.  Denies CP, SOB.              acetaminophen   Tablet .. 650 milliGRAM(s) Oral every 6 hours PRN  acetaminophen   Tablet .. 650 milliGRAM(s) Oral every 6 hours PRN  allopurinol 100 milliGRAM(s) Oral daily  ascorbic acid 500 milliGRAM(s) Oral two times a day  carvedilol 6.25 milliGRAM(s) Oral daily  digoxin     Tablet 125 MICROGram(s) Oral every other day  fluticasone propionate 50 MICROgram(s)/spray Nasal Spray 1 Spray(s) Both Nostrils two times a day  furosemide    Tablet 40 milliGRAM(s) Oral <User Schedule>  influenza   Vaccine 0.5 milliLiter(s) IntraMuscular once  lactobacillus acidophilus 1 Tablet(s) Oral three times a day with meals  ondansetron Injectable 4 milliGRAM(s) IV Push every 8 hours PRN  potassium phosphate / sodium phosphate Powder (PHOS-NaK) 1 Packet(s) Oral two times a day  sodium bicarbonate 650 milliGRAM(s) Oral three times a day  sodium chloride 0.65% Nasal 1 Spray(s) Both Nostrils every 4 hours PRN          Physical Exam:  T(C): 36.9 (10-04-21 @ 04:22), Max: 37.6 (10-03-21 @ 21:02)  HR: 95 (10-04-21 @ 04:22) (92 - 99)  BP: 116/72 (10-04-21 @ 04:22) (102/67 - 116/72)  RR: 18 (10-04-21 @ 04:22) (16 - 18)  SpO2: 95% (10-04-21 @ 04:22) (95% - 95%)  Wt(kg): --  General: Comfortable in NAD  HEENT: MMM, sclera anicteric  Resp: coarse bilaterally   CVS: nl s1s2, rrr, no s3/JVD  Abd: soft, NT, ND, BS+  Ext: No c/c/e  Neuro awake, alert, answers questions appropriately     I&O's Summary    03 Oct 2021 07:01  -  04 Oct 2021 07:00  --------------------------------------------------------  IN: 0 mL / OUT: 1700 mL / NET: -1700 mL          Labs:   04 Oct 2021 07:08    146    |  111    |  17.5   ----------------------------<  109    3.4     |  23.0   |  1.05     Ca    8.0        04 Oct 2021 07:08  Mg     1.6       04 Oct 2021 07:08    TPro  6.1    /  Alb  2.4    /  TBili  0.4    /  DBili  x      /  AST  23     /  ALT  15     /  AlkPhos  81     03 Oct 2021 07:40                          10.3   11.53 )-----------( 397      ( 04 Oct 2021 07:08 )             33.4     PT/INR - ( 04 Oct 2021 07:08 )   PT: 17.6 sec;   INR: 1.55 ratio                   Echo:   1. Left ventricular ejection fraction, by visual estimation, is 35 to 40%.   2. Moderately decreased global left ventricular systolic function.   3. The left ventricular diastolic function could not be assessed in this study.   4. Normal left ventricular internal cavity size.   5. Moderately enlarged right ventricle. Mildly reduced RV systolic function.   6. Moderately enlarged left atrium.   7. Moderately enlarged right atrium.   8. Mild mitral annular calcification.   9. Mild thickening and calcification of the anterior and posterior mitral valve leaflets.  10. Mild mitral valve regurgitation.  11. Sclerotic aortic valve with normal opening.  12. Mild to moderate aortic regurgitation.  13. Moderate tricuspid regurgitation.  14. Mild pulmonic valve regurgitation.  15. Estimated pulmonary artery systolic pressure is 47.2 mmHg assuming a right atrial pressure of 8 mmHg, which is consistent with mild pulmonary hypertension.  16. There is no evidence of pericardial effusion.            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 Cardiology consulted due to elevated troponin, which is 0.08 in the setting of CKD. Patient denies angina or dyspnea and does not appear to have an acute coronary syndrome at this time. BNP markedly elevated at 4000s which is less than prior ER visit this month.   -Patient with b/l PNA 2/2 aspiration, being treated and improving.  -Noted to have ARF and no significant acute CHF on exam.  Diuretics held.  Cr improving, may consider restarting po Lasix by tomorrow.  -Also with new CVA.  Now on Coumadin and INR elevated today.  No further intervention planned. INR has been somewhat labile   -Now more alert.  Echo with no change from prior echo from 2019.  EF moderately depressed.  No severe VHD.  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  -Patient with persistent cough.  Likely mostly 2/2 PNA and ?ongoing aspiration.  Also has been getting IVF with NaHCO3, some mild fluid overload noted. Head of bed elevation     Plan:  1. D/c IVF.  Lasix 40mg IVx1 yesterday and no restarted home regimen of 40mg po QOD, alternating with 20mg QOD.  2. Continue Dig QOD.   3. Dose Coumadin for INR 2-3.  4. Continue other current CV meds at current doses.  5. Abx per med/ID.  6. Gout per med/ortho.  7. Neuro f/u.          Frantz Jordan MD

## 2021-10-05 ENCOUNTER — TRANSCRIPTION ENCOUNTER (OUTPATIENT)
Age: 66
End: 2021-10-05

## 2021-10-05 VITALS
TEMPERATURE: 98 F | OXYGEN SATURATION: 97 % | RESPIRATION RATE: 18 BRPM | DIASTOLIC BLOOD PRESSURE: 65 MMHG | HEART RATE: 89 BPM | SYSTOLIC BLOOD PRESSURE: 106 MMHG

## 2021-10-05 LAB
ANION GAP SERPL CALC-SCNC: 13 MMOL/L — SIGNIFICANT CHANGE UP (ref 5–17)
BUN SERPL-MCNC: 15.6 MG/DL — SIGNIFICANT CHANGE UP (ref 8–20)
CALCIUM SERPL-MCNC: 8.2 MG/DL — LOW (ref 8.6–10.2)
CHLORIDE SERPL-SCNC: 108 MMOL/L — HIGH (ref 98–107)
CO2 SERPL-SCNC: 25 MMOL/L — SIGNIFICANT CHANGE UP (ref 22–29)
CREAT SERPL-MCNC: 0.94 MG/DL — SIGNIFICANT CHANGE UP (ref 0.5–1.3)
CULTURE RESULTS: SIGNIFICANT CHANGE UP
CULTURE RESULTS: SIGNIFICANT CHANGE UP
GLUCOSE BLDC GLUCOMTR-MCNC: 102 MG/DL — HIGH (ref 70–99)
GLUCOSE BLDC GLUCOMTR-MCNC: 125 MG/DL — HIGH (ref 70–99)
GLUCOSE SERPL-MCNC: 100 MG/DL — HIGH (ref 70–99)
HCT VFR BLD CALC: 34.6 % — LOW (ref 39–50)
HGB BLD-MCNC: 10.7 G/DL — LOW (ref 13–17)
INR BLD: 1.48 RATIO — HIGH (ref 0.88–1.16)
MAGNESIUM SERPL-MCNC: 1.6 MG/DL — SIGNIFICANT CHANGE UP (ref 1.6–2.6)
MCHC RBC-ENTMCNC: 23.8 PG — LOW (ref 27–34)
MCHC RBC-ENTMCNC: 30.9 GM/DL — LOW (ref 32–36)
MCV RBC AUTO: 76.9 FL — LOW (ref 80–100)
PLATELET # BLD AUTO: 394 K/UL — SIGNIFICANT CHANGE UP (ref 150–400)
POTASSIUM SERPL-MCNC: 4 MMOL/L — SIGNIFICANT CHANGE UP (ref 3.5–5.3)
POTASSIUM SERPL-SCNC: 4 MMOL/L — SIGNIFICANT CHANGE UP (ref 3.5–5.3)
PROTHROM AB SERPL-ACNC: 16.8 SEC — HIGH (ref 10.6–13.6)
RBC # BLD: 4.5 M/UL — SIGNIFICANT CHANGE UP (ref 4.2–5.8)
RBC # FLD: 16.8 % — HIGH (ref 10.3–14.5)
SODIUM SERPL-SCNC: 146 MMOL/L — HIGH (ref 135–145)
SPECIMEN SOURCE: SIGNIFICANT CHANGE UP
SPECIMEN SOURCE: SIGNIFICANT CHANGE UP
WBC # BLD: 11.52 K/UL — HIGH (ref 3.8–10.5)
WBC # FLD AUTO: 11.52 K/UL — HIGH (ref 3.8–10.5)

## 2021-10-05 PROCEDURE — 85730 THROMBOPLASTIN TIME PARTIAL: CPT

## 2021-10-05 PROCEDURE — 82010 KETONE BODYS QUAN: CPT

## 2021-10-05 PROCEDURE — C8929: CPT

## 2021-10-05 PROCEDURE — 97110 THERAPEUTIC EXERCISES: CPT

## 2021-10-05 PROCEDURE — 74176 CT ABD & PELVIS W/O CONTRAST: CPT

## 2021-10-05 PROCEDURE — 84484 ASSAY OF TROPONIN QUANT: CPT

## 2021-10-05 PROCEDURE — 96372 THER/PROPH/DIAG INJ SC/IM: CPT

## 2021-10-05 PROCEDURE — 87086 URINE CULTURE/COLONY COUNT: CPT

## 2021-10-05 PROCEDURE — 93005 ELECTROCARDIOGRAM TRACING: CPT

## 2021-10-05 PROCEDURE — 70450 CT HEAD/BRAIN W/O DYE: CPT

## 2021-10-05 PROCEDURE — 80061 LIPID PANEL: CPT

## 2021-10-05 PROCEDURE — 82803 BLOOD GASES ANY COMBINATION: CPT

## 2021-10-05 PROCEDURE — 86140 C-REACTIVE PROTEIN: CPT

## 2021-10-05 PROCEDURE — 0225U NFCT DS DNA&RNA 21 SARSCOV2: CPT

## 2021-10-05 PROCEDURE — 80076 HEPATIC FUNCTION PANEL: CPT

## 2021-10-05 PROCEDURE — 94760 N-INVAS EAR/PLS OXIMETRY 1: CPT

## 2021-10-05 PROCEDURE — 96374 THER/PROPH/DIAG INJ IV PUSH: CPT

## 2021-10-05 PROCEDURE — 83550 IRON BINDING TEST: CPT

## 2021-10-05 PROCEDURE — 82607 VITAMIN B-12: CPT

## 2021-10-05 PROCEDURE — 82746 ASSAY OF FOLIC ACID SERUM: CPT

## 2021-10-05 PROCEDURE — 83540 ASSAY OF IRON: CPT

## 2021-10-05 PROCEDURE — 99239 HOSP IP/OBS DSCHRG MGMT >30: CPT

## 2021-10-05 PROCEDURE — 87040 BLOOD CULTURE FOR BACTERIA: CPT

## 2021-10-05 PROCEDURE — 83880 ASSAY OF NATRIURETIC PEPTIDE: CPT

## 2021-10-05 PROCEDURE — 84550 ASSAY OF BLOOD/URIC ACID: CPT

## 2021-10-05 PROCEDURE — 82570 ASSAY OF URINE CREATININE: CPT

## 2021-10-05 PROCEDURE — 84145 PROCALCITONIN (PCT): CPT

## 2021-10-05 PROCEDURE — 83605 ASSAY OF LACTIC ACID: CPT

## 2021-10-05 PROCEDURE — U0003: CPT

## 2021-10-05 PROCEDURE — U0005: CPT

## 2021-10-05 PROCEDURE — 87205 SMEAR GRAM STAIN: CPT

## 2021-10-05 PROCEDURE — 73200 CT UPPER EXTREMITY W/O DYE: CPT

## 2021-10-05 PROCEDURE — 85610 PROTHROMBIN TIME: CPT

## 2021-10-05 PROCEDURE — 80048 BASIC METABOLIC PNL TOTAL CA: CPT

## 2021-10-05 PROCEDURE — 94640 AIRWAY INHALATION TREATMENT: CPT

## 2021-10-05 PROCEDURE — 83735 ASSAY OF MAGNESIUM: CPT

## 2021-10-05 PROCEDURE — 86769 SARS-COV-2 COVID-19 ANTIBODY: CPT

## 2021-10-05 PROCEDURE — 84466 ASSAY OF TRANSFERRIN: CPT

## 2021-10-05 PROCEDURE — 99285 EMERGENCY DEPT VISIT HI MDM: CPT

## 2021-10-05 PROCEDURE — 80162 ASSAY OF DIGOXIN TOTAL: CPT

## 2021-10-05 PROCEDURE — 83036 HEMOGLOBIN GLYCOSYLATED A1C: CPT

## 2021-10-05 PROCEDURE — 73110 X-RAY EXAM OF WRIST: CPT

## 2021-10-05 PROCEDURE — 84443 ASSAY THYROID STIM HORMONE: CPT

## 2021-10-05 PROCEDURE — 81001 URINALYSIS AUTO W/SCOPE: CPT

## 2021-10-05 PROCEDURE — 84300 ASSAY OF URINE SODIUM: CPT

## 2021-10-05 PROCEDURE — 84100 ASSAY OF PHOSPHORUS: CPT

## 2021-10-05 PROCEDURE — 85652 RBC SED RATE AUTOMATED: CPT

## 2021-10-05 PROCEDURE — 96375 TX/PRO/DX INJ NEW DRUG ADDON: CPT

## 2021-10-05 PROCEDURE — 72125 CT NECK SPINE W/O DYE: CPT

## 2021-10-05 PROCEDURE — 93880 EXTRACRANIAL BILAT STUDY: CPT

## 2021-10-05 PROCEDURE — 71250 CT THORAX DX C-: CPT

## 2021-10-05 PROCEDURE — 85027 COMPLETE CBC AUTOMATED: CPT

## 2021-10-05 PROCEDURE — 82962 GLUCOSE BLOOD TEST: CPT

## 2021-10-05 PROCEDURE — 80053 COMPREHEN METABOLIC PANEL: CPT

## 2021-10-05 PROCEDURE — 97167 OT EVAL HIGH COMPLEX 60 MIN: CPT

## 2021-10-05 PROCEDURE — 36415 COLL VENOUS BLD VENIPUNCTURE: CPT

## 2021-10-05 PROCEDURE — 99232 SBSQ HOSP IP/OBS MODERATE 35: CPT

## 2021-10-05 PROCEDURE — 97530 THERAPEUTIC ACTIVITIES: CPT

## 2021-10-05 PROCEDURE — 71045 X-RAY EXAM CHEST 1 VIEW: CPT

## 2021-10-05 PROCEDURE — 85025 COMPLETE CBC W/AUTO DIFF WBC: CPT

## 2021-10-05 PROCEDURE — 92526 ORAL FUNCTION THERAPY: CPT

## 2021-10-05 PROCEDURE — 92610 EVALUATE SWALLOWING FUNCTION: CPT

## 2021-10-05 RX ORDER — DIGOXIN 250 MCG
1 TABLET ORAL
Qty: 0 | Refills: 0 | DISCHARGE
Start: 2021-10-05

## 2021-10-05 RX ORDER — FUROSEMIDE 40 MG
1 TABLET ORAL
Qty: 0 | Refills: 0 | DISCHARGE
Start: 2021-10-05

## 2021-10-05 RX ORDER — FUROSEMIDE 40 MG
1 TABLET ORAL
Qty: 0 | Refills: 0 | DISCHARGE

## 2021-10-05 RX ORDER — CARVEDILOL PHOSPHATE 80 MG/1
1 CAPSULE, EXTENDED RELEASE ORAL
Qty: 0 | Refills: 0 | DISCHARGE
Start: 2021-10-05

## 2021-10-05 RX ORDER — ALLOPURINOL 300 MG
1 TABLET ORAL
Qty: 30 | Refills: 0
Start: 2021-10-05 | End: 2021-11-03

## 2021-10-05 RX ADMIN — Medication 20 MILLIGRAM(S): at 04:59

## 2021-10-05 RX ADMIN — CARVEDILOL PHOSPHATE 6.25 MILLIGRAM(S): 80 CAPSULE, EXTENDED RELEASE ORAL at 05:00

## 2021-10-05 RX ADMIN — Medication 500 MILLIGRAM(S): at 04:59

## 2021-10-05 RX ADMIN — Medication 100 MILLIGRAM(S): at 12:33

## 2021-10-05 NOTE — PROGRESS NOTE ADULT - PROVIDER SPECIALTY LIST ADULT
Cardiology
Hospitalist
Neurology
Cardiology
Hospitalist
Hospitalist
Infectious Disease
Nephrology
Nephrology
Rehab Medicine
Cardiology
Hospitalist
Nephrology
Neurology
Orthopedics
Pulmonology
Cardiology
Hospitalist
Infectious Disease
Nephrology
Nephrology
Neurology
Neurology
Pulmonology

## 2021-10-05 NOTE — PROGRESS NOTE ADULT - SUBJECTIVE AND OBJECTIVE BOX
RUSH MARTINSHEREEN  [id number]          Chief Complaint:  follow up Failure to thrive/Aspiration Pneumonia/History of CVA/Elevated troponin    Interval History:  Patient awake and alert without c/o.  Denies CP, SOB.      acetaminophen   Tablet .. 650 milliGRAM(s) Oral every 6 hours PRN  acetaminophen   Tablet .. 650 milliGRAM(s) Oral every 6 hours PRN  allopurinol 100 milliGRAM(s) Oral daily  artificial  tears Solution 1 Drop(s) Both EYES two times a day PRN  ascorbic acid 500 milliGRAM(s) Oral two times a day  carvedilol 6.25 milliGRAM(s) Oral daily  digoxin     Tablet 125 MICROGram(s) Oral every other day  furosemide    Tablet 40 milliGRAM(s) Oral <User Schedule>  furosemide    Tablet 20 milliGRAM(s) Oral <User Schedule>  influenza   Vaccine 0.5 milliLiter(s) IntraMuscular once  lactobacillus acidophilus 1 Tablet(s) Oral three times a day with meals  ondansetron Injectable 4 milliGRAM(s) IV Push every 8 hours PRN  sodium chloride 0.65% Nasal 1 Spray(s) Both Nostrils every 4 hours PRN          Physical Exam:  T(C): 36.7 (10-05-21 @ 04:28), Max: 36.8 (10-04-21 @ 12:20)  HR: 88 (10-05-21 @ 04:28) (82 - 88)  BP: 131/66 (10-05-21 @ 04:28) (105/68 - 131/66)  RR: 18 (10-05-21 @ 04:28) (18 - 18)  SpO2: 96% (10-05-21 @ 04:28) (94% - 97%)  Wt(kg): --  General: Comfortable in NAD  HEENT: MMM, sclera anicteric  Resp: coarse bilaterally   CVS: nl s1s2, rrr, no s3/JVD  Abd: soft, NT, ND, BS+  Ext: No c/c/e  Neuro awake, alert, answers questions appropriately       I&O's Summary    04 Oct 2021 07:01  -  05 Oct 2021 07:00  --------------------------------------------------------  IN: 120 mL / OUT: 600 mL / NET: -480 mL          Labs:   05 Oct 2021 06:13    146    |  108    |  15.6   ----------------------------<  100    4.0     |  25.0   |  0.94     Ca    8.2        05 Oct 2021 06:13  Mg     1.6       05 Oct 2021 06:13                            10.7   11.52 )-----------( 394      ( 05 Oct 2021 06:13 )             34.6     PT/INR - ( 05 Oct 2021 06:13 )   PT: 16.8 sec;   INR: 1.48 ratio                 Echo:   1. Left ventricular ejection fraction, by visual estimation, is 35 to 40%.   2. Moderately decreased global left ventricular systolic function.   3. The left ventricular diastolic function could not be assessed in this study.   4. Normal left ventricular internal cavity size.   5. Moderately enlarged right ventricle. Mildly reduced RV systolic function.   6. Moderately enlarged left atrium.   7. Moderately enlarged right atrium.   8. Mild mitral annular calcification.   9. Mild thickening and calcification of the anterior and posterior mitral valve leaflets.  10. Mild mitral valve regurgitation.  11. Sclerotic aortic valve with normal opening.  12. Mild to moderate aortic regurgitation.  13. Moderate tricuspid regurgitation.  14. Mild pulmonic valve regurgitation.  15. Estimated pulmonary artery systolic pressure is 47.2 mmHg assuming a right atrial pressure of 8 mmHg, which is consistent with mild pulmonary hypertension.  16. There is no evidence of pericardial effusion.            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 Cardiology consulted due to elevated troponin, which is 0.08 in the setting of CKD. Patient denies angina or dyspnea and does not appear to have an acute coronary syndrome at this time. BNP markedly elevated at 4000s which is less than prior ER visit this month.   -Patient with b/l PNA 2/2 aspiration, being treated and improving.  -Noted to have ARF and no significant acute CHF on exam.  Diuretics held.  Cr improving, may consider restarting po Lasix by tomorrow.  -Also with new CVA.  Now on Coumadin and INR elevated today.  No further intervention planned. INR has been somewhat labile   -Now more alert.  Echo with no change from prior echo from 2019.  EF moderately depressed.  No severe VHD.  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  -Patient with persistent cough.  Likely mostly 2/2 PNA and ?ongoing aspiration.  Also has been getting IVF with NaHCO3, some mild fluid overload noted. Head of bed elevation     Plan:  1. Continue lasix f 40mg po QOD, alternating with 20mg QOD.  2. Continue Dig QOD.   3. Dose Coumadin for INR 2-3.  4. Continue other current CV meds at current doses.  5. Abx per med/ID.  6. Gout per med/ortho.  7. CV stable for discharge, outpatient follow up with Frantz Feng MD

## 2021-10-05 NOTE — PROGRESS NOTE ADULT - REASON FOR ADMISSION
CVA timing unclear of newer R parietal CVA  emesis poss constipation/fecal impaction
CVA timing unclear of newer R parietal CVA  emesis possible  constipation
CVA timing unclear of newer R parietal CVA  emesis poss constipation/fecal impaction
CVA timing unclear of newer R parietal CVA  emesis poss constipation/fecal impaction

## 2021-10-05 NOTE — DISCHARGE NOTE NURSING/CASE MANAGEMENT/SOCIAL WORK - PATIENT PORTAL LINK FT
You can access the FollowMyHealth Patient Portal offered by [facility] by registering at the following website: [email or web address]. By joining "[employer]’s FollowMyHealth portal, you will also be able to view your health information using other applications (apps) compatible with our system.

## 2021-11-08 NOTE — DISCHARGE NOTE NURSING/CASE MANAGEMENT/SOCIAL WORK - CASE MANAGER'S NAME
VERONICA FELIPE RN Negro: urine clear after cbi. abx given. pt with uro f/u. leg bag. return precautoins and d/c.

## 2021-11-10 ENCOUNTER — APPOINTMENT (OUTPATIENT)
Dept: CARDIOLOGY | Facility: CLINIC | Age: 66
End: 2021-11-10
Payer: MEDICARE

## 2021-11-10 PROCEDURE — 93295 DEV INTERROG REMOTE 1/2/MLT: CPT

## 2021-11-10 PROCEDURE — 93296 REM INTERROG EVL PM/IDS: CPT | Mod: NC

## 2022-02-10 ENCOUNTER — APPOINTMENT (OUTPATIENT)
Dept: CARDIOLOGY | Facility: CLINIC | Age: 67
End: 2022-02-10
Payer: MEDICARE

## 2022-02-10 PROCEDURE — 93295 DEV INTERROG REMOTE 1/2/MLT: CPT

## 2022-02-10 PROCEDURE — 93296 REM INTERROG EVL PM/IDS: CPT

## 2022-02-16 NOTE — ED ADULT NURSE NOTE - PMH
Patient:   CELIA LUTZ            MRN: [id number]            FIN: [id number]               Age:   75 years     Sex:  Male     :  1945   Associated Diagnoses:   Adenocarcinoma of lung; COPD (chronic obstructive pulmonary disease) with acute bronchitis; Prostate cancer; Acute renal failure superimposed on stage 3 chronic kidney disease   Author:   Yong Boyd MD      Visit Information      Date of Service: 10/20/2020 12:31 pm  Performing Location: [facility]  Encounter#: [id number]      Primary Care Provider (PCP):  Yong Boyd MD    NPI# [id number]      Referring Provider:  Yong Boyd MD    NPI# [id number]      Chief Complaint   10/20/2020 12:39 PM CDT  Preop - scheduled for endoscopic u/s - for pancreatic cyst and for esophagitis f/u 10/23 with Dr Edwards at [facility]            Additional Information:No additional information recorded during visit.   Chief complaint and symptoms as noted above and confirmed with patient.  Recent lab and diagnostic studies reviewed with patient      History of Present Illness   10/14/2020: Returns to clinic for hospital f/u.  Recent admission to [facility] for LEILA with SCr rise to 7.5 and anemia, Hgb 6.7.  Renal function steadily improved with IVFs and cessation of lisinopril.  Transfused 1 unit PRBCs and seen by GI.  Decisions for outpatient endoscopy and EUS (h/o pancreatic cyst).  Scheduled for excisional biopsy of left sided oral lesions and planned laryngeal biopsy (recent avidity on PET scan) with JVT on 10/16 - procedure since postponed.  Feeling better since hospitalization.      10/20/2020:  Returns for pre-op before planned EUS/EGD scheduled for Friday, 10/23 at [facility] with Dr. Edwards.  EUS for f/u on pancreatic cyst.  EGD for acute anemia related to recent hospitalization.  He has remained on PPI BID.  Feeling well.  Planned excisional biopsy of mouth SCC postponed related to recent hospitalization.         Review of Systems   Constitutional:  No  fever, No chills.    Eye:  Negative except as documented in history of present illness.    Ear/Nose/Mouth/Throat:  Negative except as documented in history of present illness, mouth sore.    Respiratory:  Shortness of breath, No wheezing.    Cardiovascular:  No chest pain, No palpitations, No peripheral edema, No syncope.    Gastrointestinal:  No nausea, No vomiting, No heartburn, No abdominal pain.    Genitourinary:  No dysuria, No hematuria.    Hematology/Lymphatics:  No bruising tendency.    Endocrine:  No excessive thirst, No polyuria.    Immunologic:  No recurrent fevers.    Musculoskeletal:  Joint pain, No muscle pain, No decreased range of motion, No trauma.    Neurologic:  Alert and oriented X4, No numbness, No tingling, No headache.       Health Status   Allergies:    Allergic Reactions (Selected)  No Known Medication Allergies   Medications:  (Selected)   Prescriptions  Prescribed  Advair Diskus 500 mcg-50 mcg inhalation powder: See Instructions, Instructions: INHALE 1 PUFF TWICE A DAY, # 180 unknown unit, 0 Refill(s), Type: Maintenance, Pharmacy: [facility] [id number] [facility], 71, in, 07/17/20 15:40:00 CDT, Height Measured, 205, lb, 07/17/20 15:40:00 CDT, Weight Measured  Ventolin HFA 90 mcg/inh inhalation aerosol: 2 puff(s), Inhale, q6 hrs, # 1 EA, 3 Refill(s), AD, Type: Maintenance, Pharmacy: [facility], 2 puff(s) Inhale q6 hrs, 71, in, 07/17/20 15:40:00 CDT, Height Measured, 205, lb, 07/17/20 15:40:00 CDT, Weight Measured  spacer for inhaler: spacer for inhaler, See Instructions, Instructions: use with albuterol inhaler, Supply, # 1 EA, 0 Refill(s), Type: Maintenance, Pharmacy: [facility], use with albuterol inhaler  tiotropium 18 mcg inhalation capsule: = 1 cap(s) ( 18 mcg ), Inhale, daily, # 90 cap(s), 3 Refill(s), Type: Maintenance, Pharmacy: [id number], 1 cap(s) Inhale daily, 71, in, 06/04/20 11:00:00 CDT, Height Measured, 202, lb, 06/04/20 10:56:00 CDT, Weight  Measured  Documented Medications  Documented  Multiple Vitamins oral tablet: 1 tab(s), po, daily, 0 Refill(s), Type: Maintenance  Tylenol Extra Strength PM: 2 tab(s), Oral, hs, 0 Refill(s), Type: Maintenance  Vitamin B12 500 mcg oral tablet: 1 tab(s) ( 500 mcg ), po, daily, 0 Refill(s), Type: Maintenance  Zetia 10 mg oral tablet: = 1 tab(s) ( 10 mg ), Oral, daily, # 30 tab(s), 0 Refill(s), Type: Maintenance  albuterol 2.5 mg/3 mL (0.083%) inhalation solution: = 3 mL ( 2.5 mg ), Inhale, q6 hrs, PRN: for wheezing, # 25 EA, 0 Refill(s), Type: Maintenance  magnesium oxide 250 mg oral tablet: 1 tab(s) ( 250 mg ), po, daily, 0 Refill(s), Type: Maintenance  metoprolol succinate 25 mg oral capsule, extended release: = 1 cap(s) ( 25 mg ), Oral, daily, 0 Refill(s), Type: Maintenance  omeprazole 20 mg oral delayed release capsule: = 1 cap(s) ( 20 mg ), Oral, daily, # 90 cap(s), 0 Refill(s), Type: Maintenance  polyethylene glycol 3350: ( 17 gm ), Oral, daily, 0 Refill(s), Type: Maintenance,    Medications          *denotes recorded medication          spacer for inhaler: See Instructions, use with albuterol inhaler, 1 EA, 0 Refill(s).          *Tylenol Extra Strength PM: 2 tab(s), Oral, hs, 0 Refill(s).          Ventolin HFA 90 mcg/inh inhalation aerosol: 2 puff(s), Inhale, q6 hrs, 1 EA, 3 Refill(s).          *albuterol 2.5 mg/3 mL (0.083%) inhalation solution: 2.5 mg, 3 mL, Inhale, q6 hrs, PRN: for wheezing, 25 EA, 0 Refill(s).          *Vitamin B12 500 mcg oral tablet: 500 mcg, 1 tab(s), po, daily, 0 Refill(s).          *Zetia 10 mg oral tablet: 10 mg, 1 tab(s), Oral, daily, 30 tab(s), 0 Refill(s).          Advair Diskus 500 mcg-50 mcg inhalation powder: See Instructions, INHALE 1 PUFF TWICE A DAY, 180 unknown unit, 0 Refill(s).          *magnesium oxide 250 mg oral tablet: 250 mg, 1 tab(s), po, daily, 0 Refill(s).          *metoprolol succinate 25 mg oral capsule, extended release: 25 mg, 1 cap(s), Oral, daily, 0  Refill(s).          *Multiple Vitamins oral tablet: 1 tab(s), po, daily, 0 Refill(s).          *omeprazole 20 mg oral delayed release capsule: 20 mg, 1 cap(s), Oral, daily, 90 cap(s), 0 Refill(s).          *polyethylene glycol 3350: 17 gm, Oral, daily, 0 Refill(s).          tiotropium 18 mcg inhalation capsule: 18 mcg, 1 cap(s), Inhale, daily, 90 cap(s), 3 Refill(s).       Problem list:    All Problems  AAA (abdominal aortic aneurysm) / SNOMED CT 258443633 / Confirmed  Adenocarcinoma of lung / SNOMED CT 285837976 / Confirmed  [place] Esophagus / SNOMED CT 9766035804 / Confirmed  CKD (chronic kidney disease) stage 3, GFR 30-59 ml/min / SNOMED CT 2935269543 / Confirmed  COPD (chronic obstructive pulmonary disease) with acute bronchitis / SNOMED CT 71545182 / Confirmed  Closed hip fracture / SNOMED CT 597097811 / Confirmed  Closed fracture of trochanter of femur / SNOMED CT 8541993127 / Confirmed  Cardiac arrhythmia / SNOMED CT 28913548 / Confirmed  Coronary artery disease due to lipid rich plaque / SNOMED CT 9151158504 / Confirmed  Lipids abnormal / SNOMED CT 826439806 / Confirmed  HTN (Hypertension) / SNOMED CT 30441570 / Confirmed  Former Smoker / SNOMED CT [id number] / Confirmed  GERD (gastroesophageal reflux disease) / SNOMED CT 7169600131 / Confirmed  Anticoagulated / SNOMED CT 855381002 / Confirmed  History of oropharyngeal cancer / SNOMED CT 3085086617 / Confirmed  H/O prostate cancer / SNOMED CT 3464530018 / Confirmed  Lung cancer / SNOMED CT 763704100 / Confirmed  DJD (Degenerative Joint Disease) / SNOMED CT 6370762196 / Confirmed  H/O unilateral nephrectomy / SNOMED CT 598609946 / Confirmed  Resolved: *Hospitalized@[facility] - Atypical chest pain  Resolved: *Hospitalized@[facility] - Hip fracture  Resolved: *Hospitalized@[facility] Septic hip vs hematoma  Resolved: History of sepsis / SNOMED CT 9881983148  Resolved: Inpatient stay / SNOMED CT 291558108  Resolved: Inpatient stay / SNOMED CT  055734783  Resolved: Inpatient stay / SNOMED CT 745938934  Resolved: Prostate cancer / SNOMED CT 723883560  Canceled: Oropharyngeal cancer / SNOMED CT 900932625  Canceled: Solitary kidney / SNOMED CT 054028529  Canceled: Solitary kidney / SNOMED CT 221535486      Histories   Past Medical History:    Active  AAA (abdominal aortic aneurysm) (767467562)  Coronary artery disease due to lipid rich plaque (4532764853)  GERD (gastroesophageal reflux disease) (6199914587)  Cardiac arrhythmia (46222240)  CKD (chronic kidney disease) stage 3, GFR 30-59 ml/min (6783861239)  Lung cancer (961129237)  Resolved  Inpatient stay (566838830): Onset on 10/8/2020 at 75 years.  Resolved on 10/11/2020 at 75 years.  Comments:  10/12/2020 CDT 3:36 PM CDT - Dayan Musa  @ [facility] due to acute renal failure.  Inpatient stay (203569144): Onset on 1/3/2020 at 74 years.  Resolved on 2020 at 74 years.  Comments:  2020 CST 1:41 PM CST - Yoly Tillman  [facility], [place] - Chest pain, acute renal failure.  Inpatient stay (656037868): Onset on 2019 at 74 years.  Resolved on 2019 at 74 years.  Comments:  12/10/2019 CST 1:46 PM CST - Jessi Eid  @[facility] - NSTEMI.  *Hospitalized@[facility] - Septic hip vs hematoma: Onset on 7/3/2016 at 70 years.  Resolved on 2016 at 70 years.  History of sepsis (4916940545): Onset on 7/3/2016 at 70 years.  Resolved.  Comments:  2016 CDT 2:14 PM CDT - Alma Barfield  Severe; due to septic hip.    2016 CDT 3:37 PM CDT - Alma Barfield  Sepsis organ failure: Acute renal failure.  *Hospitalized@[facility] - Hip fracture: Onset on 2016 at 70 years.  Resolved on 2016 at 70 years.  *Hospitalized@[facility] - Atypical chest pain: Onset on 2015 at 69 years.  Resolved on 2015 at 69 years.  Prostate cancer (019565275):  Resolved.   Family History:    CA - Breast cancer  Sister (Alexa, )  Lupus  Sister (Rebecca)  Alcohol abuse  Sister (Alexa,  )  Mother ()  SMA type III  Grandfather (M)  Obese  Sister (Sujey, )     Procedure history:    Coronary angiography (88012829) on 2019 at 74 Years.  Comments:  12/10/2019 1:47 PM CST - Jessi Eid  and PCI of the right coronary artery.  Esophagogastroduodenoscopy (073600695) on 2019 at 73 Years.  Comments:  10/12/2020 3:44 PM CDT - Dayan Musa  Endoscopic US, Fine Needle Aspiration; Gastric Biopsies of polyps.  Colonoscopy (101515773) on 3/21/2017 at 71 Years.  Comments:  3/22/2017 2:33 PM CDT - Filippo Barbour MD  Indication: Adenomatous Polyps  Sedation: MAC  Findings: Adenomatous polyp cecum, hemorrohids  Rec: Repeat in 5 years  Thoracoscopic wedge resection of lung (0280034790) in 2016 at 71 Years.  Comments:  10/12/2020 3:40 PM CDT - Dayan Musa  RUL, RML  Right Hip Bipoloar Hemiarthroplasty on 2016 at 70 Years.  Comments:  2016 7:26 AM CDT - Debby Faustin CMA  Right displasced femoral neck fracture  right thoracotomy on 11/15/2015 at 70 Years.  Comments:  2015 9:07 AM CST - Debby Faustin CMA  wedge resection right upper lung nodule, wedge resection right middle lobe lung nodule, Mediastinal lymph node dissection  Colonoscopy (409056190) on 2014 at 68 Years.  Comments:  5/15/2014 10:56 AM CDT - Livier Stallings RN  Sedation: midazolam, fentanyl  Indication: screen  5-6mm tubular adenom x3, 8mm tubular adenoma x1, 12mm tubular adenoma with advance adenoma due to size  Repeat in 3 years.  nepherectomy in the month of 3/2009 at 63 Years.  Lumbar discectomy in  at 61 Years.  Left salivary gland removal in  at 50 Years.  Oral surgery in  at 49 Years.  Aortic aneurysm, abdominal ([id number]).   Social History:        Electronic Cigarette/Vaping Assessment            Electronic Cigarette Use: Never.      Alcohol Assessment            Current, Liquor (Hard) (1.5 oz), 1-2 times per month, 2 drinks/episode  average.      Tobacco Assessment            Past, 20 per day.  49 year(s).      Substance Abuse Assessment            Never      Employment and Education Assessment            Retired, Highest education level: High school.      Home and Environment Assessment            Marital status: .  Spouse/Partner name: Darlene Chua.  4 children.  Living situation: Home/Independent.               Injuries/Abuse/Neglect in household: No.  Feels unsafe at home: No.  Family/Friends available for support:               Yes.      Nutrition and Health Assessment            Type of diet: Regular.  Wants to lose weight: No.  Sleeping concerns: No.  Feels highly stressed: No.      Exercise and Physical Activity Assessment            Exercise frequency: Occasional.      Sexual Assessment            Sexually active: No.  Identifies as male, Sexual orientation: Straight or heterosexual.  Contraceptive Use               Details: None.        Physical Examination   vital signs stable, as noted above   Vital Signs   10/20/2020 12:39 PM CDT Temperature Tympanic 97.7 DegF  LOW    Peripheral Pulse Rate 91 bpm    Systolic Blood Pressure 120 mmHg    Diastolic Blood Pressure 68 mmHg    Mean Arterial Pressure 85 mmHg    Oxygen Saturation 97 %      Measurements from flowsheet : Measurements   10/20/2020 12:39 PM CDT Height Measured - Standard 71 in    Weight Measured - Standard 206 lb    BSA 2.16 m2    Body Mass Index 28.73 kg/m2  HI      General:  Alert and oriented, No acute distress.    Eye:  Extraocular movements are intact.    HENT:  Normocephalic, Oral mucosa is moist, No pharyngeal erythema, dentures, discrete ulcerative/white lesion on left buccal surface; small punctate ulcer on lateral, left undersurface of tongue.    Neck:  Supple, Previous right sided neck dissection with flap.    Respiratory:  Lungs are clear to auscultation, Respirations are non-labored, posterior thoracotomy scar.    Cardiovascular:  Normal rate, Regular rhythm,  No murmur, No edema.    Gastrointestinal:  Soft, Non-tender, Non-distended.    Neurologic:  Alert, Oriented, Normal motor function, No focal deficits.    Cognition and Speech:  Oriented, Speech clear and coherent.    Psychiatric:  Appropriate mood & affect.       Review / Management   Results review:  Lab results   10/20/2020 1:14 PM CDT WBC 7.1    RBC 3.00    Hgb 9.7 g/dL    Hct 30.9 %     fL    MCH 32.3 pg    MCHC 31.4 g/dL    RDW 14.8 %    Platelet 247    MPV 9.2 fL   10/14/2020 1:35 PM CDT Sodium Level 139 mmol/L    Potassium Level 3.9 mmol/L    Chloride Level 105 mmol/L    CO2 Level 25 mmol/L    AGAP 9    Glucose Level 166 mg/dL    BUN 27 mg/dL    Creatinine 3.53 mg/dL    BUN/Creat Ratio 8    eGFR  21    eGFR Non-African American 17    Calcium Level 8.8 mg/dL   10/12/2020 6:06 PM CDT Sodium Level 139 mmol/L    Potassium Level 4.1 mmol/L    Chloride Level 104 mmol/L    CO2 Level 24 mmol/L    Glucose Level 89 mg/dL    BUN 33 mg/dL  HI    Creatinine 3.93 mg/dL  HI    BUN/Creat Ratio 8    eGFR 14 mL/min/1.73m2  LOW    eGFR African American 16 mL/min/1.73m2  LOW    Calcium Level 9.1 mg/dL    Magnesium Level 1.4 mg/dL  LOW   10/12/2020 6:03 PM CDT WBC 7.6    RBC 2.65    Hgb 8.7 g/dL    Hct 26.9 %     fL    MCH 32.8 pg    MCHC 32.3 g/dL    RDW 15.4 %    Platelet 127    MPV 8.2 fL   10/6/2020 2:29 PM CDT Sodium Level 135 mmol/L    Potassium Level 5.0 mmol/L    Chloride Level 103 mmol/L    CO2 Level 19 mmol/L  LOW    Glucose Level 92 mg/dL    BUN 33 mg/dL  HI    Creatinine 7.18 mg/dL  HI    BUN/Creat Ratio 5  LOW    eGFR 7 mL/min/1.73m2  LOW    eGFR African American 8 mL/min/1.73m2  LOW    Calcium Level 9.3 mg/dL   .       Impression and Plan   Diagnosis     Adenocarcinoma of lung (GRR61-WZ C34.90).     COPD (chronic obstructive pulmonary disease) with acute bronchitis (WEZ28-JS J44.1).     Prostate cancer (BRT06-QP C61).     Acute renal failure superimposed on stage 3 chronic kidney  disease (LFB60-DX N17.9).         .) pre-op, planned EUS/EGD on 10/23 at [facility] with Dr. Edwards  EUS, 7/2019, of  2.2cm pancreatic cyst; no malignancy on biopsy   - on omeprazole 20mg BID  - currently holding ASA + clopidogrel since last [facility] hospitalization  - CBC: 9.7,  - suspect related to reticulocytosis with recent bleeding  - no necessary medication changes  low risk procedure in setting of moderate risk patient profile (cardiac risk index 2 for IHD and recent LEILA); no further pre-operative risk assessment indicated     .) recent LEILA on CKD with SCt 7.5 and severe anemia (prior h/o of LEILA in 1/2020); no previous dialysis needs  - BMP: SCr improving, down from 3.5 to 2.6  - lisinopril indefinitely on hold    plan as previously outlined and not discussed at today's visit     .) oropharyngeal cancer with flap '94  - new mouth ulcer on left buccal surface  - following with JVT   - will need to reschedule excisional biopsy in near future    .) COPD   - on Advair 500/50mcg BID + Spiriva   - consider repeat PFTs in the future  .) s/p right wedge resection of limited stage adenocarcinoma of lung (11/2015)   - new MELODY lung nodule; PET avid    - ultimately will need CT surgery/pulmonary evaluation, planned in Nov, 2020      .) CKD; baseline SCr 1.5-1.9   - multiple LEILA episodes; h/o contrast induced nephropathy (VITOR)  - heightened risk for future contrast induced nephropathy    .) hypertension; controlled  current antihypertensive regimen: Toprol XL 25mg daily  regimen changes: none  intolerance:  future titration/work-up plan:     .) NSTEMI with PCI to RCA with multi-vessel disease (non-obstructive LAD lesion); following with Dr. Valentine  - DAPT stopped (10/2020): profound anemia, concerns for GIB  - Toprol XL 25mg daily  - rosuvastatin 40mg  held with recent LEILA    .) prostate Ca; watchful waiting   - original prostate biopsy in '11, repeat biopsy in '16; Ken 6   - watchful surveillance; q6 month  PSA; last PSA 15   - MRI of prostate (2019): focal coarse calcifications in prostate; no evidence of extra-prostate extension   - following with Dr. Bustos - prefers not to pursue XRT at this point    .) h/o AAA, endovascular aortic repair, follows with Dr. David    .) polyarthritis pains  - limited benefit with NSAIDs and APAP  s/p right MIRNA after hip fracture from fall (6/2016)   - normal DEXA (7/2015)  - doubt inflammatory arthritis; doubt polymyalgia rheumatica given no upper extremity involvement  - working with Ortho - lasting benefits from intra-articular injections    .) health maintenance   - CRC due in '22   - q6 month PSA   - Prevacid 30mg BID   - enrolled in [facility]    RTC as previously scheduled   AICD (automatic cardioverter/defibrillator) present    Atrial fibrillation    Cardiomyopathy    CVA (cerebral vascular accident)    Hyperlipidemia    Hypertension    RBBB    Thrombus of left atrial appendage

## 2022-04-13 NOTE — PROGRESS NOTE ADULT - SUBJECTIVE AND OBJECTIVE BOX
04/12/22 2043   Patient Assessment/Suction   Level of Consciousness (AVPU) alert   Respiratory Effort Normal;Unlabored   Expansion/Accessory Muscles/Retractions no use of accessory muscles;expansion symmetric;no retractions   All Lung Fields Breath Sounds Anterior:;Lateral:;clear   Rhythm/Pattern, Respiratory unlabored;pattern regular;depth regular   PRE-TX-O2   O2 Device (Oxygen Therapy) room air   SpO2 (!) 92 %   Pulse Oximetry Type Intermittent   $ Pulse Oximetry - Multiple Charge Pulse Oximetry - Multiple   Pulse 83   Resp 18   Incentive Spirometer   $ Incentive Spirometer Charges done with encouragement   Incentive Spirometer Predicted Level (mL) 1500   Administration (IS) instruction provided, follow-up   Number of Repetitions (IS) 10   Level Incentive Spirometer (mL) 2500   Patient Tolerance (IS) good                                  [facility]                                        Neurology at [place]                                 Noah Ramírez, & [place]                                  [address]                                        [place], [place], [address]                                             [phone]        CC: stroke    HPI:   67 yo man with history of hemorrhagic stroke in 2012 due to uncontrolled HTN as per wife with residual R hemiparesis who was admitted due to episode of vomiting yesterday.  The patient had been at [facility] in July with left sided weakness and his wife reports that she was told it was secondary to cellulitis.   He has been to emergency rooms a few times since then.     His wife also reports that since the pacemaker battery change in April of 2020, he has been in the hospital almost every 3 months. (JW)    Interim history: no new neuro symptoms    ROS:   Unobtainable due to patient's condition.     MEDICATIONS  (STANDING):  atorvastatin 40 milliGRAM(s) Oral at bedtime  carvedilol 3.125 milliGRAM(s) Oral every 12 hours  influenza   Vaccine 0.5 milliLiter(s) IntraMuscular once  levalbuterol Inhalation 0.63 milliGRAM(s) Inhalation every 6 hours  pantoprazole  Injectable 40 milliGRAM(s) IV Push at bedtime  piperacillin/tazobactam IVPB.. 3.375 Gram(s) IV Intermittent every 8 hours  polyethylene glycol 3350 17 Gram(s) Oral daily  potassium chloride    Tablet ER 40 milliEquivalent(s) Oral every 4 hours  potassium phosphate / sodium phosphate Powder (PHOS-NaK) 1 Packet(s) Oral two times a day  saccharomyces boulardii 250 milliGRAM(s) Oral two times a day  senna 2 Tablet(s) Oral at bedtime  sodium chloride 0.9%. 1000 milliLiter(s) (70 mL/Hr) IV Continuous <Continuous>    MEDICATIONS  (PRN):  ondansetron Injectable 4 milliGRAM(s) IV Push every 8 hours PRN Nausea and/or Vomiting      Vital Signs Last 24 Hrs  T(C): 36.9 (29 Sep 2021 10:32), Max: 36.9 (28 Sep 2021 20:43)  T(F): 98.4 (29 Sep 2021 10:32), Max: 98.4 (28 Sep 2021 20:43)  HR: 83 (29 Sep 2021 10:32) (77 - 83)  BP: 102/67 (29 Sep 2021 10:32) (94/58 - 105/67)  BP(mean): --  RR: 18 (29 Sep 2021 10:32) (18 - 18)  SpO2: 96% (29 Sep 2021 14:43) (95% - 98%)    Detailed Neurologic Exam:    Mental status: The patient is awake but non verbal. Follows simple commands.     Cranial nerves: Pupils equal and react symmetrically to light. There is no visual field deficit to threat. Extraocular motion is full with no nystagmus. Facial sensation is intact. Facial musculature is asymmetric with a depression of the right nasolabial fold. Palate elevates symmetrically. Tongue is midline.    Motor: There is increased tone on the right.  There is spastic paresis on the right.  The left is 2-3/5 in the hand and 1-2/5 otherwise.     Sensation: Decreased on the right compared to the left.    Reflexes: 2-3+on the right 2+ left and plantar responses are extensor.    Cerebellar: Cannot be tested.     Labs:                           11.1   10.41 )-----------( 299      ( 28 Sep 2021 03:33 )             35.4     09-29    142  |  104  |  44.3<H>  ----------------------------<  116<H>  2.6<LL>   |  21.0<L>  |  1.75<H>    Ca    8.3<L>      29 Sep 2021 07:01  Phos  1.9     09-29  Mg     2.1     09-29        PT/INR - ( 29 Sep 2021 07:01 )   PT: 47.6 sec;   INR: 4.40 ratio          Rad:   CT head: There are chronic ischemic changes, left thalamic infarct, bilateral basal ganglia infarcts as well as right parietal infarct.

## 2022-05-10 ENCOUNTER — APPOINTMENT (OUTPATIENT)
Dept: CARDIOLOGY | Facility: CLINIC | Age: 67
End: 2022-05-10
Payer: MEDICARE

## 2022-05-10 PROCEDURE — 93296 REM INTERROG EVL PM/IDS: CPT

## 2022-05-10 PROCEDURE — 93295 DEV INTERROG REMOTE 1/2/MLT: CPT

## 2022-05-25 NOTE — OCCUPATIONAL THERAPY INITIAL EVALUATION ADULT - ORIENTATION, REHAB EVAL
Is This A New Presentation, Or A Follow-Up?: Skin Lesion
What Type Of Note Output Would You Prefer (Optional)?: Standard Output
How Severe Is Your Skin Lesion?: moderate
Has Your Skin Lesion Been Treated?: not been treated
when offered choices for where he is, pt nodded "yes" to being home.  Spouse states "He knows exactly where he is and what's going on."/person

## 2022-08-09 ENCOUNTER — APPOINTMENT (OUTPATIENT)
Dept: CARDIOLOGY | Facility: CLINIC | Age: 67
End: 2022-08-09

## 2022-08-09 PROCEDURE — 93295 DEV INTERROG REMOTE 1/2/MLT: CPT

## 2022-08-09 PROCEDURE — 93296 REM INTERROG EVL PM/IDS: CPT

## 2022-12-07 NOTE — ED ADULT NURSE NOTE - SEPSIS SCREEN SUSPECTED INFECTION, MLM
Hortensia Braxton RN received report from the preop nurse and brought the patient to the operating room   No

## 2023-01-01 ENCOUNTER — LABORATORY RESULT (OUTPATIENT)
Age: 68
End: 2023-01-01

## 2023-01-01 ENCOUNTER — NON-APPOINTMENT (OUTPATIENT)
Age: 68
End: 2023-01-01

## 2023-01-01 ENCOUNTER — EMERGENCY (EMERGENCY)
Facility: HOSPITAL | Age: 68
LOS: 1 days | Discharge: HOME | End: 2023-01-01
Attending: EMERGENCY MEDICINE
Payer: COMMERCIAL

## 2023-01-01 ENCOUNTER — INPATIENT (INPATIENT)
Facility: HOSPITAL | Age: 68
LOS: 1 days | DRG: 871 | End: 2023-11-09
Attending: INTERNAL MEDICINE | Admitting: INTERNAL MEDICINE
Payer: COMMERCIAL

## 2023-01-01 VITALS — HEART RATE: 114 BPM | OXYGEN SATURATION: 94 % | HEIGHT: 66 IN | RESPIRATION RATE: 18 BRPM

## 2023-01-01 VITALS
SYSTOLIC BLOOD PRESSURE: 87 MMHG | HEART RATE: 127 BPM | DIASTOLIC BLOOD PRESSURE: 73 MMHG | RESPIRATION RATE: 22 BRPM | TEMPERATURE: 98 F

## 2023-01-01 VITALS
DIASTOLIC BLOOD PRESSURE: 91 MMHG | RESPIRATION RATE: 16 BRPM | SYSTOLIC BLOOD PRESSURE: 143 MMHG | WEIGHT: 130.07 LBS | OXYGEN SATURATION: 99 % | HEIGHT: 66 IN | TEMPERATURE: 98 F | HEART RATE: 92 BPM

## 2023-01-01 VITALS
DIASTOLIC BLOOD PRESSURE: 78 MMHG | TEMPERATURE: 98 F | RESPIRATION RATE: 16 BRPM | OXYGEN SATURATION: 99 % | SYSTOLIC BLOOD PRESSURE: 135 MMHG | HEART RATE: 75 BPM

## 2023-01-01 VITALS
TEMPERATURE: 99 F | RESPIRATION RATE: 17 BRPM | OXYGEN SATURATION: 99 % | DIASTOLIC BLOOD PRESSURE: 80 MMHG | WEIGHT: 104.94 LBS | SYSTOLIC BLOOD PRESSURE: 135 MMHG | HEIGHT: 66 IN | HEART RATE: 87 BPM

## 2023-01-01 VITALS
OXYGEN SATURATION: 95 % | HEART RATE: 75 BPM | DIASTOLIC BLOOD PRESSURE: 74 MMHG | SYSTOLIC BLOOD PRESSURE: 140 MMHG | RESPIRATION RATE: 16 BRPM

## 2023-01-01 DIAGNOSIS — Z95.828 PRESENCE OF OTHER VASCULAR IMPLANTS AND GRAFTS: Chronic | ICD-10-CM

## 2023-01-01 DIAGNOSIS — Z95.810 PRESENCE OF AUTOMATIC (IMPLANTABLE) CARDIAC DEFIBRILLATOR: Chronic | ICD-10-CM

## 2023-01-01 DIAGNOSIS — J18.9 PNEUMONIA, UNSPECIFIED ORGANISM: ICD-10-CM

## 2023-01-01 LAB
ALBUMIN SERPL ELPH-MCNC: 1.9 G/DL — LOW (ref 3.3–5.2)
ALBUMIN SERPL ELPH-MCNC: 1.9 G/DL — LOW (ref 3.3–5.2)
ALBUMIN SERPL ELPH-MCNC: 2.1 G/DL — LOW (ref 3.3–5.2)
ALBUMIN SERPL ELPH-MCNC: 2.1 G/DL — LOW (ref 3.3–5.2)
ALBUMIN SERPL ELPH-MCNC: 2.3 G/DL — LOW (ref 3.3–5.2)
ALBUMIN SERPL ELPH-MCNC: 2.3 G/DL — LOW (ref 3.3–5.2)
ALBUMIN SERPL ELPH-MCNC: 2.4 G/DL — LOW (ref 3.3–5.2)
ALBUMIN SERPL ELPH-MCNC: 2.4 G/DL — LOW (ref 3.3–5.2)
ALP SERPL-CCNC: 138 U/L — HIGH (ref 40–120)
ALP SERPL-CCNC: 138 U/L — HIGH (ref 40–120)
ALP SERPL-CCNC: 172 U/L — HIGH (ref 40–120)
ALP SERPL-CCNC: 172 U/L — HIGH (ref 40–120)
ALP SERPL-CCNC: 63 U/L — SIGNIFICANT CHANGE UP (ref 40–120)
ALP SERPL-CCNC: 63 U/L — SIGNIFICANT CHANGE UP (ref 40–120)
ALP SERPL-CCNC: 64 U/L — SIGNIFICANT CHANGE UP (ref 40–120)
ALP SERPL-CCNC: 64 U/L — SIGNIFICANT CHANGE UP (ref 40–120)
ALT FLD-CCNC: 23 U/L — SIGNIFICANT CHANGE UP
ALT FLD-CCNC: 23 U/L — SIGNIFICANT CHANGE UP
ALT FLD-CCNC: 363 U/L — HIGH
ALT FLD-CCNC: 363 U/L — HIGH
ALT FLD-CCNC: 7 U/L — SIGNIFICANT CHANGE UP
ALT FLD-CCNC: 7 U/L — SIGNIFICANT CHANGE UP
ALT FLD-CCNC: 8 U/L — SIGNIFICANT CHANGE UP
ALT FLD-CCNC: 8 U/L — SIGNIFICANT CHANGE UP
ANION GAP SERPL CALC-SCNC: 10 MMOL/L — SIGNIFICANT CHANGE UP (ref 5–17)
ANION GAP SERPL CALC-SCNC: 10 MMOL/L — SIGNIFICANT CHANGE UP (ref 5–17)
ANION GAP SERPL CALC-SCNC: 14 MMOL/L — SIGNIFICANT CHANGE UP (ref 5–17)
ANION GAP SERPL CALC-SCNC: 14 MMOL/L — SIGNIFICANT CHANGE UP (ref 5–17)
ANION GAP SERPL CALC-SCNC: 17 MMOL/L — SIGNIFICANT CHANGE UP (ref 5–17)
ANION GAP SERPL CALC-SCNC: 17 MMOL/L — SIGNIFICANT CHANGE UP (ref 5–17)
ANION GAP SERPL CALC-SCNC: 23 MMOL/L — HIGH (ref 5–17)
ANION GAP SERPL CALC-SCNC: 23 MMOL/L — HIGH (ref 5–17)
ANION GAP SERPL CALC-SCNC: 8 MMOL/L — SIGNIFICANT CHANGE UP (ref 5–17)
ANISOCYTOSIS BLD QL: SLIGHT — SIGNIFICANT CHANGE UP
APPEARANCE UR: ABNORMAL
APPEARANCE UR: ABNORMAL
APTT BLD: 33 SEC — SIGNIFICANT CHANGE UP (ref 24.5–35.6)
APTT BLD: 33 SEC — SIGNIFICANT CHANGE UP (ref 24.5–35.6)
APTT BLD: 36.9 SEC — HIGH (ref 24.5–35.6)
APTT BLD: 36.9 SEC — HIGH (ref 24.5–35.6)
APTT BLD: 45.2 SEC — HIGH (ref 24.5–35.6)
APTT BLD: 45.2 SEC — HIGH (ref 24.5–35.6)
APTT BLD: 54.8 SEC — HIGH (ref 24.5–35.6)
APTT BLD: 54.8 SEC — HIGH (ref 24.5–35.6)
AST SERPL-CCNC: 1017 U/L — HIGH
AST SERPL-CCNC: 1017 U/L — HIGH
AST SERPL-CCNC: 15 U/L — SIGNIFICANT CHANGE UP
AST SERPL-CCNC: 15 U/L — SIGNIFICANT CHANGE UP
AST SERPL-CCNC: 20 U/L — SIGNIFICANT CHANGE UP
AST SERPL-CCNC: 20 U/L — SIGNIFICANT CHANGE UP
AST SERPL-CCNC: 22 U/L — SIGNIFICANT CHANGE UP
AST SERPL-CCNC: 22 U/L — SIGNIFICANT CHANGE UP
BACTERIA # UR AUTO: ABNORMAL /HPF
BACTERIA # UR AUTO: ABNORMAL /HPF
BASE EXCESS BLDV CALC-SCNC: 1.4 MMOL/L — SIGNIFICANT CHANGE UP (ref -2–3)
BASE EXCESS BLDV CALC-SCNC: 1.4 MMOL/L — SIGNIFICANT CHANGE UP (ref -2–3)
BASOPHILS # BLD AUTO: 0 K/UL — SIGNIFICANT CHANGE UP (ref 0–0.2)
BASOPHILS # BLD AUTO: 0 K/UL — SIGNIFICANT CHANGE UP (ref 0–0.2)
BASOPHILS # BLD AUTO: 0.03 K/UL — SIGNIFICANT CHANGE UP (ref 0–0.2)
BASOPHILS # BLD AUTO: 0.07 K/UL — SIGNIFICANT CHANGE UP (ref 0–0.2)
BASOPHILS NFR BLD AUTO: 0 % — SIGNIFICANT CHANGE UP (ref 0–2)
BASOPHILS NFR BLD AUTO: 0 % — SIGNIFICANT CHANGE UP (ref 0–2)
BASOPHILS NFR BLD AUTO: 0.1 % — SIGNIFICANT CHANGE UP (ref 0–2)
BASOPHILS NFR BLD AUTO: 1 % — SIGNIFICANT CHANGE UP (ref 0–2)
BASOPHILS NFR BLD AUTO: 1 % — SIGNIFICANT CHANGE UP (ref 0–2)
BASOPHILS NFR BLD AUTO: 1.1 % — SIGNIFICANT CHANGE UP (ref 0–2)
BASOPHILS NFR BLD AUTO: 1.1 % — SIGNIFICANT CHANGE UP (ref 0–2)
BILIRUB SERPL-MCNC: 0.3 MG/DL — LOW (ref 0.4–2)
BILIRUB SERPL-MCNC: 0.3 MG/DL — LOW (ref 0.4–2)
BILIRUB SERPL-MCNC: 0.4 MG/DL — SIGNIFICANT CHANGE UP (ref 0.4–2)
BILIRUB SERPL-MCNC: 0.4 MG/DL — SIGNIFICANT CHANGE UP (ref 0.4–2)
BILIRUB SERPL-MCNC: 0.8 MG/DL — SIGNIFICANT CHANGE UP (ref 0.4–2)
BILIRUB SERPL-MCNC: 0.8 MG/DL — SIGNIFICANT CHANGE UP (ref 0.4–2)
BILIRUB SERPL-MCNC: 1.3 MG/DL — SIGNIFICANT CHANGE UP (ref 0.4–2)
BILIRUB SERPL-MCNC: 1.3 MG/DL — SIGNIFICANT CHANGE UP (ref 0.4–2)
BILIRUB UR-MCNC: NEGATIVE — SIGNIFICANT CHANGE UP
BILIRUB UR-MCNC: NEGATIVE — SIGNIFICANT CHANGE UP
BUN SERPL-MCNC: 12.2 MG/DL — SIGNIFICANT CHANGE UP (ref 8–20)
BUN SERPL-MCNC: 12.2 MG/DL — SIGNIFICANT CHANGE UP (ref 8–20)
BUN SERPL-MCNC: 21.8 MG/DL — HIGH (ref 8–20)
BUN SERPL-MCNC: 21.8 MG/DL — HIGH (ref 8–20)
BUN SERPL-MCNC: 61.6 MG/DL — HIGH (ref 8–20)
BUN SERPL-MCNC: 61.6 MG/DL — HIGH (ref 8–20)
BUN SERPL-MCNC: 62.2 MG/DL — HIGH (ref 8–20)
BUN SERPL-MCNC: 62.2 MG/DL — HIGH (ref 8–20)
BUN SERPL-MCNC: 63.1 MG/DL — HIGH (ref 8–20)
BUN SERPL-MCNC: 63.1 MG/DL — HIGH (ref 8–20)
BUN SERPL-MCNC: 70.2 MG/DL — HIGH (ref 8–20)
BUN SERPL-MCNC: 70.2 MG/DL — HIGH (ref 8–20)
BURR CELLS BLD QL SMEAR: PRESENT — SIGNIFICANT CHANGE UP
BURR CELLS BLD QL SMEAR: PRESENT — SIGNIFICANT CHANGE UP
CA-I SERPL-SCNC: 1.2 MMOL/L — SIGNIFICANT CHANGE UP (ref 1.15–1.33)
CA-I SERPL-SCNC: 1.2 MMOL/L — SIGNIFICANT CHANGE UP (ref 1.15–1.33)
CALCIUM SERPL-MCNC: 8.1 MG/DL — LOW (ref 8.4–10.5)
CALCIUM SERPL-MCNC: 9 MG/DL — SIGNIFICANT CHANGE UP (ref 8.4–10.5)
CALCIUM SERPL-MCNC: 9 MG/DL — SIGNIFICANT CHANGE UP (ref 8.4–10.5)
CAST: 2 /LPF — SIGNIFICANT CHANGE UP (ref 0–4)
CAST: 2 /LPF — SIGNIFICANT CHANGE UP (ref 0–4)
CHLORIDE BLDV-SCNC: 104 MMOL/L — SIGNIFICANT CHANGE UP (ref 96–108)
CHLORIDE BLDV-SCNC: 104 MMOL/L — SIGNIFICANT CHANGE UP (ref 96–108)
CHLORIDE SERPL-SCNC: 101 MMOL/L — SIGNIFICANT CHANGE UP (ref 96–108)
CHLORIDE SERPL-SCNC: 105 MMOL/L — SIGNIFICANT CHANGE UP (ref 96–108)
CHLORIDE SERPL-SCNC: 111 MMOL/L — HIGH (ref 96–108)
CHLORIDE SERPL-SCNC: 111 MMOL/L — HIGH (ref 96–108)
CO2 SERPL-SCNC: 13 MMOL/L — LOW (ref 22–29)
CO2 SERPL-SCNC: 13 MMOL/L — LOW (ref 22–29)
CO2 SERPL-SCNC: 19 MMOL/L — LOW (ref 22–29)
CO2 SERPL-SCNC: 19 MMOL/L — LOW (ref 22–29)
CO2 SERPL-SCNC: 22 MMOL/L — SIGNIFICANT CHANGE UP (ref 22–29)
CO2 SERPL-SCNC: 22 MMOL/L — SIGNIFICANT CHANGE UP (ref 22–29)
CO2 SERPL-SCNC: 26 MMOL/L — SIGNIFICANT CHANGE UP (ref 22–29)
CO2 SERPL-SCNC: 26 MMOL/L — SIGNIFICANT CHANGE UP (ref 22–29)
CO2 SERPL-SCNC: 27 MMOL/L — SIGNIFICANT CHANGE UP (ref 22–29)
CO2 SERPL-SCNC: 27 MMOL/L — SIGNIFICANT CHANGE UP (ref 22–29)
CO2 SERPL-SCNC: 28 MMOL/L — SIGNIFICANT CHANGE UP (ref 22–29)
CO2 SERPL-SCNC: 28 MMOL/L — SIGNIFICANT CHANGE UP (ref 22–29)
COLOR SPEC: YELLOW — SIGNIFICANT CHANGE UP
COLOR SPEC: YELLOW — SIGNIFICANT CHANGE UP
CREAT SERPL-MCNC: 1.04 MG/DL — SIGNIFICANT CHANGE UP (ref 0.5–1.3)
CREAT SERPL-MCNC: 1.04 MG/DL — SIGNIFICANT CHANGE UP (ref 0.5–1.3)
CREAT SERPL-MCNC: 1.06 MG/DL — SIGNIFICANT CHANGE UP (ref 0.5–1.3)
CREAT SERPL-MCNC: 1.06 MG/DL — SIGNIFICANT CHANGE UP (ref 0.5–1.3)
CREAT SERPL-MCNC: 1.07 MG/DL — SIGNIFICANT CHANGE UP (ref 0.5–1.3)
CREAT SERPL-MCNC: 1.07 MG/DL — SIGNIFICANT CHANGE UP (ref 0.5–1.3)
CREAT SERPL-MCNC: 1.1 MG/DL — SIGNIFICANT CHANGE UP (ref 0.5–1.3)
CREAT SERPL-MCNC: 1.1 MG/DL — SIGNIFICANT CHANGE UP (ref 0.5–1.3)
CREAT SERPL-MCNC: 1.15 MG/DL — SIGNIFICANT CHANGE UP (ref 0.5–1.3)
CREAT SERPL-MCNC: 1.15 MG/DL — SIGNIFICANT CHANGE UP (ref 0.5–1.3)
CREAT SERPL-MCNC: 1.29 MG/DL — SIGNIFICANT CHANGE UP (ref 0.5–1.3)
CREAT SERPL-MCNC: 1.29 MG/DL — SIGNIFICANT CHANGE UP (ref 0.5–1.3)
DIFF PNL FLD: ABNORMAL
DIFF PNL FLD: ABNORMAL
DIGOXIN SERPL-MCNC: <0.4 NG/ML — LOW (ref 0.8–2)
DIGOXIN SERPL-MCNC: <0.4 NG/ML — LOW (ref 0.8–2)
EGFR: 60 ML/MIN/1.73M2 — SIGNIFICANT CHANGE UP
EGFR: 60 ML/MIN/1.73M2 — SIGNIFICANT CHANGE UP
EGFR: 69 ML/MIN/1.73M2 — SIGNIFICANT CHANGE UP
EGFR: 69 ML/MIN/1.73M2 — SIGNIFICANT CHANGE UP
EGFR: 73 ML/MIN/1.73M2 — SIGNIFICANT CHANGE UP
EGFR: 73 ML/MIN/1.73M2 — SIGNIFICANT CHANGE UP
EGFR: 75 ML/MIN/1.73M2 — SIGNIFICANT CHANGE UP
EGFR: 75 ML/MIN/1.73M2 — SIGNIFICANT CHANGE UP
EGFR: 76 ML/MIN/1.73M2 — SIGNIFICANT CHANGE UP
EGFR: 76 ML/MIN/1.73M2 — SIGNIFICANT CHANGE UP
EGFR: 78 ML/MIN/1.73M2 — SIGNIFICANT CHANGE UP
EGFR: 78 ML/MIN/1.73M2 — SIGNIFICANT CHANGE UP
ELLIPTOCYTES BLD QL SMEAR: SLIGHT — SIGNIFICANT CHANGE UP
ELLIPTOCYTES BLD QL SMEAR: SLIGHT — SIGNIFICANT CHANGE UP
EOSINOPHIL # BLD AUTO: 0 K/UL — SIGNIFICANT CHANGE UP (ref 0–0.5)
EOSINOPHIL # BLD AUTO: 0.11 K/UL — SIGNIFICANT CHANGE UP (ref 0–0.5)
EOSINOPHIL # BLD AUTO: 0.11 K/UL — SIGNIFICANT CHANGE UP (ref 0–0.5)
EOSINOPHIL # BLD AUTO: 0.19 K/UL — SIGNIFICANT CHANGE UP (ref 0–0.5)
EOSINOPHIL # BLD AUTO: 0.19 K/UL — SIGNIFICANT CHANGE UP (ref 0–0.5)
EOSINOPHIL NFR BLD AUTO: 0 % — SIGNIFICANT CHANGE UP (ref 0–6)
EOSINOPHIL NFR BLD AUTO: 1.8 % — SIGNIFICANT CHANGE UP (ref 0–6)
EOSINOPHIL NFR BLD AUTO: 1.8 % — SIGNIFICANT CHANGE UP (ref 0–6)
EOSINOPHIL NFR BLD AUTO: 2.7 % — SIGNIFICANT CHANGE UP (ref 0–6)
EOSINOPHIL NFR BLD AUTO: 2.7 % — SIGNIFICANT CHANGE UP (ref 0–6)
FINE GRAN CASTS #/AREA URNS AUTO: PRESENT
FINE GRAN CASTS #/AREA URNS AUTO: PRESENT
GAS PNL BLDV: 139 MMOL/L — SIGNIFICANT CHANGE UP (ref 136–145)
GAS PNL BLDV: 139 MMOL/L — SIGNIFICANT CHANGE UP (ref 136–145)
GAS PNL BLDV: SIGNIFICANT CHANGE UP
GLUCOSE BLDC GLUCOMTR-MCNC: 157 MG/DL — HIGH (ref 70–99)
GLUCOSE BLDC GLUCOMTR-MCNC: 157 MG/DL — HIGH (ref 70–99)
GLUCOSE BLDC GLUCOMTR-MCNC: 274 MG/DL — HIGH (ref 70–99)
GLUCOSE BLDC GLUCOMTR-MCNC: 274 MG/DL — HIGH (ref 70–99)
GLUCOSE BLDC GLUCOMTR-MCNC: 91 MG/DL — SIGNIFICANT CHANGE UP (ref 70–99)
GLUCOSE BLDC GLUCOMTR-MCNC: 91 MG/DL — SIGNIFICANT CHANGE UP (ref 70–99)
GLUCOSE BLDC GLUCOMTR-MCNC: 95 MG/DL — SIGNIFICANT CHANGE UP (ref 70–99)
GLUCOSE BLDC GLUCOMTR-MCNC: 95 MG/DL — SIGNIFICANT CHANGE UP (ref 70–99)
GLUCOSE BLDV-MCNC: 225 MG/DL — HIGH (ref 70–99)
GLUCOSE BLDV-MCNC: 225 MG/DL — HIGH (ref 70–99)
GLUCOSE SERPL-MCNC: 131 MG/DL — HIGH (ref 70–99)
GLUCOSE SERPL-MCNC: 131 MG/DL — HIGH (ref 70–99)
GLUCOSE SERPL-MCNC: 234 MG/DL — HIGH (ref 70–99)
GLUCOSE SERPL-MCNC: 234 MG/DL — HIGH (ref 70–99)
GLUCOSE SERPL-MCNC: 243 MG/DL — HIGH (ref 70–99)
GLUCOSE SERPL-MCNC: 243 MG/DL — HIGH (ref 70–99)
GLUCOSE SERPL-MCNC: 265 MG/DL — HIGH (ref 70–99)
GLUCOSE SERPL-MCNC: 265 MG/DL — HIGH (ref 70–99)
GLUCOSE SERPL-MCNC: 71 MG/DL — SIGNIFICANT CHANGE UP (ref 70–99)
GLUCOSE SERPL-MCNC: 71 MG/DL — SIGNIFICANT CHANGE UP (ref 70–99)
GLUCOSE SERPL-MCNC: 85 MG/DL — SIGNIFICANT CHANGE UP (ref 70–99)
GLUCOSE SERPL-MCNC: 85 MG/DL — SIGNIFICANT CHANGE UP (ref 70–99)
GLUCOSE UR QL: NEGATIVE MG/DL — SIGNIFICANT CHANGE UP
GLUCOSE UR QL: NEGATIVE MG/DL — SIGNIFICANT CHANGE UP
HCO3 BLDV-SCNC: 28 MMOL/L — SIGNIFICANT CHANGE UP (ref 22–29)
HCO3 BLDV-SCNC: 28 MMOL/L — SIGNIFICANT CHANGE UP (ref 22–29)
HCT VFR BLD CALC: 33.2 % — LOW (ref 39–50)
HCT VFR BLD CALC: 33.2 % — LOW (ref 39–50)
HCT VFR BLD CALC: 34.4 % — LOW (ref 39–50)
HCT VFR BLD CALC: 34.4 % — LOW (ref 39–50)
HCT VFR BLD CALC: 35.1 % — LOW (ref 39–50)
HCT VFR BLD CALC: 35.1 % — LOW (ref 39–50)
HCT VFR BLD CALC: 39.3 % — SIGNIFICANT CHANGE UP (ref 39–50)
HCT VFR BLD CALC: 39.3 % — SIGNIFICANT CHANGE UP (ref 39–50)
HCT VFR BLD CALC: 39.5 % — SIGNIFICANT CHANGE UP (ref 39–50)
HCT VFR BLD CALC: 39.5 % — SIGNIFICANT CHANGE UP (ref 39–50)
HCT VFR BLDA CALC: 40 % — SIGNIFICANT CHANGE UP
HCT VFR BLDA CALC: 40 % — SIGNIFICANT CHANGE UP
HGB BLD CALC-MCNC: 13.3 G/DL — SIGNIFICANT CHANGE UP (ref 12.6–17.4)
HGB BLD CALC-MCNC: 13.3 G/DL — SIGNIFICANT CHANGE UP (ref 12.6–17.4)
HGB BLD-MCNC: 11 G/DL — LOW (ref 13–17)
HGB BLD-MCNC: 11 G/DL — LOW (ref 13–17)
HGB BLD-MCNC: 11.1 G/DL — LOW (ref 13–17)
HGB BLD-MCNC: 11.1 G/DL — LOW (ref 13–17)
HGB BLD-MCNC: 11.4 G/DL — LOW (ref 13–17)
HGB BLD-MCNC: 11.4 G/DL — LOW (ref 13–17)
HGB BLD-MCNC: 12.1 G/DL — LOW (ref 13–17)
HGB BLD-MCNC: 12.1 G/DL — LOW (ref 13–17)
HGB BLD-MCNC: 12.5 G/DL — LOW (ref 13–17)
HGB BLD-MCNC: 12.5 G/DL — LOW (ref 13–17)
IMM GRANULOCYTES NFR BLD AUTO: 0.2 % — SIGNIFICANT CHANGE UP (ref 0–0.9)
IMM GRANULOCYTES NFR BLD AUTO: 0.2 % — SIGNIFICANT CHANGE UP (ref 0–0.9)
IMM GRANULOCYTES NFR BLD AUTO: 0.3 % — SIGNIFICANT CHANGE UP (ref 0–0.9)
IMM GRANULOCYTES NFR BLD AUTO: 0.3 % — SIGNIFICANT CHANGE UP (ref 0–0.9)
IMM GRANULOCYTES NFR BLD AUTO: 0.8 % — SIGNIFICANT CHANGE UP (ref 0–0.9)
IMM GRANULOCYTES NFR BLD AUTO: 0.8 % — SIGNIFICANT CHANGE UP (ref 0–0.9)
IMM GRANULOCYTES NFR BLD AUTO: 1.5 % — HIGH (ref 0–0.9)
IMM GRANULOCYTES NFR BLD AUTO: 1.5 % — HIGH (ref 0–0.9)
INR BLD: 1.63 RATIO — HIGH (ref 0.85–1.18)
INR BLD: 1.63 RATIO — HIGH (ref 0.85–1.18)
INR BLD: 1.86 RATIO — HIGH (ref 0.85–1.18)
INR BLD: 1.86 RATIO — HIGH (ref 0.85–1.18)
INR BLD: 3.48 RATIO — HIGH (ref 0.85–1.18)
INR BLD: 3.48 RATIO — HIGH (ref 0.85–1.18)
INR BLD: 4.52 RATIO — HIGH (ref 0.85–1.18)
INR BLD: 4.52 RATIO — HIGH (ref 0.85–1.18)
INR PPP: 1.4
INR PPP: 4.52
IRON SATN MFR SERPL: 17 % — SIGNIFICANT CHANGE UP (ref 16–55)
IRON SATN MFR SERPL: 17 % — SIGNIFICANT CHANGE UP (ref 16–55)
IRON SATN MFR SERPL: 21 UG/DL — LOW (ref 59–158)
IRON SATN MFR SERPL: 21 UG/DL — LOW (ref 59–158)
KETONES UR-MCNC: NEGATIVE MG/DL — SIGNIFICANT CHANGE UP
KETONES UR-MCNC: NEGATIVE MG/DL — SIGNIFICANT CHANGE UP
LACTATE BLDV-MCNC: 4.8 MMOL/L — CRITICAL HIGH (ref 0.5–2)
LACTATE BLDV-MCNC: 4.8 MMOL/L — CRITICAL HIGH (ref 0.5–2)
LACTATE SERPL-SCNC: 2.8 MMOL/L — HIGH (ref 0.5–2)
LACTATE SERPL-SCNC: 2.8 MMOL/L — HIGH (ref 0.5–2)
LACTATE SERPL-SCNC: 4.8 MMOL/L — CRITICAL HIGH (ref 0.5–2)
LACTATE SERPL-SCNC: 4.8 MMOL/L — CRITICAL HIGH (ref 0.5–2)
LACTATE SERPL-SCNC: 7.1 MMOL/L — CRITICAL HIGH (ref 0.5–2)
LACTATE SERPL-SCNC: 7.1 MMOL/L — CRITICAL HIGH (ref 0.5–2)
LEGIONELLA AG UR QL: NEGATIVE — SIGNIFICANT CHANGE UP
LEGIONELLA AG UR QL: NEGATIVE — SIGNIFICANT CHANGE UP
LEUKOCYTE ESTERASE UR-ACNC: ABNORMAL
LEUKOCYTE ESTERASE UR-ACNC: ABNORMAL
LYMPHOCYTES # BLD AUTO: 0 % — LOW (ref 13–44)
LYMPHOCYTES # BLD AUTO: 0 % — LOW (ref 13–44)
LYMPHOCYTES # BLD AUTO: 0 K/UL — LOW (ref 1–3.3)
LYMPHOCYTES # BLD AUTO: 0 K/UL — LOW (ref 1–3.3)
LYMPHOCYTES # BLD AUTO: 0.77 K/UL — LOW (ref 1–3.3)
LYMPHOCYTES # BLD AUTO: 0.77 K/UL — LOW (ref 1–3.3)
LYMPHOCYTES # BLD AUTO: 0.91 K/UL — LOW (ref 1–3.3)
LYMPHOCYTES # BLD AUTO: 0.91 K/UL — LOW (ref 1–3.3)
LYMPHOCYTES # BLD AUTO: 1.6 K/UL — SIGNIFICANT CHANGE UP (ref 1–3.3)
LYMPHOCYTES # BLD AUTO: 1.6 K/UL — SIGNIFICANT CHANGE UP (ref 1–3.3)
LYMPHOCYTES # BLD AUTO: 1.7 K/UL — SIGNIFICANT CHANGE UP (ref 1–3.3)
LYMPHOCYTES # BLD AUTO: 1.7 K/UL — SIGNIFICANT CHANGE UP (ref 1–3.3)
LYMPHOCYTES # BLD AUTO: 22.4 % — SIGNIFICANT CHANGE UP (ref 13–44)
LYMPHOCYTES # BLD AUTO: 22.4 % — SIGNIFICANT CHANGE UP (ref 13–44)
LYMPHOCYTES # BLD AUTO: 27.3 % — SIGNIFICANT CHANGE UP (ref 13–44)
LYMPHOCYTES # BLD AUTO: 27.3 % — SIGNIFICANT CHANGE UP (ref 13–44)
LYMPHOCYTES # BLD AUTO: 3.1 % — LOW (ref 13–44)
LYMPHOCYTES # BLD AUTO: 3.1 % — LOW (ref 13–44)
LYMPHOCYTES # BLD AUTO: 3.6 % — LOW (ref 13–44)
LYMPHOCYTES # BLD AUTO: 3.6 % — LOW (ref 13–44)
MACROCYTES BLD QL: SLIGHT — SIGNIFICANT CHANGE UP
MACROCYTES BLD QL: SLIGHT — SIGNIFICANT CHANGE UP
MAGNESIUM SERPL-MCNC: 2 MG/DL — SIGNIFICANT CHANGE UP (ref 1.6–2.6)
MAGNESIUM SERPL-MCNC: 2 MG/DL — SIGNIFICANT CHANGE UP (ref 1.6–2.6)
MAGNESIUM SERPL-MCNC: 2.1 MG/DL — SIGNIFICANT CHANGE UP (ref 1.6–2.6)
MAGNESIUM SERPL-MCNC: 2.1 MG/DL — SIGNIFICANT CHANGE UP (ref 1.6–2.6)
MANUAL SMEAR VERIFICATION: SIGNIFICANT CHANGE UP
MCHC RBC-ENTMCNC: 23.2 PG — LOW (ref 27–34)
MCHC RBC-ENTMCNC: 23.2 PG — LOW (ref 27–34)
MCHC RBC-ENTMCNC: 23.3 PG — LOW (ref 27–34)
MCHC RBC-ENTMCNC: 23.3 PG — LOW (ref 27–34)
MCHC RBC-ENTMCNC: 24.3 PG — LOW (ref 27–34)
MCHC RBC-ENTMCNC: 24.6 PG — LOW (ref 27–34)
MCHC RBC-ENTMCNC: 24.6 PG — LOW (ref 27–34)
MCHC RBC-ENTMCNC: 30.6 GM/DL — LOW (ref 32–36)
MCHC RBC-ENTMCNC: 30.6 GM/DL — LOW (ref 32–36)
MCHC RBC-ENTMCNC: 31.3 GM/DL — LOW (ref 32–36)
MCHC RBC-ENTMCNC: 31.3 GM/DL — LOW (ref 32–36)
MCHC RBC-ENTMCNC: 31.8 GM/DL — LOW (ref 32–36)
MCHC RBC-ENTMCNC: 31.8 GM/DL — LOW (ref 32–36)
MCHC RBC-ENTMCNC: 32.3 GM/DL — SIGNIFICANT CHANGE UP (ref 32–36)
MCHC RBC-ENTMCNC: 32.3 GM/DL — SIGNIFICANT CHANGE UP (ref 32–36)
MCHC RBC-ENTMCNC: 34.3 GM/DL — SIGNIFICANT CHANGE UP (ref 32–36)
MCHC RBC-ENTMCNC: 34.3 GM/DL — SIGNIFICANT CHANGE UP (ref 32–36)
MCV RBC AUTO: 70.6 FL — LOW (ref 80–100)
MCV RBC AUTO: 70.6 FL — LOW (ref 80–100)
MCV RBC AUTO: 72.3 FL — LOW (ref 80–100)
MCV RBC AUTO: 72.3 FL — LOW (ref 80–100)
MCV RBC AUTO: 72.9 FL — LOW (ref 80–100)
MCV RBC AUTO: 72.9 FL — LOW (ref 80–100)
MCV RBC AUTO: 78.3 FL — LOW (ref 80–100)
MCV RBC AUTO: 78.3 FL — LOW (ref 80–100)
MCV RBC AUTO: 79.5 FL — LOW (ref 80–100)
MCV RBC AUTO: 79.5 FL — LOW (ref 80–100)
MICROCYTES BLD QL: SLIGHT — SIGNIFICANT CHANGE UP
MICROCYTES BLD QL: SLIGHT — SIGNIFICANT CHANGE UP
MONOCYTES # BLD AUTO: 0 K/UL — SIGNIFICANT CHANGE UP (ref 0–0.9)
MONOCYTES # BLD AUTO: 0 K/UL — SIGNIFICANT CHANGE UP (ref 0–0.9)
MONOCYTES # BLD AUTO: 0.5 K/UL — SIGNIFICANT CHANGE UP (ref 0–0.9)
MONOCYTES # BLD AUTO: 0.5 K/UL — SIGNIFICANT CHANGE UP (ref 0–0.9)
MONOCYTES # BLD AUTO: 0.55 K/UL — SIGNIFICANT CHANGE UP (ref 0–0.9)
MONOCYTES # BLD AUTO: 0.55 K/UL — SIGNIFICANT CHANGE UP (ref 0–0.9)
MONOCYTES # BLD AUTO: 0.59 K/UL — SIGNIFICANT CHANGE UP (ref 0–0.9)
MONOCYTES # BLD AUTO: 0.59 K/UL — SIGNIFICANT CHANGE UP (ref 0–0.9)
MONOCYTES # BLD AUTO: 0.9 K/UL — SIGNIFICANT CHANGE UP (ref 0–0.9)
MONOCYTES # BLD AUTO: 0.9 K/UL — SIGNIFICANT CHANGE UP (ref 0–0.9)
MONOCYTES NFR BLD AUTO: 0 % — LOW (ref 2–14)
MONOCYTES NFR BLD AUTO: 0 % — LOW (ref 2–14)
MONOCYTES NFR BLD AUTO: 2.3 % — SIGNIFICANT CHANGE UP (ref 2–14)
MONOCYTES NFR BLD AUTO: 2.3 % — SIGNIFICANT CHANGE UP (ref 2–14)
MONOCYTES NFR BLD AUTO: 3.1 % — SIGNIFICANT CHANGE UP (ref 2–14)
MONOCYTES NFR BLD AUTO: 3.1 % — SIGNIFICANT CHANGE UP (ref 2–14)
MONOCYTES NFR BLD AUTO: 7.7 % — SIGNIFICANT CHANGE UP (ref 2–14)
MONOCYTES NFR BLD AUTO: 7.7 % — SIGNIFICANT CHANGE UP (ref 2–14)
MONOCYTES NFR BLD AUTO: 9.5 % — SIGNIFICANT CHANGE UP (ref 2–14)
MONOCYTES NFR BLD AUTO: 9.5 % — SIGNIFICANT CHANGE UP (ref 2–14)
NEUTROPHILS # BLD AUTO: 19.94 K/UL — HIGH (ref 1.8–7.4)
NEUTROPHILS # BLD AUTO: 19.94 K/UL — HIGH (ref 1.8–7.4)
NEUTROPHILS # BLD AUTO: 26.8 K/UL — HIGH (ref 1.8–7.4)
NEUTROPHILS # BLD AUTO: 26.8 K/UL — HIGH (ref 1.8–7.4)
NEUTROPHILS # BLD AUTO: 28.16 K/UL — HIGH (ref 1.8–7.4)
NEUTROPHILS # BLD AUTO: 28.16 K/UL — HIGH (ref 1.8–7.4)
NEUTROPHILS # BLD AUTO: 3.74 K/UL — SIGNIFICANT CHANGE UP (ref 1.8–7.4)
NEUTROPHILS # BLD AUTO: 3.74 K/UL — SIGNIFICANT CHANGE UP (ref 1.8–7.4)
NEUTROPHILS # BLD AUTO: 4.7 K/UL — SIGNIFICANT CHANGE UP (ref 1.8–7.4)
NEUTROPHILS # BLD AUTO: 4.7 K/UL — SIGNIFICANT CHANGE UP (ref 1.8–7.4)
NEUTROPHILS NFR BLD AUTO: 100 % — HIGH (ref 43–77)
NEUTROPHILS NFR BLD AUTO: 100 % — HIGH (ref 43–77)
NEUTROPHILS NFR BLD AUTO: 60.1 % — SIGNIFICANT CHANGE UP (ref 43–77)
NEUTROPHILS NFR BLD AUTO: 60.1 % — SIGNIFICANT CHANGE UP (ref 43–77)
NEUTROPHILS NFR BLD AUTO: 65.9 % — SIGNIFICANT CHANGE UP (ref 43–77)
NEUTROPHILS NFR BLD AUTO: 65.9 % — SIGNIFICANT CHANGE UP (ref 43–77)
NEUTROPHILS NFR BLD AUTO: 92.2 % — HIGH (ref 43–77)
NEUTROPHILS NFR BLD AUTO: 92.2 % — HIGH (ref 43–77)
NEUTROPHILS NFR BLD AUTO: 93.2 % — HIGH (ref 43–77)
NEUTROPHILS NFR BLD AUTO: 93.2 % — HIGH (ref 43–77)
NITRITE UR-MCNC: NEGATIVE — SIGNIFICANT CHANGE UP
NITRITE UR-MCNC: NEGATIVE — SIGNIFICANT CHANGE UP
NT-PROBNP SERPL-SCNC: 5454 PG/ML — HIGH (ref 0–300)
NT-PROBNP SERPL-SCNC: 5454 PG/ML — HIGH (ref 0–300)
NT-PROBNP SERPL-SCNC: HIGH PG/ML (ref 0–300)
NT-PROBNP SERPL-SCNC: HIGH PG/ML (ref 0–300)
OVALOCYTES BLD QL SMEAR: SLIGHT — SIGNIFICANT CHANGE UP
PCO2 BLDV: 55 MMHG — SIGNIFICANT CHANGE UP (ref 42–55)
PCO2 BLDV: 55 MMHG — SIGNIFICANT CHANGE UP (ref 42–55)
PH BLDV: 7.31 — LOW (ref 7.32–7.43)
PH BLDV: 7.31 — LOW (ref 7.32–7.43)
PH UR: 5.5 — SIGNIFICANT CHANGE UP (ref 5–8)
PH UR: 5.5 — SIGNIFICANT CHANGE UP (ref 5–8)
PHOSPHATE SERPL-MCNC: 3.1 MG/DL — SIGNIFICANT CHANGE UP (ref 2.4–4.7)
PHOSPHATE SERPL-MCNC: 3.1 MG/DL — SIGNIFICANT CHANGE UP (ref 2.4–4.7)
PHOSPHATE SERPL-MCNC: 5.3 MG/DL — HIGH (ref 2.4–4.7)
PHOSPHATE SERPL-MCNC: 5.3 MG/DL — HIGH (ref 2.4–4.7)
PLAT MORPH BLD: NORMAL — SIGNIFICANT CHANGE UP
PLATELET # BLD AUTO: 188 K/UL — SIGNIFICANT CHANGE UP (ref 150–400)
PLATELET # BLD AUTO: 188 K/UL — SIGNIFICANT CHANGE UP (ref 150–400)
PLATELET # BLD AUTO: 259 K/UL — SIGNIFICANT CHANGE UP (ref 150–400)
PLATELET # BLD AUTO: 259 K/UL — SIGNIFICANT CHANGE UP (ref 150–400)
PLATELET # BLD AUTO: 314 K/UL — SIGNIFICANT CHANGE UP (ref 150–400)
PLATELET # BLD AUTO: 314 K/UL — SIGNIFICANT CHANGE UP (ref 150–400)
PLATELET # BLD AUTO: 333 K/UL — SIGNIFICANT CHANGE UP (ref 150–400)
PLATELET # BLD AUTO: 333 K/UL — SIGNIFICANT CHANGE UP (ref 150–400)
PLATELET # BLD AUTO: 459 K/UL — HIGH (ref 150–400)
PLATELET # BLD AUTO: 459 K/UL — HIGH (ref 150–400)
PO2 BLDV: 67 MMHG — HIGH (ref 25–45)
PO2 BLDV: 67 MMHG — HIGH (ref 25–45)
POIKILOCYTOSIS BLD QL AUTO: SLIGHT — SIGNIFICANT CHANGE UP
POLYCHROMASIA BLD QL SMEAR: SIGNIFICANT CHANGE UP
POLYCHROMASIA BLD QL SMEAR: SIGNIFICANT CHANGE UP
POLYCHROMASIA BLD QL SMEAR: SLIGHT — SIGNIFICANT CHANGE UP
POLYCHROMASIA BLD QL SMEAR: SLIGHT — SIGNIFICANT CHANGE UP
POTASSIUM BLDV-SCNC: 4 MMOL/L — SIGNIFICANT CHANGE UP (ref 3.5–5.1)
POTASSIUM BLDV-SCNC: 4 MMOL/L — SIGNIFICANT CHANGE UP (ref 3.5–5.1)
POTASSIUM SERPL-MCNC: 3.2 MMOL/L — LOW (ref 3.5–5.3)
POTASSIUM SERPL-MCNC: 3.2 MMOL/L — LOW (ref 3.5–5.3)
POTASSIUM SERPL-MCNC: 3.8 MMOL/L — SIGNIFICANT CHANGE UP (ref 3.5–5.3)
POTASSIUM SERPL-MCNC: 3.8 MMOL/L — SIGNIFICANT CHANGE UP (ref 3.5–5.3)
POTASSIUM SERPL-MCNC: 4 MMOL/L — SIGNIFICANT CHANGE UP (ref 3.5–5.3)
POTASSIUM SERPL-MCNC: 4 MMOL/L — SIGNIFICANT CHANGE UP (ref 3.5–5.3)
POTASSIUM SERPL-MCNC: 4.2 MMOL/L — SIGNIFICANT CHANGE UP (ref 3.5–5.3)
POTASSIUM SERPL-MCNC: 4.2 MMOL/L — SIGNIFICANT CHANGE UP (ref 3.5–5.3)
POTASSIUM SERPL-MCNC: 4.5 MMOL/L — SIGNIFICANT CHANGE UP (ref 3.5–5.3)
POTASSIUM SERPL-MCNC: 4.5 MMOL/L — SIGNIFICANT CHANGE UP (ref 3.5–5.3)
POTASSIUM SERPL-MCNC: 4.7 MMOL/L — SIGNIFICANT CHANGE UP (ref 3.5–5.3)
POTASSIUM SERPL-MCNC: 4.7 MMOL/L — SIGNIFICANT CHANGE UP (ref 3.5–5.3)
POTASSIUM SERPL-SCNC: 3.2 MMOL/L — LOW (ref 3.5–5.3)
POTASSIUM SERPL-SCNC: 3.2 MMOL/L — LOW (ref 3.5–5.3)
POTASSIUM SERPL-SCNC: 3.8 MMOL/L — SIGNIFICANT CHANGE UP (ref 3.5–5.3)
POTASSIUM SERPL-SCNC: 3.8 MMOL/L — SIGNIFICANT CHANGE UP (ref 3.5–5.3)
POTASSIUM SERPL-SCNC: 4 MMOL/L — SIGNIFICANT CHANGE UP (ref 3.5–5.3)
POTASSIUM SERPL-SCNC: 4 MMOL/L — SIGNIFICANT CHANGE UP (ref 3.5–5.3)
POTASSIUM SERPL-SCNC: 4.2 MMOL/L — SIGNIFICANT CHANGE UP (ref 3.5–5.3)
POTASSIUM SERPL-SCNC: 4.2 MMOL/L — SIGNIFICANT CHANGE UP (ref 3.5–5.3)
POTASSIUM SERPL-SCNC: 4.5 MMOL/L — SIGNIFICANT CHANGE UP (ref 3.5–5.3)
POTASSIUM SERPL-SCNC: 4.5 MMOL/L — SIGNIFICANT CHANGE UP (ref 3.5–5.3)
POTASSIUM SERPL-SCNC: 4.7 MMOL/L — SIGNIFICANT CHANGE UP (ref 3.5–5.3)
POTASSIUM SERPL-SCNC: 4.7 MMOL/L — SIGNIFICANT CHANGE UP (ref 3.5–5.3)
PROT SERPL-MCNC: 5.6 G/DL — LOW (ref 6.6–8.7)
PROT SERPL-MCNC: 5.6 G/DL — LOW (ref 6.6–8.7)
PROT SERPL-MCNC: 6.5 G/DL — LOW (ref 6.6–8.7)
PROT SERPL-MCNC: 6.5 G/DL — LOW (ref 6.6–8.7)
PROT SERPL-MCNC: 7.7 G/DL — SIGNIFICANT CHANGE UP (ref 6.6–8.7)
PROT UR-MCNC: 30 MG/DL
PROT UR-MCNC: 30 MG/DL
PROTHROM AB SERPL-ACNC: 17.8 SEC — HIGH (ref 9.5–13)
PROTHROM AB SERPL-ACNC: 17.8 SEC — HIGH (ref 9.5–13)
PROTHROM AB SERPL-ACNC: 20.3 SEC — HIGH (ref 9.5–13)
PROTHROM AB SERPL-ACNC: 20.3 SEC — HIGH (ref 9.5–13)
PROTHROM AB SERPL-ACNC: 37.2 SEC — HIGH (ref 9.5–13)
PROTHROM AB SERPL-ACNC: 37.2 SEC — HIGH (ref 9.5–13)
PROTHROM AB SERPL-ACNC: 48 SEC — HIGH (ref 9.5–13)
PROTHROM AB SERPL-ACNC: 48 SEC — HIGH (ref 9.5–13)
RAPID RVP RESULT: DETECTED
RAPID RVP RESULT: DETECTED
RAPID RVP RESULT: SIGNIFICANT CHANGE UP
RAPID RVP RESULT: SIGNIFICANT CHANGE UP
RBC # BLD: 4.48 M/UL — SIGNIFICANT CHANGE UP (ref 4.2–5.8)
RBC # BLD: 4.48 M/UL — SIGNIFICANT CHANGE UP (ref 4.2–5.8)
RBC # BLD: 4.7 M/UL — SIGNIFICANT CHANGE UP (ref 4.2–5.8)
RBC # BLD: 4.7 M/UL — SIGNIFICANT CHANGE UP (ref 4.2–5.8)
RBC # BLD: 4.76 M/UL — SIGNIFICANT CHANGE UP (ref 4.2–5.8)
RBC # BLD: 4.76 M/UL — SIGNIFICANT CHANGE UP (ref 4.2–5.8)
RBC # BLD: 4.97 M/UL — SIGNIFICANT CHANGE UP (ref 4.2–5.8)
RBC # BLD: 4.97 M/UL — SIGNIFICANT CHANGE UP (ref 4.2–5.8)
RBC # BLD: 5.39 M/UL — SIGNIFICANT CHANGE UP (ref 4.2–5.8)
RBC # BLD: 5.39 M/UL — SIGNIFICANT CHANGE UP (ref 4.2–5.8)
RBC # FLD: 15.7 % — HIGH (ref 10.3–14.5)
RBC # FLD: 15.7 % — HIGH (ref 10.3–14.5)
RBC # FLD: 15.8 % — HIGH (ref 10.3–14.5)
RBC # FLD: 15.9 % — HIGH (ref 10.3–14.5)
RBC # FLD: 15.9 % — HIGH (ref 10.3–14.5)
RBC # FLD: 16.9 % — HIGH (ref 10.3–14.5)
RBC # FLD: 16.9 % — HIGH (ref 10.3–14.5)
RBC BLD AUTO: ABNORMAL
RBC CASTS # UR COMP ASSIST: 5 /HPF — HIGH (ref 0–4)
RBC CASTS # UR COMP ASSIST: 5 /HPF — HIGH (ref 0–4)
S PNEUM AG UR QL: NEGATIVE — SIGNIFICANT CHANGE UP
S PNEUM AG UR QL: NEGATIVE — SIGNIFICANT CHANGE UP
SAO2 % BLDV: 90.2 % — SIGNIFICANT CHANGE UP
SAO2 % BLDV: 90.2 % — SIGNIFICANT CHANGE UP
SARS-COV-2 RNA SPEC QL NAA+PROBE: DETECTED
SARS-COV-2 RNA SPEC QL NAA+PROBE: DETECTED
SARS-COV-2 RNA SPEC QL NAA+PROBE: SIGNIFICANT CHANGE UP
SARS-COV-2 RNA SPEC QL NAA+PROBE: SIGNIFICANT CHANGE UP
SCHISTOCYTES BLD QL AUTO: SLIGHT — SIGNIFICANT CHANGE UP
SCHISTOCYTES BLD QL AUTO: SLIGHT — SIGNIFICANT CHANGE UP
SODIUM SERPL-SCNC: 136 MMOL/L — SIGNIFICANT CHANGE UP (ref 135–145)
SODIUM SERPL-SCNC: 136 MMOL/L — SIGNIFICANT CHANGE UP (ref 135–145)
SODIUM SERPL-SCNC: 137 MMOL/L — SIGNIFICANT CHANGE UP (ref 135–145)
SODIUM SERPL-SCNC: 141 MMOL/L — SIGNIFICANT CHANGE UP (ref 135–145)
SODIUM SERPL-SCNC: 146 MMOL/L — HIGH (ref 135–145)
SODIUM SERPL-SCNC: 146 MMOL/L — HIGH (ref 135–145)
SP GR SPEC: 1.02 — SIGNIFICANT CHANGE UP (ref 1–1.03)
SP GR SPEC: 1.02 — SIGNIFICANT CHANGE UP (ref 1–1.03)
SQUAMOUS # UR AUTO: 5 /HPF — SIGNIFICANT CHANGE UP (ref 0–5)
SQUAMOUS # UR AUTO: 5 /HPF — SIGNIFICANT CHANGE UP (ref 0–5)
TARGETS BLD QL SMEAR: SLIGHT — SIGNIFICANT CHANGE UP
TARGETS BLD QL SMEAR: SLIGHT — SIGNIFICANT CHANGE UP
TIBC SERPL-MCNC: 124 UG/DL — LOW (ref 220–430)
TIBC SERPL-MCNC: 124 UG/DL — LOW (ref 220–430)
TRANSFERRIN SERPL-MCNC: 87 MG/DL — LOW (ref 180–329)
TRANSFERRIN SERPL-MCNC: 87 MG/DL — LOW (ref 180–329)
TROPONIN T SERPL-MCNC: 0.06 NG/ML — SIGNIFICANT CHANGE UP (ref 0–0.06)
TROPONIN T SERPL-MCNC: 0.06 NG/ML — SIGNIFICANT CHANGE UP (ref 0–0.06)
TROPONIN T, HIGH SENSITIVITY RESULT: 41 NG/L — SIGNIFICANT CHANGE UP (ref 0–51)
TROPONIN T, HIGH SENSITIVITY RESULT: 41 NG/L — SIGNIFICANT CHANGE UP (ref 0–51)
URATE CRY FLD QL MICRO: PRESENT
URATE CRY FLD QL MICRO: PRESENT
UROBILINOGEN FLD QL: 0.2 MG/DL — SIGNIFICANT CHANGE UP (ref 0.2–1)
UROBILINOGEN FLD QL: 0.2 MG/DL — SIGNIFICANT CHANGE UP (ref 0.2–1)
WBC # BLD: 21.42 K/UL — HIGH (ref 3.8–10.5)
WBC # BLD: 21.42 K/UL — HIGH (ref 3.8–10.5)
WBC # BLD: 28.16 K/UL — HIGH (ref 3.8–10.5)
WBC # BLD: 28.16 K/UL — HIGH (ref 3.8–10.5)
WBC # BLD: 29.07 K/UL — HIGH (ref 3.8–10.5)
WBC # BLD: 29.07 K/UL — HIGH (ref 3.8–10.5)
WBC # BLD: 6.22 K/UL — SIGNIFICANT CHANGE UP (ref 3.8–10.5)
WBC # BLD: 6.22 K/UL — SIGNIFICANT CHANGE UP (ref 3.8–10.5)
WBC # BLD: 7.13 K/UL — SIGNIFICANT CHANGE UP (ref 3.8–10.5)
WBC # BLD: 7.13 K/UL — SIGNIFICANT CHANGE UP (ref 3.8–10.5)
WBC # FLD AUTO: 21.42 K/UL — HIGH (ref 3.8–10.5)
WBC # FLD AUTO: 21.42 K/UL — HIGH (ref 3.8–10.5)
WBC # FLD AUTO: 28.16 K/UL — HIGH (ref 3.8–10.5)
WBC # FLD AUTO: 28.16 K/UL — HIGH (ref 3.8–10.5)
WBC # FLD AUTO: 29.07 K/UL — HIGH (ref 3.8–10.5)
WBC # FLD AUTO: 29.07 K/UL — HIGH (ref 3.8–10.5)
WBC # FLD AUTO: 6.22 K/UL — SIGNIFICANT CHANGE UP (ref 3.8–10.5)
WBC # FLD AUTO: 6.22 K/UL — SIGNIFICANT CHANGE UP (ref 3.8–10.5)
WBC # FLD AUTO: 7.13 K/UL — SIGNIFICANT CHANGE UP (ref 3.8–10.5)
WBC # FLD AUTO: 7.13 K/UL — SIGNIFICANT CHANGE UP (ref 3.8–10.5)
WBC UR QL: 12 /HPF — HIGH (ref 0–5)
WBC UR QL: 12 /HPF — HIGH (ref 0–5)
YEAST-LIKE CELLS: PRESENT
YEAST-LIKE CELLS: PRESENT

## 2023-01-01 PROCEDURE — 93005 ELECTROCARDIOGRAM TRACING: CPT

## 2023-01-01 PROCEDURE — 94640 AIRWAY INHALATION TREATMENT: CPT

## 2023-01-01 PROCEDURE — 83540 ASSAY OF IRON: CPT

## 2023-01-01 PROCEDURE — 85025 COMPLETE CBC W/AUTO DIFF WBC: CPT

## 2023-01-01 PROCEDURE — 93010 ELECTROCARDIOGRAM REPORT: CPT

## 2023-01-01 PROCEDURE — 85730 THROMBOPLASTIN TIME PARTIAL: CPT

## 2023-01-01 PROCEDURE — 96361 HYDRATE IV INFUSION ADD-ON: CPT

## 2023-01-01 PROCEDURE — 96360 HYDRATION IV INFUSION INIT: CPT

## 2023-01-01 PROCEDURE — 74176 CT ABD & PELVIS W/O CONTRAST: CPT | Mod: 26

## 2023-01-01 PROCEDURE — 71250 CT THORAX DX C-: CPT | Mod: 26

## 2023-01-01 PROCEDURE — 80053 COMPREHEN METABOLIC PANEL: CPT

## 2023-01-01 PROCEDURE — 83880 ASSAY OF NATRIURETIC PEPTIDE: CPT

## 2023-01-01 PROCEDURE — 36415 COLL VENOUS BLD VENIPUNCTURE: CPT

## 2023-01-01 PROCEDURE — 84484 ASSAY OF TROPONIN QUANT: CPT

## 2023-01-01 PROCEDURE — 70450 CT HEAD/BRAIN W/O DYE: CPT | Mod: 26

## 2023-01-01 PROCEDURE — 99291 CRITICAL CARE FIRST HOUR: CPT

## 2023-01-01 PROCEDURE — 99285 EMERGENCY DEPT VISIT HI MDM: CPT

## 2023-01-01 PROCEDURE — 99284 EMERGENCY DEPT VISIT MOD MDM: CPT | Mod: 25

## 2023-01-01 PROCEDURE — 71045 X-RAY EXAM CHEST 1 VIEW: CPT | Mod: 26,77

## 2023-01-01 PROCEDURE — 71045 X-RAY EXAM CHEST 1 VIEW: CPT | Mod: 26

## 2023-01-01 PROCEDURE — 84466 ASSAY OF TRANSFERRIN: CPT

## 2023-01-01 PROCEDURE — 85610 PROTHROMBIN TIME: CPT

## 2023-01-01 PROCEDURE — 0225U NFCT DS DNA&RNA 21 SARSCOV2: CPT

## 2023-01-01 PROCEDURE — 83550 IRON BINDING TEST: CPT

## 2023-01-01 PROCEDURE — 99291 CRITICAL CARE FIRST HOUR: CPT | Mod: GC

## 2023-01-01 PROCEDURE — 93306 TTE W/DOPPLER COMPLETE: CPT | Mod: 26

## 2023-01-01 PROCEDURE — 82962 GLUCOSE BLOOD TEST: CPT

## 2023-01-01 RX ORDER — SODIUM CHLORIDE 9 MG/ML
1000 INJECTION INTRAMUSCULAR; INTRAVENOUS; SUBCUTANEOUS ONCE
Refills: 0 | Status: COMPLETED | OUTPATIENT
Start: 2023-01-01 | End: 2023-01-01

## 2023-01-01 RX ORDER — SODIUM CHLORIDE 9 MG/ML
500 INJECTION, SOLUTION INTRAVENOUS ONCE
Refills: 0 | Status: COMPLETED | OUTPATIENT
Start: 2023-01-01 | End: 2023-01-01

## 2023-01-01 RX ORDER — NOREPINEPHRINE BITARTRATE/D5W 8 MG/250ML
0.02 PLASTIC BAG, INJECTION (ML) INTRAVENOUS
Qty: 8 | Refills: 0 | Status: DISCONTINUED | OUTPATIENT
Start: 2023-01-01 | End: 2023-01-01

## 2023-01-01 RX ORDER — REMDESIVIR 5 MG/ML
100 INJECTION INTRAVENOUS EVERY 24 HOURS
Refills: 0 | Status: DISCONTINUED | OUTPATIENT
Start: 2023-01-01 | End: 2023-01-01

## 2023-01-01 RX ORDER — PIPERACILLIN AND TAZOBACTAM 4; .5 G/20ML; G/20ML
3.38 INJECTION, POWDER, LYOPHILIZED, FOR SOLUTION INTRAVENOUS ONCE
Refills: 0 | Status: COMPLETED | OUTPATIENT
Start: 2023-01-01 | End: 2023-01-01

## 2023-01-01 RX ORDER — NOREPINEPHRINE BITARTRATE/D5W 8 MG/250ML
0.3 PLASTIC BAG, INJECTION (ML) INTRAVENOUS
Qty: 8 | Refills: 0 | Status: DISCONTINUED | OUTPATIENT
Start: 2023-01-01 | End: 2023-01-01

## 2023-01-01 RX ORDER — ALBUTEROL 90 UG/1
2.5 AEROSOL, METERED ORAL ONCE
Refills: 0 | Status: COMPLETED | OUTPATIENT
Start: 2023-01-01 | End: 2023-01-01

## 2023-01-01 RX ORDER — POTASSIUM CHLORIDE 20 MEQ
10 PACKET (EA) ORAL
Refills: 0 | Status: COMPLETED | OUTPATIENT
Start: 2023-01-01 | End: 2023-01-01

## 2023-01-01 RX ORDER — ACETAMINOPHEN 500 MG
1000 TABLET ORAL ONCE
Refills: 0 | Status: COMPLETED | OUTPATIENT
Start: 2023-01-01 | End: 2023-01-01

## 2023-01-01 RX ORDER — FLUTICASONE PROPIONATE 50 MCG
2 SPRAY, SUSPENSION NASAL ONCE
Refills: 0 | Status: COMPLETED | OUTPATIENT
Start: 2023-01-01 | End: 2023-01-01

## 2023-01-01 RX ORDER — CHLORHEXIDINE GLUCONATE 213 G/1000ML
1 SOLUTION TOPICAL DAILY
Refills: 0 | Status: DISCONTINUED | OUTPATIENT
Start: 2023-01-01 | End: 2023-01-01

## 2023-01-01 RX ORDER — FUROSEMIDE 40 MG
40 TABLET ORAL ONCE
Refills: 0 | Status: DISCONTINUED | OUTPATIENT
Start: 2023-01-01 | End: 2023-01-01

## 2023-01-01 RX ORDER — PANTOPRAZOLE SODIUM 20 MG/1
1 TABLET, DELAYED RELEASE ORAL
Qty: 0 | Refills: 0 | DISCHARGE

## 2023-01-01 RX ORDER — PIPERACILLIN AND TAZOBACTAM 4; .5 G/20ML; G/20ML
3.38 INJECTION, POWDER, LYOPHILIZED, FOR SOLUTION INTRAVENOUS EVERY 8 HOURS
Refills: 0 | Status: DISCONTINUED | OUTPATIENT
Start: 2023-01-01 | End: 2023-01-01

## 2023-01-01 RX ORDER — WARFARIN SODIUM 2.5 MG/1
1 TABLET ORAL
Refills: 0 | DISCHARGE

## 2023-01-01 RX ORDER — INFLUENZA VIRUS VACCINE 15; 15; 15; 15 UG/.5ML; UG/.5ML; UG/.5ML; UG/.5ML
0.7 SUSPENSION INTRAMUSCULAR ONCE
Refills: 0 | Status: DISCONTINUED | OUTPATIENT
Start: 2023-01-01 | End: 2023-01-01

## 2023-01-01 RX ORDER — FUROSEMIDE 40 MG
40 TABLET ORAL ONCE
Refills: 0 | Status: COMPLETED | OUTPATIENT
Start: 2023-01-01 | End: 2023-01-01

## 2023-01-01 RX ORDER — INSULIN LISPRO 100/ML
VIAL (ML) SUBCUTANEOUS EVERY 6 HOURS
Refills: 0 | Status: DISCONTINUED | OUTPATIENT
Start: 2023-01-01 | End: 2023-01-01

## 2023-01-01 RX ORDER — METOPROLOL TARTRATE 50 MG
5 TABLET ORAL ONCE
Refills: 0 | Status: COMPLETED | OUTPATIENT
Start: 2023-01-01 | End: 2023-01-01

## 2023-01-01 RX ORDER — ACETAMINOPHEN 500 MG
700 TABLET ORAL ONCE
Refills: 0 | Status: COMPLETED | OUTPATIENT
Start: 2023-01-01 | End: 2023-01-01

## 2023-01-01 RX ORDER — PIPERACILLIN AND TAZOBACTAM 4; .5 G/20ML; G/20ML
3.38 INJECTION, POWDER, LYOPHILIZED, FOR SOLUTION INTRAVENOUS ONCE
Refills: 0 | Status: DISCONTINUED | OUTPATIENT
Start: 2023-01-01 | End: 2023-01-01

## 2023-01-01 RX ORDER — ERYTHROMYCIN BASE 5 MG/GRAM
1 OINTMENT (GRAM) OPHTHALMIC (EYE) ONCE
Refills: 0 | Status: COMPLETED | OUTPATIENT
Start: 2023-01-01 | End: 2023-01-01

## 2023-01-01 RX ORDER — VANCOMYCIN HCL 1 G
1000 VIAL (EA) INTRAVENOUS ONCE
Refills: 0 | Status: COMPLETED | OUTPATIENT
Start: 2023-01-01 | End: 2023-01-01

## 2023-01-01 RX ORDER — PANTOPRAZOLE SODIUM 20 MG/1
40 TABLET, DELAYED RELEASE ORAL DAILY
Refills: 0 | Status: DISCONTINUED | OUTPATIENT
Start: 2023-01-01 | End: 2023-01-01

## 2023-01-01 RX ORDER — ENOXAPARIN SODIUM 100 MG/ML
50 INJECTION SUBCUTANEOUS EVERY 12 HOURS
Refills: 0 | Status: DISCONTINUED | OUTPATIENT
Start: 2023-01-01 | End: 2023-01-01

## 2023-01-01 RX ORDER — ERYTHROMYCIN BASE 5 MG/GRAM
1 OINTMENT (GRAM) OPHTHALMIC (EYE)
Qty: 1 | Refills: 0
Start: 2023-01-01 | End: 2023-01-01

## 2023-01-01 RX ORDER — WARFARIN SODIUM 2.5 MG/1
1 TABLET ORAL
Qty: 0 | Refills: 0 | DISCHARGE

## 2023-01-01 RX ORDER — DEXAMETHASONE 0.5 MG/5ML
6 ELIXIR ORAL DAILY
Refills: 0 | Status: DISCONTINUED | OUTPATIENT
Start: 2023-01-01 | End: 2023-01-01

## 2023-01-01 RX ORDER — SODIUM CHLORIDE 9 MG/ML
500 INJECTION INTRAMUSCULAR; INTRAVENOUS; SUBCUTANEOUS ONCE
Refills: 0 | Status: COMPLETED | OUTPATIENT
Start: 2023-01-01 | End: 2023-01-01

## 2023-01-01 RX ORDER — CARVEDILOL PHOSPHATE 80 MG/1
1 CAPSULE, EXTENDED RELEASE ORAL
Refills: 0 | DISCHARGE

## 2023-01-01 RX ADMIN — Medication 250 MILLIGRAM(S): at 22:33

## 2023-01-01 RX ADMIN — PANTOPRAZOLE SODIUM 40 MILLIGRAM(S): 20 TABLET, DELAYED RELEASE ORAL at 11:40

## 2023-01-01 RX ADMIN — Medication 6: at 11:43

## 2023-01-01 RX ADMIN — Medication 100 MILLIEQUIVALENT(S): at 06:52

## 2023-01-01 RX ADMIN — PIPERACILLIN AND TAZOBACTAM 25 GRAM(S): 4; .5 INJECTION, POWDER, LYOPHILIZED, FOR SOLUTION INTRAVENOUS at 09:15

## 2023-01-01 RX ADMIN — PIPERACILLIN AND TAZOBACTAM 25 GRAM(S): 4; .5 INJECTION, POWDER, LYOPHILIZED, FOR SOLUTION INTRAVENOUS at 01:26

## 2023-01-01 RX ADMIN — Medication 280 MILLIGRAM(S): at 01:54

## 2023-01-01 RX ADMIN — SODIUM CHLORIDE 500 MILLILITER(S): 9 INJECTION INTRAMUSCULAR; INTRAVENOUS; SUBCUTANEOUS at 15:00

## 2023-01-01 RX ADMIN — Medication 1 APPLICATION(S): at 18:02

## 2023-01-01 RX ADMIN — Medication 6 MILLIGRAM(S): at 21:15

## 2023-01-01 RX ADMIN — SODIUM CHLORIDE 500 MILLILITER(S): 9 INJECTION, SOLUTION INTRAVENOUS at 11:39

## 2023-01-01 RX ADMIN — ENOXAPARIN SODIUM 50 MILLIGRAM(S): 100 INJECTION SUBCUTANEOUS at 06:22

## 2023-01-01 RX ADMIN — ALBUTEROL 2.5 MILLIGRAM(S): 90 AEROSOL, METERED ORAL at 18:45

## 2023-01-01 RX ADMIN — Medication 100 MILLIEQUIVALENT(S): at 09:15

## 2023-01-01 RX ADMIN — Medication 2: at 17:39

## 2023-01-01 RX ADMIN — REMDESIVIR 200 MILLIGRAM(S): 5 INJECTION INTRAVENOUS at 21:26

## 2023-01-01 RX ADMIN — Medication 40 MILLIGRAM(S): at 14:39

## 2023-01-01 RX ADMIN — Medication 5 MILLIGRAM(S): at 11:40

## 2023-01-01 RX ADMIN — Medication 2.04 MICROGRAM(S)/KG/MIN: at 14:39

## 2023-01-01 RX ADMIN — Medication 400 MILLIGRAM(S): at 18:08

## 2023-01-01 RX ADMIN — PIPERACILLIN AND TAZOBACTAM 25 GRAM(S): 4; .5 INJECTION, POWDER, LYOPHILIZED, FOR SOLUTION INTRAVENOUS at 02:55

## 2023-01-01 RX ADMIN — Medication 100 MILLIEQUIVALENT(S): at 08:06

## 2023-01-01 RX ADMIN — REMDESIVIR 200 MILLIGRAM(S): 5 INJECTION INTRAVENOUS at 21:15

## 2023-01-01 RX ADMIN — SODIUM CHLORIDE 500 MILLILITER(S): 9 INJECTION INTRAMUSCULAR; INTRAVENOUS; SUBCUTANEOUS at 13:00

## 2023-01-01 RX ADMIN — SODIUM CHLORIDE 1000 MILLILITER(S): 9 INJECTION INTRAMUSCULAR; INTRAVENOUS; SUBCUTANEOUS at 17:23

## 2023-01-01 RX ADMIN — ENOXAPARIN SODIUM 47 MILLIGRAM(S): 100 INJECTION SUBCUTANEOUS at 05:31

## 2023-01-01 RX ADMIN — ENOXAPARIN SODIUM 50 MILLIGRAM(S): 100 INJECTION SUBCUTANEOUS at 17:39

## 2023-01-01 RX ADMIN — Medication 1 ENEMA: at 11:40

## 2023-01-01 RX ADMIN — Medication 10 MILLIGRAM(S): at 11:40

## 2023-01-01 RX ADMIN — PIPERACILLIN AND TAZOBACTAM 25 GRAM(S): 4; .5 INJECTION, POWDER, LYOPHILIZED, FOR SOLUTION INTRAVENOUS at 17:39

## 2023-01-01 RX ADMIN — CHLORHEXIDINE GLUCONATE 1 APPLICATION(S): 213 SOLUTION TOPICAL at 11:40

## 2023-01-01 RX ADMIN — Medication 2.04 MICROGRAM(S)/KG/MIN: at 18:45

## 2023-01-01 RX ADMIN — Medication 2 SPRAY(S): at 18:03

## 2023-01-01 RX ADMIN — Medication 6 MILLIGRAM(S): at 06:22

## 2023-01-01 RX ADMIN — PIPERACILLIN AND TAZOBACTAM 200 GRAM(S): 4; .5 INJECTION, POWDER, LYOPHILIZED, FOR SOLUTION INTRAVENOUS at 17:23

## 2023-01-19 NOTE — PATIENT PROFILE ADULT - NSPROEXTENSIONSOFSELF_GEN_A_NUR
"Chief Complaint  Hypertension (Pt states BP has been running high and not getting any better )    Subjective          Vera Burch presents to [facility]  History of Present Illness  Patient is in today wanting to transfer care here from another primary care provider.  She states she has been monitoring her blood pressure intermittently at home with her cuff and has had some elevated readings.  Denies any chest pain or shortness of breath.  She is due for her GYN exam and has that scheduled.  She is up-to-date on her mammogram.  She is due for colon cancer screening and states will consider but declines getting scheduled today.  Denies any changes to bowel or urine habits.  She states she follows with rheumatology has had a positive ILENE test and they are doing more studies.  She takes Cymbalta for depression symptoms as well as possible fibromyalgia symptoms.  Denies side effects of medication.  Hypertension  This is a chronic problem. Pertinent negatives include no anxiety, blurred vision, chest pain, palpitations, peripheral edema or shortness of breath.   Depression  Visit Type: follow-up  Patient presents with the following symptoms: depressed mood and nervousness/anxiety.  Patient is not experiencing: chest pain, palpitations and shortness of breath.        Objective   Vital Signs:   /70 (BP Location: Left arm, Patient Position: Sitting, Cuff Size: Large Adult)   Pulse 99   Ht 160 cm (63\")   Wt 89.8 kg (198 lb)   SpO2 100%   BMI 35.07 kg/m²     Body mass index is 35.07 kg/m².    Review of Systems   Constitutional: Negative for fever.   HENT: Negative for congestion and sore throat.    Eyes: Negative for blurred vision.   Respiratory: Negative for cough and shortness of breath.    Cardiovascular: Negative for chest pain and palpitations.   Gastrointestinal: Negative for abdominal pain, diarrhea, nausea and vomiting.   Genitourinary: Negative for dysuria and " frequency.   Musculoskeletal: Positive for arthralgias.   Neurological: Negative for dizziness and headache.   Psychiatric/Behavioral: Positive for depressed mood. The patient is nervous/anxious.        Past History:  Medical History: has a past medical history of Anxiety, Depressive disorder, and Hypertension.   Surgical History: has no past surgical history on file.   Family History: family history includes Other in her maternal grandmother.   Social History: reports that she has been smoking cigarettes. She has been smoking an average of .5 packs per day. She does not have any smokeless tobacco history on file. She reports that she does not drink alcohol and does not use drugs.      Current Outpatient Medications:   •  celecoxib (CeleBREX) 200 MG capsule, Take 200 mg by mouth As Needed for Mild Pain., Disp: , Rfl:   •  cholecalciferol (VITAMIN D3) 25 MCG (1000 UT) tablet, Take 1,000 Units by mouth Daily., Disp: , Rfl:   •  cyclobenzaprine (FLEXERIL) 5 MG tablet, Take 5 mg by mouth 3 (Three) Times a Day As Needed for Muscle Spasms., Disp: , Rfl:   •  DULoxetine (CYMBALTA) 60 MG capsule, Take 1 capsule by mouth Daily., Disp: 90 capsule, Rfl: 1  •  lisinopril (PRINIVIL,ZESTRIL) 10 MG tablet, Take 1 tablet by mouth Daily., Disp: 90 tablet, Rfl: 1  •  omeprazole (priLOSEC) 20 MG capsule, Take 20 mg by mouth Daily., Disp: , Rfl:   Allergies: Patient has no known allergies.    Physical Exam  Constitutional:       Appearance: Normal appearance.   HENT:      Right Ear: Tympanic membrane normal.      Left Ear: Tympanic membrane normal.      Mouth/Throat:      Pharynx: Oropharynx is clear.   Eyes:      Conjunctiva/sclera: Conjunctivae normal.      Pupils: Pupils are equal, round, and reactive to light.   Cardiovascular:      Rate and Rhythm: Normal rate and regular rhythm.      Heart sounds: Normal heart sounds.   Pulmonary:      Effort: Pulmonary effort is normal.      Breath sounds: Normal breath sounds.   Abdominal:       Palpations: Abdomen is soft.      Tenderness: There is no abdominal tenderness.   Musculoskeletal:      Right lower leg: No edema.      Left lower leg: No edema.   Neurological:      Mental Status: She is oriented to person, place, and time.   Psychiatric:         Mood and Affect: Mood normal.         Behavior: Behavior normal.             Assessment and Plan   Diagnoses and all orders for this visit:    1. Essential hypertension (Primary)  -     CBC & Differential; Future  -     Comprehensive Metabolic Panel; Future  -     TSH; Future  -     CBC & Differential  -     Comprehensive Metabolic Panel  -     TSH  Refilled lisinopril at 10 mg once a day at this time.  We will have her bring in her blood pressure cuff for comparison.  If continues to stay elevated, will increase dosage to 20 mg once a day.  Return to clinic prior to recheck as needed. Encouraged to get in for colon cancer screening.   2. Major depressive disorder, recurrent, moderate (HCC)  Refilled Cymbalta at current dosage.  Discussed with patient if symptoms progressing, can get in with behavioral health for further evaluation.  3. Encounter for hepatitis C screening test for low risk patient  -     Hepatitis C Antibody; Future  -     Hepatitis C Antibody    4. Lipid screening  -     Lipid Panel; Future  -     Lipid Panel  Will check fasting lipid panel today.  Encouraged healthy diet and exercise.  Other orders  -     lisinopril (PRINIVIL,ZESTRIL) 10 MG tablet; Take 1 tablet by mouth Daily.  Dispense: 90 tablet; Refill: 1  -     DULoxetine (CYMBALTA) 60 MG capsule; Take 1 capsule by mouth Daily.  Dispense: 90 capsule; Refill: 1            Follow Up   No follow-ups on file.  Patient was given instructions and counseling regarding her condition or for health maintenance advice. Please see specific information pulled into the AVS if appropriate.     Marisa Machado PA-C   eyeglasses

## 2023-02-26 NOTE — PATIENT PROFILE ADULT - CAREGIVER ADDRESS
"Pt brought in by EMS from [facility] with c/o chest pressure and shortness of breath. On EMS arrival Pt was found to be in V-tach, diaphoretic and pale. 150mg amiodarone IV and 324mg aspirin administered. /71   Pulse 84   Temp 97.6  F (36.4  C) (Tympanic)   Resp 20   Ht 1.626 m (5' 4\")   Wt 101.2 kg (223 lb)   SpO2 98%   BMI 38.28 kg/m         Triage Assessment     Row Name 02/26/23 1557       Triage Assessment (Adult)    Airway WDL WDL       Respiratory WDL    Respiratory WDL WDL       Skin Circulation/Temperature WDL    Skin Circulation/Temperature WDL WDL       Cardiac WDL    Cardiac WDL X;chest pain       Chest Pain Assessment    Chest Pain Location midsternal    Chest Pain Radiation back    Alleviating Factors oxygen    Chest Pain Intervention cardiac biomarkers drawn;cardiac monitor placed;12-lead ECG obtained;aspirin given       Peripheral/Neurovascular WDL    Peripheral Neurovascular WDL X  Lt BKA       Cognitive/Neuro/Behavioral WDL    Cognitive/Neuro/Behavioral WDL WDL              "
N/A

## 2023-10-12 NOTE — ED ADULT NURSE NOTE - NSFALLRISKINTERV_ED_ALL_ED

## 2023-10-12 NOTE — ED ADULT TRIAGE NOTE - CHIEF COMPLAINT QUOTE
Pt BIBA from home for 2 days of AMS. As per spouse he is normally A&O x's 1 but has not been at his baseline that last 2 days. Has h/o prior CVA with right side residual deficits.

## 2023-10-12 NOTE — PHYSICAL THERAPY INITIAL EVALUATION ADULT - MANUAL MUSCLE TESTING RESULTS, REHAB EVAL
right shoulder 0/5 throughout, right hip 2/5, right knee 2-/5, right ankle 2/5, left LE: 2+/5 throughout/no strength deficits were identified

## 2023-10-12 NOTE — ED PROVIDER NOTE - OBJECTIVE STATEMENT
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 Wife at bedside is historian. Pt at baseline is minimally verbal, able to understand and provide 1-2 word responses to questions, AOOx1 to name. Wife states that pt was admitted to [facility] 9/23-9/30 and treated with abx, had many episodes of diarrhea, was treated with vanco, and was discharged home. Wife states that she is his sole caretaker and has been unable to have a doctor come to the house to check his blood work. Pt denies HA, neck pain, cough, sick contacts, CP, SOB, abd pain, n/v/d/c, denies dysuria, denies fall/trauma.

## 2023-10-12 NOTE — ED PROVIDER NOTE - NSFOLLOWUPINSTRUCTIONS_ED_ALL_ED_FT
Per Dr. Travis, skip 10/12/23 evening dose of warfarin. Continue taking Warfarin 2mg once daily until Monday. [facility] will come on Monday to draw labs and you will receive a call from Dr. Travis to discuss further warfarin dosage adjustments.    Dr. Travis  [address], [place], [place] [id number] [phone]    Home PT and OT has been arranged.     1) Please follow-up with Dr. Travis. If you cannot follow-up with your cardiologist, please return to the ED for any urgent issues.  2) You were given a copy of the tests performed today.  Please bring the results with you and review them with your doctor.  3) If you have any worsening of symptoms or any other concerns please return to the ED immediately.  4) Please continue taking your home medications as directed above.

## 2023-10-12 NOTE — PHYSICAL THERAPY INITIAL EVALUATION ADULT - RANGE OF MOTION EXAMINATION, REHAB EVAL
grossly observed due to cognition deficits, right knee lacks 50% AROM, right hip and knee lacks 50% AROM, left LE hips/knee and ankle lacks 30%AROM throughout, right shoulder/elbow and wrist no AROM

## 2023-10-12 NOTE — PROVIDER CONTACT NOTE (OTHER) - ASSESSMENT
PT ordered. chart reviewed and noted. pt received in ED, semi rios position in stretcher. pt has difficulties with functional mobility due to strength and balance deficits. pt unable to state pain, NAD. pt left as received will continue to follow, all lines intact,  goals: 2-3x a week for 5-7 days  bed mobility: modA  transfers: maxA
AS PER MD, SPOUSE BROUGHT PT TO ER BECAUSE NEEDS AN MD TO FOLLOW INR IN THE COMMUNITY. CM MET W/ SPOUSE AT BEDSIDE AND SHE STATES THAT [facility] COMES TO THE HOUSE EVERY OTHER THUR FOR INR LAB DRAW, CM CALLED [facility] TO CONFIRM, AND Gueydan VERIFIED THAT THE DO COME TO THE HOUSE EVERY OTHER THURSDAY. DR ARBOLEDA WAS READING THE LAB RESULTS, BUT HE LOST HIS LICENSE. AS PER SPOUSE, DR AZIZA JOYA WAS FOLLOWING THE INR, BUT THAT WILL STOP ON 11/12/23. AS PER SPOUSE, DR COPE IS THE PT CARDIOLOGIST, CORINE SPOKE W/ THE RN IN THE OFFICE AND DR COPE WILL AGREE TO FOLLOW THE INR IN THE COMMUNITY. DR LAW MADE AWARE AND IN TOUCH WITH DR COPE'S OFFICE.
CORINE SPOKE  W/ NED @ [facility] AND SHE CONFIRMED THAT [facility] WILL BE IN THE HOME MONDAY 10/16/23 FOR INR LAB DRAW AND DR COPE OFFICE WILL NEED TO CALL 10/17/23 FOR THE RESULTS. DR LAW MADE AWARE

## 2023-10-12 NOTE — ED PROVIDER NOTE - PHYSICAL EXAMINATION
General: Awake, lying in bed in NAD, cachectic   HEENT: Normocephalic, atraumatic. No scleral icterus or conjunctival injection. EOMI. Dry mucous membranes. Oropharynx clear.   Neck:. Soft and supple.  Cardiac: CRT-D device on the L. RRR, Peripheral pulses 2+ and symmetric. No LE edema.  Resp: Lungs CTAB. No accessory muscle use  Abd: Soft, non-tender, non-distended. No guarding, rebound, or rigidity.  Back: Spine midline and non-tender.   Skin: No rashes, abrasions, or lacerations.  Neuro: AO x 1. Limited movement of all extremities, symmetrically. Motor strength and sensation grossly intact.  Psych: Appropriate mood and affect

## 2023-10-12 NOTE — CHART NOTE - NSCHARTNOTEFT_GEN_A_CORE
Ambulance arranged for patient, [facility] spoke with patients spouse Nicky [phone] to verify all residential information, patients spouse at home to greet patient.  NEAF / ambulance letter supplied.

## 2023-10-12 NOTE — ED PROVIDER NOTE - PATIENT PORTAL LINK FT
You can access the FollowMyHealth Patient Portal offered by [facility] by registering at the following website: [email or web address]. By joining [employer]’s FollowMyHealth portal, you will also be able to view your health information using other applications (apps) compatible with our system.

## 2023-10-12 NOTE — ED PROVIDER NOTE - ATTENDING CONTRIBUTION TO CARE
I, Altagracia Marx, have personally seen and examined this patient. I have fully participated in the care of this patient. I have reviewed all pertinent clinical information, including history, physical exam, plan and the Resident's note and agree except as noted below.     History provided by the wife,  patient with an old CVA right-sided weakness mostly bedbound.  Recent admission at [facility] discharged 2 weeks ago since then has just been weak and fatigued minimal p.o. intake had diarrhea which is resolving.  No fevers.  Used to have PT OT and home visits.  But having trouble getting those set up now.  Patient has no complaints.  On exam is baseline right-sided deficits lungs clear abdomen soft and nontender.  Labs with a supratherapeutic  INR.  Mild hypernatremia given 500 cc bolus patient clinically improved per wife appears more "perky" no significant anemia requiring transfusion.  Seen by PT and OT to set up home services social work and case management involved will have INR drawn on Monday Dr. Travis's office to monitor INR discussed with their office RN patient to take 2 mg daily until instructed otherwise.  Patient to skip tonight's dose.  Wife is aware will discharge home.  given resources for home visiting physician

## 2023-10-12 NOTE — ED PROVIDER NOTE - CLINICAL SUMMARY MEDICAL DECISION MAKING FREE TEXT BOX
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     Labs pending. Will assess for electrolyte imbalances. Will reassess after fluid bolus. PT and SW. 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     Labs pending. Will assess for electrolyte imbalances. Will reassess after fluid bolus. PT and SW.    Conversation had with patient regarding results of testing. Patient agrees with plan for discharge at this time. Patient agrees to comply with follow up with cardiologist. Return to ED precautions and discharge instructions given to patient.

## 2023-10-12 NOTE — PROVIDER CONTACT NOTE (OTHER) - RECOMMENDATIONS
D/C recommendation: return home c assistance and continue home OT/PT, pt would benefit from a fab lift but family declines at this time

## 2023-10-12 NOTE — PHYSICAL THERAPY INITIAL EVALUATION ADULT - ADDITIONAL COMMENTS
pt requires assist for all mobility, non ambulatory, transfers to/from bed to w/c with assist with home PT, ramp to enter home, no stairs within home

## 2023-10-12 NOTE — ED ADULT NURSE NOTE - OBJECTIVE STATEMENT
Pt nonverbal hx of CVA and R hemiplegia presents to ED for SW. Per wife at bedside, pt requires HHA for assistance with all ADLs; wife states she cannot care for pt on her own. Pt nonverbal but follows with eyes. IV access obtained; pt medicated as prescribed. Bed locked and in lowest position. Frequent checks made.

## 2023-10-18 NOTE — ED ADULT TRIAGE NOTE - CHIEF COMPLAINT QUOTE
pt BIBA from home for generalized malaise. pt offers no complaints, a&ox2. per EMS wife states patient has been having decreased PO intake and increased fatigue over the past 4 days

## 2023-10-18 NOTE — CHART NOTE - NSCHARTNOTEFT_GEN_A_CORE
CCC met with patients spouse at the bedside, CCC gave patient resources for House Calls,  book and other resources.  Spouse requesting an ambulance, all residential information reviewed with patient, ambulance arranged, ambulance letter given to patient with explanation of terms.  NEAF uploaded and supplied.

## 2023-10-18 NOTE — ED PROVIDER NOTE - NS ED MD DISPO DISCHARGE
Spoke with pt in preparation for upcoming MRI. She states she can remove her insulin pump prior to MRI and will remove CGM device prior to scan. Home

## 2023-10-18 NOTE — ED PROVIDER NOTE - CLINICAL SUMMARY MEDICAL DECISION MAKING FREE TEXT BOX
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 EKG without evidence of ischemic changes. spoke with wife regarding increasing iron in pt's diet and need to f/u with cards and pcp regarding iron levels. strict return precautions explained.

## 2023-10-18 NOTE — ED ADULT NURSE REASSESSMENT NOTE - NS ED NURSE REASSESS COMMENT FT1
assumed care of patient from AM JOHNIE Izaguirre at 1930. patient a/ox3 resting in stretcher. patient breathing is unlabored and equal. patient has no complaints at this time. safety maintained. patient pending discharge .

## 2023-10-18 NOTE — ED ADULT NURSE NOTE - OBJECTIVE STATEMENT
BIB from home for lethargy and weakness worsening over past week. Pt. nonverbal, bedbound w/ right sided weakness from previous stroke, history obtained from wife at bedside. no gross hematuria or GI bleeding, no N/V/D, states Hx of anemia in the past

## 2023-10-18 NOTE — ED PROVIDER NOTE - PATIENT PORTAL LINK FT
You can access the FollowMyHealth Patient Portal offered by [facility] by registering at the following website: [email or web address]. By joining "[employer]’s FollowMyHealth portal, you will also be able to view your health information using other applications (apps) compatible with our system.

## 2023-10-18 NOTE — ED PROVIDER NOTE - CARE PROVIDER_API CALL
David Perrin  Cardiovascular Disease  [address], [place] [address]  Phone: [phone]  Fax: [phone]  Follow Up Time:

## 2023-10-18 NOTE — ED ADULT NURSE NOTE - NSFALLHARMRISKINTERV_ED_ALL_ED
Assistance with ambulation/Communicate risk of Fall with Harm to all staff, patient, and family/Reinforce activity limits and safety measures with patient and family/Bed in lowest position, wheels locked, appropriate side rails in place/Call bell, personal items and telephone in reach/Instruct patient to call for assistance before getting out of bed/chair/stretcher/Non-slip footwear applied when patient is off stretcher/[place] to call system/Physically safe environment - no spills, clutter or unnecessary equipment/Purposeful Proactive Rounding/Room/bathroom lighting operational, light cord in reach

## 2023-10-18 NOTE — ED PROVIDER NOTE - OBJECTIVE STATEMENT
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 States he was here approx 1 wk ago for the same symptoms. However wife at bedside reports that he has been sleeping more during the day and looks fatigued. States he is still eating/drinking, urinating nl but appears very weak. Wife  states pt currently has no PCP because unable to find someone who will take his case and make home visits. Cards- Dr. Travis. As per wife at bedside, pt at baseline is minimally verbal, able to understand and provide 1-2 word responses to questions, A&Ox1 to name. Wife at bedside states he has also had a runny nose and eye redness for the last day as well.   Denies fever, LOC, cp, sob, abd pain, n/v, dysuria, hematuria, rashes. 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 States he was here approx 1 wk ago for the same symptoms. However wife at bedside reports that he has been sleeping more during the day and looks fatigued. States he is still eating/drinking, urinating nl but appears very weak. Wife  states pt currently has no PCP because unable to find someone who will take his case and make home visits. Cards- Dr. Travis. As per wife at bedside, pt at baseline is minimally verbal, able to understand and provide 1-2 word responses to questions, A&Ox1 to name. Wife at bedside states he has also had a runny nose and eye redness for the last day as well. Denies fever, LOC, cp, sob, abd pain, n/v, dysuria, hematuria, rashes.

## 2023-10-18 NOTE — ED PROVIDER NOTE - PHYSICAL EXAMINATION
Gen: No acute distress, non toxic  HEENT: Mucous membranes moist, conjunctivitis b/l, PERRL. +runny nose. airway patent.   Neck: trachea midline.   CV: RRR, nl s1/s2.  Resp: CTAB, normal rate and effort  GI: Abdomen soft, NT, ND. No rebound, no guarding  Neuro: A&Ox1. Limited movement of extr x4, symmetrically. Motor strength and sensation grossly intact.  MSK: No spine or joint tenderness to palpation  Skin: No rashes. intact and perfused. Gen: No acute distress, non toxic  HEENT: Mucous membranes moist, conjunctivitis b/l, PERRL. +runny nose. airway patent.   Neck: trachea midline.   CV: RRR, nl s1/s2.  Resp: CTAB, normal rate and effort  GI: Abdomen soft, NT, ND. No rebound, no guarding  Neuro: A&Ox1. Limited movement of extr x4, symmetrically. Motor strength and sensation grossly intact.  MSK: No spine or joint tenderness to palpation  Skin: No rashes. intact and perfused.  Vascular: Mild LE edema b/l.

## 2023-10-18 NOTE — ED PROVIDER NOTE - PROGRESS NOTE DETAILS
spoke with CM regarding helping the pt receive home care. pt's wife yelling at the bedside. demanding tbdc.

## 2023-10-18 NOTE — ED PROVIDER NOTE - ATTENDING APP SHARED VISIT CONTRIBUTION OF CARE
pt with multiple medical problems with few week history of increased fatigue and weakness; eval for infection vs cardiac; pt's wife requesting referral for primary care doctor for  inhouse visits;  states her previous doctor is no longer practicing;  Social work for referrals; and close follow up with cardiology;

## 2023-10-19 NOTE — DISCHARGE NOTE PROVIDER - NSDCCAREPROVSEEN_GEN_ALL_CORE_FT
I recommend using moisturizing thick lotion or ointment such as Aquaphor, Eucerin, CeraVe, or Vanicream.  Apply twice daily, but especially after showering.  Use gentle fragrance-free soaps, such as Dove, Cetaphil, Vanicream or CereVe  Use gentle fragrance-free detergents and unscented dryer sheets for washing clothes  Avoid irritants that can make your rash worse, such as fragrances, alpha hydroxyl acids, benzoyl peroxide, rubbing alcohol, hydrogen peroxide, wool, or dust  Follow-up in a couple weeks if not improving and sooner if worse.       
Morenita Thomas

## 2023-11-07 NOTE — ED PROVIDER NOTE - CLINICAL SUMMARY MEDICAL DECISION MAKING FREE TEXT BOX
66M presents with decreased responsiveness, found to be febrile, tachycardic, hypoxic, meeting SIRS criteria. Crackles on lung auscultation raises suspicion for pulmonary infectious etiology. 30cc/kg bolus held due to prior echos showing reduced EF. Zosyn empiric coverage as pt with frequent hospital visits

## 2023-11-07 NOTE — H&P ADULT - ASSESSMENT
66M hx CVA (minimally verbally responsive at baseline), AF on warfarin, MDT ICD, CKD, HFrEF (35-40% in 2021) presents from home with decreased responsiveness. Admitted with:  Assessment:  1. Acute Hypoxic Respiratory Failure  2. Septic Shock  3. PNA  4. Lacticemia  5. Metabolic Encephalopathy  6. COVID-19    Plan:  NEURO:  -Appears at baseline mentation. CT head pending.   -Monitor mental status closely, avoid neurosuppresants.   -Serial neurologic assessments.     CV:   -Started on Levophed Infusion, actively titrating to maintain goal MAP >65 monitoring end points of organ perfusion.   -Keep K~4 and Mg>2 for optimal arrhythmia suppression.  -Official TTE ordered.     PULM:   -On NIPPV 14/6 100% FiO2 - actively titrating to maintain goal SpO2 >88%.   -Incentive spirometry, Chest PT/Pulmonary toilet, HOB >30'.   -CT Chest pending.     GI:   -NPO.   -CT A/P pending.    RENAL:   -SCr currently WNL.  -trend lytes/Scr daily with BMP  -I's and O's, goal UOP 0.5 cc/kg/hr  -renal dose meds and avoid nephrotoxins     ENDO:   -Aggressive glycemic control to limit FS glucose to <180mg/dl.   -Keep Euthyroid.     ID:   -Febrile with leukocytosis and left-shift. CXR with PNA. Elevated Lactate, will trend until cleared. BloodCx sent-  pending. Check UA/UCx, SputumCx, MRSA/MSSA, Legionella/Strep. S/p Zosyn x1 for abx in ER. Will start empiric Zosyn course and give x1 dose of Vancomycin given recent hospitalization to cover for HCAP. Start Remdesivir/Decadron courses for COVID-19.   -ABX use and/or discontinuation based on discussion with ID in conjunction with clinical features, culture data, and judicious procalcitonin monitoring.    HEME:   -Pharmacologic DVT Prophylaxis in addition to SCD's with Lovenox.     GOC: DNR/DNI.  DISPO: Case and plan discussed with ICU attending, Dr. Conway. Will admit to MICU.  66M PMHx CVA (minimally verbally responsive at baseline), AF (on Warfarin) s/p MDT ICD, CKD, HFrEF (35-40% in 2021) from home with decreased responsiveness. Admitted with:  Assessment:  1. Acute Hypoxic Respiratory Failure  2. Septic Shock  3. PNA  4. Lacticemia  5. Metabolic Encephalopathy  6. COVID-19    Plan:  NEURO:  -Appears at baseline mentation. CT head pending.   -Monitor mental status closely, avoid neurosuppresants.   -Serial neurologic assessments.     CV:   -Started on Levophed Infusion, actively titrating to maintain goal MAP >65 monitoring end points of organ perfusion.   -Keep K~4 and Mg>2 for optimal arrhythmia suppression.  -Official TTE ordered.     PULM:   -On NIPPV 14/6 100% FiO2 - actively titrating to maintain goal SpO2 >88%.   -Incentive spirometry, Chest PT/Pulmonary toilet, HOB >30'.   -CT Chest pending.     GI:   -NPO.   -CT A/P pending.    RENAL:   -SCr currently WNL.  -trend lytes/Scr daily with BMP  -I's and O's, goal UOP 0.5 cc/kg/hr  -renal dose meds and avoid nephrotoxins     ENDO:   -Aggressive glycemic control to limit FS glucose to <180mg/dl.   -Keep Euthyroid.     ID:   -Febrile with leukocytosis and left-shift. CXR with PNA. Elevated Lactate, will trend until cleared. BloodCx sent-  pending. Check UA/UCx, SputumCx, MRSA/MSSA, Legionella/Strep. S/p Zosyn x1 for abx in ER. Will start empiric Zosyn course and give x1 dose of Vancomycin given recent hospitalization to cover for HCAP. Start Remdesivir/Decadron courses for COVID-19.   -ABX use and/or discontinuation based on discussion with ID in conjunction with clinical features, culture data, and judicious procalcitonin monitoring.    HEME:   -Start Full Dose Lovenox for hx of Afib.     GOC: DNR/DNI.  DISPO: Case and plan discussed with ICU attending, Dr. Conway. Will admit to MICU.  66M PMHx CVA (minimally verbally responsive at baseline), AF (on Warfarin) s/p MDT ICD, CKD, HFrEF (35-40% in 2021) from home with decreased responsiveness. Admitted with:  Assessment:  1. Acute Hypoxic Respiratory Failure  2. Septic Shock  3. PNA  4. Lacticemia  5. Metabolic Encephalopathy  6. COVID-19    Plan:  NEURO:  -Appears at baseline mentation. CT head pending.   -Monitor mental status closely, avoid neurosuppresants.   -Serial neurologic assessments.     CV:   -Started on Levophed Infusion, actively titrating to maintain goal MAP >65 monitoring end points of organ perfusion.   -Keep K~4 and Mg>2 for optimal arrhythmia suppression.  -Official TTE ordered.     PULM:   -On NIPPV 14/6 100% FiO2 - actively titrating to maintain goal SpO2 >88%.   -Incentive spirometry, Chest PT/Pulmonary toilet, HOB >30'.   -CT Chest pending.     GI:   -NPO.   -CT A/P pending.    RENAL:   -SCr currently WNL.  -trend lytes/Scr daily with BMP  -I's and O's, goal UOP 0.5 cc/kg/hr  -renal dose meds and avoid nephrotoxins     ENDO:   -Aggressive glycemic control to limit FS glucose to <180mg/dl.   -Keep Euthyroid.     ID:   -Febrile with leukocytosis and left-shift. CXR with PNA. Elevated Lactate, will trend until cleared. BloodCx sent-  pending. Check UA/UCx, SputumCx, MRSA/MSSA, Legionella/Strep. S/p Zosyn x1 for abx in ER. Will start empiric Zosyn course and give x1 dose of Vancomycin given recent hospitalization to cover for HCAP. Start Remdesivir/Decadron courses for COVID-19.   -ABX use and/or discontinuation based on discussion with ID in conjunction with clinical features, culture data, and judicious procalcitonin monitoring.    HEME:   -Start Full Dose Lovenox for hx of Afib.     GOC: DNR/DNI.  DISPO: Case and plan discussed with ICU attending, Dr. Love. Will admit to MICU.

## 2023-11-07 NOTE — ED ADULT NURSE REASSESSMENT NOTE - NS ED NURSE REASSESS COMMENT FT1
Pt received @ shift change. Pt A&Ox0, rales noted worse on the left side, pt on BiPAP. Pt remains on monitor. IV sites assessed - dry intact transparent, flushing well, no abnormalities noted. Pt on COVID precautions. Pt appears conformable @ this time. Pt has Levo @ 0.04.

## 2023-11-07 NOTE — ED ADULT TRIAGE NOTE - CHIEF COMPLAINT QUOTE
pt BIBA from home for reports of AMS, hypotension and hypoxia. pt responsive to pain on arrival, no distress, 100% NRB in place. pt with DNR.

## 2023-11-07 NOTE — ED PROVIDER NOTE - ATTENDING CONTRIBUTION TO CARE
I performed a history and physical exam of the patient and discussed their management with the resident. I reviewed the resident's note and agree with the documented findings and plan of care. My medical decision making and observations are found below.    67yo male with PMH CVA, minimally verbal at baseline but able to follow simple commands presenting with weakness. Patient noted to be hypotensive and hypoxic upon EMS arrival, came to ED on NRB. Of note- patient recently admitted to [facility] with pneumonia and discharged home yesterday. History obtained from patient's wife as patient unable to provide history- patient DNR/DNI, amenable to other medical interventions including vasopressors. On exam- patient tachypneic, ill appearing, awake, alert, not following commands. Diminished breath sounds L lung. Sepsis orders placed including 30cc/kg of NS, zosyn, ofirmev. Lactate elevated concerning for severe sepsis. Due to poor mentation patient briefly placed on HFNC as patient aspiration risk, however due to minimal improvement patient placed on BIPAP with improvement in oxygenation and WOB. Patient then developed sudden onset worsening hypoxia and hypotension, with diffuse rales concerning for flash pulmonary edema and fluids were then paused. Patient started on levophed with good response. Lactate trended down. CXR showed infiltrate L base.  Noted to be COVID+. Patient seen by MICU and admitted for severe sepsis likely secondary to aspiration pneumonia and/or COVID pneumonia.

## 2023-11-07 NOTE — H&P ADULT - HISTORY OF PRESENT ILLNESS
Patient is a 68y old  Male who presents with a chief complaint of     BRIEF HOSPITAL COURSE:   66M hx CVA (minimally verbally responsive at baseline), AF on warfarin, MDT ICD, CKD, HFrEF (35-40% in 2021) presents from home with decreased responsiveness. EMS noted pt to be hypoxic and tachycardic, rectal temp on arrival with 101.2F.   In ER, labs significant for WBC 21.42K with left shift (19.94), Lactate 4.8. VBG WNL. COVID+. CXR with left-side PNA. Patient was initially on HFNC, however was then transitioned to NIPPV due to WOB. Hypotensive and was not given full 30cc/kg IVF bolus due to underlying reduced EF and subsequently placed on Levophed Infusion. MICU consulted for further management.   Patient opens eyes to verbal stimuli, however otherwise not able to provide ROS. Currently on NIPPV 14/6 100% FiO2.     Of note, per report wife at bedside stated pt was just discharged from [facility] yesterday where he was treated for multiple infections including pneumonia and "a GI infection"    PAST MEDICAL & SURGICAL HISTORY:  CVA (cerebral vascular accident)  Hypertension  Hyperlipidemia  Cardiomyopathy  RBBB  Atrial fibrillation  Thrombus of left atrial appendage  AICD (automatic cardioverter/defibrillator) present  [place] filter in place  no h/o dvt and as per wife  it was placed as a precaution after cva.  AICD (automatic cardioverter/defibrillator) present    Review of Systems:  Due to baseline mentation, subjective information was not able to be obtained from the patient. History was obtained, to the extent possible, from review of the chart and collateral sources of information.     Medications:  piperacillin/tazobactam IVPB.. 3.375 Gram(s) IV Intermittent once  norepinephrine Infusion 0.02 MICROgram(s)/kG/Min IV Continuous <Continuous>    ICU Vital Signs Last 24 Hrs  T(C): 38.4 (07 Nov 2023 16:10), Max: 38.4 (07 Nov 2023 16:10)  T(F): 101.2 (07 Nov 2023 16:10), Max: 101.2 (07 Nov 2023 16:10)  HR: 61 (07 Nov 2023 19:25) (61 - 114)  BP: 98/61 (07 Nov 2023 19:25) (80/50 - 122/83)  BP(mean): 70 (07 Nov 2023 19:11) (70 - 87)  ABP: --  ABP(mean): --  RR: 24 (07 Nov 2023 19:25) (18 - 24)  SpO2: 98% (07 Nov 2023 19:25) (94% - 98%)  O2 Parameters below as of 07 Nov 2023 19:25  Patient On (Oxygen Delivery Method): BiPAP/CPAP    I&O's Detail    LABS:                      12.5   21.42 )-----------( 333      ( 07 Nov 2023 16:05 )             39.3     11-07  137  |  101  |  61.6<H>  ----------------------------<  234<H>  4.2   |  26.0  |  1.15  Ca    9.0      07 Nov 2023 16:05  TPro  6.5<L>  /  Alb  2.1<L>  /  TBili  0.8  /  DBili  x   /  AST  20  /  ALT  23  /  AlkPhos  172<H>  11-07    CAPILLARY BLOOD GLUCOSE  POCT Blood Glucose.: 194 mg/dL (07 Nov 2023 16:03)    PT/INR - ( 07 Nov 2023 16:05 )   PT: 20.3 sec;   INR: 1.86 ratio     PTT - ( 07 Nov 2023 16:05 )  PTT:33.0 sec    Urinalysis Basic - ( 07 Nov 2023 16:05 )  Color: x / Appearance: x / SG: x / pH: x  Gluc: 234 mg/dL / Ketone: x  / Bili: x / Urobili: x   Blood: x / Protein: x / Nitrite: x   Leuk Esterase: x / RBC: x / WBC x   Sq Epi: x / Non Sq Epi: x / Bacteria: x    CULTURES:  Rapid RVP Result: Detected (11-07-23 @ 16:05)    Physical Examination:  General: +NIPPV.  HEENT: PERRL.  NECK: Supple.   PULM: Course BS at bases bilaterally (L>R).  CVS: s1/s2.  ABD: Soft, nondistended, nontender, normoactive bowel sounds.  EXT: Bilateral trace LE edema, nontender.  SKIN: Warm.    RADIOLOGY:   < from: Xray Chest 1 View-PORTABLE IMMEDIATE (11.07.23 @ 17:18) >  ACC: [id number] EXAM:  XR CHEST PORTABLE IMMED 1V   ORDERED BY: GORDON BOND   PROCEDURE DATE:  11/07/2023   IMPRESSION:  1. Left lower lobe consolidation withatelectasis favored over infiltrate.  2. Small left pleural effusion.  3. Multilead pacemaker/AICD, without pulmonary edema.  4. Gastric dilatation.    CRITICAL CARE TIME SPENT:  40 minutes of critical care time spent providing medical care for patient's acute illness/conditions that impairs at least one vital organ system and/or poses a high risk of imminent or life threatening deterioration in the patient's condition. It includes time spent evaluating and treating the patient's acute illness as well as time spent reviewing labs, radiology, discussing goals of care with patient and/or patient's family, and discussing the case with a multidisciplinary team, in an effort to prevent further life threatening deterioration or end organ damage. This time is independent of any procedures performed.    Date of entry of this note is equal to the date of services rendered.  Patient is a 68y old  Male who presents with a chief complaint of     BRIEF HOSPITAL COURSE:   66M PMHx CVA (minimally verbally responsive at baseline), AF (on Warfarin) s/p MDT ICD, CKD, HFrEF (35-40% in 2021) presents from home with decreased responsiveness. EMS noted pt to be hypoxic and tachycardic, rectal temp on arrival with 101.2F.   In ER, labs significant for WBC 21.42K with left shift (19.94), Lactate 4.8. VBG WNL. COVID+. CXR with left-side PNA. Patient was initially on HFNC, however was then transitioned to NIPPV due to WOB. Hypotensive and was not given full 30cc/kg IVF bolus due to underlying reduced EF and subsequently placed on Levophed Infusion. MICU consulted for further management.   Patient opens eyes to verbal stimuli, however otherwise not able to provide ROS. Currently on NIPPV 14/6 100% FiO2.     Of note, per report wife at bedside stated pt was just discharged from [facility] yesterday where he was treated for multiple infections including pneumonia and "a GI infection"    PAST MEDICAL & SURGICAL HISTORY:  CVA (cerebral vascular accident)  Hypertension  Hyperlipidemia  Cardiomyopathy  RBBB  Atrial fibrillation  Thrombus of left atrial appendage  AICD (automatic cardioverter/defibrillator) present  [place] filter in place  no h/o dvt and as per wife  it was placed as a precaution after cva.  AICD (automatic cardioverter/defibrillator) present    Review of Systems:  Due to baseline mentation, subjective information was not able to be obtained from the patient. History was obtained, to the extent possible, from review of the chart and collateral sources of information.     Medications:  piperacillin/tazobactam IVPB.. 3.375 Gram(s) IV Intermittent once  norepinephrine Infusion 0.02 MICROgram(s)/kG/Min IV Continuous <Continuous>    ICU Vital Signs Last 24 Hrs  T(C): 38.4 (07 Nov 2023 16:10), Max: 38.4 (07 Nov 2023 16:10)  T(F): 101.2 (07 Nov 2023 16:10), Max: 101.2 (07 Nov 2023 16:10)  HR: 61 (07 Nov 2023 19:25) (61 - 114)  BP: 98/61 (07 Nov 2023 19:25) (80/50 - 122/83)  BP(mean): 70 (07 Nov 2023 19:11) (70 - 87)  ABP: --  ABP(mean): --  RR: 24 (07 Nov 2023 19:25) (18 - 24)  SpO2: 98% (07 Nov 2023 19:25) (94% - 98%)  O2 Parameters below as of 07 Nov 2023 19:25  Patient On (Oxygen Delivery Method): BiPAP/CPAP    I&O's Detail    LABS:                      12.5   21.42 )-----------( 333      ( 07 Nov 2023 16:05 )             39.3     11-07  137  |  101  |  61.6<H>  ----------------------------<  234<H>  4.2   |  26.0  |  1.15  Ca    9.0      07 Nov 2023 16:05  TPro  6.5<L>  /  Alb  2.1<L>  /  TBili  0.8  /  DBili  x   /  AST  20  /  ALT  23  /  AlkPhos  172<H>  11-07    CAPILLARY BLOOD GLUCOSE  POCT Blood Glucose.: 194 mg/dL (07 Nov 2023 16:03)    PT/INR - ( 07 Nov 2023 16:05 )   PT: 20.3 sec;   INR: 1.86 ratio     PTT - ( 07 Nov 2023 16:05 )  PTT:33.0 sec    Urinalysis Basic - ( 07 Nov 2023 16:05 )  Color: x / Appearance: x / SG: x / pH: x  Gluc: 234 mg/dL / Ketone: x  / Bili: x / Urobili: x   Blood: x / Protein: x / Nitrite: x   Leuk Esterase: x / RBC: x / WBC x   Sq Epi: x / Non Sq Epi: x / Bacteria: x    CULTURES:  Rapid RVP Result: Detected (11-07-23 @ 16:05)    Physical Examination:  General: +NIPPV.  HEENT: PERRL.  NECK: Supple.   PULM: Course BS at bases bilaterally (L>R).  CVS: s1/s2.  ABD: Soft, nondistended, nontender, normoactive bowel sounds.  EXT: Bilateral trace LE edema, nontender.  SKIN: Warm.    RADIOLOGY:   < from: Xray Chest 1 View-PORTABLE IMMEDIATE (11.07.23 @ 17:18) >  ACC: [id number] EXAM:  XR CHEST PORTABLE IMMED 1V   ORDERED BY: GORDON BOND   PROCEDURE DATE:  11/07/2023   IMPRESSION:  1. Left lower lobe consolidation withatelectasis favored over infiltrate.  2. Small left pleural effusion.  3. Multilead pacemaker/AICD, without pulmonary edema.  4. Gastric dilatation.    CRITICAL CARE TIME SPENT:  40 minutes of critical care time spent providing medical care for patient's acute illness/conditions that impairs at least one vital organ system and/or poses a high risk of imminent or life threatening deterioration in the patient's condition. It includes time spent evaluating and treating the patient's acute illness as well as time spent reviewing labs, radiology, discussing goals of care with patient and/or patient's family, and discussing the case with a multidisciplinary team, in an effort to prevent further life threatening deterioration or end organ damage. This time is independent of any procedures performed.    Date of entry of this note is equal to the date of services rendered.  Patient is a 68y old  Male who presents with a chief complaint of     BRIEF HOSPITAL COURSE:   66M PMHx CVA (minimally verbally responsive at baseline), AF (on Warfarin) s/p MDT ICD, CKD, HFrEF (35-40% in 2021) presents from home with decreased responsiveness. EMS noted pt to be hypoxic and tachycardic, rectal temp on arrival with 101.2F.   In ER, labs significant for WBC 21.42K with left shift (19.94), Lactate 4.8. VBG WNL. COVID+. CXR with left-side PNA. Patient was initially on HFNC, however was then transitioned to NIPPV due to WOB. Hypotensive and was not given full 30cc/kg IVF bolus due to underlying reduced EF and subsequently placed on Levophed Infusion. MICU consulted for further management.   Patient opens eyes to verbal stimuli, however otherwise not able to provide ROS. Currently on NIPPV 14/6 100% FiO2.     Of note, per report wife at bedside stated pt was just discharged from [facility] yesterday where he was treated for multiple infections including pneumonia and "a GI infection."    PAST MEDICAL & SURGICAL HISTORY:  CVA (cerebral vascular accident)  Hypertension  Hyperlipidemia  Cardiomyopathy  RBBB  Atrial fibrillation  Thrombus of left atrial appendage  AICD (automatic cardioverter/defibrillator) present  [place] filter in place  no h/o dvt and as per wife  it was placed as a precaution after cva.  AICD (automatic cardioverter/defibrillator) present    Review of Systems:  Due to baseline mentation, subjective information was not able to be obtained from the patient. History was obtained, to the extent possible, from review of the chart and collateral sources of information.     Medications:  piperacillin/tazobactam IVPB.. 3.375 Gram(s) IV Intermittent once  norepinephrine Infusion 0.02 MICROgram(s)/kG/Min IV Continuous <Continuous>    ICU Vital Signs Last 24 Hrs  T(C): 38.4 (07 Nov 2023 16:10), Max: 38.4 (07 Nov 2023 16:10)  T(F): 101.2 (07 Nov 2023 16:10), Max: 101.2 (07 Nov 2023 16:10)  HR: 61 (07 Nov 2023 19:25) (61 - 114)  BP: 98/61 (07 Nov 2023 19:25) (80/50 - 122/83)  BP(mean): 70 (07 Nov 2023 19:11) (70 - 87)  ABP: --  ABP(mean): --  RR: 24 (07 Nov 2023 19:25) (18 - 24)  SpO2: 98% (07 Nov 2023 19:25) (94% - 98%)  O2 Parameters below as of 07 Nov 2023 19:25  Patient On (Oxygen Delivery Method): BiPAP/CPAP    I&O's Detail    LABS:                      12.5   21.42 )-----------( 333      ( 07 Nov 2023 16:05 )             39.3     11-07  137  |  101  |  61.6<H>  ----------------------------<  234<H>  4.2   |  26.0  |  1.15  Ca    9.0      07 Nov 2023 16:05  TPro  6.5<L>  /  Alb  2.1<L>  /  TBili  0.8  /  DBili  x   /  AST  20  /  ALT  23  /  AlkPhos  172<H>  11-07    CAPILLARY BLOOD GLUCOSE  POCT Blood Glucose.: 194 mg/dL (07 Nov 2023 16:03)    PT/INR - ( 07 Nov 2023 16:05 )   PT: 20.3 sec;   INR: 1.86 ratio     PTT - ( 07 Nov 2023 16:05 )  PTT:33.0 sec    Urinalysis Basic - ( 07 Nov 2023 16:05 )  Color: x / Appearance: x / SG: x / pH: x  Gluc: 234 mg/dL / Ketone: x  / Bili: x / Urobili: x   Blood: x / Protein: x / Nitrite: x   Leuk Esterase: x / RBC: x / WBC x   Sq Epi: x / Non Sq Epi: x / Bacteria: x    CULTURES:  Rapid RVP Result: Detected (11-07-23 @ 16:05)    Physical Examination:  General: +NIPPV.  HEENT: PERRL.  NECK: Supple.   PULM: Course BS at bases bilaterally (L>R).  CVS: s1/s2.  ABD: Soft, nondistended, nontender, normoactive bowel sounds.  EXT: Bilateral trace LE edema, nontender.  SKIN: Warm.    RADIOLOGY:   < from: Xray Chest 1 View-PORTABLE IMMEDIATE (11.07.23 @ 17:18) >  ACC: [id number] EXAM:  XR CHEST PORTABLE IMMED 1V   ORDERED BY: GORDON BODN   PROCEDURE DATE:  11/07/2023   IMPRESSION:  1. Left lower lobe consolidation withatelectasis favored over infiltrate.  2. Small left pleural effusion.  3. Multilead pacemaker/AICD, without pulmonary edema.  4. Gastric dilatation.    CRITICAL CARE TIME SPENT:  40 minutes of critical care time spent providing medical care for patient's acute illness/conditions that impairs at least one vital organ system and/or poses a high risk of imminent or life threatening deterioration in the patient's condition. It includes time spent evaluating and treating the patient's acute illness as well as time spent reviewing labs, radiology, discussing goals of care with patient and/or patient's family, and discussing the case with a multidisciplinary team, in an effort to prevent further life threatening deterioration or end organ damage. This time is independent of any procedures performed.    Date of entry of this note is equal to the date of services rendered.

## 2023-11-07 NOTE — PHARMACOTHERAPY INTERVENTION NOTE - COMMENTS
Spoke to family at bedside to obtain medication list. On digoxin 0.125 mG every other day, last dose this morning 10am. Also on warfarin 2.5 mG once daily, last dose 11/6/23 @ 18:00. Outpatient Medication Review updated.    HOME MEDICATIONS:  carvedilol 3.125 mg oral tablet: 1 tab(s) orally 2 times a day (07 Nov 2023 18:15)  digoxin 125 mcg (0.125 mg) oral tablet: 1 tab(s) orally every other day (07 Nov 2023 18:15)  pantoprazole 40 mg oral delayed release tablet: 1 tab(s) orally once a day (07 Nov 2023 18:15)  warfarin 2.5 mg oral tablet: 1 tab(s) orally once a day (07 Nov 2023 18:15)

## 2023-11-07 NOTE — ED PROVIDER NOTE - PHYSICAL EXAMINATION
General: Awake, alert, lying in bed in NAD  HEENT: Normocephalic, atraumatic. No scleral icterus or conjunctival injection. EOMI. Moist mucous membranes. Oropharynx clear.   Neck:. Soft and supple.  Cardiac: Tachy but regular, Peripheral pulses 2+ and symmetric. Trace LE edema.  Resp: UNIQUE crackles. No accessory muscle use  Abd: Soft, non-tender, non-distended. No guarding, rebound, or rigidity.  Back: Superficial sacral erythema. Spine midline and non-tender.   Skin: No rashes, abrasions, or lacerations.  Neuro: Makes eye contact, not speaking, follows basic commands

## 2023-11-07 NOTE — ED PROVIDER NOTE - OBJECTIVE STATEMENT
66M hx CVA, AF on warfarin, MDT ICD, CKD, CHF presents from home with decreased responsiveness. EMS noted pt to be hypoxic and tachycardic and warm to touch. Rectal temp on arrival with 101.2F. Pt baseline minimally verbal, presently not offering complaints although following commands. 66M hx CVA, AF on warfarin, MDT ICD, CKD, CHF presents from home with decreased responsiveness. EMS noted pt to be hypoxic and tachycardic and warm to touch. Rectal temp on arrival with 101.2F. Pt baseline minimally verbal, presently not offering complaints although following commands. Wife at bedside state pt was just discharged from [facility] yesterday where he was treated for multiple infections including pneumonia and "a GI infection". Pt is DNI/DNR

## 2023-11-07 NOTE — ED ADULT NURSE NOTE - NSFALLHARMRISKINTERV_ED_ALL_ED

## 2023-11-07 NOTE — H&P ADULT - CRITICAL CARE ATTENDING COMMENT
Pt seen w PA and agree w above.  Pt  w ams, baseline poor functional status.  Requiring some pressor support.. taper levophed  as tolerated to maintain MAP >65. +COVID, start rdv and decadron. Empiric abx. Continue bipap. Pt is DNR/DNI.

## 2023-11-07 NOTE — ED ADULT NURSE NOTE - OBJECTIVE STATEMENT
Pt wife assessed altered mental status, minimally responsive at baseline, nearly nonresponsive, Pt presented with swelling in groin, hypoxia, and fever.

## 2023-11-07 NOTE — ED PROVIDER NOTE - PROGRESS NOTE DETAILS
Pt acutely became hypoxic to upper 70s, with diffuse rales on auscultation. Rpt BP similar to prior. Fluids and abx stopped temporarily. Bedside US shows hyperdynamic cardiac activity, left lung with diffuse B lines, right lung A line dominant. Clinically with concern for acute pulmonary edema although ultrasound results do not support so. Will place on HFNC and obtain CXR Once pt is stablized will resume abx. Wife at bedside reports a prior to reaction to penicillins which was nausea, will resume Zosyn. Darrin Paul MD CXR unchanged from prior. Pt with minimal improvement on HFNC, although satting much better on BiPAP with improvement in work of breathing. BP noted to be low - pt only receievd about 750cc bolus thus far but in setting of CHF and acute crackles, will initiate low dose levophed and consult MICU. COVID+ result noted. Wife at bedside note pt was exposed to COVID+ pt during his hospitalization over 1 week ago. Darrin Paul MD Will order contrast CT studies. Wife reported a reaction to contrast in the past - nausea without hives or SOB. Will proceed with contrast studies. Darrin Paul MD

## 2023-11-08 NOTE — PROGRESS NOTE ADULT - CRITICAL CARE ATTENDING COMMENT
greater than 50% of time spent reviewing labs, notes, orders and radiographs, coordinating care  discussed with nursing, MICU resident, students  patient actively on pressor, titrating, and actively managing bipap, high risk for decompensation

## 2023-11-08 NOTE — CONSULT NOTE ADULT - SUBJECTIVE AND OBJECTIVE BOX
RUSH MARTINSHEREEN  [id number]      HPI:   67 y/o male PMHx CAD, CVA (minimally verbally responsive at baseline), AF (on Warfarin) s/p MDT ICD, CKD, ICM with chronic HFrEF (EF 35-40%) presents from home with decreased responsiveness.  EMS noted pt to be hypoxic and tachycardic, rectal temp on arrival with 101.2F.  In ER labs significant for WBC 21.42, Lactate 4.8. COVID+.  CXR with left-side PNA.  Patient was initially on HFNC, however was then transitioned to NIPPV due to high work of breathing. Patient noted to be hypotensive and was given some IVF and subsequently placed on Levophed Infusion.  Patient is lethargic but responds to some commands.  History is taken from the chart, d/w care providers and patients wife.  Per his wife pt was just discharged from [facility] yesterday where he was treated for multiple infections including pneumonia and "a GI infection."      PAST MEDICAL & SURGICAL HISTORY:  Hypertension  Hyperlipidemia  RBBB  Thrombus of left atrial appendage  [place] filter in place  Otherwise as noted above    AICD (automatic cardioverter/defibrillator) present  ALLERGIES:  latex (Rash)  IV Contrast (Other)      MEDICATIONS (HOME):  Home Medications:  carvedilol 3.125 mg oral tablet: 1 tab(s) orally 2 times a day (07 Nov 2023 20:38)  digoxin 125 mcg (0.125 mg) oral tablet: 1 tab(s) orally every other day (07 Nov 2023 18:15)  pantoprazole 40 mg oral delayed release tablet: 1 tab(s) orally once a day (07 Nov 2023 18:15)  warfarin 2.5 mg oral tablet: 1 tab(s) orally once a day (07 Nov 2023 20:38)      SOCIAL HISTORY:  Patient does not use alcohol, tobacco, drugs    FAMILY HISTORY:  Family history of cerebrovascular accident (CVA)      ROS:  Unable to obtain due to AMS      PHYSICAL EXAM:  Vital Signs Last 24 Hrs  T(C): 37.5 (08 Nov 2023 17:00), Max: 37.5 (08 Nov 2023 14:45)  T(F): 99.5 (08 Nov 2023 17:00), Max: 99.5 (08 Nov 2023 14:45)  HR: 113 (08 Nov 2023 17:19) (61 - 134)  BP: 101/67 (08 Nov 2023 17:00) (75/63 - 130/82)  BP(mean): 79 (08 Nov 2023 17:00) (69 - 119)  RR: 21 (08 Nov 2023 17:00) (14 - 50)  SpO2: 97% (08 Nov 2023 17:19) (86% - 100%)  O2 Concentration (%): 50  General: Comfortable on BiPAP, lethargic, responds to some commands  HEENT: NCAT  Neck: ?JVD, no carotid bruits  CVS: nl s1, split s2, distant  Chest: Diminished b/l  Abdomen: soft, nt/nd  Extremities: No c/c/e  Neuro: Lethargic, responds to some commands  Psych: Calm      ECG:  AF with RBBB/LAFB with intermittent V-pacing.  Non-specific T-wave changes    LABS:                        11.1   28.16 )-----------( 259      ( 08 Nov 2023 03:40 )             34.4     11-08    136  |  101  |  62.2<H>  ----------------------------<  243<H>  4.0   |  19.0<L>  |  1.07    Ca    8.1<L>      08 Nov 2023 13:27  Phos  3.1     11-08  Mg     2.0     11-08    TPro  6.5<L>  /  Alb  2.1<L>  /  TBili  0.8  /  DBili  x   /  AST  20  /  ALT  23  /  AlkPhos  172<H>  11-07        PT/INR - ( 07 Nov 2023 16:05 )   PT: 20.3 sec;   INR: 1.86 ratio         PTT - ( 07 Nov 2023 16:05 )  PTT:33.0 sec      RADIOLOGY:  CT C/A/P:  Multifocal pneumonia with complete left lower lobe consolidation.  Marked gastric distention. No small bowel obstruction.  Rectal distention with stool with mild perirectal edema. Correlate for   stercoral proctitis.    Echo:  1. Technically difficult study.   2. Endocardial visualization was enhanced with intravenous echo contrast.   3. Left ventricular ejection fraction, by visual estimation, is 20 to 25%.   4. Severely decreased global left ventricular systolic function.   5. The mitral in-flow pattern reveals no discernable A-wave, therefore   no comment on diastolic function can be made.   6. Normal left ventricular internal cavity size.   7. Moderately enlarged right ventricle. Severely reduced RV systolic function.   8. Severely enlarged left atrium.   9. Moderately enlarged right atrium.  10. Mild mitral valve regurgitation.  11. Mild thickening of the anterior and posterior mitral valve leaflets.  12. Sclerotic aortic valve with normal opening.  13. Mild to moderate aortic regurgitation.  14. Mild-moderate tricuspid regurgitation.  15. Mild pulmonic valve regurgitation.  16. There is no evidence of pericardial effusion.  17. Estimated pulmonary artery systolic pressure is 73.7 mmHg assuming a right atrial pressure of 10 mmHg, which is consistent with severe   pulmonary hypertension.      Assessment:  67 y/o male PMHx CAD, CVA (minimally verbally responsive at baseline), AF (on Warfarin) s/p MDT ICD, CKD, ICM with chronic HFrEF (EF 35-40%) presents from home with decreased responsiveness.  EMS noted pt to be hypoxic and tachycardic, rectal temp on arrival with 101.2F.  Workup c/w multifocal PNA with COVID and VRE UTI.  Noted in AF with some RVR 2/2 infection and fever.  Echo with drop in EF likely 2/2 infection and andrenergic tone.  BNP very elevated.  Still on BiPAP but not hypoxic.  Agree with IV Lasix as BP tolerates.  Currently off pressors.  Primarily needs treatment for infections.    Plan:  1. Care per ICU.  2. Abx per ID.  3. Lasix 40mg IV up to BID as BP tolerates.  4. Complicated hemodynamics, use VBGs.  5. No additional CV testing now.  6. Pressors as needed.  7. Would continue Dig for AF rate control, level low.  8. No objection to Amio, highly unlikely will convert but doubt efficacy.  9. Coumadin to INR 2-3.    D/w Dr. Hernández and RN.  Will follow.  Thanks!

## 2023-11-08 NOTE — PATIENT PROFILE ADULT - FALL HARM RISK - RISK INTERVENTIONS

## 2023-11-08 NOTE — PROGRESS NOTE ADULT - ATTENDING COMMENTS
acute hypoxic respiratory failure  septic shock  multifocal pneumonia, hospital associated pneumonia  worsening reduction in EF   VRE   COVID pneumonia  metabolic encephalopathy  dementia  CVA   afib on warfarin   HRfEF s/p ICD   CKD     given worsening lactate and respiratory status, will start lasix  lopressor for HR control  continue pressor   continue bipap for respiratory status  continue covid treatment  continue antibiotics, pending ID consult for VRE     discussed with Wife,  she hopes to get patient better to a point to go home, but she is DNR.

## 2023-11-08 NOTE — PROGRESS NOTE ADULT - ASSESSMENT
NEURO:  - Below baseline mentation per spouse  - Monitor mental status     CV:   -Started on Levophed Infusion, maintain MAP >65  -Official TTE ordered. Left ventricular ejection fraction, by visual estimation, is 20 to 25%. decreased from 40% at [facility]. BNP elevated will give 40mg lasix now and consult cards      PULM:   -On NIPPV 14/6 50% FiO2 - actively titrating to maintain goal SpO2 >88%.   -Incentive spirometry, Chest PT/Pulmonary toilet, HOB >30'.     GI:   -NPO.       RENAL:   -SCr currently WNL.  -trend lytes/Scr daily with BMP  -I's and O's, goal UOP 0.5 cc/kg/hr    ENDO:   -Aggressive glycemic control to limit FS glucose to <180mg/dl.   -Keep Euthyroid.     ID:   -Initially started on vanc/zosyn for empiric coverage of pneumonia. Start Remdesivir/Decadron courses for COVID-19.   - + VRE on UA from 11/1 at [facility] will call ID for consult re antibiotic coverage    HEME:   - Lovenox 50mg bid for hx of afib    Dw Dr Hernandez

## 2023-11-08 NOTE — PROGRESS NOTE ADULT - SUBJECTIVE AND OBJECTIVE BOX
66M PMHx CVA (minimally verbally responsive at baseline), AF (on Warfarin) s/p MDT ICD, CKD, HFrEF (35-40% in ) presents from home with decreased responsiveness. EMS noted pt to be hypoxic and tachycardic, rectal temp on arrival with 101.2F.   In ER, labs significant for WBC 21.42K with left shift (19.94), Lactate 4.8. VBG WNL. COVID+. CXR with left-side PNA. Patient was initially on HFNC, however was then transitioned to NIPPV due to WOB. Hypotensive and was not given full 30cc/kg IVF bolus due to underlying reduced EF and subsequently placed on Levophed Infusion. MICU consulted for further management.   Patient opens eyes to verbal stimuli, however otherwise not able to provide ROS. Currently on NIPPV  100% FiO2.     Of note, per report wife at bedside stated pt was just discharged from [facility] yesterday where he was treated for multiple infections including pneumonia and "a GI infection."    At [facility] he was noted to have VRSA UTI, sensitive to cipro, levo, linezolid, tetracycline. 10/30 COVID + >  respiratory swab covid neg. He was treated with meropenem    PAST MEDICAL & SURGICAL HISTORY:  CVA (cerebral vascular accident)      Hypertension      Hyperlipidemia      Cardiomyopathy      RBBB      Atrial fibrillation      Thrombus of left atrial appendage      AICD (automatic cardioverter/defibrillator) present      [place] filter in place  no h/o dvt and as per wife  it was placed as a precaution after cva.      AICD (automatic cardioverter/defibrillator) present        RUSH CARDENAS   68y    Male    BRIEF HOSPITAL COURSE:    Review of Systems:                       All other ROS are negative.    Allergies    latex (Rash)  IV Contrast (Other)    Intolerances      Weight (kg): 47.1 (23 @ 01:01)  BMI (kg/m2): 16.8 (23 @ 01:01)    ICU Vital Signs Last 24 Hrs  T(C): 37.3 (2023 11:15), Max: 38.4 (2023 16:10)  T(F): 99.1 (2023 11:15), Max: 101.2 (2023 16:10)  HR: 114 (2023 12:26) (61 - 134)  BP: 100/82 (2023 12:00) (80/50 - 130/82)  BP(mean): 90 (2023 12:00) (70 - 103)  ABP: --  ABP(mean): --  RR: 33 (2023 12:00) (14 - 50)  SpO2: 98% (2023 12:26) (86% - 100%)    O2 Parameters below as of 2023 12:00  Patient On (Oxygen Delivery Method): BiPAP/CPAP    O2 Concentration (%): 50      Physical Examination:    General:     HEENT:     PULM:     CVS:     ABD:     EXT:     SKIN:     Neuro:          LABS:                        11.1   28.16 )-----------( 259      ( 2023 03:40 )             34.4     11-08    137  |  101  |  63.1<H>  ----------------------------<  265<H>  3.2<L>   |  22.0  |  1.06    Ca    8.1<L>      2023 03:40  Phos  3.1     11-08  Mg     2.0     11-08    TPro  6.5<L>  /  Alb  2.1<L>  /  TBili  0.8  /  DBili  x   /  AST  20  /  ALT  23  /  AlkPhos  172<H>  11-07          CAPILLARY BLOOD GLUCOSE      POCT Blood Glucose.: 274 mg/dL (2023 11:42)  POCT Blood Glucose.: 194 mg/dL (2023 16:03)    PT/INR - ( 2023 16:05 )   PT: 20.3 sec;   INR: 1.86 ratio         PTT - ( 2023 16:05 )  PTT:33.0 sec  Urinalysis Basic - ( 2023 10:15 )    Color: Yellow / Appearance: Turbid / S.022 / pH: x  Gluc: x / Ketone: Negative mg/dL  / Bili: Negative / Urobili: 0.2 mg/dL   Blood: x / Protein: 30 mg/dL / Nitrite: Negative   Leuk Esterase: Trace / RBC: 5 /HPF / WBC 12 /HPF   Sq Epi: x / Non Sq Epi: 5 /HPF / Bacteria: Few /HPF      CULTURES:  Rapid RVP Result: Detected ( @ 16:05)      Medications:  MEDICATIONS  (STANDING):  chlorhexidine 2% Cloths 1 Application(s) Topical daily  dexAMETHasone  Injectable 6 milliGRAM(s) IV Push daily  enoxaparin Injectable 50 milliGRAM(s) SubCutaneous every 12 hours  influenza  Vaccine (HIGH DOSE) 0.7 milliLiter(s) IntraMuscular once  insulin lispro (ADMELOG) corrective regimen sliding scale.   SubCutaneous every 6 hours  norepinephrine Infusion 0.02 MICROgram(s)/kG/Min (2.04 mL/Hr) IV Continuous <Continuous>  pantoprazole  Injectable 40 milliGRAM(s) IV Push daily  piperacillin/tazobactam IVPB.. 3.375 Gram(s) IV Intermittent every 8 hours  remdesivir  IVPB 100 milliGRAM(s) IV Intermittent every 24 hours    MEDICATIONS  (PRN):         @ 07:  -   @ 07:00  --------------------------------------------------------  IN: 184.6 mL / OUT: 0 mL / NET: 184.6 mL     @ :  -   @ 14:02  --------------------------------------------------------  IN: 777 mL / OUT: 470 mL / NET: 307 mL        RADIOLOGY/IMAGING/ECHO    Critical care point of care ultrasound:    Assessment/Plan:          CRITICAL CARE TIME SPENT: 37 minutes assessing presenting problems of acute illness, which pose high probability of life threatening deterioration or end organ damage/dysfunction, as well as medical decision making including initiating plan of care, reviewing data, reviewing radiologic exams, discussing with multidisciplinary team,  discussing goals of care with patient/family, and writing this note.  Non-inclusive of procedures performed,         66M PMHx CVA (minimally verbally responsive at baseline), AF (on Warfarin) s/p MDT ICD, CKD, HFrEF (35-40% in ) presents from home with decreased responsiveness. EMS noted pt to be hypoxic and tachycardic, rectal temp on arrival with 101.2F.   In ER, labs significant for WBC 21.42K with left shift (19.94), Lactate 4.8. VBG WNL. COVID+. CXR with left-side PNA. Patient was initially on HFNC, however was then transitioned to NIPPV due to WOB. Hypotensive and was not given full 30cc/kg IVF bolus due to underlying reduced EF and subsequently placed on Levophed Infusion. MICU consulted for further management.   Patient opens eyes to verbal stimuli, however otherwise not able to provide ROS. Currently on NIPPV  100% FiO2.     Of note, per report wife at bedside stated pt was just discharged from [facility] yesterday where he was treated for multiple infections including pneumonia and "a GI infection."    At [facility] he was noted to have VRSA UTI, sensitive to linezolid, tetracycline. 10/30 COVID + >  respiratory swab covid neg. He was treated with meropenem at the time for suspected aspiration pneumonia and remdesevir for COVID.     PAST MEDICAL & SURGICAL HISTORY:  CVA (cerebral vascular accident)      Hypertension      Hyperlipidemia      Cardiomyopathy      RBBB      Atrial fibrillation      Thrombus of left atrial appendage      AICD (automatic cardioverter/defibrillator) present      [place] filter in place  no h/o dvt and as per wife  it was placed as a precaution after cva.      AICD (automatic cardioverter/defibrillator) present        RUSH CARDENAS   68y    Male    BRIEF HOSPITAL COURSE:    Review of Systems:                       All other ROS are negative.    Allergies    latex (Rash)  IV Contrast (Other)    Intolerances      Weight (kg): 47.1 (23 @ 01:01)  BMI (kg/m2): 16.8 (23 @ 01:01)    ICU Vital Signs Last 24 Hrs  T(C): 37.3 (2023 11:15), Max: 38.4 (2023 16:10)  T(F): 99.1 (2023 11:15), Max: 101.2 (2023 16:10)  HR: 114 (2023 12:26) (61 - 134)  BP: 100/82 (2023 12:00) (80/50 - 130/82)  BP(mean): 90 (2023 12:00) (70 - 103)  ABP: --  ABP(mean): --  RR: 33 (2023 12:00) (14 - 50)  SpO2: 98% (2023 12:26) (86% - 100%)    O2 Parameters below as of 2023 12:00  Patient On (Oxygen Delivery Method): BiPAP/CPAP    O2 Concentration (%): 50      Physical Examination:      General: arousable, no acute distress.  HEENT: PERRL.  PULM: crackles billaterally (L>R).  CVS:rrr  ABD: Soft, nondistended, nontender, normoactive bowel sounds.  EXT: Bilateral trace LE edema, nontender.  SKIN: Warm.          LABS:                        11.1   28.16 )-----------( 259      ( 2023 03:40 )             34.4     11-08    137  |  101  |  63.1<H>  ----------------------------<  265<H>  3.2<L>   |  22.0  |  1.06    Ca    8.1<L>      2023 03:40  Phos  3.1     11-  Mg     2.0     11-08    TPro  6.5<L>  /  Alb  2.1<L>  /  TBili  0.8  /  DBili  x   /  AST  20  /  ALT  23  /  AlkPhos  172<H>  11-07          CAPILLARY BLOOD GLUCOSE      POCT Blood Glucose.: 274 mg/dL (2023 11:42)  POCT Blood Glucose.: 194 mg/dL (2023 16:03)    PT/INR - ( 2023 16:05 )   PT: 20.3 sec;   INR: 1.86 ratio         PTT - ( 2023 16:05 )  PTT:33.0 sec  Urinalysis Basic - ( 2023 10:15 )    Color: Yellow / Appearance: Turbid / S.022 / pH: x  Gluc: x / Ketone: Negative mg/dL  / Bili: Negative / Urobili: 0.2 mg/dL   Blood: x / Protein: 30 mg/dL / Nitrite: Negative   Leuk Esterase: Trace / RBC: 5 /HPF / WBC 12 /HPF   Sq Epi: x / Non Sq Epi: 5 /HPF / Bacteria: Few /HPF      CULTURES:  Rapid RVP Result: Detected ( @ 16:05)      Medications:  MEDICATIONS  (STANDING):  chlorhexidine 2% Cloths 1 Application(s) Topical daily  dexAMETHasone  Injectable 6 milliGRAM(s) IV Push daily  enoxaparin Injectable 50 milliGRAM(s) SubCutaneous every 12 hours  influenza  Vaccine (HIGH DOSE) 0.7 milliLiter(s) IntraMuscular once  insulin lispro (ADMELOG) corrective regimen sliding scale.   SubCutaneous every 6 hours  norepinephrine Infusion 0.02 MICROgram(s)/kG/Min (2.04 mL/Hr) IV Continuous <Continuous>  pantoprazole  Injectable 40 milliGRAM(s) IV Push daily  piperacillin/tazobactam IVPB.. 3.375 Gram(s) IV Intermittent every 8 hours  remdesivir  IVPB 100 milliGRAM(s) IV Intermittent every 24 hours    MEDICATIONS  (PRN):         @ :  -   @ 07:00  --------------------------------------------------------  IN: 184.6 mL / OUT: 0 mL / NET: 184.6 mL     @ :  -   @ 14:02  --------------------------------------------------------  IN: 777 mL / OUT: 470 mL / NET: 307 mL     66M PMHx CVA (minimally verbally responsive at baseline), AF (on Warfarin) s/p MDT ICD, CKD, HFrEF (35-40% in ) presents from home with decreased responsiveness. EMS noted pt to be hypoxic and tachycardic, rectal temp on arrival with 101.2F.   In ER, labs significant for WBC 21.42K with left shift (19.94), Lactate 4.8. VBG WNL. COVID+. CXR with left-side PNA. Patient was initially on HFNC, however was then transitioned to NIPPV due to WOB. Hypotensive and was not given full 30cc/kg IVF bolus due to underlying reduced EF and subsequently placed on Levophed Infusion. MICU consulted for further management.   Patient opens eyes to verbal stimuli, however otherwise not able to provide ROS. Currently on NIPPV  100% FiO2.     Of note, per report wife at bedside stated pt was just discharged from [facility] yesterday where he was treated for multiple infections including pneumonia and "a GI infection."    At [facility] he was noted to have VRE UTI, sensitive to linezolid, tetracycline. 10/30 COVID + >  respiratory swab covid neg. He was treated with meropenem at the time for suspected aspiration pneumonia and remdesevir for COVID.     PAST MEDICAL & SURGICAL HISTORY:  CVA (cerebral vascular accident)      Hypertension      Hyperlipidemia      Cardiomyopathy      RBBB      Atrial fibrillation      Thrombus of left atrial appendage      AICD (automatic cardioverter/defibrillator) present      [place] filter in place  no h/o dvt and as per wife  it was placed as a precaution after cva.      AICD (automatic cardioverter/defibrillator) present        RUSH CARDENAS   68y    Male    BRIEF HOSPITAL COURSE:    Review of Systems:                       All other ROS are negative.    Allergies    latex (Rash)  IV Contrast (Other)    Intolerances      Weight (kg): 47.1 (23 @ 01:01)  BMI (kg/m2): 16.8 (23 @ 01:01)    ICU Vital Signs Last 24 Hrs  T(C): 37.3 (2023 11:15), Max: 38.4 (2023 16:10)  T(F): 99.1 (2023 11:15), Max: 101.2 (2023 16:10)  HR: 114 (2023 12:26) (61 - 134)  BP: 100/82 (2023 12:00) (80/50 - 130/82)  BP(mean): 90 (2023 12:00) (70 - 103)  ABP: --  ABP(mean): --  RR: 33 (2023 12:00) (14 - 50)  SpO2: 98% (2023 12:26) (86% - 100%)    O2 Parameters below as of 2023 12:00  Patient On (Oxygen Delivery Method): BiPAP/CPAP    O2 Concentration (%): 50      Physical Examination:      General: arousable, no acute distress.  HEENT: PERRL.  PULM: crackles billaterally (L>R).  CVS:rrr  ABD: Soft, nondistended, nontender, normoactive bowel sounds.  EXT: Bilateral trace LE edema, nontender.  SKIN: Warm.          LABS:                        11.1   28.16 )-----------( 259      ( 2023 03:40 )             34.4     11-08    137  |  101  |  63.1<H>  ----------------------------<  265<H>  3.2<L>   |  22.0  |  1.06    Ca    8.1<L>      2023 03:40  Phos  3.1     11-  Mg     2.0     11-08    TPro  6.5<L>  /  Alb  2.1<L>  /  TBili  0.8  /  DBili  x   /  AST  20  /  ALT  23  /  AlkPhos  172<H>  11-07          CAPILLARY BLOOD GLUCOSE      POCT Blood Glucose.: 274 mg/dL (2023 11:42)  POCT Blood Glucose.: 194 mg/dL (2023 16:03)    PT/INR - ( 2023 16:05 )   PT: 20.3 sec;   INR: 1.86 ratio         PTT - ( 2023 16:05 )  PTT:33.0 sec  Urinalysis Basic - ( 2023 10:15 )    Color: Yellow / Appearance: Turbid / S.022 / pH: x  Gluc: x / Ketone: Negative mg/dL  / Bili: Negative / Urobili: 0.2 mg/dL   Blood: x / Protein: 30 mg/dL / Nitrite: Negative   Leuk Esterase: Trace / RBC: 5 /HPF / WBC 12 /HPF   Sq Epi: x / Non Sq Epi: 5 /HPF / Bacteria: Few /HPF      CULTURES:  Rapid RVP Result: Detected ( @ 16:05)      Medications:  MEDICATIONS  (STANDING):  chlorhexidine 2% Cloths 1 Application(s) Topical daily  dexAMETHasone  Injectable 6 milliGRAM(s) IV Push daily  enoxaparin Injectable 50 milliGRAM(s) SubCutaneous every 12 hours  influenza  Vaccine (HIGH DOSE) 0.7 milliLiter(s) IntraMuscular once  insulin lispro (ADMELOG) corrective regimen sliding scale.   SubCutaneous every 6 hours  norepinephrine Infusion 0.02 MICROgram(s)/kG/Min (2.04 mL/Hr) IV Continuous <Continuous>  pantoprazole  Injectable 40 milliGRAM(s) IV Push daily  piperacillin/tazobactam IVPB.. 3.375 Gram(s) IV Intermittent every 8 hours  remdesivir  IVPB 100 milliGRAM(s) IV Intermittent every 24 hours    MEDICATIONS  (PRN):         @ :  -   @ 07:00  --------------------------------------------------------  IN: 184.6 mL / OUT: 0 mL / NET: 184.6 mL     @ :  -   @ 14:02  --------------------------------------------------------  IN: 777 mL / OUT: 470 mL / NET: 307 mL

## 2023-11-09 NOTE — DISCHARGE NOTE FOR THE EXPIRED PATIENT - SECONDARY DIAGNOSIS.
Low sodium diet, less than 3000mg per day   Add dietary citrate (lemon/lime juice)   Increase urine output    Septic shock Pneumonia due to COVID-19 virus

## 2023-11-09 NOTE — DISCHARGE NOTE FOR THE EXPIRED PATIENT - HOSPITAL COURSE
67 y/o M with a h/o CVA (minimally verbally responsive at baseline), AF (on Warfarin) s/p MDT ICD, CKD, HFrEF (35-40% in 2021), admitted on 11/7 from home with decreased responsiveness. EMS noted pt to be hypoxic and tachycardic, rectal temp on arrival with 101.2F.   In ER, labs significant for WBC 21.42K with left shift (19.94), Lactate 4.8. VBG WNL. COVID+. CXR with left-side PNA. Patient was initially on HFNC, however was then transitioned to NIPPV due to WOB. Hypotensive and was not given full 30cc/kg IVF bolus due to underlying reduced EF and subsequently placed on Levophed Infusion. Patient experienced progressive respiratory failure and AMS and suffered cardiac arrest this morning. Patient is DNR/DNI and was pronounced dead by Tru Baez PA-C @ 0642 on 11/9/23. 2 attempts were made to contact the patient's wife, Nicky, however she did not answer and 2 voicemails were left. Case discussed with MICU physician, Dr. Love.      Total time spent on patient discharge: 35 mins

## 2023-11-10 LAB
CULTURE RESULTS: ABNORMAL
CULTURE RESULTS: ABNORMAL
SPECIMEN SOURCE: SIGNIFICANT CHANGE UP
SPECIMEN SOURCE: SIGNIFICANT CHANGE UP

## 2023-11-12 LAB
CULTURE RESULTS: SIGNIFICANT CHANGE UP
SPECIMEN SOURCE: SIGNIFICANT CHANGE UP

## 2023-11-13 ENCOUNTER — LABORATORY RESULT (OUTPATIENT)
Age: 68
End: 2023-11-13

## 2023-11-13 PROCEDURE — 80162 ASSAY OF DIGOXIN TOTAL: CPT

## 2023-11-13 PROCEDURE — 87899 AGENT NOS ASSAY W/OPTIC: CPT

## 2023-11-13 PROCEDURE — 82803 BLOOD GASES ANY COMBINATION: CPT

## 2023-11-13 PROCEDURE — 85730 THROMBOPLASTIN TIME PARTIAL: CPT

## 2023-11-13 PROCEDURE — 93005 ELECTROCARDIOGRAM TRACING: CPT

## 2023-11-13 PROCEDURE — 81001 URINALYSIS AUTO W/SCOPE: CPT

## 2023-11-13 PROCEDURE — 82962 GLUCOSE BLOOD TEST: CPT

## 2023-11-13 PROCEDURE — 74176 CT ABD & PELVIS W/O CONTRAST: CPT

## 2023-11-13 PROCEDURE — 87449 NOS EACH ORGANISM AG IA: CPT

## 2023-11-13 PROCEDURE — 84100 ASSAY OF PHOSPHORUS: CPT

## 2023-11-13 PROCEDURE — 85018 HEMOGLOBIN: CPT

## 2023-11-13 PROCEDURE — 84295 ASSAY OF SERUM SODIUM: CPT

## 2023-11-13 PROCEDURE — 82947 ASSAY GLUCOSE BLOOD QUANT: CPT

## 2023-11-13 PROCEDURE — 84484 ASSAY OF TROPONIN QUANT: CPT

## 2023-11-13 PROCEDURE — 84132 ASSAY OF SERUM POTASSIUM: CPT

## 2023-11-13 PROCEDURE — 87086 URINE CULTURE/COLONY COUNT: CPT

## 2023-11-13 PROCEDURE — 96375 TX/PRO/DX INJ NEW DRUG ADDON: CPT

## 2023-11-13 PROCEDURE — 83880 ASSAY OF NATRIURETIC PEPTIDE: CPT

## 2023-11-13 PROCEDURE — 94760 N-INVAS EAR/PLS OXIMETRY 1: CPT

## 2023-11-13 PROCEDURE — 85610 PROTHROMBIN TIME: CPT

## 2023-11-13 PROCEDURE — 83735 ASSAY OF MAGNESIUM: CPT

## 2023-11-13 PROCEDURE — 85025 COMPLETE CBC W/AUTO DIFF WBC: CPT

## 2023-11-13 PROCEDURE — 70450 CT HEAD/BRAIN W/O DYE: CPT | Mod: MA

## 2023-11-13 PROCEDURE — 80048 BASIC METABOLIC PNL TOTAL CA: CPT

## 2023-11-13 PROCEDURE — 94640 AIRWAY INHALATION TREATMENT: CPT

## 2023-11-13 PROCEDURE — 96374 THER/PROPH/DIAG INJ IV PUSH: CPT

## 2023-11-13 PROCEDURE — 82435 ASSAY OF BLOOD CHLORIDE: CPT

## 2023-11-13 PROCEDURE — 82330 ASSAY OF CALCIUM: CPT

## 2023-11-13 PROCEDURE — 87077 CULTURE AEROBIC IDENTIFY: CPT

## 2023-11-13 PROCEDURE — 93306 TTE W/DOPPLER COMPLETE: CPT

## 2023-11-13 PROCEDURE — 80053 COMPREHEN METABOLIC PANEL: CPT

## 2023-11-13 PROCEDURE — 85014 HEMATOCRIT: CPT

## 2023-11-13 PROCEDURE — 99291 CRITICAL CARE FIRST HOUR: CPT

## 2023-11-13 PROCEDURE — 87040 BLOOD CULTURE FOR BACTERIA: CPT

## 2023-11-13 PROCEDURE — 94660 CPAP INITIATION&MGMT: CPT

## 2023-11-13 PROCEDURE — 71045 X-RAY EXAM CHEST 1 VIEW: CPT

## 2023-11-13 PROCEDURE — 36415 COLL VENOUS BLD VENIPUNCTURE: CPT

## 2023-11-13 PROCEDURE — 83605 ASSAY OF LACTIC ACID: CPT

## 2023-11-13 PROCEDURE — 71250 CT THORAX DX C-: CPT

## 2023-11-13 PROCEDURE — 0225U NFCT DS DNA&RNA 21 SARSCOV2: CPT

## 2023-11-14 NOTE — POST DISCHARGE NOTE - ADDITIONAL DOCUMENTATION:
Patient's wife Nicky called asking about how her   and if he suffered.  I gave her information I was able to obtain from the chart. She was upset about the care he received at [facility] and felt they shouldn't have discharged him.

## 2023-11-28 NOTE — CHART NOTE - NSCHARTNOTEFT_GEN_A_CORE
Source: Patient [ ]  Family [ ]   other [x ]    Current Diet: Diet, Pureed:   Honey Consistency (HONCON)  Low Sodium  Supplement Feeding Modality:  Oral  Ensure Pudding Cans or Servings Per Day:  1       Frequency:  Two Times a day (10-03-21 @ 13:02)      Patient reports [ ] nausea  [ ] vomiting [ ] diarrhea [ ] constipation  [ ]chewing problems [ ] swallowing issues  [ ] other:     PO intake:  < 50% [x ]   50-75%  [ ]   %  [ ]  other :    Source for PO intake [ ] Patient [ ] family [ ] chart [ x] staff [ ] other      Current Weight:   (9/25) 61.2 kg    % Weight Change     Pertinent Medications: MEDICATIONS  (STANDING):  allopurinol 100 milliGRAM(s) Oral daily  ascorbic acid 500 milliGRAM(s) Oral two times a day  atorvastatin 40 milliGRAM(s) Oral at bedtime  fluticasone propionate 50 MICROgram(s)/spray Nasal Spray 1 Spray(s) Both Nostrils two times a day  influenza   Vaccine 0.5 milliLiter(s) IntraMuscular once  lactobacillus acidophilus 1 Tablet(s) Oral three times a day with meals  potassium phosphate / sodium phosphate Powder (PHOS-NaK) 1 Packet(s) Oral two times a day  senna 2 Tablet(s) Oral at bedtime  sodium bicarbonate 650 milliGRAM(s) Oral three times a day  warfarin 2 milliGRAM(s) Oral at bedtime    MEDICATIONS  (PRN):  acetaminophen   Tablet .. 650 milliGRAM(s) Oral every 6 hours PRN Temp greater or equal to 38C (100.4F)  acetaminophen   Tablet .. 650 milliGRAM(s) Oral every 6 hours PRN Moderate Pain (4 - 6)  ondansetron Injectable 4 milliGRAM(s) IV Push every 8 hours PRN Nausea and/or Vomiting  sodium chloride 0.65% Nasal 1 Spray(s) Both Nostrils every 4 hours PRN Nasal Congestion    Pertinent Labs: CBC Full  -  ( 03 Oct 2021 07:40 )  WBC Count : 14.20 K/uL  RBC Count : 4.57 M/uL  Hemoglobin : 10.7 g/dL  Hematocrit : 34.6 %  Platelet Count - Automated : 432 K/uL  Mean Cell Volume : 75.7 fl  Mean Cell Hemoglobin : 23.4 pg  Mean Cell Hemoglobin Concentration : 30.9 gm/dL  Auto Neutrophil # : x  Auto Lymphocyte # : x  Auto Monocyte # : x  Auto Eosinophil # : x  Auto Basophil # : x  Auto Neutrophil % : x  Auto Lymphocyte % : x  Auto Monocyte % : x  Auto Eosinophil % : x  Auto Basophil % : x  10-03 Na145 mmol/L Glu 90 mg/dL K+ 3.9 mmol/L Cr  1.12 mg/dL BUN 21.9 mg/dL<H> Phos n/a   Alb 2.4 g/dL<L> PAB n/a               Skin: Sacral Stage II  Edema : 2+ L hand wrist    Nutrition focused physical exam conducted - found signs of malnutrition [ ]absent [x ]present    Subcutaneous fat loss: [ x] Orbital fat pads region, [x ]Buccal fat region, [ ]Triceps region,  [ ]Ribs region    Muscle wasting: [x ]Temples region, [ x]Clavicle region, [ ]Shoulder region, [ ]Scapula region, [ ]Interosseous region,  [ ]thigh region, [ ]Calf region    Estimated Needs:   [x ] no change since previous assessment  [ ] recalculated:     Current Nutrition Diagnosis: Pt presents at risk secondary to Malnutrition moderate chronic, related to inability to consume sufficient protein-energy needs 2/2 aspiration PNA, hx CVA with residual weakness and dysphagia, bedbound, impaired skin integrity,  as evidenced by sev muscle/fat loss.       Recommendations:   1) add magic cup TID  2) Daily weight  3) diet per SLP  4) RX MVI  5) Cont Vit C and ensure pudding TID    Monitoring and Evaluation:   [ x] PO intake [x ] Tolerance to diet prescription [X] Weights  [X] Follow up per protocol [X] Labs:
pt is with fever 101.6 around 5 pm   BP 1105/76   Hr -   RR 18   Spo2 94 % on room air   blood cx , RVP covid swab, CBC are ordered   'Pt's wife  initially was upset and refused us to get testing , I explained to her in details what we are doing she agrees   Id consult called       ID consulted re abx coverage   d/w DR Olsen     Pt states that she has MOLTS form done 2016 seen few days ago   CPR and DNI   she does not want feeding tube , no ventilator , if CPR does not work will let him go then she said     emotional support given
wife requesting to talk to team re plan   I met her around 2 pm at the bedside , all questions answered  she does not wants us to do blood work daily , refused blood cx agnes ordered   I informed her about Ct scan result and the fecal impaction , treatment plan   she agrees   per her request called Dr Bean , left message to discuss pt's plan and condition     nephrology at bedside to talk to her now     will re contact  her later to update as needed
Asked to see pt because pt's wife is very upset. Wife believes pt is having some sort of reaction to a medication but she is not clear about what the symptoms the pt is displaying to be a sign of reaction, maybe coughing.  Pt vitals are stable and O2 saturation 98% on RA.  Pt appears at baseline.  Wife had me review all medications with her that pt had received today.  I explained that pt had not received his 6pm meds bc she asked the nurse not to give them to the pt.  The medications he received earlier in the day were iron supplementation and methyl prednisone. She is convinced that one of these caused a reaction.  We discussed all his prescribed medications. I explained the importance of the coreg and digoxin, and that these are not new mediations.  She agreed for pt to have these tonight, along with flonase.  She does not want pt to receive alluporinol or colchicine right now. She would like to see the pt in the morning and see how he is doing before any medications started. I gave the wife reassurance.  Pt continues to be monitored via cardiac monitor and continuous pulse ox. Wife is also aware that pt is currently without IV access.  She does not want the pt stuck multiple times. I assured her I would try to get IV with one stick, if unsuccessful we would not continue to try.
Long conversation with the pt's wife. She would not like to have IV to be restarted but after I spoke to her she agreed to try once.   She states her  has dry nose and breathes through mouth and in need of nasal spray or gel.  She also states he has Hx of Asthma and has been on nebulizer txs which had been helping. She states he does not use inhalers at home.  Pt. with dry nasal mucosa, and minimal wheezing.  Duoneb x1 and sline gel ordered.  Discussed with Respiratory therapist.  Moniot vitals, and .
Signed out from day team to f/u with xray of left wrist ordered due to swelling; to r/o possible source of infection.      < from: Xray Wrist 3 Views, Left (09.30.21 @ 19:03) >     EXAM:  XR WRIST COMP MIN 3 VIEWS LT                          PROCEDURE DATE:  09/30/2021          INTERPRETATION:  EXAMINATION: XR WRIST LEFT    CLINICAL INDICATION:r/o septic arthritis    COMPARISON: None    TECHNIQUE: Left wrist, 4 views    INTERPRETATION:  There is extensive pan carpal osteopenia as well as periarticular osteopenia at the carpometacarpal joints and at the wrist. There is no definite bony destruction. There is no soft tissue gas. There is soft tissue edema about the wrist. The carpal alignment is maintained.    IMPRESSION:    Pancarpal and periarticular osteopenia, with soft tissue edema about the wrist. Findings may be on the basis of inflammatory process, among other etiologies.    No soft tissue gas or radiographic evidence for osteomyelitis.    There is further clinical concern MRI should be obtained, if not clinically contraindicated.    --- End of Report ---      < end of copied text >    RN to elevate wrist and put warm compress prn swelling  Primary team to f/u with MRI if warranted
 used

## 2023-12-05 NOTE — POST DISCHARGE NOTE - NOTIFICATION:
Wife Nicky called again cara asking questions on how her  . She doesn't seem to recall the prior conversations we had.  When I explained the circumstances around his death, she said "I find that hard to believe, he was doing fine."  She again complained about the care he received at [facility], and that "they should have never released him, they gave him the COVID and they're responsible for him dying." Wife Nicky called again cara asking questions on how her  . She doesn't seem to recall the prior conversations we had.  When I explained the circumstances around his death, she said "I find that hard to believe, he was doing fine."  She again complained about the care he received at [facility], and that "they should have never released him, they gave him the COVID and they're responsible for him dying."

## 2023-12-21 NOTE — ED ADULT TRIAGE NOTE - INTERNATIONAL TRAVEL
DOS: 12/21/23    Pt seen in consultation for Mohit Campa, pt has been having problems with swallowing dry foods like meat, foods seems to pass slowly, occasional regurgitation. Gets stuck below level of larynx.Started noticing it a couple of years ago, but getting more severe over the past year. No problems with liquids. No problems with breathing or voice. Mild allergies. No GERD. No dust/irritant/cigarette smoke exposure, + pet exposure. + family Hx similar problems. No other aggravating or alleviating factors noted.    Past medical, family, and social history reviewed per electronic health record on today's date of service, and there were no changes.    Review of systems including hematologic/lymphatic, respiratory, cardiovascular, constitutional, musculoskeletal, mood, endocrine, eyes, gastrointestinal, genitourinary, integumentary, and neurological systems was negative.    12/21/2023    Vitals:    12/21/23 1321   BP: 97/62   Pulse: 75       On exam, patient is alert, oriented x3, well-developed, well-nourished, in no apparent distress.  Patient is attentive and responds to questions appropriately.  Voice is not hoarse.    Overall appearance of the head and neck is normal.  Facial symmetry and movement are within normal limits bilaterally, and skin is warm and dry.    Extraocular movement is preserved.    Ears:  Normal pinnae, ear canals, and tympanic membranes bilaterally.  Normal motion of the TM (tympanic membrane) on pneumatic otoscopy.  No TMJ (temporomandibular joint) tenderness.  No mastoid process tenderness.  Speech reception within normal limits.    Nose:  Normal septum, bridge, and nasal mucosa.  No sinus tenderness on percussion.    Oral cavity:  Normal lips, gums, floor of mouth, buccal mucosa, tongue, and hard palate.    Oropharynx:  Normal soft palate, posterior pharyngeal wall, tonsillar fossae and tonsils.    Neck:  No abnormal masses, thyroid masses or enlargement, lymphadenopathy, crepitus  or tenderness.  Trachea midline.    Chest:  No respiratory distress, stridor, or wheezing.    Cardiovascular:  No peripheral edema, normal peripheral perfusion, no pallor.    Neurologic:  Cranial nerve function grossly within normal limits.    Procedure: Both nasal cavities were anesthetized with topical lidocaine and Afrin nasal spray and bilateral rhinoscopies and laryngoscopy were performed.     Posterior nasal cavity including nasopharynx, adenoid beds, and eustachian tube orifices were shows enlarged adenoids with clear discharge, likely due to allergies.    Examination of the hypopharynx including base of tongue, posterior pharyngeal wall, vallecula, epiglottis, subglottis, aryepiglottic folds, false vocal cords, ventricles, true vocal cords, pyriform sinuses, and arytenoids/posterior glottic processes were within normal limits.    Assessment and Plan: globus versus esophageal dysmotility, will order esophagram to investigate, f/u after.   No

## 2025-07-15 NOTE — DISCUSSION/SUMMARY
No anterior cervical lymphadenopathy [FreeTextEntry1] : 1. Continue current cardiac meds in doses as noted above for treatment of his CAD, cardiomyopathy and CHF.  Would continue with Lasix at current dose.\par 2. Continue Coumadin at current dose with INRs checked by PCP.\par 3. Encourage continued aggressive PT and increased activity.\par 4. Regular BW with his PCP.\par 5. No additional CV testing at this time.\par 6. Encourage improved nutrition as much as possible.\par 7. Followup will be scheduled at the discretion of the patient's wife.